# Patient Record
Sex: FEMALE | Race: WHITE | Employment: OTHER | URBAN - METROPOLITAN AREA
[De-identification: names, ages, dates, MRNs, and addresses within clinical notes are randomized per-mention and may not be internally consistent; named-entity substitution may affect disease eponyms.]

---

## 2017-01-31 ENCOUNTER — OFFICE VISIT (OUTPATIENT)
Dept: INTERNAL MEDICINE CLINIC | Age: 67
End: 2017-01-31

## 2017-01-31 VITALS
SYSTOLIC BLOOD PRESSURE: 120 MMHG | BODY MASS INDEX: 41.99 KG/M2 | DIASTOLIC BLOOD PRESSURE: 82 MMHG | HEART RATE: 72 BPM | HEIGHT: 63 IN | WEIGHT: 237 LBS

## 2017-01-31 DIAGNOSIS — M54.42 CHRONIC BILATERAL LOW BACK PAIN WITH BILATERAL SCIATICA: Primary | ICD-10-CM

## 2017-01-31 DIAGNOSIS — F51.04 PSYCHOPHYSIOLOGICAL INSOMNIA: ICD-10-CM

## 2017-01-31 DIAGNOSIS — M54.41 CHRONIC BILATERAL LOW BACK PAIN WITH BILATERAL SCIATICA: Primary | ICD-10-CM

## 2017-01-31 DIAGNOSIS — F32.1 MODERATE SINGLE CURRENT EPISODE OF MAJOR DEPRESSIVE DISORDER (HCC): Chronic | ICD-10-CM

## 2017-01-31 DIAGNOSIS — F41.9 ANXIETY: ICD-10-CM

## 2017-01-31 DIAGNOSIS — G89.29 CHRONIC BILATERAL LOW BACK PAIN WITH BILATERAL SCIATICA: Primary | ICD-10-CM

## 2017-01-31 DIAGNOSIS — K21.9 GASTROESOPHAGEAL REFLUX DISEASE, ESOPHAGITIS PRESENCE NOT SPECIFIED: ICD-10-CM

## 2017-01-31 DIAGNOSIS — M17.12 PRIMARY OSTEOARTHRITIS OF LEFT KNEE: ICD-10-CM

## 2017-01-31 DIAGNOSIS — E66.01 MORBID OBESITY DUE TO EXCESS CALORIES (HCC): ICD-10-CM

## 2017-01-31 PROCEDURE — 99214 OFFICE O/P EST MOD 30 MIN: CPT | Performed by: INTERNAL MEDICINE

## 2017-01-31 RX ORDER — HYDROCODONE BITARTRATE AND ACETAMINOPHEN 5; 300 MG/1; MG/1
TABLET ORAL
Qty: 60 TABLET | Refills: 0 | Status: SHIPPED | OUTPATIENT
Start: 2017-01-31 | End: 2017-03-16 | Stop reason: SDUPTHER

## 2017-01-31 RX ORDER — GABAPENTIN 100 MG/1
100 CAPSULE ORAL 3 TIMES DAILY
Qty: 90 CAPSULE | Refills: 2 | Status: SHIPPED | OUTPATIENT
Start: 2017-01-31 | End: 2017-07-18 | Stop reason: SDUPTHER

## 2017-01-31 RX ORDER — ALPRAZOLAM 1 MG/1
TABLET ORAL
Qty: 60 TABLET | Refills: 2 | Status: SHIPPED | OUTPATIENT
Start: 2017-02-15 | End: 2017-06-22 | Stop reason: SDUPTHER

## 2017-01-31 RX ORDER — ZOLPIDEM TARTRATE 10 MG/1
TABLET ORAL
Qty: 30 TABLET | Refills: 2 | Status: SHIPPED | OUTPATIENT
Start: 2017-01-31 | End: 2017-05-30 | Stop reason: SDUPTHER

## 2017-03-16 RX ORDER — HYDROCODONE BITARTRATE AND ACETAMINOPHEN 5; 300 MG/1; MG/1
TABLET ORAL
Qty: 60 TABLET | Refills: 0 | Status: SHIPPED | OUTPATIENT
Start: 2017-03-16 | End: 2017-04-25 | Stop reason: SDUPTHER

## 2017-04-03 DIAGNOSIS — F51.04 PSYCHOPHYSIOLOGICAL INSOMNIA: ICD-10-CM

## 2017-04-03 RX ORDER — TRAZODONE HYDROCHLORIDE 50 MG/1
50 TABLET ORAL NIGHTLY
Qty: 30 TABLET | Refills: 5 | Status: SHIPPED | OUTPATIENT
Start: 2017-04-03 | End: 2017-11-28 | Stop reason: SDUPTHER

## 2017-04-25 RX ORDER — HYDROCODONE BITARTRATE AND ACETAMINOPHEN 5; 300 MG/1; MG/1
TABLET ORAL
Qty: 60 TABLET | Refills: 0 | Status: SHIPPED | OUTPATIENT
Start: 2017-04-25 | End: 2017-05-25 | Stop reason: SDUPTHER

## 2017-04-28 ENCOUNTER — CLINICAL DOCUMENTATION (OUTPATIENT)
Dept: INTERNAL MEDICINE CLINIC | Age: 67
End: 2017-04-28

## 2017-05-02 ENCOUNTER — OFFICE VISIT (OUTPATIENT)
Dept: INTERNAL MEDICINE CLINIC | Age: 67
End: 2017-05-02

## 2017-05-02 VITALS
OXYGEN SATURATION: 97 % | HEART RATE: 75 BPM | SYSTOLIC BLOOD PRESSURE: 145 MMHG | BODY MASS INDEX: 41.98 KG/M2 | DIASTOLIC BLOOD PRESSURE: 69 MMHG | WEIGHT: 237 LBS

## 2017-05-02 DIAGNOSIS — E78.2 MIXED HYPERLIPIDEMIA: ICD-10-CM

## 2017-05-02 DIAGNOSIS — K59.03 DRUG-INDUCED CONSTIPATION: ICD-10-CM

## 2017-05-02 DIAGNOSIS — Z11.59 NEED FOR HEPATITIS C SCREENING TEST: ICD-10-CM

## 2017-05-02 DIAGNOSIS — K91.2 POST-RESECTION MALABSORPTION: Chronic | ICD-10-CM

## 2017-05-02 DIAGNOSIS — G89.29 CHRONIC BILATERAL LOW BACK PAIN WITH BILATERAL SCIATICA: Primary | ICD-10-CM

## 2017-05-02 DIAGNOSIS — F51.04 PSYCHOPHYSIOLOGICAL INSOMNIA: ICD-10-CM

## 2017-05-02 DIAGNOSIS — K21.9 GASTROESOPHAGEAL REFLUX DISEASE, ESOPHAGITIS PRESENCE NOT SPECIFIED: ICD-10-CM

## 2017-05-02 DIAGNOSIS — M54.42 CHRONIC BILATERAL LOW BACK PAIN WITH BILATERAL SCIATICA: Primary | ICD-10-CM

## 2017-05-02 DIAGNOSIS — E66.01 MORBID OBESITY DUE TO EXCESS CALORIES (HCC): ICD-10-CM

## 2017-05-02 DIAGNOSIS — M54.41 CHRONIC BILATERAL LOW BACK PAIN WITH BILATERAL SCIATICA: Primary | ICD-10-CM

## 2017-05-02 DIAGNOSIS — Z11.4 SCREENING FOR HIV WITHOUT PRESENCE OF RISK FACTORS: ICD-10-CM

## 2017-05-02 DIAGNOSIS — M17.12 PRIMARY OSTEOARTHRITIS OF LEFT KNEE: ICD-10-CM

## 2017-05-02 DIAGNOSIS — F41.9 ANXIETY: ICD-10-CM

## 2017-05-02 DIAGNOSIS — F32.4 MAJOR DEPRESSIVE DISORDER WITH SINGLE EPISODE, IN PARTIAL REMISSION (HCC): Chronic | ICD-10-CM

## 2017-05-02 LAB
BASOPHILS ABSOLUTE: 0 K/UL (ref 0–0.2)
BASOPHILS RELATIVE PERCENT: 0.6 %
EOSINOPHILS ABSOLUTE: 0.1 K/UL (ref 0–0.6)
EOSINOPHILS RELATIVE PERCENT: 2.4 %
HCT VFR BLD CALC: 39.5 % (ref 36–48)
HEMOGLOBIN: 12.5 G/DL (ref 12–16)
LYMPHOCYTES ABSOLUTE: 1.2 K/UL (ref 1–5.1)
LYMPHOCYTES RELATIVE PERCENT: 24.1 %
MCH RBC QN AUTO: 31 PG (ref 26–34)
MCHC RBC AUTO-ENTMCNC: 31.7 G/DL (ref 31–36)
MCV RBC AUTO: 97.7 FL (ref 80–100)
MONOCYTES ABSOLUTE: 0.4 K/UL (ref 0–1.3)
MONOCYTES RELATIVE PERCENT: 7.3 %
NEUTROPHILS ABSOLUTE: 3.2 K/UL (ref 1.7–7.7)
NEUTROPHILS RELATIVE PERCENT: 65.6 %
PDW BLD-RTO: 14.5 % (ref 12.4–15.4)
PLATELET # BLD: 260 K/UL (ref 135–450)
PMV BLD AUTO: 8.7 FL (ref 5–10.5)
RBC # BLD: 4.05 M/UL (ref 4–5.2)
WBC # BLD: 4.8 K/UL (ref 4–11)

## 2017-05-02 PROCEDURE — 99214 OFFICE O/P EST MOD 30 MIN: CPT | Performed by: INTERNAL MEDICINE

## 2017-05-03 LAB
A/G RATIO: 1.6 (ref 1.1–2.2)
ALBUMIN SERPL-MCNC: 4 G/DL (ref 3.4–5)
ALP BLD-CCNC: 89 U/L (ref 40–129)
ALT SERPL-CCNC: 14 U/L (ref 10–40)
ANION GAP SERPL CALCULATED.3IONS-SCNC: 15 MMOL/L (ref 3–16)
AST SERPL-CCNC: 19 U/L (ref 15–37)
BILIRUB SERPL-MCNC: 0.3 MG/DL (ref 0–1)
BUN BLDV-MCNC: 8 MG/DL (ref 7–20)
CALCIUM SERPL-MCNC: 8.6 MG/DL (ref 8.3–10.6)
CHLORIDE BLD-SCNC: 107 MMOL/L (ref 99–110)
CHOLESTEROL, TOTAL: 143 MG/DL (ref 0–199)
CO2: 24 MMOL/L (ref 21–32)
CREAT SERPL-MCNC: 0.7 MG/DL (ref 0.6–1.2)
ESTIMATED AVERAGE GLUCOSE: 99.7 MG/DL
FERRITIN: 19.1 NG/ML (ref 15–150)
GFR AFRICAN AMERICAN: >60
GFR NON-AFRICAN AMERICAN: >60
GLOBULIN: 2.5 G/DL
GLUCOSE BLD-MCNC: 85 MG/DL (ref 70–99)
HBA1C MFR BLD: 5.1 %
HDLC SERPL-MCNC: 48 MG/DL (ref 40–60)
HEPATITIS C ANTIBODY INTERPRETATION: NORMAL
LDL CHOLESTEROL CALCULATED: 61 MG/DL
POTASSIUM SERPL-SCNC: 4.8 MMOL/L (ref 3.5–5.1)
SODIUM BLD-SCNC: 146 MMOL/L (ref 136–145)
TOTAL PROTEIN: 6.5 G/DL (ref 6.4–8.2)
TRIGL SERPL-MCNC: 171 MG/DL (ref 0–150)
TSH SERPL DL<=0.05 MIU/L-ACNC: 1.46 UIU/ML (ref 0.27–4.2)
VITAMIN B-12: 998 PG/ML (ref 211–911)
VITAMIN D 25-HYDROXY: 27.3 NG/ML
VLDLC SERPL CALC-MCNC: 34 MG/DL

## 2017-05-05 LAB — HIV-1 AND HIV-2 ANTIBODIES: NEGATIVE

## 2017-05-18 RX ORDER — HYDROCODONE BITARTRATE AND ACETAMINOPHEN 5; 300 MG/1; MG/1
TABLET ORAL
Qty: 60 TABLET | Refills: 0 | Status: CANCELLED | OUTPATIENT
Start: 2017-05-18

## 2017-05-25 ENCOUNTER — TELEPHONE (OUTPATIENT)
Dept: INTERNAL MEDICINE CLINIC | Age: 67
End: 2017-05-25

## 2017-05-25 RX ORDER — HYDROCODONE BITARTRATE AND ACETAMINOPHEN 5; 300 MG/1; MG/1
TABLET ORAL
Qty: 60 TABLET | Refills: 0 | Status: SHIPPED | OUTPATIENT
Start: 2017-05-25 | End: 2017-06-28 | Stop reason: SDUPTHER

## 2017-05-30 RX ORDER — ZOLPIDEM TARTRATE 10 MG/1
TABLET ORAL
Qty: 30 TABLET | Refills: 0 | Status: SHIPPED | OUTPATIENT
Start: 2017-05-30 | End: 2017-06-30 | Stop reason: SDUPTHER

## 2017-06-29 RX ORDER — HYDROCODONE BITARTRATE AND ACETAMINOPHEN 5; 300 MG/1; MG/1
TABLET ORAL
Qty: 60 TABLET | Refills: 0 | Status: SHIPPED | OUTPATIENT
Start: 2017-06-29 | End: 2017-07-28 | Stop reason: SDUPTHER

## 2017-07-03 RX ORDER — ZOLPIDEM TARTRATE 10 MG/1
TABLET ORAL
Qty: 30 TABLET | Refills: 1 | Status: SHIPPED | OUTPATIENT
Start: 2017-07-03 | End: 2017-08-22 | Stop reason: SDUPTHER

## 2017-07-12 RX ORDER — ESOMEPRAZOLE MAGNESIUM 40 MG/1
CAPSULE, DELAYED RELEASE ORAL
Qty: 30 CAPSULE | Refills: 0 | Status: SHIPPED | OUTPATIENT
Start: 2017-07-12 | End: 2017-08-22 | Stop reason: SDUPTHER

## 2017-07-12 RX ORDER — BUPROPION HYDROCHLORIDE 300 MG/1
TABLET ORAL
Qty: 90 TABLET | Refills: 0 | Status: SHIPPED | OUTPATIENT
Start: 2017-07-12 | End: 2017-10-17 | Stop reason: SDUPTHER

## 2017-07-12 RX ORDER — ESCITALOPRAM OXALATE 10 MG/1
TABLET ORAL
Qty: 90 TABLET | Refills: 0 | Status: SHIPPED | OUTPATIENT
Start: 2017-07-12 | End: 2017-10-17 | Stop reason: SDUPTHER

## 2017-07-12 RX ORDER — LEVOTHYROXINE SODIUM 0.15 MG/1
TABLET ORAL
Qty: 30 TABLET | Refills: 0 | Status: SHIPPED | OUTPATIENT
Start: 2017-07-12 | End: 2017-08-22 | Stop reason: SDUPTHER

## 2017-07-17 ENCOUNTER — CLINICAL DOCUMENTATION (OUTPATIENT)
Dept: INTERNAL MEDICINE CLINIC | Age: 67
End: 2017-07-17

## 2017-07-18 RX ORDER — GABAPENTIN 100 MG/1
CAPSULE ORAL
Qty: 90 CAPSULE | Refills: 2 | Status: SHIPPED | OUTPATIENT
Start: 2017-07-18 | End: 2017-10-23 | Stop reason: SDUPTHER

## 2017-07-28 RX ORDER — HYDROCODONE BITARTRATE AND ACETAMINOPHEN 5; 300 MG/1; MG/1
TABLET ORAL
Qty: 60 TABLET | Refills: 0 | Status: SHIPPED | OUTPATIENT
Start: 2017-07-28 | End: 2017-09-18 | Stop reason: SDUPTHER

## 2017-08-01 ENCOUNTER — TELEPHONE (OUTPATIENT)
Dept: INTERNAL MEDICINE CLINIC | Age: 67
End: 2017-08-01

## 2017-08-22 ENCOUNTER — OFFICE VISIT (OUTPATIENT)
Dept: INTERNAL MEDICINE CLINIC | Age: 67
End: 2017-08-22

## 2017-08-22 VITALS
HEART RATE: 85 BPM | OXYGEN SATURATION: 94 % | WEIGHT: 227 LBS | DIASTOLIC BLOOD PRESSURE: 70 MMHG | BODY MASS INDEX: 40.21 KG/M2 | SYSTOLIC BLOOD PRESSURE: 118 MMHG

## 2017-08-22 DIAGNOSIS — K59.03 DRUG-INDUCED CONSTIPATION: ICD-10-CM

## 2017-08-22 DIAGNOSIS — F32.4 MAJOR DEPRESSIVE DISORDER WITH SINGLE EPISODE, IN PARTIAL REMISSION (HCC): Chronic | ICD-10-CM

## 2017-08-22 DIAGNOSIS — K21.9 GASTROESOPHAGEAL REFLUX DISEASE, ESOPHAGITIS PRESENCE NOT SPECIFIED: ICD-10-CM

## 2017-08-22 DIAGNOSIS — F51.04 PSYCHOPHYSIOLOGICAL INSOMNIA: ICD-10-CM

## 2017-08-22 DIAGNOSIS — M54.42 CHRONIC BILATERAL LOW BACK PAIN WITH BILATERAL SCIATICA: Primary | ICD-10-CM

## 2017-08-22 DIAGNOSIS — M54.41 CHRONIC BILATERAL LOW BACK PAIN WITH BILATERAL SCIATICA: Primary | ICD-10-CM

## 2017-08-22 DIAGNOSIS — M17.12 PRIMARY OSTEOARTHRITIS OF LEFT KNEE: ICD-10-CM

## 2017-08-22 DIAGNOSIS — F41.9 ANXIETY: ICD-10-CM

## 2017-08-22 DIAGNOSIS — G89.29 CHRONIC BILATERAL LOW BACK PAIN WITH BILATERAL SCIATICA: Primary | ICD-10-CM

## 2017-08-22 PROCEDURE — 99214 OFFICE O/P EST MOD 30 MIN: CPT | Performed by: INTERNAL MEDICINE

## 2017-08-22 RX ORDER — ESOMEPRAZOLE MAGNESIUM 40 MG/1
CAPSULE, DELAYED RELEASE ORAL
Qty: 30 CAPSULE | Refills: 5 | Status: SHIPPED | OUTPATIENT
Start: 2017-08-22 | End: 2017-09-15

## 2017-08-22 RX ORDER — ZOLPIDEM TARTRATE 5 MG/1
TABLET ORAL
Qty: 30 TABLET | Refills: 5 | Status: SHIPPED | OUTPATIENT
Start: 2017-08-22 | End: 2018-02-19 | Stop reason: SDUPTHER

## 2017-08-22 RX ORDER — LEVOTHYROXINE SODIUM 0.15 MG/1
TABLET ORAL
Qty: 30 TABLET | Refills: 5 | Status: SHIPPED | OUTPATIENT
Start: 2017-08-22 | End: 2017-09-15

## 2017-08-22 ASSESSMENT — PATIENT HEALTH QUESTIONNAIRE - PHQ9
SUM OF ALL RESPONSES TO PHQ9 QUESTIONS 1 & 2: 1
SUM OF ALL RESPONSES TO PHQ QUESTIONS 1-9: 1
1. LITTLE INTEREST OR PLEASURE IN DOING THINGS: 0
2. FEELING DOWN, DEPRESSED OR HOPELESS: 1

## 2017-09-01 ENCOUNTER — HOSPITAL ENCOUNTER (OUTPATIENT)
Dept: CT IMAGING | Age: 67
Discharge: HOME | End: 2017-09-01
Attending: INTERNAL MEDICINE | Admitting: INTERNAL MEDICINE

## 2017-09-01 DIAGNOSIS — R10.10 PAIN OF UPPER ABDOMEN: ICD-10-CM

## 2017-09-01 LAB
BUN BLDV-MCNC: 13 MG/DL (ref 7–20)
CREAT SERPL-MCNC: 0.8 MG/DL (ref 0.6–1.2)
GFR AFRICAN AMERICAN: >60
GFR NON-AFRICAN AMERICAN: >60

## 2017-09-15 RX ORDER — ESOMEPRAZOLE MAGNESIUM 40 MG/1
CAPSULE, DELAYED RELEASE ORAL
Qty: 30 CAPSULE | Refills: 5 | Status: SHIPPED | OUTPATIENT
Start: 2017-09-15 | End: 2018-05-07 | Stop reason: SDUPTHER

## 2017-09-15 RX ORDER — LEVOTHYROXINE SODIUM 0.15 MG/1
TABLET ORAL
Qty: 30 TABLET | Refills: 5 | Status: SHIPPED | OUTPATIENT
Start: 2017-09-15 | End: 2018-06-05 | Stop reason: SDUPTHER

## 2017-09-18 ENCOUNTER — TELEPHONE (OUTPATIENT)
Dept: INTERNAL MEDICINE CLINIC | Age: 67
End: 2017-09-18

## 2017-09-18 RX ORDER — HYDROCODONE BITARTRATE AND ACETAMINOPHEN 5; 300 MG/1; MG/1
1 TABLET ORAL EVERY 6 HOURS PRN
Qty: 60 TABLET | Refills: 0 | Status: SHIPPED | OUTPATIENT
Start: 2017-09-18 | End: 2017-10-19 | Stop reason: SDUPTHER

## 2017-09-18 RX ORDER — ALPRAZOLAM 1 MG/1
TABLET ORAL
Qty: 60 TABLET | Refills: 1 | Status: SHIPPED | OUTPATIENT
Start: 2017-09-18 | End: 2017-12-18 | Stop reason: SDUPTHER

## 2017-10-03 ENCOUNTER — CLINICAL DOCUMENTATION (OUTPATIENT)
Dept: INTERNAL MEDICINE CLINIC | Age: 67
End: 2017-10-03

## 2017-10-03 ENCOUNTER — OFFICE VISIT (OUTPATIENT)
Dept: INTERNAL MEDICINE CLINIC | Age: 67
End: 2017-10-03

## 2017-10-03 VITALS
SYSTOLIC BLOOD PRESSURE: 129 MMHG | HEIGHT: 63 IN | BODY MASS INDEX: 40.22 KG/M2 | WEIGHT: 227 LBS | DIASTOLIC BLOOD PRESSURE: 76 MMHG | HEART RATE: 68 BPM | RESPIRATION RATE: 14 BRPM

## 2017-10-03 DIAGNOSIS — E66.01 SEVERE OBESITY (BMI >= 40) (HCC): Primary | ICD-10-CM

## 2017-10-03 DIAGNOSIS — H25.9 AGE-RELATED CATARACT OF RIGHT EYE, UNSPECIFIED AGE-RELATED CATARACT TYPE: ICD-10-CM

## 2017-10-03 DIAGNOSIS — H43.391 VITREOUS FLOATERS OF RIGHT EYE: ICD-10-CM

## 2017-10-03 PROCEDURE — 99214 OFFICE O/P EST MOD 30 MIN: CPT | Performed by: INTERNAL MEDICINE

## 2017-10-03 PROCEDURE — 90471 IMMUNIZATION ADMIN: CPT | Performed by: INTERNAL MEDICINE

## 2017-10-03 PROCEDURE — 90686 IIV4 VACC NO PRSV 0.5 ML IM: CPT | Performed by: INTERNAL MEDICINE

## 2017-10-03 ASSESSMENT — ENCOUNTER SYMPTOMS
EYES NEGATIVE: 1
RESPIRATORY NEGATIVE: 1
GASTROINTESTINAL NEGATIVE: 1
BACK PAIN: 1

## 2017-10-03 NOTE — MR AVS SNAPSHOT
After Visit Summary             Jonathan Stout   10/3/2017 9:00 AM   Office Visit    Description:  Female : 1950   Provider:  Evan Birmingham MD   Department:  [address] [place] [address] Primary Care              Your Follow-Up and Future Appointments         Below is a list of your follow-up and future appointments. This may not be a complete list as you may have made appointments directly with providers that we are not aware of or your providers may have made some for you. Please call your providers to confirm appointments. It is important to keep your appointments. Please bring your current insurance card, photo ID, co-pay, and all medication bottles to your appointment. If self-pay, payment is expected at the time of service. Your To-Do List     Future Appointments Provider Department Dept Phone    2017 10:30 AM Evan Birmingham MD [address] [place] [address] Primary Care [phone]    Please arrive 15 minutes prior to appointment, bring photo ID and insurance card. Information from Your Visit        Department     Name Address Phone Fax    [facility] [place] [place]) [facility] [address] [place] [address] [phone]      You Were Seen for:         Comments    Severe obesity (BMI >= 40) ([facility]   [[id number]]         Vital Signs     Blood Pressure Pulse Respirations Height Weight Body Mass Index    129/76 68 14 5' 3\" (1.6 m) 227 lb (103 kg) 40.21 kg/m2    Smoking Status                   Never Smoker           Additional Information about your Body Mass Index (BMI)           Your BMI as listed above is considered obese (30 or more). BMI is an estimate of body fat, calculated from your height and weight. The higher your BMI, the greater your risk of heart disease, high blood pressure, type 2 diabetes, stroke, gallstones, arthritis, sleep apnea, and certain cancers. BMI is not perfect.   It may overestimate body fat in athletes and people who are more muscular. Even a small weight loss (between 5 and 10 percent of your current weight) by decreasing your calorie intake and becoming more physically active will help lower your risk of developing or worsening diseases associated with obesity. Learn more at: [email or web address]          Instructions         Cataracts: Care Instructions  Your Care Instructions    A cataract is a clouding of the lens of the eye. The lens focuses light on the retina at the back of the eye. Cataracts block some of the light and make it harder for you to see clearly. Cataracts often develop when you get older. Most cataracts grow slowly. At first, you may just need stronger glasses to help you see better. Later, if the cataracts grow and begin to seriously impair your vision, you can have surgery to remove them. Follow-up care is a key part of your treatment and safety. Be sure to make and go to all appointments, and call your doctor if you are having problems. Its also a good idea to know your test results and keep a list of the medicines you take. How can you care for yourself at home? · Move room lights and use window shades to avoid glare. · Use more lighting or higher-watt bulbs. · Use a magnifying glass for reading. Look for large-print books and other reading material to make reading more enjoyable. · Have your eyes checked regularly, and update your glasses when needed. · Wear sunglasses to block out harmful sunlight. Buy sunglasses that screen out ultraviolet A and B (UVA and UVB) rays. · Do not smoke. Smoking can make cataracts worse. If you need help quitting, talk to your doctor about stop-smoking programs and medicines. These can increase your chances of quitting for good. When should you call for help? Watch closely for changes in your health, and be sure to contact your doctor if:  · Your vision is getting worse. Our records indicate that you have an active [employer] account. You can view your After Visit Summary by going to [email or web address] and logging in with your [employer] username and password. If you don't have a [employer] username and password but a parent or guardian has access to your record, the parent or guardian should login with their own [employer] username and password and access your record to view the After Visit Summary. Additional Information  If you have questions, please contact the physician practice where you receive care. Remember, [employer] is NOT to be used for urgent needs. For medical emergencies, dial 911. For questions regarding your [employer] account call [phone]. If you have a clinical question, please call your doctor's office.

## 2017-10-03 NOTE — PROGRESS NOTES
Vaccine Information Sheet, \"Influenza - Inactivated\"  given to Otoniel Wild, or parent/legal guardian of  Otoniel Wild and verbalized understanding. Patient responses:    Have you ever had a reaction to a flu vaccine? No  Are you able to eat eggs without adverse effects? Yes  Do you have any current illness? No  Have you ever had Guillian [place] Syndrome? No    Flu vaccine given per order. Please see immunization tab.

## 2017-10-03 NOTE — PROGRESS NOTES
 Cholecalciferol (VITAMIN D) 1000 UNIT CAPS Take 3 capsules by mouth daily. Chief Complaint   Patient presents with   Melanee Fraise     Vitrectomy/[facility] [address] Maxim Brambila & Dr Emy Hernandez @ [facility] 10/23/17 Fax # [phone]     The following comorbid conditions were considered relevant to operative risk, so their status/stability was assessed:  Obesity. No witnessed apneic episodes or loud snoring. No am somnolence or headaches. HPI:    This patient presents to the office today for a preoperative consultation at the request of surgeon, Dr. Maxim Brambila and Dr. Emy Hernandez, who plans on performing vitrectomy/cataract surgery on October 23 at [facility]. The current problem began many years ago, and symptoms have been worsening with time. Cataract is now impairing night vision, and large floaters affect reading, work and leisure activities. Conservative therapy: watchful waiting.     Planned anesthesia: Local   Known anesthesia problems: None   Bleeding risk: No recent or remote history of abnormal bleeding  Personal or FH of DVT/PE: No   Recent steroid use: no  Exercise tolerance: Good     Patient Active Problem List   Diagnosis    Severe obesity (BMI >= 40) (HCC)    Chronic bilateral low back pain with sciatica    Post-resection malabsorption    Depression    Anxiety    Hypothyroidism    Family history of bicuspid aortic valve    Osteopenia    GERD (gastroesophageal reflux disease)    Anal fissure    Insomnia    Colon polyps    Osteoarthritis of left knee    Mixed hyperlipidemia    Leg edema, left    Drug-induced constipation       Past Medical History:   Diagnosis Date    Anal fissure 7/16/2013    Anxiety     Colon polyps     Depression     Fibroid uterus     Hypothyroidism     Morbid obesity ([address]     Osteopenia      Past Surgical History:   Procedure Laterality Date    ABDOMINAL HERNIA REPAIR  2008    APPENDECTOMY  Age 27    GASTRIC BYPASS SURGERY  2000    ANTONI AND BSO  1996    Fibroids Family History   Problem Relation Age of Onset    Diabetes Father      Type II    Diabetes Sister      Type II    Diabetes Brother      Type II    Breast Cancer Sister 58    Lung Cancer Mother      Smoker    Heart Disease Sister      Bicuspid aortic valve x2     Social History     Social History    Marital status:      Spouse name: N/A    Number of children: N/A    Years of education: N/A     Occupational History    Computer work      Social History Main Topics    Smoking status: Never Smoker    Smokeless tobacco: Never Used    Alcohol use No    Drug use: No    Sexual activity: No     Other Topics Concern    Not on file     Social History Narrative       Review of Systems   Constitutional: Negative. HENT: Negative. Eyes: Negative. See above   Respiratory: Negative. Cardiovascular: Negative. Gastrointestinal: Negative. Genitourinary: Negative. Musculoskeletal: Positive for back pain (Chronic). Skin: Negative. Neurological: Negative. Psychiatric/Behavioral: Negative. Physical Exam   Constitutional: She is oriented to person, place, and time. She appears well-developed and well-nourished. No distress. HENT:   Head: Normocephalic and atraumatic. Mouth/Throat: Uvula is midline, oropharynx is clear and moist and mucous membranes are normal.   Eyes: Conjunctivae and EOM are normal. Pupils are equal, round, and reactive to light. Neck: Trachea normal and normal range of motion. Neck supple. No JVD present. Carotid bruit is not present. No thyroid mass and no thyromegaly present. Cardiovascular: Normal rate, regular rhythm, normal heart sounds and intact distal pulses. Exam reveals no gallop and no friction rub. No murmur heard. Pulmonary/Chest: Effort normal and breath sounds normal. No respiratory distress. She has no wheezes. She has no rales. Abdominal: Soft.  Normal aorta and bowel sounds are normal. She exhibits no distension and no

## 2017-10-03 NOTE — PATIENT INSTRUCTIONS
link.  Current as of: March 3, 2017  Content Version: 11.3  © 2401-9049 [employer]. Care instructions adapted under license by [facility] ([facility]). If you have questions about a medical condition or this instruction, always ask your healthcare professional. [address] any warranty or liability for your use of this information.

## 2017-10-17 RX ORDER — ESCITALOPRAM OXALATE 10 MG/1
TABLET ORAL
Qty: 90 TABLET | Refills: 1 | Status: SHIPPED | OUTPATIENT
Start: 2017-10-17 | End: 2017-11-28 | Stop reason: SDUPTHER

## 2017-10-17 RX ORDER — BUPROPION HYDROCHLORIDE 300 MG/1
TABLET ORAL
Qty: 90 TABLET | Refills: 1 | Status: SHIPPED | OUTPATIENT
Start: 2017-10-17 | End: 2018-06-05 | Stop reason: SDUPTHER

## 2017-10-19 RX ORDER — HYDROCODONE BITARTRATE AND ACETAMINOPHEN 5; 300 MG/1; MG/1
1 TABLET ORAL EVERY 6 HOURS PRN
Qty: 15 TABLET | Refills: 0 | Status: SHIPPED | OUTPATIENT
Start: 2017-10-19 | End: 2018-03-06

## 2017-10-19 NOTE — TELEPHONE ENCOUNTER
Spoke to patient explained she was to take one-two a week. She was taking half daily. Explained with other medications she needs to back off on the vicodin. She is having eye surgeon on Monday.  Explained if she needed for the surgery would need to come from surgeon, she voice understanding

## 2017-10-23 RX ORDER — GABAPENTIN 100 MG/1
CAPSULE ORAL
Qty: 90 CAPSULE | Refills: 1 | Status: SHIPPED | OUTPATIENT
Start: 2017-10-23 | End: 2018-02-19 | Stop reason: SDUPTHER

## 2017-11-28 ENCOUNTER — OFFICE VISIT (OUTPATIENT)
Dept: INTERNAL MEDICINE CLINIC | Age: 67
End: 2017-11-28

## 2017-11-28 VITALS
BODY MASS INDEX: 40.92 KG/M2 | OXYGEN SATURATION: 97 % | SYSTOLIC BLOOD PRESSURE: 122 MMHG | DIASTOLIC BLOOD PRESSURE: 74 MMHG | WEIGHT: 231 LBS | HEART RATE: 80 BPM

## 2017-11-28 DIAGNOSIS — G25.81 RESTLESS LEG SYNDROME: ICD-10-CM

## 2017-11-28 DIAGNOSIS — G89.29 CHRONIC BILATERAL LOW BACK PAIN WITH BILATERAL SCIATICA: Primary | ICD-10-CM

## 2017-11-28 DIAGNOSIS — Z12.31 ENCOUNTER FOR SCREENING MAMMOGRAM FOR BREAST CANCER: ICD-10-CM

## 2017-11-28 DIAGNOSIS — F51.04 PSYCHOPHYSIOLOGICAL INSOMNIA: ICD-10-CM

## 2017-11-28 DIAGNOSIS — Z23 NEED FOR PROPHYLACTIC VACCINATION AGAINST STREPTOCOCCUS PNEUMONIAE (PNEUMOCOCCUS): ICD-10-CM

## 2017-11-28 DIAGNOSIS — Z78.0 POST-MENOPAUSAL: ICD-10-CM

## 2017-11-28 DIAGNOSIS — F32.4 MAJOR DEPRESSIVE DISORDER WITH SINGLE EPISODE, IN PARTIAL REMISSION (HCC): Chronic | ICD-10-CM

## 2017-11-28 DIAGNOSIS — M54.42 CHRONIC BILATERAL LOW BACK PAIN WITH BILATERAL SCIATICA: Primary | ICD-10-CM

## 2017-11-28 DIAGNOSIS — F41.9 ANXIETY: ICD-10-CM

## 2017-11-28 DIAGNOSIS — M17.12 PRIMARY OSTEOARTHRITIS OF LEFT KNEE: ICD-10-CM

## 2017-11-28 DIAGNOSIS — M54.41 CHRONIC BILATERAL LOW BACK PAIN WITH BILATERAL SCIATICA: Primary | ICD-10-CM

## 2017-11-28 PROCEDURE — 90670 PCV13 VACCINE IM: CPT | Performed by: INTERNAL MEDICINE

## 2017-11-28 PROCEDURE — 99214 OFFICE O/P EST MOD 30 MIN: CPT | Performed by: INTERNAL MEDICINE

## 2017-11-28 PROCEDURE — 90471 IMMUNIZATION ADMIN: CPT | Performed by: INTERNAL MEDICINE

## 2017-11-28 RX ORDER — LANOLIN ALCOHOL/MO/W.PET/CERES
325 CREAM (GRAM) TOPICAL
Qty: 90 TABLET | Refills: 3 | Status: SHIPPED | OUTPATIENT
Start: 2017-11-28 | End: 2021-01-19

## 2017-11-28 RX ORDER — ESCITALOPRAM OXALATE 10 MG/1
5 TABLET ORAL DAILY
COMMUNITY
Start: 2017-11-28 | End: 2019-02-04 | Stop reason: SDUPTHER

## 2017-11-28 RX ORDER — TRAZODONE HYDROCHLORIDE 50 MG/1
100 TABLET ORAL NIGHTLY
Qty: 60 TABLET | Refills: 5 | Status: SHIPPED | OUTPATIENT
Start: 2017-11-28 | End: 2018-06-03 | Stop reason: SDUPTHER

## 2017-11-28 NOTE — PROGRESS NOTES
gabapentin (NEURONTIN) 100 MG capsule TAKE 1 CAPSULE BY MOUTH THREE TIMES DAILY Yes Cori Wahl MD   escitalopram (LEXAPRO) 10 MG tablet TAKE 1 TABLET BY MOUTH ONCE A DAY Yes Cori Wahl MD   buPROPion (WELLBUTRIN XL) 300 MG extended release tablet TAKE 1 TABLET BY MOUTH EVERY MORNING Yes Cori Wahl MD   ALPRAZolam (XANAX) 1 MG tablet TAKE 1 TO 2 TABLETS BY MOUTH EVERY NIGHT AS NEEDED FOR ANXIETY OR INSOMNIA Yes Cori Wahl MD   levothyroxine (SYNTHROID) 150 MCG tablet TAKE 1 TABLET BY MOUTH ONCE A DAY Yes Cori Wahl MD   esomeprazole ([address]) 40 MG delayed release capsule TAKE 1 CAPSULE BY MOUTH EVERY MORNING BEFORE BREAKFAST Yes Cori Wahl MD   Krill Oil 300 MG CAPS Take 1 capsule by mouth daily Yes Historical Provider, MD   traZODone (DESYREL) 50 MG tablet Take 1 tablet by mouth nightly Yes Cori Wahl MD   Cyanocobalamin (VITAMIN B 12 PO) Take 1,000 mcg by mouth daily. Yes Historical Provider, MD   fish oil-omega-3 fatty acids 1000 MG capsule Take 2 g by mouth daily. Yes Historical Provider, MD   Multiple Minerals-Vitamins (CITRACAL PLUS PO) Take 1 tablet by mouth daily. Yes Historical Provider, MD   Cholecalciferol (VITAMIN D) 1000 UNIT CAPS Take 3 capsules by mouth daily. Yes Historical Provider, MD   HYDROcodone-acetaminophen (VICODIN) 5-300 MG TABS Take 1 tablet by mouth every 6 hours as needed for Pain  Max- 2/24 hr.  Cori Wahl MD   zolpidem (AMBIEN) 5 MG tablet Take 1/2 to 1 tablet by mouth nightly as needed for insomnia. Cori Wahl MD   [facility] Natural Products (OSTEO BI-FLEX ADV TRIPLE ST) TABS Use as directed on bottle  Cori Wahl MD     Vitals:    11/28/17 1031   BP: 122/74   Pulse: 80   SpO2: 97%   Weight: 231 lb (104.8 kg)     Body mass index is 40.92 kg/m².      Wt Readings from Last 3 Encounters:   11/28/17 231 lb (104.8 kg)   10/03/17 227 lb (103 kg)   08/22/17 227 lb (103 kg)     BP Readings from Last 3 Encounters:   11/28/17 122/74 10/03/17 129/76   08/22/17 118/70      CC:  Patient presents for re-evaluation of the following medical concerns      HPI  Chronic Joint Pain:  Patient presents for follow-up of pain in left knee, low back pain. Back pain is better- only significant with prolonged walking. Knee pain is unchanged. On average, pain is perceived as mild- moderate (4-6 pain scale). Restless legs sensation intermittent- last ferritin 19.1. Current treatment: Celebrex 200 mg qd, gabapentin 200 mg tid, no longer taking Vicodin. Medication side effects: none. Recent diagnostic testing: none. Insomnia:  Current treatment: prescription sleep aid-  trazodone  mg qhs, which has been effective. No longer requiring Ambien. No adverse effects. Mood Disorder: Patient presents for follow-up of depression and anxiety disorder. Current complaints include: anhedonia, excessive worry, grief over losing - continues to slowly improve. No further panic attacks. Symptoms/signs of jojo: none. Current treatment includes: Lexapro- 10 mg qd and Wellbutrin- 300 mg qam, takes Xanax about once daily. Medication side effects: tremor. Review of Systems  As documented in HPI    Physical Exam   Constitutional: She is oriented to person, place, and time. She appears well-developed and well-nourished. No distress. HENT:   Mouth/Throat: Oropharynx is clear and moist and mucous membranes are normal.   Eyes: Conjunctivae are normal.   Neck: Carotid bruit is not present. No thyroid mass and no thyromegaly present. Cardiovascular: Normal rate, regular rhythm, normal heart sounds, intact distal pulses and normal pulses. Exam reveals no gallop and no friction rub. No murmur heard. Pulmonary/Chest: Effort normal and breath sounds normal. No respiratory distress. She has no wheezes. She has no rhonchi. She has no rales. Musculoskeletal: She exhibits no edema. There is no tenderness over the lumbar spine and sacral spine.

## 2017-11-28 NOTE — PATIENT INSTRUCTIONS
What You Should Know About Serotonin Syndrome     Serotonin is a chemical found in the body. It helps your nerves and brain work. Some drugs increase serotonin levels in the body. Prescription drugs for depression, headache, and pain are examples of these drugs. Some nonprescription drugs such as Nichos wort and the cough medicine dextromethorphan (Robitussin DM and others) can also increase serotonin levels. These drugs can cause a condition called serotonin syndrome. This is especially true when two or more of these drugs are used at the same time. Serotonin syndrome is extremely rare. However, its good to know the signs to watch for. Serotonin syndrome can cause death if it isnt treated. You can usually get right back to normal if its treated early. Serotonin syndrome is most likely to happen within 24 hours after:  you start taking a drug that increases serotonin levels  the dose of that drug is increased  Some signs that you might have serotonin syndrome are:  Shakiness  Twitching or stiff muscles  Shivering  Agitation or restlessness  Confusion  Hallucinations  Racing heartbeat  Very high or very low blood pressure  Goose bumps  Diarrhea, nausea, or vomiting  Large pupils  Some signs of serotonin syndrome that are very serious are:  High fever  Heavy sweating  Severe muscle spasms or rigid muscles  Seizures  Uneven heartbeat  Passing out  Call your prescriber right away or go to the emergency room if you think you have serotonin syndrome. Do not wait if you feel really bad or if you are getting worse. Be sure to bring all of the medicines you are taking. Or bring a complete list of them. Include vitamins and supplements. This can help your prescriber figure out if you have serotonin syndrome.   [February 2012]

## 2017-12-18 RX ORDER — ALPRAZOLAM 1 MG/1
TABLET ORAL
Qty: 60 TABLET | Refills: 0 | Status: SHIPPED | OUTPATIENT
Start: 2017-12-18 | End: 2018-01-24 | Stop reason: SDUPTHER

## 2018-01-19 ENCOUNTER — TELEPHONE (OUTPATIENT)
Dept: INTERNAL MEDICINE CLINIC | Age: 68
End: 2018-01-19

## 2018-01-19 NOTE — TELEPHONE ENCOUNTER
Patient calling about mammo results. States she needs additional testing that needs to be ordered by PCP. Please call patient back on her cell phone.

## 2018-01-25 RX ORDER — ALPRAZOLAM 1 MG/1
TABLET ORAL
Qty: 60 TABLET | Refills: 0 | Status: SHIPPED | OUTPATIENT
Start: 2018-01-25 | End: 2018-03-06 | Stop reason: SDUPTHER

## 2018-02-13 ENCOUNTER — OFFICE VISIT (OUTPATIENT)
Dept: INTERNAL MEDICINE CLINIC | Age: 68
End: 2018-02-13

## 2018-02-13 VITALS
HEART RATE: 66 BPM | SYSTOLIC BLOOD PRESSURE: 120 MMHG | TEMPERATURE: 98.3 F | WEIGHT: 227 LBS | OXYGEN SATURATION: 97 % | DIASTOLIC BLOOD PRESSURE: 66 MMHG | HEIGHT: 62 IN | BODY MASS INDEX: 41.77 KG/M2

## 2018-02-13 DIAGNOSIS — H25.9 SENILE CATARACT OF LEFT EYE, UNSPECIFIED AGE-RELATED CATARACT TYPE: Primary | ICD-10-CM

## 2018-02-13 DIAGNOSIS — E66.01 SEVERE OBESITY (BMI >= 40) (HCC): ICD-10-CM

## 2018-02-13 DIAGNOSIS — H43.392 VITREOUS FLOATERS OF LEFT EYE: ICD-10-CM

## 2018-02-13 DIAGNOSIS — E78.2 MIXED HYPERLIPIDEMIA: ICD-10-CM

## 2018-02-13 PROCEDURE — 99242 OFF/OP CONSLTJ NEW/EST SF 20: CPT | Performed by: INTERNAL MEDICINE

## 2018-02-13 ASSESSMENT — ENCOUNTER SYMPTOMS
RESPIRATORY NEGATIVE: 1
GASTROINTESTINAL NEGATIVE: 1
BACK PAIN: 1
EYES NEGATIVE: 1

## 2018-02-13 NOTE — PROGRESS NOTES
Preoperative Consultation      Mark Payne  YOB: 1950    Date of Service:  2/13/2018    Vitals:    02/13/18 1024   BP: 120/66   Pulse: 66   Temp: 98.3 °F (36.8 °C)   TempSrc: Temporal   SpO2: 97%   Weight: 227 lb (103 kg)   Height: 5' 2\" (1.575 m)      Wt Readings from Last 2 Encounters:   02/13/18 227 lb (103 kg)   11/28/17 231 lb (104.8 kg)     BP Readings from Last 3 Encounters:   02/13/18 120/66   11/28/17 122/74   10/03/17 129/76        Allergies   Allergen Reactions    Codeine Nausea Only     Outpatient Prescriptions Marked as Taking for the 2/13/18 encounter (Office Visit) with Gary Bee MD   Medication Sig Dispense Refill    ALPRAZolam (XANAX) 1 MG tablet TAKE 1- 2 TABLETS BY MOUTH EVERY NIGHT AT BEDTIME AS NEEDED FOR INSOMNIA 60 tablet 0    traZODone (DESYREL) 50 MG tablet Take 2 tablets by mouth nightly 60 tablet 5    escitalopram (LEXAPRO) 10 MG tablet Take 0.5 tablets by mouth daily      ferrous sulfate (FE TABS) 325 (65 Fe) MG EC tablet Take 1 tablet by mouth daily (with breakfast) 90 tablet 3    gabapentin (NEURONTIN) 100 MG capsule TAKE 1 CAPSULE BY MOUTH THREE TIMES DAILY 90 capsule 1    buPROPion (WELLBUTRIN XL) 300 MG extended release tablet TAKE 1 TABLET BY MOUTH EVERY MORNING 90 tablet 1    levothyroxine (SYNTHROID) 150 MCG tablet TAKE 1 TABLET BY MOUTH ONCE A DAY 30 tablet 5    esomeprazole (NEXIUM) 40 MG delayed release capsule TAKE 1 CAPSULE BY MOUTH EVERY MORNING BEFORE BREAKFAST 30 capsule 5    Krill Oil 300 MG CAPS Take 1 capsule by mouth daily      Cyanocobalamin (VITAMIN B 12 PO) Take 1,000 mcg by mouth daily.  fish oil-omega-3 fatty acids 1000 MG capsule Take 2 g by mouth daily.  Multiple Minerals-Vitamins (CITRACAL PLUS PO) Take 1 tablet by mouth daily.  Cholecalciferol (VITAMIN D) 1000 UNIT CAPS Take 3 capsules by mouth daily.          Chief Complaint   Patient presents with    Cataract     left with vitrectomy, 2/26/18, Dr Emy Baumann  Hypothyroidism     Morbid obesity ([address]     Osteopenia      Past Surgical History:   Procedure Laterality Date    ABDOMINAL HERNIA REPAIR  2008    APPENDECTOMY  Age 27    GASTRIC BYPASS SURGERY  2000    ANTONI AND BSO  1996    Fibroids     Family History   Problem Relation Age of Onset    Diabetes Father      Type II    Diabetes Sister      Type II    Diabetes Brother      Type II    Breast Cancer Sister 58    Lung Cancer Mother      Smoker    Heart Disease Sister      Bicuspid aortic valve x2     Social History     Social History    Marital status:      Spouse name: N/A    Number of children: N/A    Years of education: N/A     Occupational History    Computer work      Social History Main Topics    Smoking status: Never Smoker    Smokeless tobacco: Never Used    Alcohol use No    Drug use: No    Sexual activity: No     Other Topics Concern    Not on file     Social History Narrative    No narrative on file       Review of Systems   Constitutional: Negative. HENT: Negative. Eyes: Negative. Respiratory: Negative. Cardiovascular: Negative. Gastrointestinal: Negative. Genitourinary: Negative. Musculoskeletal: Positive for arthralgias (Bilateral knee pain- OA) and back pain (chronic low back). Skin: Negative. Neurological: Negative. Psychiatric/Behavioral: Negative. Physical Exam   Constitutional: She is oriented to person, place, and time. She appears well-developed and well-nourished. No distress. HENT:   Head: Normocephalic and atraumatic. Mouth/Throat: Uvula is midline, oropharynx is clear and moist and mucous membranes are normal.   Eyes: Conjunctivae and EOM are normal. Pupils are equal, round, and reactive to light. Neck: Trachea normal and normal range of motion. Neck supple. No JVD present. Carotid bruit is not present. No thyroid mass and no thyromegaly present.    Cardiovascular: Normal rate, regular rhythm, normal heart sounds and intact distal pulses. Exam reveals no gallop and no friction rub. No murmur heard. Pulmonary/Chest: Effort normal and breath sounds normal. No respiratory distress. She has no wheezes. She has no rales. Abdominal: Soft. Normal aorta and bowel sounds are normal. She exhibits no distension and no mass. There is no hepatosplenomegaly. There is no tenderness. Musculoskeletal: She exhibits no edema or tenderness. Neurological: She is alert and oriented to person, place, and time. No cranial nerve deficit. Coordination normal.   Skin: Skin is warm and dry. No rash noted. No erythema. Psychiatric: She has a normal mood and affect. Her behavior is normal.         Assessment/Plan:     1. Senile cataract of left eye, unspecified age-related cataract type  1. Preoperative workup as follows: none  2. Change in medication regimen before surgery: Take Nexium, Synthroid, Wellbutrin XL and Lexapro on morning of surgery with sip of water, and hold all other medications until after surgery. Discontinue fish oil 1 week prior to surgery. 3. Prophylaxis for cardiac events with perioperative beta-blockers:Not indicated    4. Deep vein thrombosis prophylaxis: regimen to be chosen by surgical team  5. No contraindications to planned surgery    2. Vitreous floaters of left eye  See above    3. Mixed hyperlipidemia  Importance of lifestyle changes stressed to help prevent need for cholesterol medication in the future. 4. Severe obesity (BMI >= 40) (HCC)  Patient counseled on diet and exercise.

## 2018-02-19 RX ORDER — ZOLPIDEM TARTRATE 5 MG/1
TABLET ORAL
Qty: 30 TABLET | Refills: 2 | Status: SHIPPED | OUTPATIENT
Start: 2018-02-19 | End: 2018-03-06

## 2018-02-19 RX ORDER — GABAPENTIN 100 MG/1
100 CAPSULE ORAL 3 TIMES DAILY
Qty: 90 CAPSULE | Refills: 2 | Status: SHIPPED | OUTPATIENT
Start: 2018-02-19 | End: 2018-06-05 | Stop reason: SDUPTHER

## 2018-03-05 RX ORDER — ALPRAZOLAM 1 MG/1
TABLET ORAL
Qty: 60 TABLET | Refills: 0 | Status: CANCELLED | OUTPATIENT
Start: 2018-03-05 | End: 2018-04-04

## 2018-03-06 ENCOUNTER — OFFICE VISIT (OUTPATIENT)
Dept: INTERNAL MEDICINE CLINIC | Age: 68
End: 2018-03-06

## 2018-03-06 VITALS
SYSTOLIC BLOOD PRESSURE: 118 MMHG | HEART RATE: 74 BPM | OXYGEN SATURATION: 97 % | WEIGHT: 229 LBS | BODY MASS INDEX: 41.88 KG/M2 | DIASTOLIC BLOOD PRESSURE: 62 MMHG

## 2018-03-06 DIAGNOSIS — K91.2 POST-RESECTION MALABSORPTION: Chronic | ICD-10-CM

## 2018-03-06 DIAGNOSIS — F41.9 ANXIETY: ICD-10-CM

## 2018-03-06 DIAGNOSIS — M54.41 CHRONIC BILATERAL LOW BACK PAIN WITH BILATERAL SCIATICA: Primary | ICD-10-CM

## 2018-03-06 DIAGNOSIS — E78.2 MIXED HYPERLIPIDEMIA: ICD-10-CM

## 2018-03-06 DIAGNOSIS — M54.42 CHRONIC BILATERAL LOW BACK PAIN WITH BILATERAL SCIATICA: Primary | ICD-10-CM

## 2018-03-06 DIAGNOSIS — E03.9 ACQUIRED HYPOTHYROIDISM: Chronic | ICD-10-CM

## 2018-03-06 DIAGNOSIS — F32.4 MAJOR DEPRESSIVE DISORDER WITH SINGLE EPISODE, IN PARTIAL REMISSION (HCC): Chronic | ICD-10-CM

## 2018-03-06 DIAGNOSIS — G89.29 CHRONIC BILATERAL LOW BACK PAIN WITH BILATERAL SCIATICA: Primary | ICD-10-CM

## 2018-03-06 DIAGNOSIS — E66.01 SEVERE OBESITY (BMI >= 40) (HCC): ICD-10-CM

## 2018-03-06 DIAGNOSIS — M17.12 PRIMARY OSTEOARTHRITIS OF LEFT KNEE: ICD-10-CM

## 2018-03-06 DIAGNOSIS — F51.04 PSYCHOPHYSIOLOGICAL INSOMNIA: ICD-10-CM

## 2018-03-06 PROBLEM — H43.392 VITREOUS FLOATERS OF LEFT EYE: Status: RESOLVED | Noted: 2018-02-13 | Resolved: 2018-03-06

## 2018-03-06 PROBLEM — H25.9 AGE-RELATED CATARACT OF LEFT EYE: Status: RESOLVED | Noted: 2018-02-13 | Resolved: 2018-03-06

## 2018-03-06 PROBLEM — H43.391 VITREOUS FLOATERS OF RIGHT EYE: Status: RESOLVED | Noted: 2017-10-03 | Resolved: 2018-03-06

## 2018-03-06 PROCEDURE — 99214 OFFICE O/P EST MOD 30 MIN: CPT | Performed by: INTERNAL MEDICINE

## 2018-03-06 RX ORDER — ALPRAZOLAM 1 MG/1
1 TABLET ORAL 2 TIMES DAILY PRN
Qty: 60 TABLET | Refills: 2 | Status: SHIPPED | OUTPATIENT
Start: 2018-03-06 | End: 2018-06-03 | Stop reason: SDUPTHER

## 2018-03-06 NOTE — PROGRESS NOTES
Encounters:   03/06/18 229 lb (103.9 kg)   02/13/18 227 lb (103 kg)   11/28/17 231 lb (104.8 kg)     BP Readings from Last 3 Encounters:   03/06/18 118/62   02/13/18 120/66   11/28/17 122/74      CC:  Patient presents for re-evaluation of the following medical concerns      HPI  Chronic Joint Pain:  Patient presents for follow-up of pain in left knee, low back pain. Back pain is worse- especially on the right, associated with decreased physical after eye surgery. Knee pain is unchanged. On average, pain is perceived as moderate (4-6 pain scale). Restless legs sensation improved. Current treatment: gabapentin 100 mg qam, 200 mg qhs, Tylenol 1000 mg bid. Medication side effects: none. Recent diagnostic testing: none. Insomnia:  Current treatment: prescription sleep aid-  trazodone 100 mg qhs, Xanax 1 mg which has been partially effective. No adverse effects. Mood Disorder: Patient presents for follow-up of depression and anxiety disorder. Current complaints include: anhedonia, excessive worry, grief over losing - unchanged. No further panic attacks. Symptoms/signs of jojo: none. Current treatment includes: Lexapro- 5 mg qd and Wellbutrin- 300 mg qam, takes Xanax about 2x/week during the day for anxiety. Medication side effects: tremor. Post-resection malabsorption:  Taking daily MVI with iron, 2000 mcg vitamin B12/day, vitamin D 1000 IU/day. Hypothyroidism: Recent symptoms: fatigue, hair loss and dry skin. She denies weight gain, weight loss, cold intolerance, heat intolerance, constipation, diarrhea and palpitations. Patient is  taking her medication consistently on an empty stomach. Lab Results   Component Value Date    TSHREFLEX 1.80 11/13/2013    TSHREFLEX 3.93 07/16/2013     Lab Results   Component Value Date    TSH 1.46 05/02/2017    TSH 1.56 07/26/2016    TSH 1.13 10/29/2015      Hyperlipidemia/obesity:  Eating out less, more fruits and vegetables, less simple carbs.   Less

## 2018-03-06 NOTE — PATIENT INSTRUCTIONS
The medication list shown above is our record of what you are currently taking, including any changes that were made at today's visit. If you find any differences when compared to your medications at home, or have any questions that were not answered at your visit, please contact the office.

## 2018-04-04 ENCOUNTER — HOSPITAL ENCOUNTER (OUTPATIENT)
Dept: OTHER | Age: 68
Discharge: HOME | End: 2018-04-04
Attending: INTERNAL MEDICINE | Admitting: INTERNAL MEDICINE

## 2018-04-04 ENCOUNTER — OFFICE VISIT (OUTPATIENT)
Dept: INTERNAL MEDICINE CLINIC | Age: 68
End: 2018-04-04

## 2018-04-04 VITALS
OXYGEN SATURATION: 97 % | BODY MASS INDEX: 41.52 KG/M2 | TEMPERATURE: 100.2 F | HEART RATE: 98 BPM | DIASTOLIC BLOOD PRESSURE: 70 MMHG | RESPIRATION RATE: 16 BRPM | WEIGHT: 227 LBS | SYSTOLIC BLOOD PRESSURE: 136 MMHG

## 2018-04-04 DIAGNOSIS — R68.89 FLU-LIKE SYMPTOMS: Primary | ICD-10-CM

## 2018-04-04 DIAGNOSIS — J20.9 BRONCHITIS WITH BRONCHOSPASM: ICD-10-CM

## 2018-04-04 PROCEDURE — 87804 INFLUENZA ASSAY W/OPTIC: CPT | Performed by: INTERNAL MEDICINE

## 2018-04-04 PROCEDURE — 99213 OFFICE O/P EST LOW 20 MIN: CPT | Performed by: INTERNAL MEDICINE

## 2018-04-04 RX ORDER — AZITHROMYCIN 250 MG/1
TABLET, FILM COATED ORAL
Qty: 1 PACKET | Refills: 0 | Status: SHIPPED | OUTPATIENT
Start: 2018-04-04 | End: 2018-04-14

## 2018-04-04 RX ORDER — ALBUTEROL SULFATE 90 UG/1
2 AEROSOL, METERED RESPIRATORY (INHALATION) EVERY 6 HOURS PRN
Qty: 1 INHALER | Refills: 3 | Status: SHIPPED | OUTPATIENT
Start: 2018-04-04 | End: 2018-10-04

## 2018-04-04 RX ORDER — GUAIFENESIN AND DEXTROMETHORPHAN HYDROBROMIDE 600; 30 MG/1; MG/1
1-2 TABLET, EXTENDED RELEASE ORAL 2 TIMES DAILY
Qty: 28 TABLET | Refills: 0 | COMMUNITY
Start: 2018-04-04 | End: 2018-10-04

## 2018-04-04 RX ORDER — FLUTICASONE FUROATE AND VILANTEROL 200; 25 UG/1; UG/1
1 POWDER RESPIRATORY (INHALATION) DAILY
Qty: 1 EACH | Refills: 0 | COMMUNITY
Start: 2018-04-04 | End: 2018-09-11 | Stop reason: ALTCHOICE

## 2018-05-29 ENCOUNTER — CLINICAL DOCUMENTATION (OUTPATIENT)
Dept: INTERNAL MEDICINE CLINIC | Age: 68
End: 2018-05-29

## 2018-06-03 DIAGNOSIS — F41.9 ANXIETY: Primary | ICD-10-CM

## 2018-06-03 DIAGNOSIS — F51.04 PSYCHOPHYSIOLOGICAL INSOMNIA: ICD-10-CM

## 2018-06-03 RX ORDER — ALPRAZOLAM 1 MG/1
1 TABLET ORAL 2 TIMES DAILY PRN
Qty: 60 TABLET | Refills: 0 | Status: SHIPPED | OUTPATIENT
Start: 2018-06-03 | End: 2018-07-01 | Stop reason: SDUPTHER

## 2018-06-03 RX ORDER — TRAZODONE HYDROCHLORIDE 50 MG/1
TABLET ORAL
Qty: 60 TABLET | Refills: 5 | Status: SHIPPED | OUTPATIENT
Start: 2018-06-03 | End: 2018-12-04 | Stop reason: SDUPTHER

## 2018-06-04 DIAGNOSIS — F41.9 ANXIETY: ICD-10-CM

## 2018-06-04 DIAGNOSIS — F51.04 PSYCHOPHYSIOLOGICAL INSOMNIA: ICD-10-CM

## 2018-06-04 RX ORDER — TRAZODONE HYDROCHLORIDE 50 MG/1
TABLET ORAL
Qty: 60 TABLET | Refills: 5 | Status: CANCELLED | OUTPATIENT
Start: 2018-06-04

## 2018-06-04 RX ORDER — ALPRAZOLAM 1 MG/1
1 TABLET ORAL 2 TIMES DAILY PRN
Qty: 60 TABLET | Refills: 0 | Status: CANCELLED | OUTPATIENT
Start: 2018-06-04 | End: 2018-07-04

## 2018-06-05 ENCOUNTER — OFFICE VISIT (OUTPATIENT)
Dept: INTERNAL MEDICINE CLINIC | Age: 68
End: 2018-06-05

## 2018-06-05 VITALS
DIASTOLIC BLOOD PRESSURE: 68 MMHG | WEIGHT: 226 LBS | BODY MASS INDEX: 41.34 KG/M2 | SYSTOLIC BLOOD PRESSURE: 126 MMHG | HEART RATE: 80 BPM

## 2018-06-05 DIAGNOSIS — G89.29 CHRONIC BILATERAL LOW BACK PAIN WITH BILATERAL SCIATICA: Primary | ICD-10-CM

## 2018-06-05 DIAGNOSIS — M54.42 CHRONIC BILATERAL LOW BACK PAIN WITH BILATERAL SCIATICA: Primary | ICD-10-CM

## 2018-06-05 DIAGNOSIS — M17.12 PRIMARY OSTEOARTHRITIS OF LEFT KNEE: ICD-10-CM

## 2018-06-05 DIAGNOSIS — F51.04 PSYCHOPHYSIOLOGICAL INSOMNIA: ICD-10-CM

## 2018-06-05 DIAGNOSIS — M54.41 CHRONIC BILATERAL LOW BACK PAIN WITH BILATERAL SCIATICA: Primary | ICD-10-CM

## 2018-06-05 DIAGNOSIS — F32.4 MAJOR DEPRESSIVE DISORDER WITH SINGLE EPISODE, IN PARTIAL REMISSION (HCC): ICD-10-CM

## 2018-06-05 DIAGNOSIS — F41.9 ANXIETY: ICD-10-CM

## 2018-06-05 PROCEDURE — 99214 OFFICE O/P EST MOD 30 MIN: CPT | Performed by: INTERNAL MEDICINE

## 2018-06-05 RX ORDER — BUPROPION HYDROCHLORIDE 300 MG/1
TABLET ORAL
Qty: 90 TABLET | Refills: 1 | Status: SHIPPED | OUTPATIENT
Start: 2018-06-05 | End: 2018-06-23 | Stop reason: SDUPTHER

## 2018-06-05 RX ORDER — ASCORBIC ACID 1000 MG
TABLET ORAL
Status: ON HOLD | COMMUNITY
End: 2019-10-21

## 2018-06-05 RX ORDER — LEVOTHYROXINE SODIUM 0.15 MG/1
TABLET ORAL
Qty: 30 TABLET | Refills: 5 | Status: SHIPPED | OUTPATIENT
Start: 2018-06-05 | End: 2019-02-04 | Stop reason: SDUPTHER

## 2018-06-05 RX ORDER — GABAPENTIN 100 MG/1
100 CAPSULE ORAL 3 TIMES DAILY
Qty: 90 CAPSULE | Refills: 2 | Status: SHIPPED | OUTPATIENT
Start: 2018-06-05 | End: 2018-11-05 | Stop reason: SDUPTHER

## 2018-06-24 RX ORDER — BUPROPION HYDROCHLORIDE 300 MG/1
TABLET ORAL
Qty: 90 TABLET | Refills: 1 | Status: SHIPPED | OUTPATIENT
Start: 2018-06-24 | End: 2019-02-04 | Stop reason: SDUPTHER

## 2018-06-28 ENCOUNTER — TELEPHONE (OUTPATIENT)
Dept: ORTHOPEDIC SURGERY | Age: 68
End: 2018-06-28

## 2018-07-31 DIAGNOSIS — F41.9 ANXIETY: ICD-10-CM

## 2018-07-31 DIAGNOSIS — F51.04 PSYCHOPHYSIOLOGICAL INSOMNIA: ICD-10-CM

## 2018-07-31 RX ORDER — ALPRAZOLAM 1 MG/1
TABLET ORAL
Qty: 60 TABLET | Refills: 1 | Status: SHIPPED | OUTPATIENT
Start: 2018-07-31 | End: 2018-09-29 | Stop reason: SDUPTHER

## 2018-09-11 ENCOUNTER — OFFICE VISIT (OUTPATIENT)
Dept: INTERNAL MEDICINE CLINIC | Age: 68
End: 2018-09-11

## 2018-09-11 VITALS
SYSTOLIC BLOOD PRESSURE: 120 MMHG | DIASTOLIC BLOOD PRESSURE: 68 MMHG | BODY MASS INDEX: 41.88 KG/M2 | WEIGHT: 229 LBS | HEART RATE: 76 BPM

## 2018-09-11 DIAGNOSIS — M54.41 CHRONIC BILATERAL LOW BACK PAIN WITH BILATERAL SCIATICA: Primary | ICD-10-CM

## 2018-09-11 DIAGNOSIS — M17.12 PRIMARY OSTEOARTHRITIS OF LEFT KNEE: ICD-10-CM

## 2018-09-11 DIAGNOSIS — R10.84 GENERALIZED ABDOMINAL PAIN: ICD-10-CM

## 2018-09-11 DIAGNOSIS — M67.432 GANGLION CYST OF WRIST, LEFT: ICD-10-CM

## 2018-09-11 DIAGNOSIS — F32.4 MAJOR DEPRESSIVE DISORDER WITH SINGLE EPISODE, IN PARTIAL REMISSION (HCC): ICD-10-CM

## 2018-09-11 DIAGNOSIS — K21.9 GASTROESOPHAGEAL REFLUX DISEASE, ESOPHAGITIS PRESENCE NOT SPECIFIED: ICD-10-CM

## 2018-09-11 DIAGNOSIS — M54.42 CHRONIC BILATERAL LOW BACK PAIN WITH BILATERAL SCIATICA: Primary | ICD-10-CM

## 2018-09-11 DIAGNOSIS — F41.9 ANXIETY: ICD-10-CM

## 2018-09-11 DIAGNOSIS — G89.29 CHRONIC BILATERAL LOW BACK PAIN WITH BILATERAL SCIATICA: Primary | ICD-10-CM

## 2018-09-11 DIAGNOSIS — F51.04 PSYCHOPHYSIOLOGICAL INSOMNIA: ICD-10-CM

## 2018-09-11 PROBLEM — K59.03 DRUG-INDUCED CONSTIPATION: Status: RESOLVED | Noted: 2017-05-02 | Resolved: 2018-09-11

## 2018-09-11 LAB
A/G RATIO: 1.3 (ref 1.1–2.2)
ALBUMIN SERPL-MCNC: 3.8 G/DL (ref 3.4–5)
ALP BLD-CCNC: 82 U/L (ref 40–129)
ALT SERPL-CCNC: 11 U/L (ref 10–40)
AMYLASE: 59 U/L (ref 25–115)
ANION GAP SERPL CALCULATED.3IONS-SCNC: 14 MMOL/L (ref 3–16)
AST SERPL-CCNC: 16 U/L (ref 15–37)
BASOPHILS ABSOLUTE: 0 K/UL (ref 0–0.2)
BASOPHILS RELATIVE PERCENT: 0.4 %
BILIRUB SERPL-MCNC: 0.3 MG/DL (ref 0–1)
BUN BLDV-MCNC: 15 MG/DL (ref 7–20)
CALCIUM SERPL-MCNC: 8.7 MG/DL (ref 8.3–10.6)
CHLORIDE BLD-SCNC: 102 MMOL/L (ref 99–110)
CO2: 25 MMOL/L (ref 21–32)
CREAT SERPL-MCNC: 0.8 MG/DL (ref 0.6–1.2)
EOSINOPHILS ABSOLUTE: 0.1 K/UL (ref 0–0.6)
EOSINOPHILS RELATIVE PERCENT: 1.4 %
GFR AFRICAN AMERICAN: >60
GFR NON-AFRICAN AMERICAN: >60
GLOBULIN: 2.9 G/DL
GLUCOSE BLD-MCNC: 91 MG/DL (ref 70–99)
HCT VFR BLD CALC: 39.8 % (ref 36–48)
HEMOGLOBIN: 13 G/DL (ref 12–16)
LIPASE: 40 U/L (ref 13–60)
LYMPHOCYTES ABSOLUTE: 1.4 K/UL (ref 1–5.1)
LYMPHOCYTES RELATIVE PERCENT: 18.3 %
MCH RBC QN AUTO: 31.1 PG (ref 26–34)
MCHC RBC AUTO-ENTMCNC: 32.6 G/DL (ref 31–36)
MCV RBC AUTO: 95.5 FL (ref 80–100)
MONOCYTES ABSOLUTE: 0.4 K/UL (ref 0–1.3)
MONOCYTES RELATIVE PERCENT: 5.9 %
NEUTROPHILS ABSOLUTE: 5.5 K/UL (ref 1.7–7.7)
NEUTROPHILS RELATIVE PERCENT: 74 %
PDW BLD-RTO: 14.8 % (ref 12.4–15.4)
PLATELET # BLD: 261 K/UL (ref 135–450)
PMV BLD AUTO: 8.3 FL (ref 5–10.5)
POTASSIUM SERPL-SCNC: 4.5 MMOL/L (ref 3.5–5.1)
RBC # BLD: 4.17 M/UL (ref 4–5.2)
SODIUM BLD-SCNC: 141 MMOL/L (ref 136–145)
TOTAL PROTEIN: 6.7 G/DL (ref 6.4–8.2)
WBC # BLD: 7.5 K/UL (ref 4–11)

## 2018-09-11 PROCEDURE — 99214 OFFICE O/P EST MOD 30 MIN: CPT | Performed by: INTERNAL MEDICINE

## 2018-09-11 NOTE — PROGRESS NOTES
Assessment/Plan     1. Chronic bilateral low back pain with bilateral sciatica  Continues to worsen, but patient does not want to pursue until she adequately addresses knee pain. When she is ready, she will see Dr. Irvin Vera to discuss her therapeutic options, including physical therapy, aquatic therapy and injections. Continue current doses of Tylenol and gabapentin. She has not a candidate for NSAIDs due to prior gastric bypass surgery. 2. Primary osteoarthritis of left knee  Await further input from Dr. Tania Worthington. 3. Psychophysiological insomnia  Stable on current doses of trazodone, melatonin and prn Xanax- continue lowest effective doses of medications. Principles of good sleep hygiene discussed. 4. Major depressive disorder with single episode, in partial remission (HCC)  Slowing improving with time. Continue current medications. 5. Anxiety  See plan for depression. Continue lowest effective dose of Xanax for breakthrough symptoms. 6. Gastroesophageal reflux disease, esophagitis presence not specified  Worse despite daily Nexium. Due for colonoscopy, so will see Dr. Tigre Henderson to discuss probable need to EGD, as well. Reflux precautions discussed. Patient will call if symptoms change or worsen. 7. Generalized abdominal pain  Suspect adhesions for gastric bypass surgery, but since becoming progressively worse, warrants imaging. She will also discuss with Dr. Tigre Henderson. Patient will call if symptoms change or worsen. - CT ABDOMEN PELVIS W IV CONTRAST; Future  - Comprehensive Metabolic Panel; Future  - CBC Auto Differential; Future  - Lipase; Future  - Amylase; Future    8. Ganglion cyst of wrist, left  Referred to hand surgery for excision.  - Jese Yan MD (Hand, Wrist, Upper Extremity)        Return in about 3 months (around 12/11/2018).             Pleasant Smaller   YOB: 1950    Date of Visit:  9/11/2018    Allergies   Allergen Reactions    Codeine Nausea Only      Prior to Visit Medications    Medication Sig Taking? Authorizing Provider   buPROPion (WELLBUTRIN XL) 300 MG extended release tablet TAKE 1 TABLET BY MOUTH EVERY MORNING Yes Luisito Davey MD   Ginkgo Biloba (GINKOBA) 40 MG TABS Take by mouth Yes Historical Provider, MD   levothyroxine (SYNTHROID) 150 MCG tablet TAKE 1 TABLET BY MOUTH ONCE A DAY Yes Luisito Davey MD   traZODone (DESYREL) 50 MG tablet TAKE 2 TABLETS BY MOUTH EVERY NIGHT Yes Luisito Davey MD   esomeprazole ([address]) 40 MG delayed release capsule TAKE 1 CAPSULE BY MOUTH EVERY MORNING BEFORE BREAKFAST Yes Luisito Davey MD   albuterol sulfate HFA (PROAIR HFA) 108 (90 Base) MCG/ACT inhaler Inhale 2 puffs into the lungs every 6 hours as needed for Wheezing or Shortness of Breath Yes Valerio Dee DO   Dextromethorphan-Guaifenesin (MUCINEX DM)  MG TB12 Take 1-2 tablets by mouth 2 times daily Yes Valerio Dee DO   escitalopram (LEXAPRO) 10 MG tablet Take 0.5 tablets by mouth daily Yes Luisito Davey MD   ferrous sulfate (FE TABS) 325 (65 Fe) MG EC tablet Take 1 tablet by mouth daily (with breakfast) Yes Luisito Davey MD   Tracy Carrier Oil 300 MG CAPS Take 1 capsule by mouth daily Yes Historical Provider, MD   Cyanocobalamin (VITAMIN B 12 PO) Take 1,000 mcg by mouth daily. Yes Historical Provider, MD   fish oil-omega-3 fatty acids 1000 MG capsule Take 2 g by mouth daily. Yes Historical Provider, MD   Multiple Minerals-Vitamins (CITRACAL PLUS PO) Take 1 tablet by mouth daily. Yes Historical Provider, MD   Cholecalciferol (VITAMIN D) 1000 UNIT CAPS Take 3 capsules by mouth daily. Yes Historical Provider, MD   ALPRAZolam (XANAX) 1 MG tablet TAKE 1 TABLET BY MOUTH TWICE DAILY AS NEEDED FOR ANXIETY OR SLEEP  Luisito Davey MD   gabapentin (NEURONTIN) 100 MG capsule Take 1 capsule by mouth 3 times daily for 90 days. .  Luisito Davey MD       Vitals:    09/11/18 1325   BP: 120/68   Pulse: 76   Weight: 229 lb (103.9 kg)      Body mass index is 41.88 kg/m². Wt Readings from Last 3 Encounters:   09/11/18 229 lb (103.9 kg)   06/18/18 226 lb (102.5 kg)   06/05/18 226 lb (102.5 kg)     BP Readings from Last 3 Encounters:   09/11/18 120/68   06/18/18 120/74   06/05/18 126/68       CC:  Patient presents for re-evaluation of the following medical concerns     HPI  Chronic Joint Pain:  Patient presents for follow-up of pain in left knee, low back pain. Both worse, but knee bothers her more than back currently. On average, pain is perceived as moderate (4-6 pain scale). Current treatment: gabapentin 100 mg qam, 200 mg qhs, Tylenol 1000 mg bid. Medication side effects: none. Recent diagnostic testing: none. Working with Dr. Elaine Tobias on the knee, but is putting off spine consult. Obesity:  Working on lower fat diet, but portions ok. Still drinking soft drinks, but not much other sugar. No significant exercise due to joint pain.     Insomnia:  Current treatment: prescription sleep aid-  trazodone 100 mg qhs, melatonin 5 mg qhs, Xanax 1 mg qod, which has been effective. No adverse effects.      Mood Disorder:  Patient presents for follow-up of depression and anxiety disorder. Current complaints include:  anhedonia, depressed mood, excessive worry, difficulty concentrating, feelings of worthlessness/guilt, impaired memory, change in appetite- all slowly improving. No panic attacks, feelings of hopelessness or suicidal ideation. Symptoms/signs of jojo: none. Current treatment includes: Lexapro- 5 mg qd and Wellbutrin- 300 mg qam, takes Xanax about 2x/week during the day for anxiety. Medication side effects: tremor. GERD:  Currently treated with proton pump inhibitor: Nexium 40 mg, which has been somewhat effective. She has been using this therapy daily.   New unexplained bilateral aching pain in right and left middle quadrants, dry cough, hiccups, post-prandial nausea, bloating, early satiety, but no fevers, dysphagia, cough, no rebound and no guarding. Musculoskeletal: She exhibits no edema. There is no tenderness over the lumbar spine and sacral spine. Paraspinal muscle spasm/tenderness:  yes-right. Knee exam deferred to ortho    1 cm slightly tender nodule over radial aspect of left wrist.   Neurological: She is alert and oriented to person, place, and time. No sensory or motor deficit is noted. Deep tendon reflexes are 2+ and equal bilaterally. SLR positive on the right. Skin: Skin is warm, dry and intact. No rash noted. No erythema. Psychiatric: She has a normal mood and affect.  Her speech is normal and behavior is normal. Judgment and thought content normal. Cognition and memory are normal.

## 2018-09-24 ENCOUNTER — HOSPITAL ENCOUNTER (OUTPATIENT)
Dept: CT IMAGING | Age: 68
Discharge: HOME | End: 2018-09-24
Payer: COMMERCIAL

## 2018-09-24 DIAGNOSIS — R10.84 GENERALIZED ABDOMINAL PAIN: ICD-10-CM

## 2018-09-24 PROCEDURE — 74177 CT ABD & PELVIS W/CONTRAST: CPT

## 2018-09-24 PROCEDURE — 6360000004 HC RX CONTRAST MEDICATION: Performed by: INTERNAL MEDICINE

## 2018-09-24 RX ADMIN — IOHEXOL 50 ML: 240 INJECTION, SOLUTION INTRATHECAL; INTRAVASCULAR; INTRAVENOUS; ORAL at 17:03

## 2018-09-24 RX ADMIN — IOPAMIDOL 75 ML: 755 INJECTION, SOLUTION INTRAVENOUS at 17:02

## 2018-09-29 DIAGNOSIS — F51.04 PSYCHOPHYSIOLOGICAL INSOMNIA: ICD-10-CM

## 2018-09-29 DIAGNOSIS — F41.9 ANXIETY: ICD-10-CM

## 2018-10-01 RX ORDER — ALPRAZOLAM 1 MG/1
TABLET ORAL
Qty: 60 TABLET | Refills: 0 | Status: SHIPPED | OUTPATIENT
Start: 2018-10-01 | End: 2018-10-24 | Stop reason: SDUPTHER

## 2018-10-04 ENCOUNTER — ANESTHESIA EVENT (OUTPATIENT)
Dept: ENDOSCOPY | Age: 68
End: 2018-10-04
Payer: COMMERCIAL

## 2018-10-04 NOTE — PROGRESS NOTES
22. Bring a complete list of all your medications with name and dose include any supplements. 26. Other__________________________________________   *Please call pre admission testing if you any further questions   [facility]   [address]    [id number] [facility]  [phone]   [address] [place] [address]       All above information reviewed with patient in person or by phone. Patient verbalizes understanding. All questions and concerns addressed.                                                                                                  Patient/Rep____________________                                                                                                                                    PRE OP INSTRUCTIONS

## 2018-10-10 ENCOUNTER — HOSPITAL ENCOUNTER (OUTPATIENT)
Age: 68
Setting detail: OUTPATIENT SURGERY
Discharge: HOME | End: 2018-10-10
Attending: INTERNAL MEDICINE | Admitting: INTERNAL MEDICINE
Payer: COMMERCIAL

## 2018-10-10 ENCOUNTER — ANESTHESIA (OUTPATIENT)
Dept: ENDOSCOPY | Age: 68
End: 2018-10-10
Payer: COMMERCIAL

## 2018-10-10 VITALS
WEIGHT: 227 LBS | HEIGHT: 63 IN | RESPIRATION RATE: 16 BRPM | HEART RATE: 78 BPM | SYSTOLIC BLOOD PRESSURE: 111 MMHG | BODY MASS INDEX: 40.22 KG/M2 | DIASTOLIC BLOOD PRESSURE: 89 MMHG | OXYGEN SATURATION: 98 % | TEMPERATURE: 97.2 F

## 2018-10-10 VITALS — SYSTOLIC BLOOD PRESSURE: 118 MMHG | OXYGEN SATURATION: 98 % | DIASTOLIC BLOOD PRESSURE: 65 MMHG

## 2018-10-10 PROCEDURE — 7100000010 HC PHASE II RECOVERY - FIRST 15 MIN: Performed by: INTERNAL MEDICINE

## 2018-10-10 PROCEDURE — 2580000003 HC RX 258: Performed by: NURSE ANESTHETIST, CERTIFIED REGISTERED

## 2018-10-10 PROCEDURE — 7100000011 HC PHASE II RECOVERY - ADDTL 15 MIN: Performed by: INTERNAL MEDICINE

## 2018-10-10 PROCEDURE — 2500000003 HC RX 250 WO HCPCS: Performed by: NURSE ANESTHETIST, CERTIFIED REGISTERED

## 2018-10-10 PROCEDURE — 3700000001 HC ADD 15 MINUTES (ANESTHESIA): Performed by: INTERNAL MEDICINE

## 2018-10-10 PROCEDURE — 2580000003 HC RX 258: Performed by: ANESTHESIOLOGY

## 2018-10-10 PROCEDURE — 2709999900 HC NON-CHARGEABLE SUPPLY: Performed by: INTERNAL MEDICINE

## 2018-10-10 PROCEDURE — 3609017100 HC EGD: Performed by: INTERNAL MEDICINE

## 2018-10-10 PROCEDURE — 3700000000 HC ANESTHESIA ATTENDED CARE: Performed by: INTERNAL MEDICINE

## 2018-10-10 PROCEDURE — 3609010600 HC COLONOSCOPY POLYPECTOMY SNARE/COLD BIOPSY: Performed by: INTERNAL MEDICINE

## 2018-10-10 PROCEDURE — 6360000002 HC RX W HCPCS: Performed by: NURSE ANESTHETIST, CERTIFIED REGISTERED

## 2018-10-10 RX ORDER — SODIUM CHLORIDE 9 MG/ML
INJECTION, SOLUTION INTRAVENOUS CONTINUOUS
Status: CANCELLED | OUTPATIENT
Start: 2018-10-10

## 2018-10-10 RX ORDER — SODIUM CHLORIDE 0.9 % (FLUSH) 0.9 %
10 SYRINGE (ML) INJECTION PRN
Status: CANCELLED | OUTPATIENT
Start: 2018-10-10

## 2018-10-10 RX ORDER — PROPOFOL 10 MG/ML
INJECTION, EMULSION INTRAVENOUS PRN
Status: DISCONTINUED | OUTPATIENT
Start: 2018-10-10 | End: 2018-10-10 | Stop reason: SDUPTHER

## 2018-10-10 RX ORDER — SODIUM CHLORIDE 9 MG/ML
INJECTION, SOLUTION INTRAVENOUS CONTINUOUS PRN
Status: DISCONTINUED | OUTPATIENT
Start: 2018-10-10 | End: 2018-10-10 | Stop reason: SDUPTHER

## 2018-10-10 RX ORDER — ONDANSETRON 2 MG/ML
4 INJECTION INTRAMUSCULAR; INTRAVENOUS PRN
Status: DISCONTINUED | OUTPATIENT
Start: 2018-10-10 | End: 2018-10-10 | Stop reason: HOSPADM

## 2018-10-10 RX ORDER — LIDOCAINE HYDROCHLORIDE 20 MG/ML
INJECTION, SOLUTION EPIDURAL; INFILTRATION; INTRACAUDAL; PERINEURAL PRN
Status: DISCONTINUED | OUTPATIENT
Start: 2018-10-10 | End: 2018-10-10 | Stop reason: SDUPTHER

## 2018-10-10 RX ORDER — SODIUM CHLORIDE 0.9 % (FLUSH) 0.9 %
10 SYRINGE (ML) INJECTION EVERY 12 HOURS SCHEDULED
Status: CANCELLED | OUTPATIENT
Start: 2018-10-10

## 2018-10-10 RX ORDER — PROPOFOL 10 MG/ML
INJECTION, EMULSION INTRAVENOUS CONTINUOUS PRN
Status: DISCONTINUED | OUTPATIENT
Start: 2018-10-10 | End: 2018-10-10 | Stop reason: SDUPTHER

## 2018-10-10 RX ORDER — SODIUM CHLORIDE 9 MG/ML
INJECTION, SOLUTION INTRAVENOUS CONTINUOUS
Status: DISCONTINUED | OUTPATIENT
Start: 2018-10-10 | End: 2018-10-10 | Stop reason: HOSPADM

## 2018-10-10 RX ORDER — GLYCOPYRROLATE 0.2 MG/ML
INJECTION INTRAMUSCULAR; INTRAVENOUS PRN
Status: DISCONTINUED | OUTPATIENT
Start: 2018-10-10 | End: 2018-10-10 | Stop reason: SDUPTHER

## 2018-10-10 RX ORDER — LIDOCAINE HYDROCHLORIDE 10 MG/ML
1 INJECTION, SOLUTION EPIDURAL; INFILTRATION; INTRACAUDAL; PERINEURAL
Status: DISCONTINUED | OUTPATIENT
Start: 2018-10-10 | End: 2018-10-10 | Stop reason: HOSPADM

## 2018-10-10 RX ORDER — SODIUM CHLORIDE 0.9 % (FLUSH) 0.9 %
10 SYRINGE (ML) INJECTION EVERY 12 HOURS SCHEDULED
Status: DISCONTINUED | OUTPATIENT
Start: 2018-10-10 | End: 2018-10-10 | Stop reason: HOSPADM

## 2018-10-10 RX ORDER — SODIUM CHLORIDE 0.9 % (FLUSH) 0.9 %
10 SYRINGE (ML) INJECTION PRN
Status: DISCONTINUED | OUTPATIENT
Start: 2018-10-10 | End: 2018-10-10 | Stop reason: HOSPADM

## 2018-10-10 RX ADMIN — PROPOFOL 120 MCG/KG/MIN: 10 INJECTION, EMULSION INTRAVENOUS at 06:59

## 2018-10-10 RX ADMIN — LIDOCAINE HYDROCHLORIDE 60 MG: 20 INJECTION, SOLUTION EPIDURAL; INFILTRATION; INTRACAUDAL; PERINEURAL at 06:59

## 2018-10-10 RX ADMIN — SODIUM CHLORIDE: 9 INJECTION, SOLUTION INTRAVENOUS at 06:07

## 2018-10-10 RX ADMIN — PROPOFOL 120 MG: 10 INJECTION, EMULSION INTRAVENOUS at 06:59

## 2018-10-10 RX ADMIN — GLYCOPYRROLATE 0.2 MG: 0.2 INJECTION, SOLUTION INTRAMUSCULAR; INTRAVENOUS at 06:58

## 2018-10-10 RX ADMIN — SODIUM CHLORIDE: 9 INJECTION, SOLUTION INTRAVENOUS at 06:56

## 2018-10-10 ASSESSMENT — PAIN SCALES - GENERAL
PAINLEVEL_OUTOF10: 0

## 2018-10-10 ASSESSMENT — PAIN - FUNCTIONAL ASSESSMENT: PAIN_FUNCTIONAL_ASSESSMENT: 0-10

## 2018-10-10 NOTE — ANESTHESIA POSTPROCEDURE EVALUATION
Department of Anesthesiology  Postprocedure Note    Patient: Tesha Kennedy  MRN: [id number]  YOB: 1950  Date of evaluation: 10/10/2018  Time:  8:08 AM     Procedure Summary     Date:  10/10/18 Room / Location:  [facility] ENDO 02 / [facility] ENDOSCOPY    Anesthesia Start:  8856 Anesthesia Stop:  0732    Procedures:       COLONOSCOPY POLYPECTOMY SNARE/COLD BIOPSY (N/A )      EGD (N/A ) Diagnosis:  (HISTORY OF COLON POLYPS Z86.010 / ABDOMINAL PAIN R10.9)    Surgeon:  Naren Koehler MD Responsible Provider:      Anesthesia Type:  MAC ASA Status:  2          Anesthesia Type: MAC    Mook Phase I: Mook Score: 10    Mook Phase II: Mook Score: 9    Last vitals: Reviewed and per EMR flowsheets.        Anesthesia Post Evaluation    Patient location during evaluation: PACU  Patient participation: complete - patient participated  Level of consciousness: awake and alert  Pain score: 0  Airway patency: patent  Nausea & Vomiting: no nausea and no vomiting  Complications: no  Cardiovascular status: blood pressure returned to baseline  Respiratory status: acceptable  Hydration status: euvolemic

## 2018-10-10 NOTE — ANESTHESIA PRE PROCEDURE
Department of Anesthesiology  Preprocedure Note       Name:  Shania Leo   Age:  74 y.o.  :  1950                                          MRN:  [id number]         Date:  10/10/2018      Surgeon: Monet Mckeon):  Antony Stiles MD    Procedure: Procedure(s):  COLONOSCOPY  EGD    Medications prior to admission:   Prior to Admission medications    Medication Sig Start Date End Date Taking? Authorizing Provider   ALPRAZolam Adam Sly) 1 MG tablet TAKE 1 TABLET BY MOUTH TWICE DAILY AS NEEDED FOR ANXIETY OR SLEEP 10/1/18 10/31/18 Yes Viet Benavides MD   buPROPion (WELLBUTRIN XL) 300 MG extended release tablet TAKE 1 TABLET BY MOUTH EVERY MORNING 18  Yes Viet Benavides MD   Ginkgo Biloba [facility]) 40 MG TABS Take by mouth   Yes Historical Provider, MD   levothyroxine (SYNTHROID) 150 MCG tablet TAKE 1 TABLET BY MOUTH ONCE A DAY 18  Yes Viet Benavides MD   traZODone (DESYREL) 50 MG tablet TAKE 2 TABLETS BY MOUTH EVERY NIGHT 6/3/18  Yes Viet Benavides MD   esomeprazole ([address]) 40 MG delayed release capsule TAKE 1 CAPSULE BY MOUTH EVERY MORNING BEFORE BREAKFAST 18  Yes Viet Benavides MD   escitalopram (LEXAPRO) 10 MG tablet Take 0.5 tablets by mouth daily 17  Yes Viet Benaivdes MD   ferrous sulfate (FE TABS) 325 (65 Fe) MG EC tablet Take 1 tablet by mouth daily (with breakfast) 17  Yes Viet Benavides MD   Rennis Candy Oil 300 MG CAPS Take 1 capsule by mouth daily   Yes Historical Provider, MD   Cyanocobalamin (VITAMIN B 12 PO) Take 1,000 mcg by mouth daily. Yes Historical Provider, MD   fish oil-omega-3 fatty acids 1000 MG capsule Take 2 g by mouth daily. Yes Historical Provider, MD   Multiple Minerals-Vitamins (CITRACAL PLUS PO) Take 1 tablet by mouth daily. Yes Historical Provider, MD   Cholecalciferol (VITAMIN D) 1000 UNIT CAPS Take 3 capsules by mouth daily.    Yes Historical Provider, MD   gabapentin (NEURONTIN) 100 MG capsule Take 1 capsule by mouth 3 times daily for 90 days.. 6/5/18 9/3/18  Jeannine Akhtar MD       Current medications:    Current Facility-Administered Medications   Medication Dose Route Frequency Provider Last Rate Last Dose    0.9 % sodium chloride infusion   Intravenous Continuous Claudia Lucks, DO 75 mL/hr at 10/10/18 5709      sodium chloride flush 0.9 % injection 10 mL  10 mL Intravenous 2 times per day Claudia Lucks, DO        sodium chloride flush 0.9 % injection 10 mL  10 mL Intravenous PRN Claudia Lucks, DO        lidocaine PF 1 % injection 1 mL  1 mL Intradermal Once PRN Claudia Lucks, DO        ondansetron [facility] injection 4 mg  4 mg Intravenous PRN Claudia Lucks, DO           Allergies:     Allergies   Allergen Reactions    Codeine Nausea Only       Problem List:    Patient Active Problem List   Diagnosis Code    Severe obesity (BMI >= 40) ([facility]) E66.01    Chronic bilateral low back pain with sciatica M54.40, G89.29    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    Major depressive disorder with single episode F32.9    Anxiety F41.9    Hypothyroidism E03.9    Family history of bicuspid aortic valve Z82.79    Osteopenia M85.80    GERD (gastroesophageal reflux disease) K21.9    Anal fissure K60.2    Psychophysiological insomnia F51.04    Colon polyps K63.5    Generalized abdominal pain R10.84    Osteoarthritis of left knee M17.12    Mixed hyperlipidemia E78.2    Leg edema, left R60.0    Restless leg syndrome G25.81    Ganglion cyst of wrist, left M67.432       Past Medical History:        Diagnosis Date    Anal fissure 7/16/2013    Anxiety     Colon polyps     Depression     Fibroid uterus     Hypothyroidism     Morbid obesity ([address]     Osteopenia        Past Surgical History:        Procedure Laterality Date    ABDOMINAL HERNIA REPAIR  2008    APPENDECTOMY  Age 27    CATARACT REMOVAL WITH IMPLANT Right 10/23/2017    with vitrectomy    CATARACT REMOVAL WITH IMPLANT Left 02/26/2018    with vitrectomy    GASTRIC

## 2018-10-23 ENCOUNTER — CLINICAL DOCUMENTATION (OUTPATIENT)
Dept: INTERNAL MEDICINE CLINIC | Age: 68
End: 2018-10-23

## 2018-10-24 DIAGNOSIS — F51.04 PSYCHOPHYSIOLOGICAL INSOMNIA: ICD-10-CM

## 2018-10-24 DIAGNOSIS — F41.9 ANXIETY: ICD-10-CM

## 2018-10-24 RX ORDER — ALPRAZOLAM 1 MG/1
TABLET ORAL
Qty: 60 TABLET | Refills: 0 | Status: SHIPPED | OUTPATIENT
Start: 2018-10-24 | End: 2018-11-30 | Stop reason: SDUPTHER

## 2018-10-25 RX ORDER — ESOMEPRAZOLE MAGNESIUM 40 MG/1
CAPSULE, DELAYED RELEASE ORAL
Qty: 30 CAPSULE | Refills: 5 | Status: SHIPPED | OUTPATIENT
Start: 2018-10-25 | End: 2018-10-25 | Stop reason: SDUPTHER

## 2018-10-25 RX ORDER — ESOMEPRAZOLE MAGNESIUM 40 MG/1
CAPSULE, DELAYED RELEASE ORAL
Qty: 90 CAPSULE | Refills: 1 | Status: SHIPPED | OUTPATIENT
Start: 2018-10-25 | End: 2019-01-08 | Stop reason: ALTCHOICE

## 2018-11-01 ENCOUNTER — HOSPITAL ENCOUNTER (OUTPATIENT)
Dept: GENERAL RADIOLOGY | Age: 68
Discharge: HOME | End: 2018-11-01
Payer: COMMERCIAL

## 2018-11-01 DIAGNOSIS — K44.9 PARAESOPHAGEAL HERNIA: ICD-10-CM

## 2018-11-01 PROCEDURE — 74240 X-RAY XM UPR GI TRC 1CNTRST: CPT

## 2018-11-05 RX ORDER — GABAPENTIN 100 MG/1
CAPSULE ORAL
Qty: 90 CAPSULE | Refills: 2 | Status: SHIPPED | OUTPATIENT
Start: 2018-11-05 | End: 2019-03-12 | Stop reason: SDUPTHER

## 2018-11-06 ENCOUNTER — OFFICE VISIT (OUTPATIENT)
Dept: SURGERY | Age: 68
End: 2018-11-06
Payer: COMMERCIAL

## 2018-11-06 VITALS
WEIGHT: 228 LBS | HEIGHT: 63 IN | BODY MASS INDEX: 40.4 KG/M2 | DIASTOLIC BLOOD PRESSURE: 70 MMHG | SYSTOLIC BLOOD PRESSURE: 105 MMHG

## 2018-11-06 DIAGNOSIS — R10.10 UPPER ABDOMINAL PAIN: Primary | ICD-10-CM

## 2018-11-06 PROCEDURE — 99243 OFF/OP CNSLTJ NEW/EST LOW 30: CPT | Performed by: SURGERY

## 2018-11-06 ASSESSMENT — ENCOUNTER SYMPTOMS
RESPIRATORY NEGATIVE: 1
BACK PAIN: 1
ALLERGIC/IMMUNOLOGIC NEGATIVE: 1
GASTROINTESTINAL NEGATIVE: 1
EYE ITCHING: 1

## 2018-11-06 NOTE — PROGRESS NOTES
(XANAX) 1 MG tablet TAKE 1 TABLET BY MOUTH TWICE DAILY AS NEEDED FOR ANXIETY OR SLEEP 10/24/18 11/23/18 Yes Burgess Brenda MD   buPROPion (WELLBUTRIN XL) 300 MG extended release tablet TAKE 1 TABLET BY MOUTH EVERY MORNING 6/24/18  Yes Burgess Brenda MD   Ginkgo Biloba [facility]) 40 MG TABS Take by mouth   Yes Historical Provider, MD   levothyroxine (SYNTHROID) 150 MCG tablet TAKE 1 TABLET BY MOUTH ONCE A DAY 6/5/18  Yes Burgess Brenda MD   traZODone (DESYREL) 50 MG tablet TAKE 2 TABLETS BY MOUTH EVERY NIGHT 6/3/18  Yes Burgess Brenda MD   escitalopram (LEXAPRO) 10 MG tablet Take 0.5 tablets by mouth daily 11/28/17  Yes Burgess Brenda MD   ferrous sulfate (FE TABS) 325 (65 Fe) MG EC tablet Take 1 tablet by mouth daily (with breakfast) 11/28/17  Yes Burgess Brenda MD   Gordo Humberto Oil 300 MG CAPS Take 1 capsule by mouth daily   Yes Historical Provider, MD   Cyanocobalamin (VITAMIN B 12 PO) Take 1,000 mcg by mouth daily. Yes Historical Provider, MD   Multiple Minerals-Vitamins (CITRACAL PLUS PO) Take 1 tablet by mouth daily. Yes Historical Provider, MD   Cholecalciferol (VITAMIN D) 1000 UNIT CAPS Take 3 capsules by mouth daily. Yes Historical Provider, MD        Allergies:  Codeine    Social History:    reports that she has never smoked. She has never used smokeless tobacco. She reports that she does not drink alcohol or use drugs. Family History:    Family History   Problem Relation Age of Onset    Diabetes Father         Type II    Diabetes Sister         Type II    Diabetes Brother         Type II    Breast Cancer Sister 58    Lung Cancer Mother         Smoker    Heart Disease Sister         Bicuspid aortic valve x2       REVIEW OF SYSTEMS:  Review of Systems   Constitutional: Positive for fatigue. HENT: Positive for sneezing. Eyes: Positive for itching. Respiratory: Negative. Cardiovascular: Positive for leg swelling. Gastrointestinal: Negative. Endocrine: Negative. Genitourinary: Negative. Musculoskeletal: Positive for back pain and joint swelling. Skin: Negative. Allergic/Immunologic: Negative. Neurological: Positive for tremors and weakness. Hematological: Negative. Psychiatric/Behavioral: Positive for agitation, decreased concentration and sleep disturbance. The patient is nervous/anxious. PHYSICAL EXAM:  VITALS:  /70   Ht 5' 3\" (1.6 m)   Wt 228 lb (103.4 kg)   BMI 40.39 kg/m²   CONSTITUTIONAL:  alert, no apparent distress and morbidly obese  EYES:  sclera clear  ENT:  normocepalic, without obvious abnormality  NECK:  supple, symmetrical, trachea midline  LUNGS:  clear to auscultation  CARDIOVASCULAR:  regular rate and rhythm  ABDOMEN:  scars noted are healed, normal bowel sounds, soft, obese, non-distended, non-tender, voluntary guarding absent, no hepatosplenomegally and hernia absent. Old seroma noted on CT is palpable, nontender  MUSCULOSKELETAL:  0+ pitting edema lower extremities  NEUROLOGIC:  Mental Status Exam:  Level of Alertness:   awake  Orientation:   person, place, time  SKIN:  no bruising or bleeding, normal skin color, texture, turgor, no redness, warmth, or swelling and no jaundice        Radiology Review:   CT scans and UGI, and EGD  EXAMINATION:   CT OF THE ABDOMEN AND PELVIS WITH CONTRAST 9/1/2017 4:03 pm       TECHNIQUE:   CT of the abdomen and pelvis was performed with the administration of   intravenous contrast. Multiplanar reformatted images are provided for review. Dose modulation, iterative reconstruction, and/or weight based adjustment of   the mA/kV was utilized to reduce the radiation dose to as low as reasonably   achievable.        COMPARISON:   01/27/2014       HISTORY:   ORDERING PHYSICIAN PROVIDED HISTORY: Pain of upper abdomen   TECHNOLOGIST PROVIDED HISTORY:   Additional Contrast?->Oral   Technologist Provided Reason for Exam: upper Abd pain   Acuity: Unknown   Type of Encounter: Unknown   Relevant

## 2018-11-28 ENCOUNTER — OFFICE VISIT (OUTPATIENT)
Dept: ORTHOPEDIC SURGERY | Age: 68
End: 2018-11-28
Payer: COMMERCIAL

## 2018-11-28 VITALS
BODY MASS INDEX: 40.39 KG/M2 | HEART RATE: 73 BPM | HEIGHT: 63 IN | WEIGHT: 227.96 LBS | DIASTOLIC BLOOD PRESSURE: 69 MMHG | SYSTOLIC BLOOD PRESSURE: 120 MMHG

## 2018-11-28 DIAGNOSIS — M51.36 DDD (DEGENERATIVE DISC DISEASE), LUMBAR: ICD-10-CM

## 2018-11-28 DIAGNOSIS — M47.816 SPONDYLOSIS OF LUMBAR REGION WITHOUT MYELOPATHY OR RADICULOPATHY: ICD-10-CM

## 2018-11-28 DIAGNOSIS — M54.5 BILATERAL LOW BACK PAIN, UNSPECIFIED CHRONICITY, WITH SCIATICA PRESENCE UNSPECIFIED: ICD-10-CM

## 2018-11-28 DIAGNOSIS — M54.12 CERVICAL RADICULOPATHY: ICD-10-CM

## 2018-11-28 DIAGNOSIS — M48.02 CERVICAL STENOSIS OF SPINE: Primary | ICD-10-CM

## 2018-11-28 DIAGNOSIS — M48.062 LUMBAR STENOSIS WITH NEUROGENIC CLAUDICATION: ICD-10-CM

## 2018-11-28 PROCEDURE — 99244 OFF/OP CNSLTJ NEW/EST MOD 40: CPT | Performed by: PHYSICAL MEDICINE & REHABILITATION

## 2018-11-28 RX ORDER — METHYLPREDNISOLONE 4 MG/1
TABLET ORAL
Qty: 1 KIT | Refills: 0 | Status: SHIPPED | OUTPATIENT
Start: 2018-11-28 | End: 2018-12-11 | Stop reason: ALTCHOICE

## 2018-11-30 ENCOUNTER — TELEPHONE (OUTPATIENT)
Dept: ORTHOPEDIC SURGERY | Age: 68
End: 2018-11-30

## 2018-11-30 DIAGNOSIS — F41.9 ANXIETY: ICD-10-CM

## 2018-11-30 DIAGNOSIS — F51.04 PSYCHOPHYSIOLOGICAL INSOMNIA: ICD-10-CM

## 2018-11-30 RX ORDER — ALPRAZOLAM 1 MG/1
1 TABLET ORAL NIGHTLY PRN
Qty: 30 TABLET | Refills: 0 | Status: SHIPPED | OUTPATIENT
Start: 2018-11-30 | End: 2018-12-21 | Stop reason: SDUPTHER

## 2018-11-30 NOTE — TELEPHONE ENCOUNTER
L/m on patient's v/m that MRI LSP has been approved and authorization is valid until December 28, 2018. Patient will need to call YANIQUE Penaloza to schedule appointment. At this time, MRI CSP is pending denial.  Will notify patient once MRI CSP has been officially approved or denied.

## 2018-12-04 DIAGNOSIS — F51.04 PSYCHOPHYSIOLOGICAL INSOMNIA: ICD-10-CM

## 2018-12-04 RX ORDER — TRAZODONE HYDROCHLORIDE 50 MG/1
TABLET ORAL
Qty: 60 TABLET | Refills: 5 | Status: SHIPPED | OUTPATIENT
Start: 2018-12-04 | End: 2019-03-31 | Stop reason: SDUPTHER

## 2018-12-05 DIAGNOSIS — G95.9 CERVICAL MYELOPATHY (HCC): Primary | ICD-10-CM

## 2018-12-06 ENCOUNTER — TELEPHONE (OUTPATIENT)
Dept: ORTHOPEDIC SURGERY | Age: 68
End: 2018-12-06

## 2018-12-06 DIAGNOSIS — F51.04 PSYCHOPHYSIOLOGICAL INSOMNIA: ICD-10-CM

## 2018-12-06 RX ORDER — TRAZODONE HYDROCHLORIDE 50 MG/1
TABLET ORAL
Qty: 60 TABLET | Refills: 5 | Status: CANCELLED | OUTPATIENT
Start: 2018-12-06

## 2018-12-06 NOTE — TELEPHONE ENCOUNTER
Spoke to patient regarding MRI CSP approval.  Authorization is valid until 01/03/19. Patient has already completed MRI LSP on 12/04/18. Once MRI CSP is scheduled, patient will call the office to schedule MRI LSP and MRI CSP follow up appointment with Dr. Alan Soto.

## 2018-12-11 ENCOUNTER — OFFICE VISIT (OUTPATIENT)
Dept: INTERNAL MEDICINE CLINIC | Age: 68
End: 2018-12-11
Payer: COMMERCIAL

## 2018-12-11 VITALS
WEIGHT: 222 LBS | BODY MASS INDEX: 39.34 KG/M2 | SYSTOLIC BLOOD PRESSURE: 122 MMHG | HEART RATE: 100 BPM | OXYGEN SATURATION: 97 % | DIASTOLIC BLOOD PRESSURE: 70 MMHG

## 2018-12-11 DIAGNOSIS — K21.9 GASTROESOPHAGEAL REFLUX DISEASE, ESOPHAGITIS PRESENCE NOT SPECIFIED: ICD-10-CM

## 2018-12-11 DIAGNOSIS — G89.29 NECK PAIN, CHRONIC: ICD-10-CM

## 2018-12-11 DIAGNOSIS — G89.29 CHRONIC BILATERAL LOW BACK PAIN WITH BILATERAL SCIATICA: Primary | ICD-10-CM

## 2018-12-11 DIAGNOSIS — M54.2 NECK PAIN, CHRONIC: ICD-10-CM

## 2018-12-11 DIAGNOSIS — M17.12 PRIMARY OSTEOARTHRITIS OF LEFT KNEE: ICD-10-CM

## 2018-12-11 DIAGNOSIS — M54.42 CHRONIC BILATERAL LOW BACK PAIN WITH BILATERAL SCIATICA: Primary | ICD-10-CM

## 2018-12-11 DIAGNOSIS — F51.04 PSYCHOPHYSIOLOGICAL INSOMNIA: ICD-10-CM

## 2018-12-11 DIAGNOSIS — E66.01 CLASS 2 SEVERE OBESITY DUE TO EXCESS CALORIES WITH SERIOUS COMORBIDITY AND BODY MASS INDEX (BMI) OF 39.0 TO 39.9 IN ADULT (HCC): ICD-10-CM

## 2018-12-11 DIAGNOSIS — M54.41 CHRONIC BILATERAL LOW BACK PAIN WITH BILATERAL SCIATICA: Primary | ICD-10-CM

## 2018-12-11 DIAGNOSIS — F41.9 ANXIETY: ICD-10-CM

## 2018-12-11 DIAGNOSIS — F32.4 MAJOR DEPRESSIVE DISORDER WITH SINGLE EPISODE, IN PARTIAL REMISSION (HCC): ICD-10-CM

## 2018-12-11 PROCEDURE — 99214 OFFICE O/P EST MOD 30 MIN: CPT | Performed by: INTERNAL MEDICINE

## 2018-12-11 PROCEDURE — 90471 IMMUNIZATION ADMIN: CPT | Performed by: INTERNAL MEDICINE

## 2018-12-11 PROCEDURE — 90732 PPSV23 VACC 2 YRS+ SUBQ/IM: CPT | Performed by: INTERNAL MEDICINE

## 2018-12-11 NOTE — PROGRESS NOTES
Assessment/Plan     1. Chronic bilateral low back pain with bilateral sciatica  Stable. She has appointment with Dr. Nahomy Phan later this week to discuss MRI results. Continue current doses of gabapentin and Tylenol for now. 2. Neck pain, chronic  See plan for low back pain. 3. Primary osteoarthritis of left knee  Awaiting insurance approval for Synvisc per Dr. Valri Lombard. 4. Class 2 severe obesity due to excess calories with serious comorbidity and body mass index (BMI) of 39.0 to 39.9 in adult [facility])  Improved with dietary changes and more walking. She plans to start Weight Watcher's diet after the first of the year. 5. Psychophysiological insomnia  Worse due to holiday stress. Continue current doses of trazodone and melatonin. Principles of good sleep hygiene discussed. 6. Major depressive disorder with single episode, in partial remission ([address]  Continues to slowly improve. Continue current medications. 7. Gastroesophageal reflux disease, esophagitis presence not specified  She was advised to take her PPI before largest meal for maximal benefit. May continue Tums or Pepcid for breakthrough symptoms. Reflux precautions discussed. 8. Anxiety  Worse due to holidays. Continue current doses of Wellbutrin XL and Lexapro, and lowest effective dose of Xanax. Return in about 3 months (around 3/11/2019). Geoff Mcdaniel   YOB: 1950    Date of Visit:  12/11/2018    Allergies   Allergen Reactions    Codeine Nausea Only      Prior to Visit Medications    Medication Sig Taking? Authorizing Provider   traZODone (DESYREL) 50 MG tablet TAKE 2 TABLETS BY MOUTH EVERY NIGHT AT BEDTIME Yes Allie Gonzalez MD   ALPRAZolam Julieth Mcclain) 1 MG tablet Take 1 tablet by mouth nightly as needed for Sleep or Anxiety for up to 30 days. Alaine Denver, MD   gabapentin (NEURONTIN) 100 MG capsule TAKE ONE CAPSULE BY MOUTH THREE TIMES DAILY Yes Allie Gonzalez MD   esomeprazole

## 2018-12-13 ENCOUNTER — OFFICE VISIT (OUTPATIENT)
Dept: ORTHOPEDIC SURGERY | Age: 68
End: 2018-12-13
Payer: COMMERCIAL

## 2018-12-13 VITALS — BODY MASS INDEX: 39.34 KG/M2 | HEIGHT: 63 IN | WEIGHT: 222 LBS

## 2018-12-13 DIAGNOSIS — M50.30 DDD (DEGENERATIVE DISC DISEASE), CERVICAL: ICD-10-CM

## 2018-12-13 DIAGNOSIS — M43.16 SPONDYLOLISTHESIS OF LUMBAR REGION: ICD-10-CM

## 2018-12-13 DIAGNOSIS — M51.26 HNP (HERNIATED NUCLEUS PULPOSUS), LUMBAR: Primary | ICD-10-CM

## 2018-12-13 DIAGNOSIS — M50.20 HNP (HERNIATED NUCLEUS PULPOSUS), CERVICAL: ICD-10-CM

## 2018-12-13 DIAGNOSIS — M54.16 LUMBAR RADICULOPATHY: ICD-10-CM

## 2018-12-13 PROCEDURE — 99214 OFFICE O/P EST MOD 30 MIN: CPT | Performed by: PHYSICAL MEDICINE & REHABILITATION

## 2018-12-13 NOTE — PROGRESS NOTES
negative. Severo's testing is negative bilaterally. FADIR's testing is negative bilaterally. · Skin: There are no rashes, ulcerations or lesions. · Reflexes: Reflexes are symmetrically 2+ at the patellar and ankle tendons. Clonus absent bilaterally at the feet. · Gait & station: antalgic on the right  · Additional Examinations:  · RIGHT LOWER EXTREMITY: Inspection/examination of the right lower extremity does not show any tenderness, deformity or injury. Range of motion is normal and pain-free. There is no gross instability. There are no rashes, ulcerations or lesions. Strength and tone are normal. No atrophy or abnormal movements are noted. · LEFT LOWER EXTREMITY:  Inspection/examination of the left lower extremity does not show any tenderness, deformity or injury. Range of motion is normal and pain-free. There is no gross instability. There are no rashes, ulcerations or lesions. Strength and tone are normal. No atrophy or abnormal movements are noted. Diagnostic Testing:    MR Lumbar spine shows 12/4/18  1. L2-3 shallow disc bulge, subtle annular rent; gently encroaching upon ventral dural sac near    to right L3 nerve root.  Mild progression of disc degeneration and displacement since    09/10/2011 MRI. 2. L3-4 shallow posterior central disc protrusion gently encroaches upon ventral dural sac.     Disc similar to 09/10/2011 MRI. 3. Moderate to large encapsulated cystic structure at anterior abdominal wall subcutis fat,    depicted in part though to better advantage per 09/10/2011 than per currently; though still    present, imaged in small part.         MR Cervical 12/10/18  1. C5-6 disc desiccation.  Spondylosis.  Central and left disc protrusion with effacement of    the left hemicord.  Bilateral foraminal stenosis with C6 root effacement. 2. C6-7 spondylosis.  Broad disc protrusion.  Left foraminal stenosis.  C7 root abutment. 3. Straightening of the cervical lordosis.    4. Spinal cord

## 2018-12-13 NOTE — PROGRESS NOTES
Results for the following test have been finalized and entered into the patients chart.
none  · Medications:   NSAIDS:  yes   Muscle relaxer:  none   Steriods:  yes   Neuropathic medications:  none   Opioids:  none  · Previous surgery:  no  · Previous surgical consult:  no                    Past Medical History:   Past Medical History:   Diagnosis Date    Anal fissure 7/16/2013    Anxiety     Colon polyps     Depression     Fibroid uterus     Hypothyroidism     Morbid obesity ([address]     Osteopenia       Past Surgical History:     Past Surgical History:   Procedure Laterality Date    ABDOMINAL HERNIA REPAIR  2008    APPENDECTOMY  Age 27    CATARACT REMOVAL WITH IMPLANT Right 10/23/2017    with vitrectomy    CATARACT REMOVAL WITH IMPLANT Left 02/26/2018    with vitrectomy    COLONOSCOPY N/A 10/10/2018    COLONOSCOPY POLYPECTOMY SNARE/COLD BIOPSY performed by Michele Wright MD at [address]    AR ESOPHAGOGASTRODUODENOSCOPY TRANSORAL DIAGNOSTIC N/A 10/10/2018    EGD performed by Michele Wright MD at [facility]    Fibroids     Current Medications:     Current Outpatient Prescriptions:     gabapentin (NEURONTIN) 100 MG capsule, TAKE ONE CAPSULE BY MOUTH THREE TIMES DAILY, Disp: 90 capsule, Rfl: 2    esomeprazole (NEXIUM) 40 MG delayed release capsule, TAKE ONE CAPSULE BY MOUTH EVERY MORNING BEFORE BREAKFAST, Disp: 90 capsule, Rfl: 1    ALPRAZolam (XANAX) 1 MG tablet, TAKE 1 TABLET BY MOUTH TWICE DAILY AS NEEDED FOR ANXIETY OR SLEEP, Disp: 60 tablet, Rfl: 0    buPROPion (WELLBUTRIN XL) 300 MG extended release tablet, TAKE 1 TABLET BY MOUTH EVERY MORNING, Disp: 90 tablet, Rfl: 1    Ginkgo Biloba (GINKOBA) 40 MG TABS, Take by mouth, Disp: , Rfl:     levothyroxine (SYNTHROID) 150 MCG tablet, TAKE 1 TABLET BY MOUTH ONCE A DAY, Disp: 30 tablet, Rfl: 5    traZODone (DESYREL) 50 MG tablet, TAKE 2 TABLETS BY MOUTH EVERY NIGHT, Disp: 60 tablet, Rfl: 5    escitalopram (LEXAPRO) 10 MG tablet, Take 0.5 tablets by mouth daily,

## 2018-12-13 NOTE — LETTER
Please schedule the following with:     Date:   2019 @ 10:40    2. 2019 @ 10:00   Account: [de-identified]  Patient: Ranjit Chapman    : 1950  Address:  [address] [place] [address]  [address] [facility],[address] [address]    Phone (H):  [phone] (home)      ----------------------------------------------------------------------------------------------  Diagnosis:     ICD-10-CM    1. HNP (herniated nucleus pulposus), lumbar M51.26    2. Lumbar radiculopathy M54.16    3. Spondylolisthesis of lumbar region M43.16    4. HNP (herniated nucleus pulposus), cervical M50.20    5. DDD (degenerative disc disease), cervical M50.30          Levels: L3, L4  on the right  Transforaminal RAMONA  CPT Codes 56331, 90614    ----------------------------------------------------------------------------------------------  Injection #   [id number] [facility]    Attending Physician       Danny Castañeda.  Mitch Ferrara MD.      ----------------------------------------------------------------------------------------------  Injection Scheduled For:    At:    [address]    Pre-Cert#    2nd Insurance     Pre-Cert#    Comments:    · Infection control  · Tested positive for MRSA in past 12 months:  no  · Tested positive for MSSA \"staph infection\" in past 12 months: no  · Tested positive for VRE (Vancomycin Resistant Enterococci) in past 12 months:   no  · Currently on any antibiotics for an infection: no  · Anticoagulants:  · On a blood thinner:  HERBAL  · Any history of bleeding disorder: no   · Advanced Liver disease: no   · Advanced Renal disease: no   · Glaucoma: no   · Diabetes: no     Sedation:  No  -----------------------------------------------------------------------------------------------  Allergies   Allergen Reactions    Codeine Nausea Only

## 2018-12-28 ENCOUNTER — TELEPHONE (OUTPATIENT)
Dept: ORTHOPEDIC SURGERY | Age: 68
End: 2018-12-28

## 2019-01-03 ENCOUNTER — TELEPHONE (OUTPATIENT)
Dept: ORTHOPEDIC SURGERY | Age: 69
End: 2019-01-03

## 2019-01-07 ENCOUNTER — HOSPITAL ENCOUNTER (OUTPATIENT)
Age: 69
Setting detail: OUTPATIENT SURGERY
Discharge: HOME | End: 2019-01-07
Attending: PHYSICAL MEDICINE & REHABILITATION | Admitting: PHYSICAL MEDICINE & REHABILITATION
Payer: COMMERCIAL

## 2019-01-07 ENCOUNTER — APPOINTMENT (OUTPATIENT)
Dept: GENERAL RADIOLOGY | Age: 69
End: 2019-01-07
Attending: PHYSICAL MEDICINE & REHABILITATION
Payer: COMMERCIAL

## 2019-01-07 ENCOUNTER — TELEPHONE (OUTPATIENT)
Dept: ORTHOPEDIC SURGERY | Age: 69
End: 2019-01-07

## 2019-01-07 VITALS
HEIGHT: 63 IN | RESPIRATION RATE: 18 BRPM | OXYGEN SATURATION: 95 % | BODY MASS INDEX: 39.34 KG/M2 | HEART RATE: 70 BPM | WEIGHT: 222 LBS | TEMPERATURE: 97 F | SYSTOLIC BLOOD PRESSURE: 122 MMHG | DIASTOLIC BLOOD PRESSURE: 72 MMHG

## 2019-01-07 PROCEDURE — 99152 MOD SED SAME PHYS/QHP 5/>YRS: CPT | Performed by: PHYSICAL MEDICINE & REHABILITATION

## 2019-01-07 PROCEDURE — 3610000056 HC PAIN LEVEL 4 BASE (NON-OR): Performed by: PHYSICAL MEDICINE & REHABILITATION

## 2019-01-07 PROCEDURE — 6360000002 HC RX W HCPCS: Performed by: PHYSICAL MEDICINE & REHABILITATION

## 2019-01-07 PROCEDURE — 3209999900 FLUORO FOR SURGICAL PROCEDURES

## 2019-01-07 PROCEDURE — 2709999900 HC NON-CHARGEABLE SUPPLY: Performed by: PHYSICAL MEDICINE & REHABILITATION

## 2019-01-07 PROCEDURE — 2500000003 HC RX 250 WO HCPCS: Performed by: PHYSICAL MEDICINE & REHABILITATION

## 2019-01-07 RX ORDER — LIDOCAINE HYDROCHLORIDE 10 MG/ML
INJECTION, SOLUTION INFILTRATION; PERINEURAL
Status: COMPLETED | OUTPATIENT
Start: 2019-01-07 | End: 2019-01-07

## 2019-01-07 RX ORDER — METHYLPREDNISOLONE ACETATE 80 MG/ML
INJECTION, SUSPENSION INTRA-ARTICULAR; INTRALESIONAL; INTRAMUSCULAR; SOFT TISSUE
Status: COMPLETED | OUTPATIENT
Start: 2019-01-07 | End: 2019-01-07

## 2019-01-07 RX ORDER — MIDAZOLAM HYDROCHLORIDE 1 MG/ML
INJECTION INTRAMUSCULAR; INTRAVENOUS
Status: COMPLETED | OUTPATIENT
Start: 2019-01-07 | End: 2019-01-07

## 2019-01-07 RX ORDER — BUPIVACAINE HYDROCHLORIDE 5 MG/ML
INJECTION, SOLUTION PERINEURAL
Status: COMPLETED | OUTPATIENT
Start: 2019-01-07 | End: 2019-01-07

## 2019-01-07 ASSESSMENT — PAIN DESCRIPTION - DESCRIPTORS: DESCRIPTORS: ACHING

## 2019-01-07 ASSESSMENT — PAIN - FUNCTIONAL ASSESSMENT: PAIN_FUNCTIONAL_ASSESSMENT: 0-10

## 2019-01-07 ASSESSMENT — PAIN SCALES - GENERAL: PAINLEVEL_OUTOF10: 0

## 2019-01-08 RX ORDER — OMEPRAZOLE 40 MG/1
40 CAPSULE, DELAYED RELEASE ORAL
Qty: 30 CAPSULE | Refills: 5 | Status: SHIPPED | OUTPATIENT
Start: 2019-01-08 | End: 2019-01-08 | Stop reason: SDUPTHER

## 2019-01-10 ENCOUNTER — TELEPHONE (OUTPATIENT)
Dept: ORTHOPEDIC SURGERY | Age: 69
End: 2019-01-10

## 2019-01-11 ENCOUNTER — TELEPHONE (OUTPATIENT)
Dept: ORTHOPEDIC SURGERY | Age: 69
End: 2019-01-11

## 2019-01-14 ENCOUNTER — CLINICAL DOCUMENTATION (OUTPATIENT)
Dept: INTERNAL MEDICINE CLINIC | Age: 69
End: 2019-01-14

## 2019-01-14 ENCOUNTER — TELEPHONE (OUTPATIENT)
Dept: ORTHOPEDIC SURGERY | Age: 69
End: 2019-01-14

## 2019-01-18 DIAGNOSIS — F51.04 PSYCHOPHYSIOLOGICAL INSOMNIA: ICD-10-CM

## 2019-01-18 DIAGNOSIS — F41.9 ANXIETY: ICD-10-CM

## 2019-01-18 RX ORDER — ALPRAZOLAM 1 MG/1
1 TABLET ORAL NIGHTLY PRN
Qty: 30 TABLET | Refills: 1 | Status: SHIPPED | OUTPATIENT
Start: 2019-01-18 | End: 2019-03-18 | Stop reason: SDUPTHER

## 2019-02-14 ENCOUNTER — OFFICE VISIT (OUTPATIENT)
Dept: ORTHOPEDIC SURGERY | Age: 69
End: 2019-02-14
Payer: COMMERCIAL

## 2019-02-14 ENCOUNTER — TELEPHONE (OUTPATIENT)
Dept: ORTHOPEDIC SURGERY | Age: 69
End: 2019-02-14

## 2019-02-14 VITALS
BODY MASS INDEX: 39.34 KG/M2 | HEART RATE: 74 BPM | WEIGHT: 222 LBS | SYSTOLIC BLOOD PRESSURE: 120 MMHG | HEIGHT: 63 IN | DIASTOLIC BLOOD PRESSURE: 72 MMHG

## 2019-02-14 DIAGNOSIS — M79.602 PARESTHESIA AND PAIN OF BOTH UPPER EXTREMITIES: ICD-10-CM

## 2019-02-14 DIAGNOSIS — R20.2 PARESTHESIA AND PAIN OF BOTH UPPER EXTREMITIES: ICD-10-CM

## 2019-02-14 DIAGNOSIS — M48.02 CERVICAL STENOSIS OF SPINE: Primary | ICD-10-CM

## 2019-02-14 DIAGNOSIS — M50.30 DDD (DEGENERATIVE DISC DISEASE), CERVICAL: ICD-10-CM

## 2019-02-14 DIAGNOSIS — M79.601 PARESTHESIA AND PAIN OF BOTH UPPER EXTREMITIES: ICD-10-CM

## 2019-02-14 DIAGNOSIS — M48.02 FORAMINAL STENOSIS OF CERVICAL REGION: ICD-10-CM

## 2019-02-14 PROCEDURE — 99214 OFFICE O/P EST MOD 30 MIN: CPT | Performed by: PHYSICAL MEDICINE & REHABILITATION

## 2019-02-18 ENCOUNTER — TELEPHONE (OUTPATIENT)
Dept: ORTHOPEDIC SURGERY | Age: 69
End: 2019-02-18

## 2019-02-26 ENCOUNTER — OFFICE VISIT (OUTPATIENT)
Dept: GYNECOLOGY | Age: 69
End: 2019-02-26
Payer: COMMERCIAL

## 2019-02-26 VITALS
HEIGHT: 63 IN | HEART RATE: 77 BPM | DIASTOLIC BLOOD PRESSURE: 74 MMHG | WEIGHT: 214 LBS | SYSTOLIC BLOOD PRESSURE: 121 MMHG | RESPIRATION RATE: 17 BRPM | BODY MASS INDEX: 37.92 KG/M2

## 2019-02-26 DIAGNOSIS — Z78.0 MENOPAUSE: ICD-10-CM

## 2019-02-26 DIAGNOSIS — N90.89 VULVAR IRRITATION: ICD-10-CM

## 2019-02-26 DIAGNOSIS — Z01.419 WELL WOMAN EXAM WITH ROUTINE GYNECOLOGICAL EXAM: Primary | ICD-10-CM

## 2019-02-26 PROCEDURE — 99387 INIT PM E/M NEW PAT 65+ YRS: CPT | Performed by: OBSTETRICS & GYNECOLOGY

## 2019-02-26 RX ORDER — CLOBETASOL PROPIONATE 0.5 MG/G
OINTMENT TOPICAL NIGHTLY
Qty: 45 G | Refills: 1 | Status: SHIPPED | OUTPATIENT
Start: 2019-02-26 | End: 2021-05-11 | Stop reason: SDUPTHER

## 2019-02-26 ASSESSMENT — ENCOUNTER SYMPTOMS
GASTROINTESTINAL NEGATIVE: 1
RESPIRATORY NEGATIVE: 1
EYES NEGATIVE: 1

## 2019-02-27 ENCOUNTER — TELEPHONE (OUTPATIENT)
Dept: ORTHOPEDIC SURGERY | Age: 69
End: 2019-02-27

## 2019-02-28 LAB
HPV COMMENT: NORMAL
HPV TYPE 16: NOT DETECTED
HPV TYPE 18: NOT DETECTED
HPVOH (OTHER TYPES): NOT DETECTED

## 2019-03-12 ENCOUNTER — OFFICE VISIT (OUTPATIENT)
Dept: INTERNAL MEDICINE CLINIC | Age: 69
End: 2019-03-12
Payer: COMMERCIAL

## 2019-03-12 VITALS
OXYGEN SATURATION: 99 % | SYSTOLIC BLOOD PRESSURE: 126 MMHG | WEIGHT: 210 LBS | DIASTOLIC BLOOD PRESSURE: 64 MMHG | BODY MASS INDEX: 37.2 KG/M2 | HEART RATE: 68 BPM

## 2019-03-12 DIAGNOSIS — F51.04 PSYCHOPHYSIOLOGICAL INSOMNIA: ICD-10-CM

## 2019-03-12 DIAGNOSIS — F41.9 ANXIETY: ICD-10-CM

## 2019-03-12 DIAGNOSIS — M54.41 CHRONIC BILATERAL LOW BACK PAIN WITH BILATERAL SCIATICA: Primary | ICD-10-CM

## 2019-03-12 DIAGNOSIS — M54.42 CHRONIC BILATERAL LOW BACK PAIN WITH BILATERAL SCIATICA: Primary | ICD-10-CM

## 2019-03-12 DIAGNOSIS — K91.2 POST-RESECTION MALABSORPTION: Chronic | ICD-10-CM

## 2019-03-12 DIAGNOSIS — G25.81 RESTLESS LEG SYNDROME: ICD-10-CM

## 2019-03-12 DIAGNOSIS — E66.01 CLASS 2 SEVERE OBESITY DUE TO EXCESS CALORIES WITH SERIOUS COMORBIDITY AND BODY MASS INDEX (BMI) OF 37.0 TO 37.9 IN ADULT (HCC): ICD-10-CM

## 2019-03-12 DIAGNOSIS — G89.29 NECK PAIN, CHRONIC: ICD-10-CM

## 2019-03-12 DIAGNOSIS — G89.29 CHRONIC BILATERAL LOW BACK PAIN WITH BILATERAL SCIATICA: Primary | ICD-10-CM

## 2019-03-12 DIAGNOSIS — M17.12 PRIMARY OSTEOARTHRITIS OF LEFT KNEE: ICD-10-CM

## 2019-03-12 DIAGNOSIS — E03.9 ACQUIRED HYPOTHYROIDISM: Chronic | ICD-10-CM

## 2019-03-12 DIAGNOSIS — M54.2 NECK PAIN, CHRONIC: ICD-10-CM

## 2019-03-12 DIAGNOSIS — F32.4 MAJOR DEPRESSIVE DISORDER WITH SINGLE EPISODE, IN PARTIAL REMISSION (HCC): ICD-10-CM

## 2019-03-12 DIAGNOSIS — Z13.31 POSITIVE DEPRESSION SCREENING: ICD-10-CM

## 2019-03-12 PROCEDURE — 99214 OFFICE O/P EST MOD 30 MIN: CPT | Performed by: INTERNAL MEDICINE

## 2019-03-12 PROCEDURE — G8431 POS CLIN DEPRES SCRN F/U DOC: HCPCS | Performed by: INTERNAL MEDICINE

## 2019-03-12 PROCEDURE — G0444 DEPRESSION SCREEN ANNUAL: HCPCS | Performed by: INTERNAL MEDICINE

## 2019-03-12 RX ORDER — GABAPENTIN 100 MG/1
CAPSULE ORAL
Qty: 90 CAPSULE | Refills: 2 | Status: CANCELLED | OUTPATIENT
Start: 2019-03-12 | End: 2019-06-10

## 2019-03-12 RX ORDER — LEVOTHYROXINE SODIUM 0.15 MG/1
TABLET ORAL
Qty: 30 TABLET | Refills: 5 | Status: SHIPPED | OUTPATIENT
Start: 2019-03-12 | End: 2019-03-12 | Stop reason: SDUPTHER

## 2019-03-12 RX ORDER — GABAPENTIN 100 MG/1
CAPSULE ORAL
Qty: 90 CAPSULE | Refills: 2 | Status: SHIPPED | OUTPATIENT
Start: 2019-03-12 | End: 2019-07-16 | Stop reason: SDUPTHER

## 2019-03-12 ASSESSMENT — PATIENT HEALTH QUESTIONNAIRE - PHQ9
7. TROUBLE CONCENTRATING ON THINGS, SUCH AS READING THE NEWSPAPER OR WATCHING TELEVISION: 3
4. FEELING TIRED OR HAVING LITTLE ENERGY: 3
SUM OF ALL RESPONSES TO PHQ9 QUESTIONS 1 & 2: 5
8. MOVING OR SPEAKING SO SLOWLY THAT OTHER PEOPLE COULD HAVE NOTICED. OR THE OPPOSITE, BEING SO FIGETY OR RESTLESS THAT YOU HAVE BEEN MOVING AROUND A LOT MORE THAN USUAL: 0
5. POOR APPETITE OR OVEREATING: 0
10. IF YOU CHECKED OFF ANY PROBLEMS, HOW DIFFICULT HAVE THESE PROBLEMS MADE IT FOR YOU TO DO YOUR WORK, TAKE CARE OF THINGS AT HOME, OR GET ALONG WITH OTHER PEOPLE: 1
1. LITTLE INTEREST OR PLEASURE IN DOING THINGS: 2
SUM OF ALL RESPONSES TO PHQ QUESTIONS 1-9: 16
6. FEELING BAD ABOUT YOURSELF - OR THAT YOU ARE A FAILURE OR HAVE LET YOURSELF OR YOUR FAMILY DOWN: 2
SUM OF ALL RESPONSES TO PHQ QUESTIONS 1-9: 16
3. TROUBLE FALLING OR STAYING ASLEEP: 3
9. THOUGHTS THAT YOU WOULD BE BETTER OFF DEAD, OR OF HURTING YOURSELF: 0
2. FEELING DOWN, DEPRESSED OR HOPELESS: 3

## 2019-03-18 DIAGNOSIS — F41.9 ANXIETY: ICD-10-CM

## 2019-03-18 DIAGNOSIS — F51.04 PSYCHOPHYSIOLOGICAL INSOMNIA: ICD-10-CM

## 2019-03-18 RX ORDER — ALPRAZOLAM 1 MG/1
TABLET ORAL
Qty: 30 TABLET | Refills: 2 | Status: SHIPPED | OUTPATIENT
Start: 2019-03-18 | End: 2019-06-16 | Stop reason: SDUPTHER

## 2019-04-01 DIAGNOSIS — F51.04 PSYCHOPHYSIOLOGICAL INSOMNIA: ICD-10-CM

## 2019-04-01 RX ORDER — TRAZODONE HYDROCHLORIDE 50 MG/1
150 TABLET ORAL NIGHTLY
Qty: 270 TABLET | Refills: 0 | Status: SHIPPED | OUTPATIENT
Start: 2019-04-01 | End: 2019-06-09 | Stop reason: SDUPTHER

## 2019-04-09 ENCOUNTER — CLINICAL DOCUMENTATION (OUTPATIENT)
Dept: INTERNAL MEDICINE CLINIC | Age: 69
End: 2019-04-09

## 2019-04-19 ENCOUNTER — TELEPHONE (OUTPATIENT)
Dept: ORTHOPEDIC SURGERY | Age: 69
End: 2019-04-19

## 2019-04-22 ENCOUNTER — TELEPHONE (OUTPATIENT)
Dept: ORTHOPEDIC SURGERY | Age: 69
End: 2019-04-22

## 2019-04-22 NOTE — TELEPHONE ENCOUNTER
L/m on v/m:  Due to Dr. Delvalle Richy no show/cancellation policy, patient can not r/s janae. She will need to call back to schedule an office visit to discuss. This is documented several times in her chart.  Please schedule an ov when patient calls back

## 2019-05-01 ENCOUNTER — OFFICE VISIT (OUTPATIENT)
Dept: ORTHOPEDIC SURGERY | Age: 69
End: 2019-05-01
Payer: COMMERCIAL

## 2019-05-01 VITALS
WEIGHT: 210.1 LBS | HEART RATE: 71 BPM | HEIGHT: 63 IN | BODY MASS INDEX: 37.23 KG/M2 | DIASTOLIC BLOOD PRESSURE: 70 MMHG | SYSTOLIC BLOOD PRESSURE: 125 MMHG

## 2019-05-01 DIAGNOSIS — M51.26 HNP (HERNIATED NUCLEUS PULPOSUS), LUMBAR: Primary | ICD-10-CM

## 2019-05-01 DIAGNOSIS — M54.16 LUMBAR RADICULOPATHY: ICD-10-CM

## 2019-05-01 PROCEDURE — 99214 OFFICE O/P EST MOD 30 MIN: CPT | Performed by: PHYSICAL MEDICINE & REHABILITATION

## 2019-05-01 NOTE — PROGRESS NOTES
Follow up: Angela Gasca  1950  [id number]      CHIEF COMPLAINT:    Chief Complaint   Patient presents with    Back Problem     FUA LSP         HISTORY OF PRESENT ILLNESS:  Ms. Joel Turcios is a 79 y.o. female returns for a follow up visit for multiple medical problems. Her current presenting problems are   1. HNP (herniated nucleus pulposus), lumbar    2. Lumbar radiculopathy    . As per information/history obtained from the PADT(patient assessment and documentation tool) - She complains of pain in the lower back with radiation to the lower leg Bilateral She rates the pain 7/10 and describes it as aching, burning. Pain is made worse by: walking, sitting. She denies side effects from the current pain regimen. Patient reports that since the last follow up visit the physical functioning is unchanged, family/social relationships are unchanged, mood is unchanged and sleep patterns are unchanged, and that the overall functioning is unchanged. Patient denies neurological bowel or bladder. Patient returns today for follow up of low back pain. She wishes to have FMLA paperwork filled out at this time. Her pain in the right leg is worse than the left. The pain radiates primarily into the right calf and foot. Sitting for prolonged periods of time exacerbates her pain. She reports trouble getting up from a seated position. Patient has had to cancel epidural injections in the past due to financial issues. She has saved up money for this injection and due to her increased pain she is ready to have the injection at this point. She describes that her lower back and leg pain is worse than her neck pain at this time. She will likely make another appointment to review her neck pain after the injection into the low back. The patient also feels that she is unable to walk in a straight line. The pain has been waking her up at night. She denies any new injuries or triggers since her last visit.  The patient is not ambulating with any assistive devices in the office today.      Associated signs and symptoms:   Neurogenic bowel or bladder symptoms:  no   Perceived weakness:  no   Difficulty walking:  yes              Past Medical History:   Past Medical History:   Diagnosis Date    Anal fissure 7/16/2013    Anxiety     Colon polyps     Depression     Fibroid uterus     Hypothyroidism     Morbid obesity ([address]     Osteopenia       Past Surgical History:     Past Surgical History:   Procedure Laterality Date    ABDOMINAL HERNIA REPAIR  2008    APPENDECTOMY  Age 27    CATARACT REMOVAL WITH IMPLANT Right 10/23/2017    with vitrectomy    CATARACT REMOVAL WITH IMPLANT Left 02/26/2018    with vitrectomy    COLONOSCOPY N/A 10/10/2018    COLONOSCOPY POLYPECTOMY SNARE/COLD BIOPSY performed by Trini Jurado MD at [address] [place] [address], TOTAL ABDOMINAL      ovaries out also    LUMBAR SPINE SURGERY Right 1/7/2019    RIGHT L3 AND L4 LUMBAR TRANSFORAMINAL WITH FLUOROSCOPY performed by Vannessa Whitfield MD at [place] [address] ESOPHAGOGASTRODUODENOSCOPY TRANSORAL DIAGNOSTIC N/A 10/10/2018    EGD performed by Trini Jurado MD at [address] [facility]    Fibroids     Current Medications:     Current Outpatient Medications:     traZODone (DESYREL) 50 MG tablet, TAKE 3 TABLETS BY MOUTH NIGHTLY, Disp: 270 tablet, Rfl: 0    ALPRAZolam (XANAX) 1 MG tablet, TAKE 1 TABLET BY MOUTH NIGHTLY AS NEEDED FOR SLEEP OR ANXIETY, Disp: 30 tablet, Rfl: 2    gabapentin (NEURONTIN) 100 MG capsule, TAKE ONE CAPSULE BY MOUTH THREE TIMES DAILY, Disp: 90 capsule, Rfl: 2    levothyroxine (SYNTHROID) 150 MCG tablet, TAKE 1 TABLET BY MOUTH EVERY DAY, Disp: 90 tablet, Rfl: 1    escitalopram (LEXAPRO) 10 MG tablet, TAKE 1 TABLET BY MOUTH DAILY, Disp: 90 tablet, Rfl: 0    buPROPion (WELLBUTRIN XL) 300 MG extended release tablet, TAKE 1 TABLET BY MOUTH EVERY MORNING, Disp: 90 tablet, Rfl: 1    Ginkgo Biloba (GINKOBA) 40 MG TABS, Take by mouth, Disp: , Rfl:     Krill Oil 300 MG CAPS, Take 1 capsule by mouth daily, Disp: , Rfl:     Cyanocobalamin (VITAMIN B 12 PO), Take 1,000 mcg by mouth daily. , Disp: , Rfl:     Cholecalciferol (VITAMIN D) 1000 UNIT CAPS, Take 3 capsules by mouth daily. , Disp: , Rfl:     omeprazole (PRILOSEC) 40 MG delayed release capsule, TAKE ONE CAPSULE BY MOUTH EVERY MORNING BEFORE BREAKFAST, Disp: 90 capsule, Rfl: 0    ferrous sulfate (FE TABS) 325 (65 Fe) MG EC tablet, Take 1 tablet by mouth daily (with breakfast), Disp: 90 tablet, Rfl: 3    Multiple Minerals-Vitamins (CITRACAL PLUS PO), Take 1 tablet by mouth daily. , Disp: , Rfl:   Allergies:  Codeine  Social History:    reports that she has never smoked. She has never used smokeless tobacco. She reports that she does not drink alcohol or use drugs. Family History:   Family History   Problem Relation Age of Onset    Diabetes Father         Type II    Diabetes Sister         Type II    Diabetes Brother         Type II    Breast Cancer Sister 58    Lung Cancer Mother         Smoker    Heart Disease Sister         Bicuspid aortic valve x2       REVIEW OF SYSTEMS:   CONSTITUTIONAL: Denies unexplained weight loss, fevers, chills or fatigue  NEUROLOGICAL: Denies unsteady gait or progressive weakness  MUSCULOSKELETAL: Denies joint swelling or redness  GI: Denies nausea, vomiting, diarrhea   : Denies bowel or bladder issues       PHYSICAL EXAM:    Vitals: Blood pressure 125/70, pulse 71, height 5' 2.99\" (1.6 m), weight 210 lb 1.6 oz (95.3 kg). GENERAL EXAM:  · General Apparence: Patient is adequately groomed with no evidence of malnutrition. · Psychiatric: Orientation: The patient is oriented to time, place and person. The patient's mood and affect are appropriate   · Vascular: Examination reveals no swelling and palpation reveals no tenderness in upper or lower extremities. Good capillary refill.    · The lymphatic examination of the neck, axillae and groin reveals all areas to be without enlargement or induration  · Sensation is intact without deficit in the upper and lower extremities to light touch and pinprick  · Coordination of the upper and lower extremities are normal.    CERVICAL EXAMINATION:  · Inspection: Local inspection shows no step-off or bruising. Cervical alignment is normal. No instability is noted. · Palpation and Percussion: No evidence of tenderness at the midline. Paraspinal tenderness is not present. There is no paraspinal spasm. Skin:There are no rashes, ulcerations or lesions  · Range of Motion:  limited by 25% in all planes due to pain   · Strength: 5/5 bilateral upper extremities  · Special Tests:   Spurling's and Wolff's are negative bilaterally. Sher and Impingement tests are negative bilaterally. · Skin:There are no rashes, ulcerations or lesions in right & left upper extremities. · Reflexes: Bilaterally triceps, biceps and brachioradialis are 2+. Clonus absent bilaterally at the feet. No pathological reflexes are noted. · Gait & station: normal, patient ambulates without assistance  · Additional Examinations:  · RIGHT UPPER EXTREMITY:  Inspection/examination of the right upper extremity does not show any tenderness, deformity or injury. Range of motion is unremarkable and pain-free. There is no gross instability. There are no rashes, ulcerations or lesions. Strength and tone are normal. No atrophy or abnormal movements are noted. · LEFT UPPER EXTREMITY: Inspection/examination of the left upper extremity does not show any tenderness, deformity or injury. Range of motion is unremarkable and pain-free. There is no gross instability. There are no rashes, ulcerations or lesions. Strength and tone are normal. No atrophy or abnormal movements are noted. LUMBAR/SACRAL EXAMINATION:  · Inspection: Local inspection shows no step-off or bruising.   Lumbar alignment is normal. No instability is noted. · Palpation:   No evidence of tenderness at the midline. Lumbar paraspinal tenderness: Mild L4/5 and L5/S1 tenderness  Bursal tenderness No tenderness bilaterally  There is no paraspinal spasm. · Range of Motion: limited by 50% in all planes due to pain with extension and side bending to the left. · Strength:   Strength testing is 5/5 in all muscle groups tested. · Special Tests:   Straight leg raise and crossed SLR negative. Severo's testing is negative bilaterally. FADIR's testing is negative bilaterally. · Skin: There are no rashes, ulcerations or lesions. · Reflexes: Reflexes are symmetrically 2+ at the patellar and ankle tendons. Clonus absent bilaterally at the feet. · Gait & station: normal, patient ambulates without assistance  · Additional Examinations:  · RIGHT LOWER EXTREMITY: Inspection/examination of the right lower extremity does not show any tenderness, deformity or injury. Range of motion is normal and pain-free. There is no gross instability. There are no rashes, ulcerations or lesions. Strength and tone are normal. No atrophy or abnormal movements are noted. · LEFT LOWER EXTREMITY:  Inspection/examination of the left lower extremity does not show any tenderness, deformity or injury. Range of motion is normal and pain-free. There is no gross instability. There are no rashes, ulcerations or lesions. Strength and tone are normal. No atrophy or abnormal movements are noted. Diagnostic Testing:    MR Lumbar spine shows from 12/4/18. CONCLUSION:   1. L2-3 shallow disc bulge, subtle annular rent; gently encroaching upon ventral dural sac near    to right L3 nerve root.  Mild progression of disc degeneration and displacement since    09/10/2011 MRI. 2. L3-4 shallow posterior central disc protrusion gently encroaches upon ventral dural sac.     Disc similar to 09/10/2011 MRI.    3. Moderate to large encapsulated cystic structure at anterior abdominal wall subcutis fat,    depicted in part though to better advantage per 09/10/2011 than per currently; though still    present, imaged in small part. Results for orders placed or performed in visit on 03/12/19   Comprehensive Metabolic Panel, Fasting   Result Value Ref Range    Sodium 143 136 - 145 mmol/L    Potassium 4.7 3.5 - 5.1 mmol/L    Chloride 106 99 - 110 mmol/L    CO2 25 21 - 32 mmol/L    Anion Gap 12 3 - 16    Glucose, Fasting 91 70 - 99 mg/dL    BUN 13 7 - 20 mg/dL    CREATININE 0.8 0.6 - 1.2 mg/dL    GFR Non-African American >60 >60    GFR African American >60 >60    Calcium 8.8 8.3 - 10.6 mg/dL    Total Protein 6.5 6.4 - 8.2 g/dL    Alb 4.0 3.4 - 5.0 g/dL    Albumin/Globulin Ratio 1.6 1.1 - 2.2    Total Bilirubin <0.2 0.0 - 1.0 mg/dL    Alkaline Phosphatase 83 40 - 129 U/L    ALT 12 10 - 40 U/L    AST 17 15 - 37 U/L    Globulin 2.5 g/dL   TSH with Reflex   Result Value Ref Range    TSH 0.11 (L) 0.27 - 4.20 uIU/mL   Iron and TIBC   Result Value Ref Range    Iron 68 37 - 145 ug/dL    TIBC 349 260 - 445 ug/dL    Iron Saturation 19 15 - 50 %   Ferritin   Result Value Ref Range    Ferritin 20.4 15.0 - 150.0 ng/mL   Vitamin B12   Result Value Ref Range    Vitamin B-12 529 211 - 911 pg/mL   Vitamin D 25 Hydroxy   Result Value Ref Range    Vit D, 25-Hydroxy 30.5 >=30 ng/mL   T4, Free   Result Value Ref Range    T4 Free 1.6 0.9 - 1.8 ng/dL   T3   Result Value Ref Range    T3, Total 0.93 0.80 - 2.00 ng/mL     Impression:       1. HNP (herniated nucleus pulposus), lumbar    2. Lumbar radiculopathy        Plan:  Clinical Course: Above diagnoses are worsening    I discussed the diagnosis and the treatment options with Mignon Villalpando today. In Summary:  The various treatment options were outlined and discussed with Mignon Villalpando including:  Conservative care options: physical therapy, ice, medications, bracing, and activity modification. The indications for therapeutic injections.  The indications for additional imaging/laboratory studies. The indications for (possible future) interventions. After considering the various options discussed, Sami Davalos elected to pursue a course of treatment that includes the followin. Medications:  No further recommendations for new medications. 2. PT:  Encouraged to continue with HEP. 3. Further studies:  No further studies. 4. Interventional:  We discussed pursuing a Right L4 and L5 TF  epidural steroid injection to address the pain. Radiologic imaging and symptoms confirm the pain etiology. Risks, benefits and alternatives of interventional options were discussed. These include and are not limited to bleeding, infection, spinal headache, nerve injury and lack of pain relief. The patient verbalized understanding and would like to proceed. The patient will be scheduled accordingly. 5. Follow up:  2-3 weeks    Patient will be placed on intermittently and can have 2-3 days off per month for the next 3 months for flareups, injection days, and follow-ups. Sami Davalos was instructed to call the office if her symptoms worsen or if new symptoms appear prior to the next scheduled visit. She is specifically instructed to contact the office between now & her scheduled appointment if she has concerns related to her condition or if she needs assistance in scheduling the above tests. She is welcome to call for an appointment sooner if she has any additional concerns or questions. Gretel Franco ATC, am scribing for and in the presence of Dr. Mariam Galarza. 19 2:26 PM Piedad Cordova ATC    I, Dr. Ryan Nuno. Vish, personally performed the services described in this documentation as scribed by CHRISTIANO Lange in my presence and it is both accurate and complete. Sumit Snatos.  Bonny Richardson MD, CARYN, [facility]  Board Certified in [facility]  Certified and Fellowship Trained in [address] [facility]  Partner of [facility] ([facility])             This dictation was performed with a verbal recognition program [facility] ) and it was checked for errors. It is possible that there are still dictated errors within this office note. If so, please bring any errors to my attention for an addendum. All efforts were made to ensure that this office note is accurate.

## 2019-05-01 NOTE — LETTER
Please schedule the following with:     Date:   5/15/2019 @ 10:00    Account: [de-identified]  Patient: Johnny Santos    : 1950  Address:  [address] [place] [address]    Phone (H):  [phone] (home)      ----------------------------------------------------------------------------------------------  Diagnosis:     ICD-10-CM    1. HNP (herniated nucleus pulposus), lumbar M51.26    2. Lumbar radiculopathy M54.16          Levels: L4 & L5  Procedure type TRANSFORAMINAL   Side RIGHT  CPT Codes 13095, 48128    ----------------------------------------------------------------------------------------------  Injection #   [id number] [facility]    Attending Physician       Swapna Orellana.  Dick Couch MD.      ----------------------------------------------------------------------------------------------  Injection Scheduled For:    At:    1st Insurance [employer]    Pre-Cert#    2nd Insurance NONE    Pre-Cert#    Comments or Special instructions:    · Infection control  · Tested positive for MRSA in past 12 months:  no  · Tested positive for MSSA \"staph infection\" in past 12 months: no  · Tested positive for VRE (Vancomycin Resistant Enterococci) in past 12 months:   no  · Currently on any antibiotics for an infection: no  · Anticoagulants:  · On a blood thinner:  no herbals  · Any history of bleeding disorder: no   · Advanced Liver disease: no   · Advanced Renal disease: no   · Glaucoma: no   · Diabetes: no     Sedation:  No  -----------------------------------------------------------------------------------------------  Allergies   Allergen Reactions    Codeine Nausea Only

## 2019-05-02 ENCOUNTER — TELEPHONE (OUTPATIENT)
Dept: ORTHOPEDIC SURGERY | Age: 69
End: 2019-05-02

## 2019-05-09 ENCOUNTER — TELEPHONE (OUTPATIENT)
Dept: ORTHOPEDIC SURGERY | Age: 69
End: 2019-05-09

## 2019-05-09 NOTE — TELEPHONE ENCOUNTER
DOS   05/15/2019  CPT   33587   79799    92863.26  12360  OP SX AUTH  NPR    RIGHT  LEVELS   L4  L5     PROCEDURE   TRANSFORAMINAL RAMONA INJ    [address]:   [place]

## 2019-05-15 ENCOUNTER — APPOINTMENT (OUTPATIENT)
Dept: GENERAL RADIOLOGY | Age: 69
End: 2019-05-15
Attending: PHYSICAL MEDICINE & REHABILITATION
Payer: COMMERCIAL

## 2019-05-15 ENCOUNTER — HOSPITAL ENCOUNTER (OUTPATIENT)
Age: 69
Setting detail: OUTPATIENT SURGERY
Discharge: HOME | End: 2019-05-15
Attending: PHYSICAL MEDICINE & REHABILITATION | Admitting: PHYSICAL MEDICINE & REHABILITATION
Payer: COMMERCIAL

## 2019-05-15 VITALS
SYSTOLIC BLOOD PRESSURE: 106 MMHG | WEIGHT: 205 LBS | HEART RATE: 69 BPM | RESPIRATION RATE: 16 BRPM | BODY MASS INDEX: 36.32 KG/M2 | OXYGEN SATURATION: 100 % | TEMPERATURE: 97.1 F | DIASTOLIC BLOOD PRESSURE: 53 MMHG | HEIGHT: 63 IN

## 2019-05-15 PROCEDURE — 3610000056 HC PAIN LEVEL 4 BASE (NON-OR): Performed by: PHYSICAL MEDICINE & REHABILITATION

## 2019-05-15 PROCEDURE — 2709999900 HC NON-CHARGEABLE SUPPLY: Performed by: PHYSICAL MEDICINE & REHABILITATION

## 2019-05-15 PROCEDURE — 2500000003 HC RX 250 WO HCPCS: Performed by: PHYSICAL MEDICINE & REHABILITATION

## 2019-05-15 PROCEDURE — 6360000002 HC RX W HCPCS: Performed by: PHYSICAL MEDICINE & REHABILITATION

## 2019-05-15 PROCEDURE — 99152 MOD SED SAME PHYS/QHP 5/>YRS: CPT | Performed by: PHYSICAL MEDICINE & REHABILITATION

## 2019-05-15 PROCEDURE — 3209999900 FLUORO FOR SURGICAL PROCEDURES

## 2019-05-15 RX ORDER — BUPIVACAINE HYDROCHLORIDE 5 MG/ML
INJECTION, SOLUTION PERINEURAL
Status: COMPLETED | OUTPATIENT
Start: 2019-05-15 | End: 2019-05-15

## 2019-05-15 RX ORDER — MIDAZOLAM HYDROCHLORIDE 1 MG/ML
INJECTION INTRAMUSCULAR; INTRAVENOUS
Status: COMPLETED | OUTPATIENT
Start: 2019-05-15 | End: 2019-05-15

## 2019-05-15 RX ORDER — LIDOCAINE HYDROCHLORIDE 10 MG/ML
INJECTION, SOLUTION INFILTRATION; PERINEURAL
Status: COMPLETED | OUTPATIENT
Start: 2019-05-15 | End: 2019-05-15

## 2019-05-15 RX ORDER — METHYLPREDNISOLONE ACETATE 80 MG/ML
INJECTION, SUSPENSION INTRA-ARTICULAR; INTRALESIONAL; INTRAMUSCULAR; SOFT TISSUE
Status: COMPLETED | OUTPATIENT
Start: 2019-05-15 | End: 2019-05-15

## 2019-05-15 ASSESSMENT — PAIN SCALES - GENERAL
PAINLEVEL_OUTOF10: 0
PAINLEVEL_OUTOF10: 0

## 2019-05-15 ASSESSMENT — PAIN - FUNCTIONAL ASSESSMENT: PAIN_FUNCTIONAL_ASSESSMENT: 0-10

## 2019-05-15 NOTE — H&P
HISTORY AND PHYSICAL/PRE-SEDATION ASSESSMENT    Patient:  Jj Enloe   :  1950  Medical Record No.:  [id number]   Date:  5/15/2019  Physician:  Lindsey Deras M.D. Facility: [address] [place] [address]    HISTORY OF PRESENT ILLNESS:                 The patient is a 79 y.o. female whom presents with lower back and bilateral leg pain. Review of the imaging and physical exam of the patient confirmed the pre-procedure diagnosis. After a thorough discussion of risks, benefits and alternatives informed consent was obtained. Past Medical History:   Past Medical History:   Diagnosis Date    Anal fissure 2013    Anxiety     Colon polyps     Depression     Fibroid uterus     Hypothyroidism     Morbid obesity ([address]     Osteopenia       Past Surgical History:     Past Surgical History:   Procedure Laterality Date    ABDOMINAL HERNIA REPAIR      APPENDECTOMY  Age 27    CATARACT REMOVAL WITH IMPLANT Right 10/23/2017    with vitrectomy    CATARACT REMOVAL WITH IMPLANT Left 2018    with vitrectomy    COLONOSCOPY N/A 10/10/2018    COLONOSCOPY POLYPECTOMY SNARE/COLD BIOPSY performed by Geovanny Lovell MD at [address] [place] [address], TOTAL ABDOMINAL      ovaries out also    LUMBAR SPINE SURGERY Right 2019    RIGHT L3 AND L4 LUMBAR TRANSFORAMINAL WITH FLUOROSCOPY performed by Lindsey Deras MD at [place] [address] ESOPHAGOGASTRODUODENOSCOPY TRANSORAL DIAGNOSTIC N/A 10/10/2018    EGD performed by Geovanny Lovell MD at [address] [place] [address]    Fibroids     Current Medications:   Prior to Admission medications    Medication Sig Start Date End Date Taking?  Authorizing Provider   traZODone (DESYREL) 50 MG tablet TAKE 3 TABLETS BY MOUTH NIGHTLY 19  Yes Alejandra Owen MD   ALPRAZolam Navarro Paget) 1 MG tablet TAKE 1 TABLET BY MOUTH NIGHTLY AS NEEDED FOR SLEEP OR ANXIETY 3/18/19 6/16/19 Yes Alejandra Owen MD

## 2019-06-09 DIAGNOSIS — F51.04 PSYCHOPHYSIOLOGICAL INSOMNIA: ICD-10-CM

## 2019-06-09 RX ORDER — TRAZODONE HYDROCHLORIDE 50 MG/1
150 TABLET ORAL NIGHTLY
Qty: 270 TABLET | Refills: 1 | Status: SHIPPED | OUTPATIENT
Start: 2019-06-09 | End: 2019-09-10 | Stop reason: DRUGHIGH

## 2019-06-11 ENCOUNTER — OFFICE VISIT (OUTPATIENT)
Dept: INTERNAL MEDICINE CLINIC | Age: 69
End: 2019-06-11
Payer: COMMERCIAL

## 2019-06-11 VITALS
WEIGHT: 199 LBS | BODY MASS INDEX: 35.25 KG/M2 | DIASTOLIC BLOOD PRESSURE: 66 MMHG | SYSTOLIC BLOOD PRESSURE: 128 MMHG | HEART RATE: 67 BPM | OXYGEN SATURATION: 98 %

## 2019-06-11 DIAGNOSIS — E03.9 ACQUIRED HYPOTHYROIDISM: Chronic | ICD-10-CM

## 2019-06-11 DIAGNOSIS — F51.04 PSYCHOPHYSIOLOGICAL INSOMNIA: ICD-10-CM

## 2019-06-11 DIAGNOSIS — F32.4 MAJOR DEPRESSIVE DISORDER WITH SINGLE EPISODE, IN PARTIAL REMISSION (HCC): ICD-10-CM

## 2019-06-11 DIAGNOSIS — F41.9 ANXIETY: ICD-10-CM

## 2019-06-11 DIAGNOSIS — M54.42 CHRONIC BILATERAL LOW BACK PAIN WITH BILATERAL SCIATICA: Primary | ICD-10-CM

## 2019-06-11 DIAGNOSIS — G89.29 CHRONIC BILATERAL LOW BACK PAIN WITH BILATERAL SCIATICA: Primary | ICD-10-CM

## 2019-06-11 DIAGNOSIS — M17.12 PRIMARY OSTEOARTHRITIS OF LEFT KNEE: ICD-10-CM

## 2019-06-11 DIAGNOSIS — M54.41 CHRONIC BILATERAL LOW BACK PAIN WITH BILATERAL SCIATICA: Primary | ICD-10-CM

## 2019-06-11 DIAGNOSIS — E66.01 CLASS 2 SEVERE OBESITY DUE TO EXCESS CALORIES WITH SERIOUS COMORBIDITY AND BODY MASS INDEX (BMI) OF 35.0 TO 35.9 IN ADULT (HCC): ICD-10-CM

## 2019-06-11 DIAGNOSIS — M54.2 NECK PAIN, CHRONIC: ICD-10-CM

## 2019-06-11 DIAGNOSIS — G89.29 NECK PAIN, CHRONIC: ICD-10-CM

## 2019-06-11 PROCEDURE — 99214 OFFICE O/P EST MOD 30 MIN: CPT | Performed by: INTERNAL MEDICINE

## 2019-06-11 NOTE — PROGRESS NOTES
Assessment/Plan     1. Chronic bilateral low back pain with bilateral sciatica  Improved with recent weight loss. She will continue her current medications and follow up with Dr. Gabriele Jiménez. 2. Neck pain, chronic  She will address with Dr. Gabriele Jiménez at her next follow up appointment. 3. Primary osteoarthritis of left knee  Improved with weight loss. She will follow up with ortho is symptoms worsen. 4. Class 2 severe obesity due to excess calories with serious comorbidity and body mass index (BMI) of 35.0 to 35.9 in adult [facility])  Improved. She was encouraged to continue the Weight Watcher's diet and to increase her activity level. 5. Psychophysiological insomnia  Improved on higher dose of trazodone- continue, with low dose melatonin. Principles of good sleep hygiene discussed. 6. Acquired hypothyroidism  Constipation is likely related to iron supplment, but will adjust levothyroxine dose if indicated by lab results. If TSH is normal, she will try daily Miralax.  - TSH without Reflex; Future    7. Major depressive disorder with single episode, in partial remission ([address]  Improved despite discontinuation of Lexapro. Suspect that she is experiencing some mood benefit with the higher dose of trazodone. Continue current therapy. 8. Anxiety  See plan for depression. Has been able to decrease Xanax use to 1 tablet/day- continue lowest effective dose. Return in about 3 months (around 9/11/2019). Anjana Greer   YOB: 1950    Date of Visit:  6/11/2019    Allergies   Allergen Reactions    Codeine Nausea Only      Prior to Visit Medications    Medication Sig Taking?  Authorizing Provider   traZODone (DESYREL) 50 MG tablet TAKE 3 TABLETS BY MOUTH NIGHTLY Yes Melissa Donnelly MD   ALPRAZolam (XANAX) 1 MG tablet TAKE 1 TABLET BY MOUTH NIGHTLY AS NEEDED FOR SLEEP OR ANXIETY Yes Melissa Donnelly MD   gabapentin (NEURONTIN) 100 MG capsule TAKE ONE CAPSULE BY MOUTH THREE TIMES DAILY Yes Juan Cifuentes MD   levothyroxine (SYNTHROID) 150 MCG tablet TAKE 1 TABLET BY MOUTH EVERY DAY Yes Juan Cifuentes MD   buPROPion (WELLBUTRIN XL) 300 MG extended release tablet TAKE 1 TABLET BY MOUTH EVERY MORNING Yes Juan Cifuentes MD   omeprazole (PRILOSEC) 40 MG delayed release capsule TAKE ONE CAPSULE BY MOUTH EVERY MORNING BEFORE BREAKFAST Yes Juan Cifuentes MD   ferrous sulfate (FE TABS) 325 (65 Fe) MG EC tablet Take 1 tablet by mouth daily (with breakfast) Yes Juan Cifuentes MD   Vanesa Pollack Oil 300 MG CAPS Take 1 capsule by mouth daily Yes Historical Provider, MD   Cyanocobalamin (VITAMIN B 12 PO) Take 1,000 mcg by mouth daily. Yes Historical Provider, MD   Multiple Minerals-Vitamins (CITRACAL PLUS PO) Take 1 tablet by mouth daily. Yes Historical Provider, MD   Cholecalciferol (VITAMIN D) 1000 UNIT CAPS Take 3 capsules by mouth daily. Yes Historical Provider, MD   escitalopram (LEXAPRO) 10 MG tablet TAKE 1 TABLET BY MOUTH DAILY  Juan Cifuentes MD   Ginkgo Biloba [facility]) 40 MG TABS Take by mouth  Historical Provider, MD       Vitals:    06/11/19 1050   BP: 128/66   Pulse: 67   SpO2: 98%   Weight: 199 lb (90.3 kg)      Body mass index is 35.25 kg/m². Wt Readings from Last 3 Encounters:   06/11/19 199 lb (90.3 kg)   05/03/19 205 lb (93 kg)   05/01/19 210 lb 1.6 oz (95.3 kg)     BP Readings from Last 3 Encounters:   06/11/19 128/66   05/15/19 (!) 106/53   05/01/19 125/70       CC:  Patient presents for re-evaluation of the following medical concerns     HPI  Chronic Joint Pain:  Patient presents for follow-up of pain in left knee, low back pain, neck pain- some improvement with recent weight loss.  On average, pain is perceived as mild- moderate.   Current treatment: gabapentin 100 mg qam, 200 mg qhs, Tylenol 1000 mg bid, PT exercises per Dr. Candi Peralta. Medication side effects: none.  Recent diagnostic testing: MRI cervical and lumbar spine per Dr. Candi Peralta- received 2 epidurals in L-S spine with temporary relief.        Obesity:  On Weight Watcher's diet for about 6 months- has lost close to 50 pounds. Trying to walk more, but still limited by back and knee pain.     Insomnia:  Current treatment: prescription sleep aid-  trazodone 150 mg qhs, melatonin 5 mg qhs, which has been more effective than 100 mg dose of trazodone.  No adverse effects. Hypothyroidism: Recent symptoms: constipation. She denies cold intolerance, heat intolerance, hair loss, dry skin, diarrhea and palpitations. Patient is  taking her medication consistently on an empty stomach. Mood Disorder:  Patient presents for follow-up of depression and anxiety disorder. Current complaints include:  anhedonia, depressed mood, excessive worry, difficulty concentrating, feelings of worthlessness/guilt, impaired memory- better overall.  No panic attacks, feelings of hopelessness or suicidal ideation.  Symptoms/signs of jojo: none. Current treatment includes: Wellbutrin- 300 mg qam, takes Xanax once in the evening for anxiety. Stopped Lexapro about 3 months ago due to tremor, which has improved.       Lab Results   Component Value Date    LDLCALC 52 03/06/2018    LDLCALC 61 05/02/2017    TRIGLYCFAST 169 (H) 03/06/2018    TRIG 171 (H) 05/02/2017    HDL 47 03/06/2018     Lab Results   Component Value Date    GLUF 91 03/12/2019    GLUCOSE 91 09/11/2018    LABA1C 5.2 03/06/2018    LABA1C 5.1 05/02/2017     Lab Results   Component Value Date     03/12/2019    K 4.7 03/12/2019    BUN 13 03/12/2019    CREATININE 0.8 03/12/2019    LABGLOM >60 03/12/2019    GFRAA >60 03/12/2019    CALCIUM 8.8 03/12/2019    VITD25 30.5 03/12/2019     Lab Results   Component Value Date    ALT 12 03/12/2019    AST 17 03/12/2019     Lab Results   Component Value Date    HGB 13.0 09/11/2018     Lab Results   Component Value Date    TSHREFLEX 0.11 (L) 03/12/2019    TSH 1.46 05/02/2017        Review of Systems  As documented inHPI    Social History     Tobacco Use    Smoking status: Never Smoker    Smokeless tobacco: Never Used   Substance Use Topics    Alcohol use: No        Physical Exam   Constitutional: She is oriented to person, place, and time. She appears well-developed and well-nourished. No distress. HENT:   Mouth/Throat: Oropharynx is clear and moist and mucous membranes are normal.   Eyes: Conjunctivae are normal.   Neck: Carotid bruit is not present. No thyroid mass and no thyromegaly present. Cardiovascular: Normal rate, regular rhythm, normal heart sounds, intact distal pulses and normal pulses. Exam reveals no gallop and no friction rub. No murmur heard. Pulmonary/Chest: Effort normal and breath sounds normal. No respiratory distress. She has no wheezes. She has no rhonchi. She has no rales. Abdominal: She exhibits distension (large periumbilical hernia- slightly tender). There is tenderness (Mild, right-sided). There is no rebound and no guarding. Musculoskeletal: She exhibits no edema. Deferred to ortho/PMR   Neurological: She is alert and oriented to person, place, and time. Skin: Skin is warm, dry and intact. No rash noted. No erythema. Psychiatric: She has a normal mood and affect.  Her speech is normal and behavior is normal. Judgment and thought content normal. Cognition and memory are normal.

## 2019-07-16 RX ORDER — GABAPENTIN 100 MG/1
CAPSULE ORAL
Qty: 90 CAPSULE | Refills: 1 | Status: SHIPPED | OUTPATIENT
Start: 2019-07-16 | End: 2019-09-10 | Stop reason: SDUPTHER

## 2019-08-05 ENCOUNTER — TELEPHONE (OUTPATIENT)
Dept: GYNECOLOGY | Age: 69
End: 2019-08-05

## 2019-08-07 ENCOUNTER — OFFICE VISIT (OUTPATIENT)
Dept: ORTHOPEDIC SURGERY | Age: 69
End: 2019-08-07
Payer: COMMERCIAL

## 2019-08-07 VITALS
DIASTOLIC BLOOD PRESSURE: 62 MMHG | HEART RATE: 65 BPM | BODY MASS INDEX: 34.55 KG/M2 | RESPIRATION RATE: 14 BRPM | HEIGHT: 63 IN | SYSTOLIC BLOOD PRESSURE: 115 MMHG | WEIGHT: 195 LBS

## 2019-08-07 DIAGNOSIS — M43.16 SPONDYLOLISTHESIS OF LUMBAR REGION: ICD-10-CM

## 2019-08-07 DIAGNOSIS — M51.26 HNP (HERNIATED NUCLEUS PULPOSUS), LUMBAR: Primary | ICD-10-CM

## 2019-08-07 DIAGNOSIS — M54.16 LUMBAR RADICULOPATHY: ICD-10-CM

## 2019-08-07 PROCEDURE — 99214 OFFICE O/P EST MOD 30 MIN: CPT | Performed by: PHYSICIAN ASSISTANT

## 2019-08-07 NOTE — PROGRESS NOTES
tenderness, deformity or injury. Range of motion is normal and pain-free. There is no gross instability. There are no rashes, ulcerations or lesions. Strength and tone are normal. No atrophy or abnormal movements are noted. · LEFT LOWER EXTREMITY:  Inspection/examination of the left lower extremity does not show any tenderness, deformity or injury. Range of motion is normal and pain-free. There is no gross instability. There are no rashes, ulcerations or lesions. Strength and tone are normal. No atrophy or abnormal movements are noted. Diagnostic Testing:    MR Lumbar spine shows  12/04/2018  CONCLUSION:   1. L2-3 shallow disc bulge, subtle annular rent; gently encroaching upon ventral dural sac near    to right L3 nerve root.  Mild progression of disc degeneration and displacement since    09/10/2011 MRI. 2. L3-4 shallow posterior central disc protrusion gently encroaches upon ventral dural sac.     Disc similar to 09/10/2011 MRI. 3. Moderate to large encapsulated cystic structure at anterior abdominal wall subcutis fat,    depicted in part though to better advantage per 09/10/2011 than per currently; though still    present, imaged in small part. Results for orders placed or performed in visit on 06/11/19   TSH without Reflex   Result Value Ref Range    TSH 0.52 0.27 - 4.20 uIU/mL     Impression:       1. HNP (herniated nucleus pulposus), lumbar    2. Lumbar radiculopathy    3. Spondylolisthesis of lumbar region        Plan:  Clinical Course: Above diagnoses are worsening    I discussed the diagnosis and the treatment options with Umberto Farah today. In Summary:  The various treatment options were outlined and discussed with Umberto Farah including:  Conservative care options: physical therapy, ice, medications, bracing, and activity modification. The indications for therapeutic injections. The indications for additional imaging/laboratory studies.   The indications for (possible future) interventions. After considering the various options discussed, Jose Reed elected to pursue a course of treatment that includes the followin. Medications:  No further recommendations for new medications. 2. PT:  Encouraged to continue with HEP. 3. Further studies:  No further studies. 4. Interventional:  We discussed pursuing a RIGHT L4 and L5 TF epidural steroid injection to address the pain. Radiologic imaging and symptoms confirm the pain etiology. Risks, benefits and alternatives of interventional options were discussed. These include and are not limited to bleeding, infection, spinal headache, nerve injury and lack of pain relief. The patient verbalized understanding and would like to proceed. The patient will be scheduled accordingly. 5. Follow up:  4-6 weeks      Jose Reed was instructed to call the office if her symptoms worsen or if new symptoms appear prior to the next scheduled visit. She is specifically instructed to contact the office between now & her scheduled appointment if she has concerns related to her condition or if she needs assistance in scheduling the above tests. She is welcome to call for an appointment sooner if she has any additional concerns or questions. Lisbeth Torres CRMA am scribing for and in the presence of Daphney Fagan 19 10:06 AM .    The physical examination was performed between the patient and SHONNA Fagan. All counseling during the appointment was performed between the patient and provider. Arcelia Jesus PA-C, personally performed the services described in this documentation as scribed by Néstor Ramsey CRMA in my presence and it is both accurate and complete.         EDUARDO Amaro PA-C  Board Certified by the [employer] on Certification of Physician Assistants  Jordan Melo 60  Partner of [facility] ([facility])      This dictation was performed with a

## 2019-08-08 RX ORDER — OMEPRAZOLE 40 MG/1
CAPSULE, DELAYED RELEASE ORAL
Qty: 30 CAPSULE | Refills: 5 | Status: SHIPPED | OUTPATIENT
Start: 2019-08-08 | End: 2019-08-08 | Stop reason: SDUPTHER

## 2019-08-08 RX ORDER — OMEPRAZOLE 40 MG/1
CAPSULE, DELAYED RELEASE ORAL
Qty: 90 CAPSULE | Refills: 1 | Status: ON HOLD | OUTPATIENT
Start: 2019-08-08 | End: 2019-10-21

## 2019-08-15 ENCOUNTER — OFFICE VISIT (OUTPATIENT)
Dept: GYNECOLOGY | Age: 69
End: 2019-08-15
Payer: COMMERCIAL

## 2019-08-15 VITALS
RESPIRATION RATE: 17 BRPM | SYSTOLIC BLOOD PRESSURE: 124 MMHG | HEIGHT: 63 IN | WEIGHT: 196.6 LBS | HEART RATE: 73 BPM | BODY MASS INDEX: 34.84 KG/M2 | DIASTOLIC BLOOD PRESSURE: 66 MMHG

## 2019-08-15 DIAGNOSIS — Z78.0 MENOPAUSE: Primary | ICD-10-CM

## 2019-08-15 DIAGNOSIS — N90.89 VULVAR IRRITATION: ICD-10-CM

## 2019-08-15 PROCEDURE — 99213 OFFICE O/P EST LOW 20 MIN: CPT | Performed by: OBSTETRICS & GYNECOLOGY

## 2019-08-18 ASSESSMENT — ENCOUNTER SYMPTOMS
RESPIRATORY NEGATIVE: 1
EYES NEGATIVE: 1
ABDOMINAL PAIN: 0
GASTROINTESTINAL NEGATIVE: 1

## 2019-08-19 NOTE — PROGRESS NOTES
MG EC tablet Take 1 tablet by mouth daily (with breakfast) Yes MD Rukhsana Leon Birch Oil 300 MG CAPS Take 1 capsule by mouth daily Yes Historical Provider, MD   Cyanocobalamin (VITAMIN B 12 PO) Take 1,000 mcg by mouth daily. Yes Historical Provider, MD   Multiple Minerals-Vitamins (CITRACAL PLUS PO) Take 1 tablet by mouth daily. Yes Historical Provider, MD   Cholecalciferol (VITAMIN D) 1000 UNIT CAPS Take 3 capsules by mouth daily. Yes Historical Provider, MD        Allergies   Allergen Reactions    Codeine Nausea Only       Past Medical History:   Diagnosis Date    Anal fissure 7/16/2013    Anxiety     Colon polyps     Depression     Fibroid uterus     Hypothyroidism     Morbid obesity ([address]     Osteopenia        Past Surgical History:   Procedure Laterality Date    ABDOMINAL HERNIA REPAIR  2008    APPENDECTOMY  Age 27    CATARACT REMOVAL WITH IMPLANT Right 10/23/2017    with vitrectomy    CATARACT REMOVAL WITH IMPLANT Left 02/26/2018    with vitrectomy    COLONOSCOPY N/A 10/10/2018    COLONOSCOPY POLYPECTOMY SNARE/COLD BIOPSY performed by Raquel Christine MD at [facility], TOTAL ABDOMINAL      ovaries out also    LUMBAR SPINE SURGERY Right 1/7/2019    RIGHT L3 AND L4 LUMBAR TRANSFORAMINAL WITH FLUOROSCOPY performed by Arsen Lee MD at [address] [place] [address] Right 5/15/2019    RIGHT L4 AND L5 TRANSFORAMINAL EPIDURAL STEROID INJECTION WITH FLUOROSCOPY performed by Arsen Lee MD at [place] [address] ESOPHAGOGASTRODUODENOSCOPY TRANSORAL DIAGNOSTIC N/A 10/10/2018    EGD performed by Raquel Christine MD at [address]    Fibroids       Social History     Socioeconomic History    Marital status:       Spouse name: Not on file    Number of children: Not on file    Years of education: Not on file    Highest education level: Not on file   Occupational History    Occupation: Computer work

## 2019-08-20 ENCOUNTER — TELEPHONE (OUTPATIENT)
Dept: ORTHOPEDIC SURGERY | Age: 69
End: 2019-08-20

## 2019-08-26 ENCOUNTER — HOSPITAL ENCOUNTER (OUTPATIENT)
Age: 69
Setting detail: OUTPATIENT SURGERY
Discharge: HOME | End: 2019-08-26
Attending: PHYSICAL MEDICINE & REHABILITATION | Admitting: PHYSICAL MEDICINE & REHABILITATION
Payer: COMMERCIAL

## 2019-08-26 ENCOUNTER — APPOINTMENT (OUTPATIENT)
Dept: GENERAL RADIOLOGY | Age: 69
End: 2019-08-26
Attending: PHYSICAL MEDICINE & REHABILITATION
Payer: COMMERCIAL

## 2019-08-26 VITALS
TEMPERATURE: 97.5 F | DIASTOLIC BLOOD PRESSURE: 61 MMHG | HEART RATE: 68 BPM | RESPIRATION RATE: 16 BRPM | OXYGEN SATURATION: 99 % | SYSTOLIC BLOOD PRESSURE: 101 MMHG

## 2019-08-26 PROCEDURE — 6360000002 HC RX W HCPCS: Performed by: PHYSICAL MEDICINE & REHABILITATION

## 2019-08-26 PROCEDURE — 2500000003 HC RX 250 WO HCPCS: Performed by: PHYSICAL MEDICINE & REHABILITATION

## 2019-08-26 PROCEDURE — 99152 MOD SED SAME PHYS/QHP 5/>YRS: CPT | Performed by: PHYSICAL MEDICINE & REHABILITATION

## 2019-08-26 PROCEDURE — 3610000056 HC PAIN LEVEL 4 BASE (NON-OR): Performed by: PHYSICAL MEDICINE & REHABILITATION

## 2019-08-26 PROCEDURE — 3209999900 FLUORO FOR SURGICAL PROCEDURES

## 2019-08-26 PROCEDURE — 2709999900 HC NON-CHARGEABLE SUPPLY: Performed by: PHYSICAL MEDICINE & REHABILITATION

## 2019-08-26 RX ORDER — BUPIVACAINE HYDROCHLORIDE 5 MG/ML
INJECTION, SOLUTION EPIDURAL; INTRACAUDAL
Status: COMPLETED | OUTPATIENT
Start: 2019-08-26 | End: 2019-08-26

## 2019-08-26 RX ORDER — METHYLPREDNISOLONE ACETATE 80 MG/ML
INJECTION, SUSPENSION INTRA-ARTICULAR; INTRALESIONAL; INTRAMUSCULAR; SOFT TISSUE
Status: COMPLETED | OUTPATIENT
Start: 2019-08-26 | End: 2019-08-26

## 2019-08-26 RX ORDER — LIDOCAINE HYDROCHLORIDE 10 MG/ML
INJECTION, SOLUTION INFILTRATION; PERINEURAL
Status: COMPLETED | OUTPATIENT
Start: 2019-08-26 | End: 2019-08-26

## 2019-08-26 RX ORDER — MIDAZOLAM HYDROCHLORIDE 1 MG/ML
INJECTION INTRAMUSCULAR; INTRAVENOUS
Status: COMPLETED | OUTPATIENT
Start: 2019-08-26 | End: 2019-08-26

## 2019-08-26 ASSESSMENT — PAIN DESCRIPTION - FREQUENCY: FREQUENCY: CONTINUOUS

## 2019-08-26 ASSESSMENT — PAIN DESCRIPTION - ORIENTATION: ORIENTATION: LOWER

## 2019-08-26 ASSESSMENT — PAIN DESCRIPTION - DESCRIPTORS
DESCRIPTORS: ACHING
DESCRIPTORS: ACHING

## 2019-08-26 ASSESSMENT — PAIN SCALES - GENERAL
PAINLEVEL_OUTOF10: 0
PAINLEVEL_OUTOF10: 2

## 2019-08-26 ASSESSMENT — PAIN - FUNCTIONAL ASSESSMENT: PAIN_FUNCTIONAL_ASSESSMENT: 0-10

## 2019-08-26 ASSESSMENT — PAIN DESCRIPTION - LOCATION: LOCATION: BACK

## 2019-09-10 ENCOUNTER — OFFICE VISIT (OUTPATIENT)
Dept: INTERNAL MEDICINE CLINIC | Age: 69
End: 2019-09-10
Payer: COMMERCIAL

## 2019-09-10 VITALS
WEIGHT: 190 LBS | SYSTOLIC BLOOD PRESSURE: 130 MMHG | HEART RATE: 69 BPM | DIASTOLIC BLOOD PRESSURE: 62 MMHG | OXYGEN SATURATION: 98 % | BODY MASS INDEX: 33.66 KG/M2

## 2019-09-10 DIAGNOSIS — M54.42 CHRONIC BILATERAL LOW BACK PAIN WITH BILATERAL SCIATICA: Primary | ICD-10-CM

## 2019-09-10 DIAGNOSIS — M17.12 PRIMARY OSTEOARTHRITIS OF LEFT KNEE: ICD-10-CM

## 2019-09-10 DIAGNOSIS — G89.29 CHRONIC BILATERAL LOW BACK PAIN WITH BILATERAL SCIATICA: Primary | ICD-10-CM

## 2019-09-10 DIAGNOSIS — G25.81 RESTLESS LEG SYNDROME: ICD-10-CM

## 2019-09-10 DIAGNOSIS — F32.4 MAJOR DEPRESSIVE DISORDER WITH SINGLE EPISODE, IN PARTIAL REMISSION (HCC): ICD-10-CM

## 2019-09-10 DIAGNOSIS — F51.04 PSYCHOPHYSIOLOGICAL INSOMNIA: ICD-10-CM

## 2019-09-10 DIAGNOSIS — F41.9 ANXIETY: ICD-10-CM

## 2019-09-10 DIAGNOSIS — M54.41 CHRONIC BILATERAL LOW BACK PAIN WITH BILATERAL SCIATICA: Primary | ICD-10-CM

## 2019-09-10 DIAGNOSIS — E66.09 CLASS 1 OBESITY DUE TO EXCESS CALORIES WITH SERIOUS COMORBIDITY AND BODY MASS INDEX (BMI) OF 33.0 TO 33.9 IN ADULT: ICD-10-CM

## 2019-09-10 DIAGNOSIS — M54.2 NECK PAIN, CHRONIC: ICD-10-CM

## 2019-09-10 DIAGNOSIS — G89.29 NECK PAIN, CHRONIC: ICD-10-CM

## 2019-09-10 PROCEDURE — 99214 OFFICE O/P EST MOD 30 MIN: CPT | Performed by: INTERNAL MEDICINE

## 2019-09-10 RX ORDER — BUPROPION HYDROCHLORIDE 300 MG/1
TABLET ORAL
Qty: 90 TABLET | Refills: 1 | Status: SHIPPED | OUTPATIENT
Start: 2019-09-10 | End: 2020-04-08

## 2019-09-10 RX ORDER — ALPRAZOLAM 1 MG/1
TABLET ORAL
Qty: 35 TABLET | Refills: 2 | Status: SHIPPED | OUTPATIENT
Start: 2019-09-10 | End: 2019-12-08 | Stop reason: SDUPTHER

## 2019-09-10 RX ORDER — TRAZODONE HYDROCHLORIDE 50 MG/1
100 TABLET ORAL NIGHTLY
Status: SHIPPED | COMMUNITY
Start: 2019-09-10 | End: 2019-11-10

## 2019-09-10 RX ORDER — GABAPENTIN 300 MG/1
300 CAPSULE ORAL 2 TIMES DAILY
Qty: 60 CAPSULE | Refills: 2 | Status: SHIPPED | OUTPATIENT
Start: 2019-09-10 | End: 2019-12-10 | Stop reason: SDUPTHER

## 2019-09-10 NOTE — PROGRESS NOTES
Results   Component Value Date    TSHREFLEX 0.11 (L) 03/12/2019    TSH 0.52 06/11/2019        Review of Systems  As documented inHPI    Social History     Tobacco Use    Smoking status: Never Smoker    Smokeless tobacco: Never Used   Substance Use Topics    Alcohol use: No        Physical Exam   Constitutional: She is oriented to person, place, and time. She appears well-developed and well-nourished. No distress. HENT:   Mouth/Throat: Oropharynx is clear and moist and mucous membranes are normal.   Eyes: Conjunctivae are normal.   Neck: Carotid bruit is not present. No thyroid mass and no thyromegaly present. Cardiovascular: Normal rate, regular rhythm, normal heart sounds, intact distal pulses and normal pulses. Exam reveals no gallop and no friction rub. No murmur heard. Pulmonary/Chest: Effort normal and breath sounds normal. No respiratory distress. She has no wheezes. She has no rhonchi. She has no rales. Abdominal: She exhibits no distension. There is tenderness (Mild, right-sided). There is no rebound and no guarding. Musculoskeletal: She exhibits no edema. Deferred to ortho/PMR   Neurological: She is alert and oriented to person, place, and time. Skin: Skin is warm, dry and intact. No rash noted. No erythema. Psychiatric: She has a normal mood and affect.  Her speech is normal and behavior is normal. Judgment and thought content normal. Cognition and memory are normal.

## 2019-09-10 NOTE — PATIENT INSTRUCTIONS
Patient Education        Patient Education        Iron-Rich Diet: Care Instructions  Your Care Instructions    Your body needs iron to make hemoglobin. Hemoglobin is a substance in red blood cells that carries oxygen from the lungs to cells all through your body. If you do not get enough iron, your body makes fewer and smaller red blood cells. As a result, your body's cells may not get enough oxygen. Adult men need 8 milligrams of iron a day; adult women need 18 milligrams of iron a day. After menopause, women need 8 milligrams of iron a day. A pregnant woman needs 27 milligrams of iron a day. Infants and young children have higher iron needs relative to their size than other age groups. People who have lost blood because of ulcers or heavy menstrual periods may become very low in iron and may develop anemia. Most people can get the iron their bodies need by eating enough of certain iron-rich foods. Your doctor may recommend that you take an iron supplement along with eating an iron-rich diet. Follow-up care is a key part of your treatment and safety. Be sure to make and go to all appointments, and call your doctor if you are having problems. It's also a good idea to know your test results and keep a list of the medicines you take. How can you care for yourself at home? · Make iron-rich foods a part of your daily diet. Iron-rich foods include:  ? All meats, such as chicken, beef, lamb, pork, fish, and shellfish. Liver is especially high in iron. ? Leafy green vegetables. ? Raisins, peas, beans, lentils, barley, and eggs. ? Iron-fortified breakfast cereals. · Eat foods with vitamin C along with iron-rich foods. Vitamin C helps you absorb more iron from food. Drink a glass of orange juice or another citrus juice with your food. · Eat meat and vegetables or grains together. The iron in meat helps your body absorb the iron in other foods. Where can you learn more?   Go to [email or web address] and sign in to your [employer] account. Enter [id number] in the [facility] box to learn more about \"Iron-Rich Diet: Care Instructions. \"     If you do not have an account, please click on the \"Sign Up Now\" link. Current as of: November 7, 2018  Content Version: 12.1  © 7770-0885 [employer], Inc[employer]. Care instructions adapted under license by [facility] ([place] [facility]). If you have questions about a medical condition or this instruction, always ask your healthcare professional. [place] [address] any warranty or liability for your use of this information.

## 2019-09-11 ENCOUNTER — OFFICE VISIT (OUTPATIENT)
Dept: ORTHOPEDIC SURGERY | Age: 69
End: 2019-09-11
Payer: COMMERCIAL

## 2019-09-11 VITALS
HEIGHT: 63 IN | BODY MASS INDEX: 33.67 KG/M2 | SYSTOLIC BLOOD PRESSURE: 108 MMHG | HEART RATE: 67 BPM | WEIGHT: 190.04 LBS | DIASTOLIC BLOOD PRESSURE: 57 MMHG

## 2019-09-11 DIAGNOSIS — M54.16 LUMBAR RADICULOPATHY: ICD-10-CM

## 2019-09-11 DIAGNOSIS — R20.2 PARESTHESIA OF RIGHT LOWER EXTREMITY: ICD-10-CM

## 2019-09-11 DIAGNOSIS — M43.16 SPONDYLOLISTHESIS OF LUMBAR REGION: ICD-10-CM

## 2019-09-11 DIAGNOSIS — M51.26 HNP (HERNIATED NUCLEUS PULPOSUS), LUMBAR: Primary | ICD-10-CM

## 2019-09-11 PROCEDURE — 99213 OFFICE O/P EST LOW 20 MIN: CPT | Performed by: PHYSICIAN ASSISTANT

## 2019-09-11 NOTE — PROGRESS NOTES
Follow up: Shekhar Corona  1950  [id number]      CHIEF COMPLAINT:    Chief Complaint   Patient presents with    Lower Back Pain     F/u Right L4 and Right L5 TF 8/26         HISTORY OF PRESENT ILLNESS:  Ms. Lilly Burton is a 79 y.o. female returns for a follow up visit for multiple medical problems. Her current presenting problems are   1. HNP (herniated nucleus pulposus), lumbar    2. Lumbar radiculopathy    3. Spondylolisthesis of lumbar region    4. Paresthesia of right lower extremity    . As per information/history obtained from the PADT(patient assessment and documentation tool) - She complains of pain in the lower back with radiation to the hips Right, upper leg Right and lower leg Right She rates the pain 6/10 and describes it as dull, burning. Pain is made worse by: lying on right side, sitting, walking. She denies side effects from the current pain regimen. Patient reports that since the last follow up visit the physical functioning is unchanged, family/social relationships are unchanged, mood is unchanged and sleep patterns are unchanged, and that the overall functioning is unchanged. Patient denies neurological bowel or bladder. Patient presents today for follow up of low back and right leg pain. She recently underwent a right L4 and right L5 transforaminal epidural steroid injection on 8/26/19 and notes overall no improvement. She states pain did not worsen, just did not improve. She continues to have constant pain with ADL's and cannot pinpoint any specific activity that worsens her pain directly. She notes no new injuries or triggers since procedure. She continues to have pain with numbness and tingling down the right leg through the buttocks to the ankle. She descirbes that this is a dull ache. She notes no side effects to procedure.        Associated signs and symptoms:   Neurogenic bowel or bladder symptoms:  no   Perceived weakness:  no   Difficulty walking:  yes lower extremities. Good capillary refill. · The lymphatic examination of the neck, axillae and groin reveals all areas to be without enlargement or induration  · Sensation is intact without deficit in the upper and lower extremities to light touch and pinprick  · Coordination of the upper and lower extremities are normal.  · RIGHT UPPER EXTREMITY:  Inspection/examination of the right upper extremity does not show any tenderness, deformity or injury. Range of motion is unremarkable and pain-free. There is no gross instability. There are no rashes, ulcerations or lesions. Strength and tone are normal. No atrophy or abnormal movements are noted. · LEFT UPPER EXTREMITY: Inspection/examination of the left upper extremity does not show any tenderness, deformity or injury. Range of motion is unremarkable and pain-free. There is no gross instability. There are no rashes, ulcerations or lesions. Strength and tone are normal. No atrophy or abnormal movements are noted. LUMBAR/SACRAL EXAMINATION:  · Inspection: Local inspection shows no step-off or bruising. Lumbar alignment is normal. No instability is noted. · Palpation:   No evidence of tenderness at the midline. Lumbar paraspinal tenderness: Mild L4/5 and L5/S1 tenderness  Bursal tenderness No tenderness bilaterally  There is no paraspinal spasm. · Range of Motion: limited by 50% in all planes due to pain  · Strength:   Strength testing is 5/5 in all muscle groups tested. · Special Tests:   Straight leg raise positive right and negative left and crossed SLR negative. Severo's testing is negative bilaterally. FADIR's testing is negative bilaterally. · Skin: There are no rashes, ulcerations or lesions. · Reflexes: Reflexes are symmetrically 2+ at the patellar and ankle tendons. Clonus absent bilaterally at the feet.   · Gait & station: normal, patient ambulates without assistance  · Additional Examinations:  · RIGHT LOWER EXTREMITY: Inspection/examination of errors to my attention for an addendum. All efforts were made to ensure that this office note is accurate.

## 2019-09-12 DIAGNOSIS — F41.9 ANXIETY: ICD-10-CM

## 2019-09-12 DIAGNOSIS — F51.04 PSYCHOPHYSIOLOGICAL INSOMNIA: ICD-10-CM

## 2019-09-12 RX ORDER — ALPRAZOLAM 1 MG/1
TABLET ORAL
Qty: 30 TABLET | Refills: 0 | OUTPATIENT
Start: 2019-09-12

## 2019-10-10 ENCOUNTER — OFFICE VISIT (OUTPATIENT)
Dept: ORTHOPEDIC SURGERY | Age: 69
End: 2019-10-10
Payer: COMMERCIAL

## 2019-10-10 DIAGNOSIS — M79.604 PAIN OF RIGHT LOWER EXTREMITY: Primary | ICD-10-CM

## 2019-10-10 DIAGNOSIS — G57.01 PIRIFORMIS SYNDROME OF RIGHT SIDE: ICD-10-CM

## 2019-10-10 PROCEDURE — 95886 MUSC TEST DONE W/N TEST COMP: CPT | Performed by: PHYSICAL MEDICINE & REHABILITATION

## 2019-10-10 PROCEDURE — 95908 NRV CNDJ TST 3-4 STUDIES: CPT | Performed by: PHYSICAL MEDICINE & REHABILITATION

## 2019-10-15 ENCOUNTER — TELEPHONE (OUTPATIENT)
Dept: ORTHOPEDIC SURGERY | Age: 69
End: 2019-10-15

## 2019-10-21 ENCOUNTER — APPOINTMENT (OUTPATIENT)
Dept: GENERAL RADIOLOGY | Age: 69
End: 2019-10-21
Attending: PHYSICAL MEDICINE & REHABILITATION
Payer: COMMERCIAL

## 2019-10-21 ENCOUNTER — HOSPITAL ENCOUNTER (OUTPATIENT)
Age: 69
Setting detail: OUTPATIENT SURGERY
Discharge: HOME | End: 2019-10-21
Attending: PHYSICAL MEDICINE & REHABILITATION | Admitting: PHYSICAL MEDICINE & REHABILITATION
Payer: COMMERCIAL

## 2019-10-21 VITALS
TEMPERATURE: 96.6 F | SYSTOLIC BLOOD PRESSURE: 124 MMHG | RESPIRATION RATE: 16 BRPM | HEIGHT: 63 IN | BODY MASS INDEX: 32.96 KG/M2 | HEART RATE: 64 BPM | WEIGHT: 186 LBS | DIASTOLIC BLOOD PRESSURE: 60 MMHG | OXYGEN SATURATION: 99 %

## 2019-10-21 PROCEDURE — 3610000054 HC PAIN LEVEL 3 BASE (NON-OR): Performed by: PHYSICAL MEDICINE & REHABILITATION

## 2019-10-21 PROCEDURE — 6360000002 HC RX W HCPCS: Performed by: PHYSICAL MEDICINE & REHABILITATION

## 2019-10-21 PROCEDURE — G0260 INJ FOR SACROILIAC JT ANESTH: HCPCS

## 2019-10-21 PROCEDURE — 99152 MOD SED SAME PHYS/QHP 5/>YRS: CPT | Performed by: PHYSICAL MEDICINE & REHABILITATION

## 2019-10-21 PROCEDURE — 2500000003 HC RX 250 WO HCPCS: Performed by: PHYSICAL MEDICINE & REHABILITATION

## 2019-10-21 PROCEDURE — 2709999900 HC NON-CHARGEABLE SUPPLY: Performed by: PHYSICAL MEDICINE & REHABILITATION

## 2019-10-21 RX ORDER — LIDOCAINE HYDROCHLORIDE 10 MG/ML
INJECTION, SOLUTION EPIDURAL; INFILTRATION; INTRACAUDAL; PERINEURAL
Status: COMPLETED | OUTPATIENT
Start: 2019-10-21 | End: 2019-10-21

## 2019-10-21 RX ORDER — BUPIVACAINE HYDROCHLORIDE 5 MG/ML
INJECTION, SOLUTION EPIDURAL; INTRACAUDAL
Status: COMPLETED | OUTPATIENT
Start: 2019-10-21 | End: 2019-10-21

## 2019-10-21 RX ORDER — METHYLPREDNISOLONE ACETATE 80 MG/ML
INJECTION, SUSPENSION INTRA-ARTICULAR; INTRALESIONAL; INTRAMUSCULAR; SOFT TISSUE
Status: COMPLETED | OUTPATIENT
Start: 2019-10-21 | End: 2019-10-21

## 2019-10-21 RX ORDER — MIDAZOLAM HYDROCHLORIDE 1 MG/ML
INJECTION INTRAMUSCULAR; INTRAVENOUS
Status: COMPLETED | OUTPATIENT
Start: 2019-10-21 | End: 2019-10-21

## 2019-10-21 ASSESSMENT — PAIN - FUNCTIONAL ASSESSMENT: PAIN_FUNCTIONAL_ASSESSMENT: 0-10

## 2019-10-21 ASSESSMENT — PAIN SCALES - GENERAL: PAINLEVEL_OUTOF10: 0

## 2019-11-06 ENCOUNTER — OFFICE VISIT (OUTPATIENT)
Dept: ORTHOPEDIC SURGERY | Age: 69
End: 2019-11-06
Payer: COMMERCIAL

## 2019-11-06 VITALS
SYSTOLIC BLOOD PRESSURE: 125 MMHG | WEIGHT: 186.07 LBS | HEIGHT: 63 IN | DIASTOLIC BLOOD PRESSURE: 70 MMHG | BODY MASS INDEX: 32.97 KG/M2

## 2019-11-06 DIAGNOSIS — M51.26 HNP (HERNIATED NUCLEUS PULPOSUS), LUMBAR: ICD-10-CM

## 2019-11-06 DIAGNOSIS — M54.16 LUMBAR RADICULOPATHY: ICD-10-CM

## 2019-11-06 DIAGNOSIS — G57.01 PIRIFORMIS SYNDROME OF RIGHT SIDE: Primary | ICD-10-CM

## 2019-11-06 PROCEDURE — 99213 OFFICE O/P EST LOW 20 MIN: CPT | Performed by: PHYSICAL MEDICINE & REHABILITATION

## 2019-11-09 DIAGNOSIS — F51.04 PSYCHOPHYSIOLOGICAL INSOMNIA: ICD-10-CM

## 2019-11-10 RX ORDER — TRAZODONE HYDROCHLORIDE 50 MG/1
150 TABLET ORAL NIGHTLY
Qty: 270 TABLET | Refills: 1 | Status: SHIPPED | OUTPATIENT
Start: 2019-11-10 | End: 2020-05-11

## 2019-11-18 ENCOUNTER — E-VISIT (OUTPATIENT)
Dept: INTERNAL MEDICINE CLINIC | Age: 69
End: 2019-11-18
Payer: COMMERCIAL

## 2019-11-18 DIAGNOSIS — J01.90 ACUTE NON-RECURRENT SINUSITIS, UNSPECIFIED LOCATION: Primary | ICD-10-CM

## 2019-11-18 PROCEDURE — 99444 PR PHYSICIAN ONLINE EVALUATION & MANAGEMENT SERVICE: CPT | Performed by: INTERNAL MEDICINE

## 2019-11-18 RX ORDER — AMOXICILLIN AND CLAVULANATE POTASSIUM 875; 125 MG/1; MG/1
1 TABLET, FILM COATED ORAL 2 TIMES DAILY
Qty: 20 TABLET | Refills: 0 | Status: SHIPPED | OUTPATIENT
Start: 2019-11-18 | End: 2019-11-28

## 2019-11-18 ASSESSMENT — LIFESTYLE VARIABLES: SMOKING_STATUS: NO, I'VE NEVER SMOKED

## 2019-12-08 DIAGNOSIS — F41.9 ANXIETY: ICD-10-CM

## 2019-12-08 DIAGNOSIS — F51.04 PSYCHOPHYSIOLOGICAL INSOMNIA: ICD-10-CM

## 2019-12-08 RX ORDER — ALPRAZOLAM 1 MG/1
TABLET ORAL
Qty: 35 TABLET | Refills: 2 | Status: SHIPPED | OUTPATIENT
Start: 2019-12-08 | End: 2020-03-17

## 2019-12-10 ENCOUNTER — OFFICE VISIT (OUTPATIENT)
Dept: INTERNAL MEDICINE CLINIC | Age: 69
End: 2019-12-10
Payer: COMMERCIAL

## 2019-12-10 VITALS
BODY MASS INDEX: 33.84 KG/M2 | DIASTOLIC BLOOD PRESSURE: 60 MMHG | WEIGHT: 191 LBS | OXYGEN SATURATION: 98 % | HEART RATE: 84 BPM | SYSTOLIC BLOOD PRESSURE: 134 MMHG

## 2019-12-10 DIAGNOSIS — M17.12 PRIMARY OSTEOARTHRITIS OF LEFT KNEE: ICD-10-CM

## 2019-12-10 DIAGNOSIS — E66.09 CLASS 1 OBESITY DUE TO EXCESS CALORIES WITH SERIOUS COMORBIDITY AND BODY MASS INDEX (BMI) OF 33.0 TO 33.9 IN ADULT: ICD-10-CM

## 2019-12-10 DIAGNOSIS — G25.81 RESTLESS LEG SYNDROME: ICD-10-CM

## 2019-12-10 DIAGNOSIS — G89.29 CHRONIC BILATERAL LOW BACK PAIN WITH BILATERAL SCIATICA: Primary | ICD-10-CM

## 2019-12-10 DIAGNOSIS — F32.4 MAJOR DEPRESSIVE DISORDER WITH SINGLE EPISODE, IN PARTIAL REMISSION (HCC): ICD-10-CM

## 2019-12-10 DIAGNOSIS — F51.04 PSYCHOPHYSIOLOGICAL INSOMNIA: ICD-10-CM

## 2019-12-10 DIAGNOSIS — M54.2 NECK PAIN, CHRONIC: ICD-10-CM

## 2019-12-10 DIAGNOSIS — G89.29 NECK PAIN, CHRONIC: ICD-10-CM

## 2019-12-10 DIAGNOSIS — F41.9 ANXIETY: ICD-10-CM

## 2019-12-10 DIAGNOSIS — M54.41 CHRONIC BILATERAL LOW BACK PAIN WITH BILATERAL SCIATICA: Primary | ICD-10-CM

## 2019-12-10 DIAGNOSIS — M54.42 CHRONIC BILATERAL LOW BACK PAIN WITH BILATERAL SCIATICA: Primary | ICD-10-CM

## 2019-12-10 PROCEDURE — 99214 OFFICE O/P EST MOD 30 MIN: CPT | Performed by: INTERNAL MEDICINE

## 2019-12-10 RX ORDER — GABAPENTIN 300 MG/1
300 CAPSULE ORAL 2 TIMES DAILY
Qty: 60 CAPSULE | Refills: 5 | Status: SHIPPED | OUTPATIENT
Start: 2019-12-10 | End: 2020-07-06

## 2019-12-10 RX ORDER — CHOLECALCIFEROL (VITAMIN D3) 125 MCG
5 CAPSULE ORAL DAILY
COMMUNITY

## 2020-02-06 RX ORDER — LEVOTHYROXINE SODIUM 0.15 MG/1
TABLET ORAL
Qty: 90 TABLET | Refills: 1 | Status: SHIPPED | OUTPATIENT
Start: 2020-02-06 | End: 2020-11-02

## 2020-03-10 ENCOUNTER — OFFICE VISIT (OUTPATIENT)
Dept: INTERNAL MEDICINE CLINIC | Age: 70
End: 2020-03-10
Payer: COMMERCIAL

## 2020-03-10 VITALS
WEIGHT: 186 LBS | OXYGEN SATURATION: 98 % | DIASTOLIC BLOOD PRESSURE: 60 MMHG | HEART RATE: 70 BPM | BODY MASS INDEX: 32.96 KG/M2 | SYSTOLIC BLOOD PRESSURE: 118 MMHG

## 2020-03-10 DIAGNOSIS — K90.829 POST-RESECTION MALABSORPTION: Chronic | ICD-10-CM

## 2020-03-10 LAB
A/G RATIO: 1.3 (ref 1.1–2.2)
ALBUMIN SERPL-MCNC: 3.6 G/DL (ref 3.4–5)
ALP BLD-CCNC: 67 U/L (ref 40–129)
ALT SERPL-CCNC: 14 U/L (ref 10–40)
ANION GAP SERPL CALCULATED.3IONS-SCNC: 14 MMOL/L (ref 3–16)
AST SERPL-CCNC: 22 U/L (ref 15–37)
BASOPHILS ABSOLUTE: 0 K/UL (ref 0–0.2)
BASOPHILS RELATIVE PERCENT: 0.7 %
BILIRUB SERPL-MCNC: 0.3 MG/DL (ref 0–1)
BUN BLDV-MCNC: 13 MG/DL (ref 7–20)
CALCIUM SERPL-MCNC: 8.7 MG/DL (ref 8.3–10.6)
CHLORIDE BLD-SCNC: 102 MMOL/L (ref 99–110)
CO2: 23 MMOL/L (ref 21–32)
CREAT SERPL-MCNC: 0.8 MG/DL (ref 0.6–1.2)
EOSINOPHILS ABSOLUTE: 0.1 K/UL (ref 0–0.6)
EOSINOPHILS RELATIVE PERCENT: 2.7 %
FERRITIN: 33.1 NG/ML (ref 15–150)
GFR AFRICAN AMERICAN: >60
GFR NON-AFRICAN AMERICAN: >60
GLOBULIN: 2.8 G/DL
GLUCOSE BLD-MCNC: 91 MG/DL (ref 70–99)
HCT VFR BLD CALC: 41.1 % (ref 36–48)
HEMOGLOBIN: 13.3 G/DL (ref 12–16)
IRON SATURATION: 30 % (ref 15–50)
IRON: 103 UG/DL (ref 37–145)
LYMPHOCYTES ABSOLUTE: 1.1 K/UL (ref 1–5.1)
LYMPHOCYTES RELATIVE PERCENT: 25.6 %
MCH RBC QN AUTO: 31.9 PG (ref 26–34)
MCHC RBC AUTO-ENTMCNC: 32.3 G/DL (ref 31–36)
MCV RBC AUTO: 98.7 FL (ref 80–100)
MONOCYTES ABSOLUTE: 0.4 K/UL (ref 0–1.3)
MONOCYTES RELATIVE PERCENT: 8.9 %
NEUTROPHILS ABSOLUTE: 2.7 K/UL (ref 1.7–7.7)
NEUTROPHILS RELATIVE PERCENT: 62.1 %
PDW BLD-RTO: 14.4 % (ref 12.4–15.4)
PLATELET # BLD: 215 K/UL (ref 135–450)
PMV BLD AUTO: 9.3 FL (ref 5–10.5)
POTASSIUM SERPL-SCNC: 4.5 MMOL/L (ref 3.5–5.1)
RBC # BLD: 4.16 M/UL (ref 4–5.2)
SODIUM BLD-SCNC: 139 MMOL/L (ref 136–145)
T4 FREE: 1.7 NG/DL (ref 0.9–1.8)
TOTAL IRON BINDING CAPACITY: 340 UG/DL (ref 260–445)
TOTAL PROTEIN: 6.4 G/DL (ref 6.4–8.2)
TSH REFLEX: 0.13 UIU/ML (ref 0.27–4.2)
VITAMIN B-12: 658 PG/ML (ref 211–911)
VITAMIN D 25-HYDROXY: 17.4 NG/ML
WBC # BLD: 4.4 K/UL (ref 4–11)

## 2020-03-10 PROCEDURE — 99214 OFFICE O/P EST MOD 30 MIN: CPT | Performed by: INTERNAL MEDICINE

## 2020-03-10 NOTE — PROGRESS NOTES
MG tablet TAKE 3 TABLETS BY MOUTH NIGHTLY Yes Carina Calabrese MD   buPROPion (WELLBUTRIN XL) 300 MG extended release tablet TAKE 1 TABLET BY MOUTH EVERY MORNING Yes Carina Calabrese MD   ferrous sulfate (FE TABS) 325 (65 Fe) MG EC tablet Take 1 tablet by mouth daily (with breakfast) Yes Carina Calabrese MD   Perez Hoose Oil 300 MG CAPS Take 1 capsule by mouth daily Yes Historical Provider, MD   Cyanocobalamin (VITAMIN B 12 PO) Take 1,000 mcg by mouth daily. Yes Historical Provider, MD   Cholecalciferol (VITAMIN D) 1000 UNIT CAPS Take 3 capsules by mouth daily. Yes Historical Provider, MD   ALPRAZolam (XANAX) 1 MG tablet TAKE 1 TABLET BY MOUTH TWICE DAILY AS NEEDED FOR ANXIETY  Carina Calabrese MD       Vitals:    03/10/20 1310   BP: 118/60   Pulse: 70   SpO2: 98%   Weight: 186 lb (84.4 kg)      Body mass index is 32.96 kg/m². Wt Readings from Last 3 Encounters:   03/10/20 186 lb (84.4 kg)   12/10/19 191 lb (86.6 kg)   11/06/19 186 lb 1.1 oz (84.4 kg)     BP Readings from Last 3 Encounters:   03/10/20 118/60   12/10/19 134/60   11/06/19 125/70       CC:  Patient presents for re-evaluation of the following medical concerns     HPI  Chronic Joint Pain:  Patient presents for follow-up of pain in left knee, low back pain and neck better- a little better.  On average, pain is perceived as mild-moderate.   Current treatment: gabapentin 600 mg qhs, Tylenol 1000 mg bid, CBD oil, PT exercises per Dr. Everett Bustamante. Medication side effects: none. Recent diagnostic testing: MRI cervical and lumbar spine 12/18 per Dr. Everett Bustamante- Multiple injections have provided only temporary relief. Synvisc was not approved by [email or web address], so no current treatment for knee pain. Obesity:  On Weight Watcher's diet for about 1 year- has lost 62 pounds, 5 since last visit.   Trying to walk more, but still limited by back and knee pain.     Insomnia:  Current treatment: prescription sleep aid-  trazodone 150 mg qhs, melatonin 5 mg qhs, CBD oil, which has been effective. No adverse effects. Restless legs symptoms wax and wane.     Mood Disorder:  Patient presents for follow-up of depression and anxiety disorder. Current complaints include:  anhedonia, depressed mood, excessive worry, difficulty concentrating, feelings of worthlessness/guilt, impaired memory- significantly better.  No panic attacks, feelings of hopelessness or suicidal ideation.  Symptoms/signs of jojo: none. Current treatment includes: Wellbutrin- 300 mg qam, takes Xanax about once in the evening for anxiety and occasionally during the day, CBD oil and counseling every 3 weeks.  Medication side effects: none. Malabsorption:  Currently taking vitamin D 1000 IU/day, B12 1000 mcg qd, iron 325 mg weekly, high iron diet. Women's one-a-day. Lab Results   Component Value Date    LDLCALC 52 03/06/2018    LDLCALC 61 05/02/2017    TRIGLYCFAST 169 (H) 03/06/2018    TRIG 171 (H) 05/02/2017    HDL 47 03/06/2018     Lab Results   Component Value Date    GLUF 91 03/12/2019    GLUCOSE 91 09/11/2018    LABA1C 5.2 03/06/2018    LABA1C 5.1 05/02/2017     Lab Results   Component Value Date     03/12/2019    K 4.7 03/12/2019    BUN 13 03/12/2019    CREATININE 0.8 03/12/2019    LABGLOM >60 03/12/2019    GFRAA >60 03/12/2019    CALCIUM 8.8 03/12/2019    VITD25 30.5 03/12/2019     Lab Results   Component Value Date    ALT 12 03/12/2019    AST 17 03/12/2019     Lab Results   Component Value Date    HGB 13.0 09/11/2018     Lab Results   Component Value Date    TSHREFLEX 0.11 (L) 03/12/2019    TSH 0.52 06/11/2019        Review of Systems  As documented in HPI    Social History     Tobacco Use    Smoking status: Never Smoker    Smokeless tobacco: Never Used   Substance Use Topics    Alcohol use: No        Physical Exam  Constitutional:       General: She is not in acute distress. Appearance: She is well-developed.    Eyes:      Conjunctiva/sclera: Conjunctivae normal.   Neck:      Thyroid: No thyroid mass or thyromegaly. Cardiovascular:      Rate and Rhythm: Normal rate and regular rhythm. Heart sounds: Normal heart sounds. No murmur. No friction rub. No gallop. Pulmonary:      Effort: Pulmonary effort is normal. No respiratory distress. Breath sounds: Normal breath sounds. No wheezing, rhonchi or rales. Musculoskeletal:      Comments: Spine exam deferred to Dr. Konstantin Arnold exam deferred to ortho   Lymphadenopathy:      Cervical: No cervical adenopathy. Skin:     General: Skin is warm and dry. Findings: No erythema or rash. Neurological:      Mental Status: She is alert and oriented to person, place, and time. Psychiatric:         Speech: Speech normal.         Behavior: Behavior normal.         Thought Content:  Thought content normal.         Judgment: Judgment normal.

## 2020-03-11 LAB — T3 TOTAL: 0.92 NG/ML (ref 0.8–2)

## 2020-03-12 RX ORDER — OMEPRAZOLE 40 MG/1
CAPSULE, DELAYED RELEASE ORAL
Qty: 30 CAPSULE | Refills: 5 | Status: SHIPPED | OUTPATIENT
Start: 2020-03-12 | End: 2020-09-01

## 2020-03-16 ENCOUNTER — E-VISIT (OUTPATIENT)
Dept: INTERNAL MEDICINE CLINIC | Age: 70
End: 2020-03-16
Payer: COMMERCIAL

## 2020-03-16 PROCEDURE — 99421 OL DIG E/M SVC 5-10 MIN: CPT | Performed by: INTERNAL MEDICINE

## 2020-03-16 RX ORDER — DOXYCYCLINE HYCLATE 100 MG/1
100 CAPSULE ORAL 2 TIMES DAILY
Qty: 20 CAPSULE | Refills: 0 | Status: SHIPPED | OUTPATIENT
Start: 2020-03-16 | End: 2020-03-26

## 2020-03-16 ASSESSMENT — LIFESTYLE VARIABLES: SMOKING_STATUS: NO, I'VE NEVER SMOKED

## 2020-03-17 RX ORDER — ALPRAZOLAM 1 MG/1
TABLET ORAL
Qty: 35 TABLET | Refills: 2 | Status: SHIPPED | OUTPATIENT
Start: 2020-03-17 | End: 2020-06-17

## 2020-03-30 ENCOUNTER — TELEPHONE (OUTPATIENT)
Dept: INTERNAL MEDICINE CLINIC | Age: 70
End: 2020-03-30

## 2020-04-08 RX ORDER — BUPROPION HYDROCHLORIDE 300 MG/1
TABLET ORAL
Qty: 90 TABLET | Refills: 1 | Status: SHIPPED | OUTPATIENT
Start: 2020-04-08 | End: 2020-07-06

## 2020-05-01 ENCOUNTER — TELEPHONE (OUTPATIENT)
Dept: INTERNAL MEDICINE CLINIC | Age: 70
End: 2020-05-01

## 2020-05-01 NOTE — TELEPHONE ENCOUNTER
We are not currently testing anyone who is not symptomatic, but please ask her to continue to self-quarantine for 14 days after her last exposure, and give her the number for the [facility] to schedule testing if symptoms develop.

## 2020-05-11 RX ORDER — TRAZODONE HYDROCHLORIDE 50 MG/1
TABLET ORAL
Qty: 270 TABLET | Refills: 1 | Status: SHIPPED | OUTPATIENT
Start: 2020-05-11 | End: 2020-09-28

## 2020-06-05 VITALS — BODY MASS INDEX: 32.96 KG/M2 | DIASTOLIC BLOOD PRESSURE: 64 MMHG | SYSTOLIC BLOOD PRESSURE: 90 MMHG | WEIGHT: 186 LBS

## 2020-06-09 ENCOUNTER — TELEMEDICINE (OUTPATIENT)
Dept: INTERNAL MEDICINE CLINIC | Age: 70
End: 2020-06-09
Payer: COMMERCIAL

## 2020-06-09 PROCEDURE — 99214 OFFICE O/P EST MOD 30 MIN: CPT | Performed by: INTERNAL MEDICINE

## 2020-06-09 NOTE — PROGRESS NOTES
Comments: No visible mass  Pulmonary:      Effort: Pulmonary effort is normal. No respiratory distress. Skin:     Comments: No rash, erythema or other discoloration on facial skin and exposed areas of neck and upper extremites    Neurological:      Mental Status: She is alert. Comments: No facial asymmetry (cranial nerve 7 motor function) or gaze palsy   Psychiatric:         Attention and Perception: Attention normal.         Mood and Affect: Mood normal.         Speech: Speech normal.         Behavior: Behavior normal.         Thought Content: Thought content normal.         Cognition and Memory: Cognition normal.          Lab Results   Component Value Date    CHOLFAST 133 03/06/2018    TRIGLYCFAST 169 (H) 03/06/2018    HDL 47 03/06/2018    LDLCALC 52 03/06/2018     Lab Results   Component Value Date    GLUF 91 03/12/2019    LABA1C 5.2 03/06/2018     Lab Results   Component Value Date     03/10/2020    K 4.5 03/10/2020    BUN 13 03/10/2020    CREATININE 0.8 03/10/2020    LABGLOM >60 03/10/2020    GFRAA >60 03/10/2020    CALCIUM 8.7 03/10/2020    VITD25 17.4 (L) 03/10/2020     Lab Results   Component Value Date    ALT 14 03/10/2020    AST 22 03/10/2020     Lab Results   Component Value Date    HGB 13.3 03/10/2020     Lab Results   Component Value Date    TSHREFLEX 0.13 (L) 03/10/2020    TSH 0.52 06/11/2019           ASSESSMENT/PLAN:  1. Chronic bilateral low back pain with bilateral sciatica  Worse with less physical activity associated with recent quarantine. Injections have not provided sustained relief, so will refer to PT. Continue current doses of gabapentin, Tylenol and CBD oil. - [facility] [place]    2. Neck pain, chronic  See plan for low back pain. [address] [place]    3. Primary osteoarthritis of left knee  Unable to obtain Synvisc due to insurance issues, so she will work with PT.  - [facility] [employer]    4.  Acquired hypothyroidism  Fatigue and constipation are likely multifactorial, but will adjust levothyroxine dose if indicated by lab results.   - TSH without Reflex; Future    5. Psychophysiological insomnia  Worse with COVID-19 crisis. Continue current dose of trazodone, melatonin and CBD oil. Principles of good sleep hygiene discussed. 6. Anxiety  Worse with social distancing. SSRIs have been ineffective or caused tremor in the past, so will avoid. Continue current dose of Wellbutrin XL. She will restart CBT and try meditating with Smartphone apps. 7. Major depressive disorder with single episode, in partial remission ([facility] [address]  See plan for anxiety. 8. Class 1 obesity due to excess calories with serious comorbidity and body mass index (BMI) of 32.0 to 32.9 in adult  Continued slow improvement. Continue Weight Watcher's diet and as much physical activity as tolerated. 9. Post-resection malabsorption  Recheck labs on higher dose of vitamin D supplement. - Comprehensive Metabolic Panel, Fasting; Future  - Vitamin D 25 Hydroxy; Future    Return in about 3 months (around 9/9/2020) for 30 MIN F/U Video Visit. An  electronic signature was used to authenticate this note. --Mar Fall MD on 6/9/2020 at 1:09 PM    Pursuant to the emergency declaration under the [address] waiver authority and the [employer] and [employer] Act, this Virtual  Visit was conducted, with patient's consent, to reduce the patient's risk of exposure to COVID-19 and provide continuity of care for an established patient. Services were provided through a video synchronous discussion virtually to substitute for in-person clinic visit.

## 2020-06-15 ENCOUNTER — HOSPITAL ENCOUNTER (OUTPATIENT)
Age: 70
Discharge: HOME | End: 2020-06-15
Payer: COMMERCIAL

## 2020-06-15 LAB
A/G RATIO: 1.4 (ref 1.1–2.2)
ALBUMIN SERPL-MCNC: 3.9 G/DL (ref 3.4–5)
ALP BLD-CCNC: 66 U/L (ref 40–129)
ALT SERPL-CCNC: 13 U/L (ref 10–40)
ANION GAP SERPL CALCULATED.3IONS-SCNC: 12 MMOL/L (ref 3–16)
AST SERPL-CCNC: 20 U/L (ref 15–37)
BILIRUB SERPL-MCNC: 0.3 MG/DL (ref 0–1)
BUN BLDV-MCNC: 17 MG/DL (ref 7–20)
CALCIUM SERPL-MCNC: 9 MG/DL (ref 8.3–10.6)
CHLORIDE BLD-SCNC: 103 MMOL/L (ref 99–110)
CO2: 24 MMOL/L (ref 21–32)
CREAT SERPL-MCNC: 1 MG/DL (ref 0.6–1.2)
GFR AFRICAN AMERICAN: >60
GFR NON-AFRICAN AMERICAN: 55
GLOBULIN: 2.8 G/DL
GLUCOSE FASTING: 72 MG/DL (ref 70–99)
POTASSIUM SERPL-SCNC: 4.7 MMOL/L (ref 3.5–5.1)
SODIUM BLD-SCNC: 139 MMOL/L (ref 136–145)
TOTAL PROTEIN: 6.7 G/DL (ref 6.4–8.2)
TSH SERPL DL<=0.05 MIU/L-ACNC: 1.4 UIU/ML (ref 0.27–4.2)
VITAMIN D 25-HYDROXY: 44.6 NG/ML

## 2020-06-15 PROCEDURE — 84443 ASSAY THYROID STIM HORMONE: CPT

## 2020-06-15 PROCEDURE — 36415 COLL VENOUS BLD VENIPUNCTURE: CPT

## 2020-06-15 PROCEDURE — 80053 COMPREHEN METABOLIC PANEL: CPT

## 2020-06-15 PROCEDURE — 82306 VITAMIN D 25 HYDROXY: CPT

## 2020-06-17 RX ORDER — ALPRAZOLAM 1 MG/1
TABLET ORAL
Qty: 35 TABLET | Refills: 2 | Status: SHIPPED | OUTPATIENT
Start: 2020-06-17 | End: 2020-09-17

## 2020-07-06 RX ORDER — GABAPENTIN 300 MG/1
CAPSULE ORAL
Qty: 60 CAPSULE | Refills: 5 | Status: SHIPPED | OUTPATIENT
Start: 2020-07-06 | End: 2020-12-29

## 2020-07-06 RX ORDER — GABAPENTIN 100 MG/1
CAPSULE ORAL
Qty: 90 CAPSULE | Refills: 1 | OUTPATIENT
Start: 2020-07-06 | End: 2021-01-06

## 2020-07-06 RX ORDER — BUPROPION HYDROCHLORIDE 300 MG/1
TABLET ORAL
Qty: 90 TABLET | Refills: 1 | Status: SHIPPED | OUTPATIENT
Start: 2020-07-06 | End: 2021-04-23

## 2020-07-20 ENCOUNTER — OFFICE VISIT (OUTPATIENT)
Dept: ORTHOPEDIC SURGERY | Age: 70
End: 2020-07-20
Payer: COMMERCIAL

## 2020-07-20 VITALS — WEIGHT: 186 LBS | BODY MASS INDEX: 32.96 KG/M2 | HEIGHT: 63 IN | TEMPERATURE: 98.6 F

## 2020-07-20 PROCEDURE — 99214 OFFICE O/P EST MOD 30 MIN: CPT | Performed by: ORTHOPAEDIC SURGERY

## 2020-07-20 PROCEDURE — 20610 DRAIN/INJ JOINT/BURSA W/O US: CPT | Performed by: ORTHOPAEDIC SURGERY

## 2020-07-20 RX ORDER — METHYLPREDNISOLONE ACETATE 40 MG/ML
40 INJECTION, SUSPENSION INTRA-ARTICULAR; INTRALESIONAL; INTRAMUSCULAR; SOFT TISSUE ONCE
Status: COMPLETED | OUTPATIENT
Start: 2020-07-20 | End: 2020-07-20

## 2020-07-20 RX ORDER — ROPIVACAINE HYDROCHLORIDE 5 MG/ML
30 INJECTION, SOLUTION EPIDURAL; INFILTRATION; PERINEURAL ONCE
Status: COMPLETED | OUTPATIENT
Start: 2020-07-20 | End: 2020-07-20

## 2020-07-20 RX ORDER — DICLOFENAC SODIUM 75 MG/1
75 TABLET, DELAYED RELEASE ORAL 2 TIMES DAILY
Qty: 60 TABLET | Refills: 0 | Status: SHIPPED | OUTPATIENT
Start: 2020-07-20 | End: 2020-09-09

## 2020-07-20 RX ADMIN — ROPIVACAINE HYDROCHLORIDE 30 ML: 5 INJECTION, SOLUTION EPIDURAL; INFILTRATION; PERINEURAL at 15:11

## 2020-07-20 RX ADMIN — METHYLPREDNISOLONE ACETATE 40 MG: 40 INJECTION, SUSPENSION INTRA-ARTICULAR; INTRALESIONAL; INTRAMUSCULAR; SOFT TISSUE at 15:11

## 2020-07-20 NOTE — PROGRESS NOTES
Chief Complaint  Knee Pain (op/sp left knee pain and swelling-- no injury -- ongoing for years  past worse the last 2 weeks)      History of Present Illness:  Pradip Urena is a pleasant 81 y.o. female following up up for her left knee osteoarthritis. She was last seen 2 years ago and has been managing her osteoarthritis with as needed NSAIDs and tylenol. 2 weeks ago she states her knee started to flare. She has been taking care of her daughter after surgery. She denies any falls or injury. Medical History:  Patient's medications, allergies, past medical, surgical, social and family histories were reviewed and updated as appropriate. Pain Assessment  Location of Pain: Knee  Location Modifiers: Left  Severity of Pain: 8  Quality of Pain: Grinding, Popping, Aching, Sharp  Duration of Pain: Persistent  Frequency of Pain: Intermittent  Date Pain First Started: 07/06/20  Aggravating Factors: Walking  Limiting Behavior: Some  Relieving Factors: Rest, Ice(tylenol)  Result of Injury: No  Work-Related Injury: No  Are there other pain locations you wish to document?: No    Pertinent items are noted in HPI  Review of systems reviewed from Patient History Form completed today and available in the patient's chart under the Media tab. Vital Signs:  Temp 98.6 °F (37 °C)   Ht 5' 2.99\" (1.6 m)   Wt 186 lb (84.4 kg)   BMI 32.96 kg/m²         Neuro: Alert & oriented x 3,  normal,  no focal deficits noted. Normal affect. Eyes: sclera clear  Ears: Normal external ear  Mouth:  No perioral lesions  Pulm: Respirations unlabored and regular  Pulse: Extremities well perfused. 2+ peripheral pulses. Skin: Warm. No ulcerations. Constitutional: The physical examination finds the patient to be well-developed and well-nourished. The patient is alert and oriented x3 and was cooperative throughout the visit. Left knee exam    Gait: No use of assistive devices. antalgic gait.     Alignment: Varus alignment. Inspection/skin: Skin is intact without erythema or ecchymosis. No gross deformity. Palpation: Moderate crepitus. Medial joint line tenderness present. Tender to palpation popliteal fossa    Range of Motion: 3 to 120 degrees    Strength: Normal quadriceps development. Effusion: Trace effusion, no swelling present. Ligamentous stability: No cruciate or collateral ligament instability. 3 mm pseudolaxity    Neurologic and vascular: Skin is warm and well-perfused. Sensation is intact to light-touch. Right knee exam    Gait: No use of assistive devices. No antalgic gait. Alignment: normal alignment. Inspection/skin: Skin is intact without erythema or ecchymosis. No gross deformity. Palpation: mild crepitus. Mild medial joint line tenderness present. Range of Motion: There is full range of motion. Strength: Normal quadriceps development. Effusion: No effusion or swelling present. Ligamentous stability: No cruciate or collateral ligament instability. Neurologic and vascular: Skin is warm and well-perfused. Sensation is intact to light-touch. Radiology:       Pertinent imaging reviewed, images only - no report available. Radiographs were obtained and reviewed in the office; 3 views: bilateral PA, bilateral Merchants AND left lateral    Impression: Left knee tricompartmental osteoarthritis with bone-on-bone articulation of the medial compartment, modest interval worsening of medial femoral condyle erosion       Assessment : Left knee tricompartmental osteoarthritis    Impression:  Encounter Diagnosis   Name Primary?     Left knee pain, unspecified chronicity Yes       Office Procedures:  Orders Placed This Encounter   Procedures    XR KNEE LEFT (3 VIEWS)     Standing Status:   Future     Number of Occurrences:   1     Standing Expiration Date:   7/20/2021     Order Specific Question:   Reason for exam:     Answer:   Pain         Plan: Pertinent imaging was reviewed. The etiology, natural history, and treatment options for the disorder were discussed. The roles of activity medication, antiinflammatories, injections, bracing, physical therapy, and surgical interventions were all described to the patient and questions were answered. We believe patient is a candidate for left knee corticosteroid injection today and initiation of as needed oral nonsteroidal anti-inflammatories. We are prescribing diclofenac 75 mg twice daily with food. She is to take it for 1 to 2 weeks and then decrease usage as she tolerates. The patient was advised that NSAID-type medications have several potential side effects that include: gastrointestinal irritation including hemorrhage, renal injury, as well as an increased risk for heart attack and stroke. The patient was asked to take the medication with food and to stop if there is any symptoms of GI upset, including heartburn, nausea, increased gas or diarrhea. I asked the patient to contact their medical provider for vomiting, abdominal pain or black/bloody stools. The patient should have renal function testing per his medical provider periodically if the medication is taken on a regular basis. The patient should be alert for any swelling in the lower extremities and should stop taking the medication immediately and contact their medical provider should this occur. In addition, the patient should stop taking the medication immediately and contact their medical provider should there be any shortness of breath, fatigue and be evaluated in an emergency facility for any chest pain. The patient expresses understanding of these issues and questions were answered. Procedure: Left knee corticosteroid injection  After informed consent was provided, the skin was cleansed and prepped. Using a 25 gauge needle an injection with 3mL of 40 mg/ml Depo-Medrol and 4 mL of 0.5% ropivicaine was injected without difficulty.   The needle was withdrawn and the puncture site sealed with a Band-Aid. The patient tolerated the procedure well. We will see Dai Erazo back on an as-needed basis. Janett Monsalve is in agreement with this plan. All questions were answered to patient's satisfaction and was encouraged to call with any further questions. Shaila Pino MD  Board Certified Orthopaedic Surgeon, [address] [place] [address]  7/20/2020         The encounter with Janett Monsalve was supervised by Dr Venkatesh Harrison who personally examined the patient and reviewed the plan. This dictation was performed with a verbal recognition program (DRAGON) and it was checked for errors. It is possible that there are still dictated errors within this office note. If so, please bring any errors to my attention for an addendum. All efforts were made to ensure that this office note is accurate. Attending Attestation:    I, Dr. Venkatesh Harrison, supervised my sports medicine fellow in the evaluation and development of a treatment plan  for this patient. I personally interviewed the patient and performed a physical examination. In addition, I discussed the patient's condition and treatment options with them. All of their questions were answered. Attestation:  I was physically present and performed my own examination of this patient and have discussed the case, including pertinent history and exam findings with the fellow. I agree with the documented assessment and plan. Ren Romo.  Venkatesh Harrison MD

## 2020-09-01 RX ORDER — OMEPRAZOLE 40 MG/1
CAPSULE, DELAYED RELEASE ORAL
Qty: 90 CAPSULE | Refills: 1 | Status: SHIPPED | OUTPATIENT
Start: 2020-09-01 | End: 2021-09-07

## 2020-09-01 RX ORDER — OMEPRAZOLE 40 MG/1
CAPSULE, DELAYED RELEASE ORAL
Qty: 30 CAPSULE | Refills: 5 | Status: SHIPPED | OUTPATIENT
Start: 2020-09-01 | End: 2020-09-01

## 2020-09-09 NOTE — PROGRESS NOTES
9/10/2020    TELEHEALTH EVALUATION -- Audio/Visual (During WWTVA-76 public health emergency)    HPI:  Morgan Claude (:  1950) has requested an audio/video evaluation for the following concern(s):    Chronic Joint Pain:  Patient presents for follow-up of pain in back and neck- improved. Left knee worse- saw Dr. Jessica Arguello in July, who administered cortisone injection and recommended knee replacement.  On average, pain is perceived as mild-moderate.   Current treatment: gabapentin 600 mg qhs, Tylenol 1000 mg bid. Medication side effects: none. Recent diagnostic testing: MRI cervical and lumbar spine  per Dr. Russell Knapp- Multiple injections  provided only temporary relief. Did not pursue PT for her back since symptoms improved. Obesity:  Eating a healthy, well-balanced diet- has lost 70 pounds, 5 since last visit.  Trying to walk more, but still limited by back and knee pain.     Hypothyroidism: Recent symptoms: constipation, fatigue. She denies cold intolerance, heat intolerance, hair loss, dry skin, edema and palpitations. Patient is taking her medication consistently on an empty stomach. Insomnia:  Current treatment: prescription sleep aid-  trazodone 150 mg qhs, melatonin 5 mg qhsl, which has been effective. No adverse effects. Restless legs symptoms wax and wane.     Mood Disorder:  Patient presents for follow-up of depression and anxiety disorder. Current complaints include:  anhedonia, depressed mood, tearfulness, excessive worry, difficulty concentrating, feelings of worthlessness/guilt, impaired memory- much improved.  No panic attacks, feelings of hopelessness, irritability or suicidal ideation.  Symptoms/signs of jojo: none. Current treatment includes: Wellbutrin- 300 mg qam, takes Xanax about once in the evening for anxiety and occasionally during the day.  Medication side effects: none. Prior to Visit Medications    Medication Sig Taking?  Authorizing Provider   omeprazole (PRILOSEC) 40 MG delayed release capsule TAKE 1 CAPSULE BY MOUTH EVERY MORNING BEFORE BREAKFAST Yes Sonia Darden MD   gabapentin (NEURONTIN) 300 MG capsule TAKE ONE CAPSULE BY MOUTH TWICE DAILY Yes Sonia Darden MD   buPROPion (WELLBUTRIN XL) 300 MG extended release tablet TAKE 1 TABLET BY MOUTH EVERY MORNING Yes Sonia Darden MD   ALPRAZolam (XANAX) 1 MG tablet TAKE 1 TABLET BY MOUTH TWICE DAILY AS NEEDED FOR ANXIETY Yes Sonia Darden MD   traZODone (DESYREL) 50 MG tablet TAKE 3 TABLETS BY MOUTH EVERY NIGHT AT BEDTIME Yes Sonia Darden MD   levothyroxine (SYNTHROID) 150 MCG tablet TAKE 1 TABLET BY MOUTH EVERY DAY Yes Sonia Darden MD   melatonin 5 MG TABS tablet Take 5 mg by mouth daily Yes Historical Provider, MD   ferrous sulfate (FE TABS) 325 (65 Fe) MG EC tablet Take 1 tablet by mouth daily (with breakfast) Yes Sonia Darden MD   Chuy [place] Oil 300 MG CAPS Take 1 capsule by mouth daily Yes Historical Provider, MD   Cyanocobalamin (VITAMIN B 12 PO) Take 1,000 mcg by mouth daily. Yes Historical Provider, MD   Cholecalciferol (VITAMIN D) 1000 UNIT CAPS Take 3 capsules by mouth daily. Yes Historical Provider, MD       Wt Readings from Last 3 Encounters:   07/20/20 186 lb (84.4 kg)   06/05/20 186 lb (84.4 kg)   03/10/20 186 lb (84.4 kg)     BP Readings from Last 3 Encounters:   06/05/20 90/64   03/10/20 118/60   12/10/19 134/60       Patient-Reported Vitals 9/10/2020   Patient-Reported Weight 181   Patient-Reported Height 53   Patient-Reported Systolic 983   Patient-Reported Diastolic 64   Patient-Reported Pulse 83   Patient-Reported Temperature -   BMI 32.1      Review of Systems:   As documented in HPI     Physical Exam  Constitutional:       General: She is not in acute distress. Appearance: She is well-developed. HENT:      Head: Normocephalic and atraumatic. Mouth/Throat:      Lips: No lesions.    Neck:      Comments: No visible mass  Pulmonary:      Effort: Pulmonary effort is normal. No respiratory distress. Skin:     Comments: No rash, erythema or other discoloration on facial skin and exposed areas of neck and upper extremites    Neurological:      Mental Status: She is alert. Comments: No facial asymmetry (cranial nerve 7 motor function) or gaze palsy   Psychiatric:         Attention and Perception: Attention normal.         Mood and Affect: Mood normal.         Speech: Speech normal.         Behavior: Behavior normal.         Thought Content: Thought content normal.         Cognition and Memory: Cognition normal.          Lab Results   Component Value Date    CHOLFAST 133 03/06/2018    TRIGLYCFAST 169 (H) 03/06/2018    HDL 47 03/06/2018    LDLCALC 52 03/06/2018     Lab Results   Component Value Date    GLUF 72 06/15/2020    LABA1C 5.2 03/06/2018     Lab Results   Component Value Date     06/15/2020    K 4.7 06/15/2020    BUN 17 06/15/2020    CREATININE 1.0 06/15/2020    LABGLOM 55 (A) 06/15/2020    GFRAA >60 06/15/2020    CALCIUM 9.0 06/15/2020    VITD25 44.6 06/15/2020     Lab Results   Component Value Date    ALT 13 06/15/2020    AST 20 06/15/2020     Lab Results   Component Value Date    HGB 13.3 03/10/2020     Lab Results   Component Value Date    TSHREFLEX 0.13 (L) 03/10/2020    TSH 1.40 06/15/2020           ASSESSMENT/PLAN:    1. Chronic bilateral low back pain with bilateral sciatica  Improved. Continue current doses of gabapentin and Tylenol. She will schedule PT if symptoms worsen. 2. Neck pain, chronic  See above. 3. Primary osteoarthritis of left knee  Some improvement with recent injection, but will likely need TKR within the next year. Likely benefit of continued weight loss stressed. 4. Psychophysiological insomnia  Doing well on combination of trazodone and melatonin- continue. 5. Anxiety  Improved. Continue current dose of Wellbutrin XL and lowest effective dose of Xanax.     6. Major depressive disorder with single episode, [facility] ([facility]  Improved- see plan for anxiety. 7. Class 1 obesity due to excess calories with serious comorbidity and body mass index (BMI) of 32.0 to 32.9 in adult  Patient was asked about her current diet and exercise habits, and personalized advice was provided regarding recommended lifestyle changes. Patient's comorbid health conditions associated with elevated BMI were discussed, as well as the likely benefits of weight loss. Based upon patient's motivation to change her behavior, the following plan was agreed upon to work toward a weight loss goal of 30-40 pounds: well-balanced diet, following Weight Watcher's principles, she will increase her activity level as much as tolerated. Educational materials for  weight loss were provided. Patient will follow-up in 3 month(s) with PCP. Provider spent 15 minutes counseling patient. Return in about 3 months (around 12/10/2020) for AWV- Video. An  electronic signature was used to authenticate this note. --Chalino Herrera MD on 9/10/2020 at 10:43 AM    Pursuant to the emergency declaration under the [facility] waiver authority and the [employer] Act, this Virtual  Visit was conducted, with patient's consent, to reduce the patient's risk of exposure to COVID-19 and provide continuity of care for an established patient. Services were provided through a video synchronous discussion virtually to substitute for in-person clinic visit.

## 2020-09-10 ENCOUNTER — TELEMEDICINE (OUTPATIENT)
Dept: INTERNAL MEDICINE CLINIC | Age: 70
End: 2020-09-10
Payer: COMMERCIAL

## 2020-09-10 PROCEDURE — 99214 OFFICE O/P EST MOD 30 MIN: CPT | Performed by: INTERNAL MEDICINE

## 2020-09-10 PROCEDURE — G0447 BEHAVIOR COUNSEL OBESITY 15M: HCPCS | Performed by: INTERNAL MEDICINE

## 2020-09-17 RX ORDER — ALPRAZOLAM 1 MG/1
TABLET ORAL
Qty: 35 TABLET | Refills: 2 | Status: SHIPPED | OUTPATIENT
Start: 2020-09-17 | End: 2020-12-28 | Stop reason: SDUPTHER

## 2020-09-17 NOTE — TELEPHONE ENCOUNTER
Last appointment: 9/10/2020  Next appointment: Visit date not found.  Due in Dec  Last refill: 6/17/2020  #[phone] x2

## 2020-09-28 RX ORDER — TRAZODONE HYDROCHLORIDE 50 MG/1
TABLET ORAL
Qty: 270 TABLET | Refills: 1 | Status: SHIPPED | OUTPATIENT
Start: 2020-09-28 | End: 2021-02-26

## 2020-10-19 VITALS — BODY MASS INDEX: 31.89 KG/M2 | WEIGHT: 180 LBS | HEIGHT: 63 IN

## 2020-10-19 ASSESSMENT — PATIENT HEALTH QUESTIONNAIRE - PHQ9
SUM OF ALL RESPONSES TO PHQ QUESTIONS 1-9: 1
SUM OF ALL RESPONSES TO PHQ9 QUESTIONS 1 & 2: 1
1. LITTLE INTEREST OR PLEASURE IN DOING THINGS: 0
1. LITTLE INTEREST OR PLEASURE IN DOING THINGS: 0
SUM OF ALL RESPONSES TO PHQ QUESTIONS 1-9: 1
2. FEELING DOWN, DEPRESSED OR HOPELESS: 0
SUM OF ALL RESPONSES TO PHQ QUESTIONS 1-9: 0
SUM OF ALL RESPONSES TO PHQ QUESTIONS 1-9: 1
2. FEELING DOWN, DEPRESSED OR HOPELESS: 1
SUM OF ALL RESPONSES TO PHQ QUESTIONS 1-9: 0
SUM OF ALL RESPONSES TO PHQ QUESTIONS 1-9: 0
SUM OF ALL RESPONSES TO PHQ9 QUESTIONS 1 & 2: 0

## 2020-10-19 ASSESSMENT — LIFESTYLE VARIABLES
AUDIT-C TOTAL SCORE: INCOMPLETE
HOW OFTEN DO YOU HAVE A DRINK CONTAINING ALCOHOL: NEVER
AUDIT TOTAL SCORE: INCOMPLETE
HOW OFTEN DO YOU HAVE A DRINK CONTAINING ALCOHOL: 0

## 2020-10-20 ENCOUNTER — TELEMEDICINE (OUTPATIENT)
Dept: INTERNAL MEDICINE CLINIC | Age: 70
End: 2020-10-20
Payer: COMMERCIAL

## 2020-10-20 PROBLEM — E78.1 HYPERTRIGLYCERIDEMIA: Status: ACTIVE | Noted: 2020-10-20

## 2020-10-20 PROCEDURE — G0438 PPPS, INITIAL VISIT: HCPCS | Performed by: INTERNAL MEDICINE

## 2020-10-20 PROCEDURE — G0447 BEHAVIOR COUNSEL OBESITY 15M: HCPCS | Performed by: INTERNAL MEDICINE

## 2020-10-20 NOTE — PROGRESS NOTES
Medicare Annual Wellness Visit  Name: Emili Olmos Date: 10/20/2020   MRN: [id number] Sex: Female   Age: 81 y.o. Ethnicity: Non-/Non    : 1950 Race: Nikkie Mendez is here for Medicare AWV    Screenings for behavioral, psychosocial and functional/safety risks, and cognitive dysfunction are all negative except as indicated below. These results, as well as other patient data from the [address] [facility] form, are documented in Flowsheets linked to this Encounter. Allergies   Allergen Reactions    Codeine Nausea Only         Prior to Visit Medications    Medication Sig Taking? Authorizing Provider   traZODone (DESYREL) 50 MG tablet TAKE 3 TABLETS BY MOUTH EVERY NIGHT AT BEDTIME  Lana Bird MD   ALPRAZolam (XANAX) 1 MG tablet TAKE 1 TABLET BY MOUTH TWICE DAILY AS NEEDED FOR ANXIETY  Lana Bird MD   omeprazole (PRILOSEC) 40 MG delayed release capsule TAKE 1 CAPSULE BY MOUTH EVERY MORNING BEFORE BREAKFAST  Lana Bird MD   gabapentin (NEURONTIN) 300 MG capsule TAKE ONE CAPSULE BY MOUTH TWICE DAILY  Lana Bird MD   buPROPion (WELLBUTRIN XL) 300 MG extended release tablet TAKE 1 TABLET BY MOUTH EVERY MORNING  Lana Bird MD   levothyroxine (SYNTHROID) 150 MCG tablet TAKE 1 TABLET BY MOUTH EVERY DAY  Lana Bird MD   melatonin 5 MG TABS tablet Take 5 mg by mouth daily  Historical Provider, MD   ferrous sulfate (FE TABS) 325 (65 Fe) MG EC tablet Take 1 tablet by mouth daily (with breakfast)  Lana Bird MD    Sink Oil 300 MG CAPS Take 1 capsule by mouth daily  Historical Provider, MD   Cyanocobalamin (VITAMIN B 12 PO) Take 1,000 mcg by mouth daily. Historical Provider, MD   Cholecalciferol (VITAMIN D) 1000 UNIT CAPS Take 3 capsules by mouth daily.   Historical Provider, MD         Past Medical History:   Diagnosis Date    Anal fissure 2013    Anxiety     Colon polyps     Depression     Fibroid uterus     Hypothyroidism     Morbid obesity ([facility] [address]     Osteopenia        Past Surgical History:   Procedure Laterality Date    ABDOMINAL HERNIA REPAIR  2008    APPENDECTOMY  Age 27    CATARACT REMOVAL WITH IMPLANT Right 10/23/2017    with vitrectomy    CATARACT REMOVAL WITH IMPLANT Left 02/26/2018    with vitrectomy    COLONOSCOPY N/A 10/10/2018    COLONOSCOPY POLYPECTOMY SNARE/COLD BIOPSY performed by Gifty Morgan MD at [address] Right 8/26/2019    RIGHT L4 AND L5 TRANSFORAMINAL EPIDURAL STEROID INJECTION WITH FLUOROSCOPY performed by Courtney Salinas MD at [address], [facility]. INJECT OTHER PERPHRL NERV Right 10/21/2019    RIGHT PIRIFORMIS INJECTION WITH FLUOROSCOPY (18405) performed by Courtney Salinas MD at [facility], TOTAL ABDOMINAL      ovaries out also   [address] Right 1/7/2019    RIGHT L3 AND L4 LUMBAR TRANSFORAMINAL WITH FLUOROSCOPY performed by Courtney Salinas MD at [facility] Right 5/15/2019    RIGHT L4 AND L5 TRANSFORAMINAL EPIDURAL STEROID INJECTION WITH FLUOROSCOPY performed by Courtney Salinas MD at [facility] ESOPHAGOGASTRODUODENOSCOPY TRANSORAL DIAGNOSTIC N/A 10/10/2018    EGD performed by Gifty Morgan MD at [address]    Fibroids         Family History   Problem Relation Age of Onset    Diabetes Father         Type II    Diabetes Sister         Type II    Diabetes Brother         Type II    Breast Cancer Sister 58    Lung Cancer Mother         Smoker    Heart Disease Sister         Bicuspid aortic valve x2       CareTeam (Including outside providers/suppliers regularly involved in providing care):   Patient Care Team:  Caitlin Mae MD as PCP - General (Internal Medicine)  Caitlin Mae MD as PCP - [address] [place] [address] Provider  Geovany Garcia DPM as Consulting Physician (Podiatry)  Miguel Angel Villavicencio MD as Surgeon (General Surgery)  Gifty Morgan MD as Consulting Physician (Gastroenterology)  Liberty Lion MD as Obstetrician (Obstetrics & Gynecology)    [facility] Readings from Last 3 Encounters:   10/19/20 180 lb (81.6 kg)   07/20/20 186 lb (84.4 kg)   06/05/20 186 lb (84.4 kg)     Patient-Reported Vitals 10/20/2020   Patient-Reported Weight 180   Patient-Reported Height -   Patient-Reported Systolic 052   Patient-Reported Diastolic 61   Patient-Reported Pulse 86   Patient-Reported Temperature -      Body mass index is 32.4 kg/m². Based upon direct observation of the patient, evaluation of cognition reveals recent and remote memory intact. Physical Exam  Constitutional:       General: She is not in acute distress. Appearance: She is well-developed. HENT:      Head: Normocephalic and atraumatic. Mouth/Throat:      Lips: No lesions. Neck:      Comments: No visible mass  Pulmonary:      Effort: Pulmonary effort is normal. No respiratory distress. Skin:     Comments: No rash, erythema or other discoloration on facial skin and exposed areas of neck and upper extremites    Neurological:      Mental Status: She is alert. Comments: No facial asymmetry (cranial nerve 7 motor function) or gaze palsy   Psychiatric:         Attention and Perception: Attention normal.         Mood and Affect: Mood normal.         Speech: Speech normal.         Behavior: Behavior normal.         Thought Content: Thought content normal.         Cognition and Memory: Cognition normal.       Patient's complete Health Risk Assessment and screening values have been reviewed and are found in Flowsheets. The following problems were reviewed today and where indicated follow up appointments were made and/or referrals ordered. Positive Risk Factor Screenings with Interventions:       General Health and ACP:  General  In general, how would you say your health is?: Very Good  In the past 7 days, have you experienced any of the following?  New or Increased Pain, New or Increased Fatigue, Loneliness, Social Isolation, Stress or Anger?: (!) Loneliness, Stress  Do you get the social and emotional support that you need?: Yes  Do you have a Living Will?: (!) No  Advance Directives     Power of  Living Will ACP-Advance Directive ACP-Power of     Not on File Not on [address] Interventions:  · Loneliness: Has adequate support from friends and family  · Stress: See above  · No Living Will: Patient referred to [place] Habits/Nutrition:  Health Habits/Nutrition  Do you exercise for at least 20 minutes 2-3 times per week?: Yes  Have you lost any weight without trying in the past 3 months?: (!) Yes  Do you eat fewer than 2 meals per day?: No  Have you seen a dentist within the past year?: Yes  Body mass index: (!) 32.39  Health Habits/Nutrition Interventions:  · Nutritional issues:  She will continue lower carb, portion-controlled diet with as much exercise as tolerated    Personalized Preventive Plan   Current Health Maintenance Status  Immunization History   Administered Date(s) Administered    Influenza Virus Vaccine 10/29/2015, 10/23/2018, 12/10/2019    Influenza, Quadv, IM, PF (6 mo and older Fluzone, Flulaval, Fluarix, and 3 yrs and older Afluria) 10/25/2016, 10/03/2017    Pneumococcal Conjugate 13-valent (Lcocfet25) 11/28/2017    Pneumococcal Polysaccharide ([id number]) 12/11/2018    Tdap (Boostrix, Adacel) 01/17/2013    Zoster Live (Zostavax) 11/12/2013        Health Maintenance   Topic Date Due    Shingles Vaccine (2 of 3) 01/07/2014    Annual Wellness Visit (AWV)  07/20/2020    Flu vaccine (1) 09/01/2020    Breast cancer screen  06/03/2021    TSH testing  06/15/2021    DTaP/Tdap/Td vaccine (2 - Td) 01/17/2023    Lipid screen  03/06/2023    Colon cancer screen colonoscopy  10/10/2023    DEXA (modify frequency per FRAX score)  Completed    Pneumococcal 65+ years Vaccine  Completed    Hepatitis C screen identification was verified at the start of the visit: Yes    Services were provided through a video synchronous discussion virtually to substitute for in-person clinic visit. Patient and provider were located at their individual homes. --Gerard Perez MD on 10/20/2020 at 1:04 PM    An electronic signature was used to authenticate this note.

## 2020-10-20 NOTE — PATIENT INSTRUCTIONS
Personalized Preventive Plan for Kimball Schirmer - 10/20/2020  Medicare offers a range of preventive health benefits. Some of the tests and screenings are paid in full while other may be subject to a deductible, co-insurance, and/or copay. Some of these benefits include a comprehensive review of your medical history including lifestyle, illnesses that may run in your family, and various assessments and screenings as appropriate. After reviewing your medical record and screening and assessments performed today your provider may have ordered immunizations, labs, imaging, and/or referrals for you. A list of these orders (if applicable) as well as your Preventive Care list are included within your After Visit Summary for your review. Other Preventive Recommendations:    · A preventive eye exam performed by an eye specialist is recommended every 1-2 years to screen for glaucoma; cataracts, macular degeneration, and other eye disorders. · A preventive dental visit is recommended every 6 months. · Try to get at least 150 minutes of exercise per week or 10,000 steps per day on a pedometer . · Order or download the FREE \"Exercise & Physical Activity: Your Everyday Guide\" from [employer]. Call 7-805.516.2015 or search [employer] online. · You need 3842-7462 mg of calcium and 4143-3132 IU of vitamin D per day. It is possible to meet your calcium requirement with diet alone, but a vitamin D supplement is usually necessary to meet this goal.  · When exposed to the sun, use a sunscreen that protects against both UVA and UVB radiation with an SPF of 30 or greater. Reapply every 2 to 3 hours or after sweating, drying off with a towel, or swimming. · Always wear a seat belt when traveling in a car. Always wear a helmet when riding a bicycle or motorcycle.

## 2020-11-02 ENCOUNTER — TELEPHONE (OUTPATIENT)
Dept: ORTHOPEDIC SURGERY | Age: 70
End: 2020-11-02

## 2020-11-02 ENCOUNTER — OFFICE VISIT (OUTPATIENT)
Dept: ORTHOPEDIC SURGERY | Age: 70
End: 2020-11-02
Payer: COMMERCIAL

## 2020-11-02 VITALS — WEIGHT: 180 LBS | HEIGHT: 63 IN | BODY MASS INDEX: 31.89 KG/M2 | TEMPERATURE: 98.4 F

## 2020-11-02 PROCEDURE — 99212 OFFICE O/P EST SF 10 MIN: CPT | Performed by: ORTHOPAEDIC SURGERY

## 2020-11-02 RX ORDER — LEVOTHYROXINE SODIUM 0.15 MG/1
TABLET ORAL
Qty: 90 TABLET | Refills: 1 | Status: SHIPPED | OUTPATIENT
Start: 2020-11-02 | End: 2021-06-25

## 2020-11-02 NOTE — PROGRESS NOTES
Returns today for follow-up of left knee varus osteoarthritis. She had a steroid injections which lasted about 6 weeks but is now painful again. She wants to know her options. ROS: Pertinent items are noted in HPI. No notes on file    Past Medical History:  7/16/2013: Anal fissure  No date: Anxiety  No date: Colon polyps  No date: Depression  No date: Fibroid uterus  No date: Hypothyroidism  No date: Morbid obesity ([address]  No date: Osteopenia     Past Surgical History:  2008: ABDOMINAL HERNIA REPAIR  Age 27: APPENDECTOMY  10/23/2017: CATARACT REMOVAL WITH IMPLANT; Right      Comment:  with vitrectomy  02/26/2018: CATARACT REMOVAL WITH IMPLANT; Left      Comment:  with vitrectomy  10/10/2018: COLONOSCOPY; N/A      Comment:  COLONOSCOPY POLYPECTOMY SNARE/COLD BIOPSY performed by                Nereyda Waters MD at [address]  8/26/2019: EPIDURAL STEROID INJECTION; Right      Comment:  RIGHT L4 AND L5 TRANSFORAMINAL EPIDURAL STEROID                INJECTION WITH FLUOROSCOPY performed by Irene Roberts MD at [address] [facility] [facility]: GASTRIC BYPASS SURGERY  10/21/2019: HC INJECT OTHER PERPHRL NERV; Right      Comment:  RIGHT PIRIFORMIS INJECTION WITH FLUOROSCOPY (06688)                performed by Irene Roberts MD at [address]  No date: HYSTERECTOMY, TOTAL ABDOMINAL      Comment:  ovaries out also  1/7/2019: [address]; Right      Comment:  RIGHT L3 AND L4 LUMBAR TRANSFORAMINAL WITH FLUOROSCOPY                performed by Irene Roberts MD at [address]  5/15/2019: [address];  Right      Comment:  RIGHT L4 AND L5 TRANSFORAMINAL EPIDURAL STEROID                INJECTION WITH FLUOROSCOPY performed by Irene Roberts MD at [address]  10/10/2018: [place] ESOPHAGOGASTRODUODENOSCOPY TRANSORAL DIAGNOSTIC; N/A      Comment:  EGD performed by Nereyda Waters MD at [address] [facility] [address]: [facility] AND [facility]      Comment:  Fibroids    Review of patient's family history indicates:  Problem: Diabetes      Relation: Father          Age of Onset: (Not Specified)          Comment: Type II  Problem: Diabetes      Relation: Sister          Age of Onset: (Not Specified)          Comment: Type II  Problem: Diabetes      Relation: Brother          Age of Onset: (Not Specified)          Comment: Type II  Problem: Breast Cancer      Relation: Sister          Age of Onset: 58  Problem: Lung Cancer      Relation: Mother          Age of Onset: (Not Specified)          Comment: Smoker  Problem: Heart Disease      Relation: Sister          Age of Onset: (Not Specified)          Comment: Bicuspid aortic valve x2      Social History    Socioeconomic History      Marital status:        Spouse name: None      Number of children: None      Years of education: None      Highest education level: None    Occupational History      Occupation: Computer work    Social Needs      Financial resource strain: None      Food insecurity        Worry: None        Inability: None      Transportation needs        Medical: None        Non-medical: None    Tobacco Use      Smoking status: Never Smoker      Smokeless tobacco: Never Used    Substance and Sexual Activity      Alcohol use: No      Drug use: No      Sexual activity: Never    Lifestyle      Physical activity        Days per week: None        Minutes per session: None      Stress: None    Relationships      Social connections        Talks on phone: None        Gets together: None        Attends Qu[employer] service: None        Active member of club or organization: None        Attends meetings of clubs or organizations: None        Relationship status: None      Intimate partner violence        Fear of current or ex partner: None        Emotionally abused: None        Physically abused: None        Forced sexual activity: None    Other Topics      Concerns:        None    Social History Narrative      None      Current Outpatient Medications:  levothyroxine (SYNTHROID) 150 MCG tablet, TAKE 1 TABLET BY MOUTH EVERY DAY, Disp: 90 tablet, Rfl: 1  traZODone (DESYREL) 50 MG tablet, TAKE 3 TABLETS BY MOUTH EVERY NIGHT AT BEDTIME, Disp: 270 tablet, Rfl: 1  ALPRAZolam (XANAX) 1 MG tablet, TAKE 1 TABLET BY MOUTH TWICE DAILY AS NEEDED FOR ANXIETY, Disp: 35 tablet, Rfl: 2  omeprazole (PRILOSEC) 40 MG delayed release capsule, TAKE 1 CAPSULE BY MOUTH EVERY MORNING BEFORE BREAKFAST, Disp: 90 capsule, Rfl: 1  gabapentin (NEURONTIN) 300 MG capsule, TAKE ONE CAPSULE BY MOUTH TWICE DAILY, Disp: 60 capsule, Rfl: 5  buPROPion (WELLBUTRIN XL) 300 MG extended release tablet, TAKE 1 TABLET BY MOUTH EVERY MORNING, Disp: 90 tablet, Rfl: 1  melatonin 5 MG TABS tablet, Take 5 mg by mouth daily, Disp: , Rfl:   ferrous sulfate (FE TABS) 325 (65 Fe) MG EC tablet, Take 1 tablet by mouth daily (with breakfast), Disp: 90 tablet, Rfl: 3  Krill Oil 300 MG CAPS, Take 1 capsule by mouth daily, Disp: , Rfl:   Cyanocobalamin (VITAMIN B 12 PO), Take 1,000 mcg by mouth daily. , Disp: , Rfl:   Cholecalciferol (VITAMIN D) 1000 UNIT CAPS, Take 3 capsules by mouth daily. , Disp: , Rfl:     No current facility-administered medications for this visit. -- Codeine -- Nausea Only    VITAL SIGNS:  Temp 98.4 °F (36.9 °C)   Ht 5' 2.5\" (1.588 m)   Wt 180 lb (81.6 kg)   BMI 32.40 kg/m²   On examination today she is tender mainly in the medial joint line. There is no warmth erythema or effusion. She does have 2 or 3 mm of pseudolaxity with valgus stress. She has negative Lachman's and pivot shift. She has no lateral joint line or retinacular tenderness. Previous x-rays show she is bone-on-bone in the medial compartment of her knee. Impression varus osteoarthritis left knee. Plan: We discussed viscosupplementation and total knee arthroplasty. Patient would like to try viscosupplementation. We will try to get this ordered and see her back once it is approved.

## 2020-11-02 NOTE — TELEPHONE ENCOUNTER
11/2/2020. FREDDY () series of 5. NO PA REQUIRED. LEFT KNEE.  VALID & BILLABLE ON CLAIM YES. BUY AND BILL. Per [employer] GUIDELINES.

## 2020-11-03 NOTE — TELEPHONE ENCOUNTER
Called patient and left message approval for Leonora Prescott left # for patient to call back to schedule

## 2020-11-04 ENCOUNTER — TELEPHONE (OUTPATIENT)
Dept: SPIRITUAL SERVICES | Age: 70
End: 2020-11-04

## 2020-11-06 ENCOUNTER — OFFICE VISIT (OUTPATIENT)
Dept: ORTHOPEDIC SURGERY | Age: 70
End: 2020-11-06
Payer: COMMERCIAL

## 2020-11-06 VITALS — HEIGHT: 63 IN | BODY MASS INDEX: 31.71 KG/M2 | WEIGHT: 179 LBS

## 2020-11-06 PROCEDURE — 99202 OFFICE O/P NEW SF 15 MIN: CPT | Performed by: ORTHOPAEDIC SURGERY

## 2020-11-06 RX ORDER — TRIAMCINOLONE ACETONIDE 40 MG/ML
80 INJECTION, SUSPENSION INTRA-ARTICULAR; INTRAMUSCULAR ONCE
Status: COMPLETED | OUTPATIENT
Start: 2020-11-06 | End: 2020-11-06

## 2020-11-06 RX ORDER — BUPIVACAINE HYDROCHLORIDE 2.5 MG/ML
2 INJECTION, SOLUTION INFILTRATION; PERINEURAL ONCE
Status: COMPLETED | OUTPATIENT
Start: 2020-11-06 | End: 2020-11-06

## 2020-11-06 RX ADMIN — TRIAMCINOLONE ACETONIDE 80 MG: 40 INJECTION, SUSPENSION INTRA-ARTICULAR; INTRAMUSCULAR at 13:32

## 2020-11-06 RX ADMIN — BUPIVACAINE HYDROCHLORIDE 5 MG: 2.5 INJECTION, SOLUTION INFILTRATION; PERINEURAL at 13:31

## 2020-11-06 NOTE — PROGRESS NOTES
SHOULDER CONSULTATION    Referring Provider: Dr. Liz Parrish    Primary Care Provider: Same    Chief Complaint    Pain (RIGHT SHOULDER)      History of Present Illness:  [place] is a 81 y.o. female who presents today for new patient evaluation and right shoulder consultation. Very pleasant female with almost 6 months of dull aching pain in the shoulder. Worse with activities such as abduction and extension. No trauma to report. Is beginning to be more of an affecting the quality of her day-to-day life. Mild night pain only. No neurologic symptoms. Her health been stable. No significant interventions to date other than anti-inflammatories. Pain Assessment  Location of Pain: Shoulder  Location Modifiers: Right  Severity of Pain: 9  Quality of Pain: Dull, Aching  Duration of Pain: Persistent  Frequency of Pain: Intermittent  Date Pain First Started: (6 MONTH)  Aggravating Factors: Bending, Stretching, Straightening(REACHIN CROSS BODY)  Limiting Behavior: Yes  Relieving Factors: Rest  Result of Injury: No  Work-Related Injury: No  Are there other pain locations you wish to document?: No    Medical History:  Patient's medications, allergies, past medical, surgical, social and family histories were reviewed and updated as appropriate. Review of Systems:  Pertinent items are noted in HPI  Review of systems reviewed from Patient History Form dated on November 6 and available in the patient's chart under the Media tab. Vitals:    11/06/20 1305   Weight: 179 lb (81.2 kg)   Height: 5' 3\" (1.6 m)           General Exam:   Constitutional: Patient is adequately groomed with no evidence of malnutrition  Mental Status: The patient is oriented to time, place and person. The patient's mood and affect are appropriate. Vascular: Examination reveals no swelling or calf tenderness. Peripheral pulses are palpable and 2+. Neurological: The patient has good coordination.   There is no weakness or sensory deficit. Shoulder Examination:    Inspection: On inspection she demonstrates a mild thoracic kyphosis, perhaps some subtle atrophy in the supraspinatus fossa    Palpation: Exquisitely tender apex the bicipital groove and anterolateral tuberosity    Range of Motion: She has a reasonable arc of movement although her scapulohumeral rhythm above 90 is poor and she has some endrange pain in abduction and extension    Strength: She is reasonable isometric strength dextrorotation with mild pain. She has a half grade diminishment in strength and elevation and abduction    Special Tests: Significant pain with Neer and Sher. Significant pain with Jobes and O'Briens. Moderate pain with long head biceps provocative testing as well. Nontender acromioclavicular joint    Skin: There are no rashes, ulcerations or lesions. Gait: No assist device    Spine good cervical rotation    Additional Comments:         Radiology:     X-rays obtained reviewed the office today include 4 views of the right shoulder. She is very mild glenohumeral arthrosis. She has some sclerotic change to the tuberosity. She has a type II acromion. Mild acromioclavicular arthritis      Assessment : My clinical impression is degenerative rotator cuff pathology      Office Procedures:  Orders Placed This Encounter   Procedures    XR SHOULDER RIGHT (MIN 2 VIEWS)   [address] [facility]     Referral Priority:   Routine     Referral Type:   Eval and Treat     Referral Reason:   Specialty Services Required     Requested Specialty:   Physical Therapy     Number of Visits Requested:   1    DE ARTHROCENTESIS ASPIR&/INJ MAJOR JT/BURSA W/O US       Treatment Plan: We had a nice discussion about the anatomy of the shoulder with function of the rotator cuff. Given her reasonable movement and she is a great candidate for first-line conservative treatment. She like to pursue this before MRI imaging at this time.     A detailed discussion of the nature of rotator cuff pathology was had with the patient. We outlined the fact that her cuff tears are very common in ageing population. We discussed some of the literature which has delineated the incidence of rotator cuff tears over time. The full spectrum of rotator cuff tears beginning with tendinopathy and extending to partial-thickness tears and full-thickness tears were notable. We also discussed available research which has shown good success rates with conservative treatment of rotator cuff pathology. Conservative supportive care with heat, ice, topicals, the judicious use of anti-inflammatories and physical therapy was outlined. The process of patient compensation through muscular adaptations and physical therapy was notable. Finally, the indications for rotator cuff repair were discussed; failure of conservative treatment, significant functional weakness and symptoms affecting the quality of daily life. Hopefully, she explain things clearly. She is comfortable with plan of care. She will be in physical therapy at the [facility] office. We appreciate the opportunity to care for this patient. The trust that is implicit in this referral does not go unnoticed. We will do our very best to provide high-quality care that goes above and beyond standards. Please feel free contact us with any questions or concerns about this patient. [address] [place] [address] Partner [facility]     This dictation was performed with a verbal recognition program (DRAGON) and it was checked for errors. It is possible that there are still dictated errors within this office note. If so, please bring any errors to my attention for an addendum. All efforts were made to ensure that this office note is accurate.

## 2020-12-21 ENCOUNTER — OFFICE VISIT (OUTPATIENT)
Dept: ORTHOPEDIC SURGERY | Age: 70
End: 2020-12-21
Payer: COMMERCIAL

## 2020-12-21 VITALS — TEMPERATURE: 97.2 F | HEIGHT: 63 IN | BODY MASS INDEX: 31.71 KG/M2 | WEIGHT: 179 LBS

## 2020-12-21 PROCEDURE — 99213 OFFICE O/P EST LOW 20 MIN: CPT | Performed by: ORTHOPAEDIC SURGERY

## 2020-12-21 PROCEDURE — 20610 DRAIN/INJ JOINT/BURSA W/O US: CPT | Performed by: ORTHOPAEDIC SURGERY

## 2020-12-21 NOTE — PROGRESS NOTES
Patient returns today for her first series of viscosupplementation left knee. She has osteoarthritis and steroid injection that worked well for her as a had in the past.    ROS: Pertinent items are noted in HPI. No notes on file    Past Medical History:  7/16/2013: Anal fissure  No date: Anxiety  No date: Colon polyps  No date: Depression  No date: Fibroid uterus  No date: Hypothyroidism  No date: Morbid obesity ([address]  No date: Osteopenia     Past Surgical History:  2008: ABDOMINAL HERNIA REPAIR  Age 27: APPENDECTOMY  10/23/2017: CATARACT REMOVAL WITH IMPLANT; Right      Comment:  with vitrectomy  02/26/2018: CATARACT REMOVAL WITH IMPLANT; Left      Comment:  with vitrectomy  10/10/2018: COLONOSCOPY; N/A      Comment:  COLONOSCOPY POLYPECTOMY SNARE/COLD BIOPSY performed by                Momo Palomares MD at [address] [place] [address]  8/26/2019: EPIDURAL STEROID INJECTION; Right      Comment:  RIGHT L4 AND L5 TRANSFORAMINAL EPIDURAL STEROID                INJECTION WITH FLUOROSCOPY performed by Cosme Severe, MD at [address] [place] [address],[address]: GASTRIC BYPASS SURGERY  10/21/2019: HC INJECT OTHER PERPHRL NERV; Right      Comment:  RIGHT PIRIFORMIS INJECTION WITH FLUOROSCOPY (50214)                performed by Cosme Severe, MD at [facility]  No date: HYSTERECTOMY, TOTAL ABDOMINAL      Comment:  ovaries out also  1/7/2019: [address] ; Right      Comment:  RIGHT L3 AND L4 LUMBAR TRANSFORAMINAL WITH FLUOROSCOPY                performed by Cosme Severe, MD at [address] [facility]  5/15/2019: [address];  Right      Comment:  RIGHT L4 AND L5 TRANSFORAMINAL EPIDURAL STEROID                INJECTION WITH FLUOROSCOPY performed by Cosme Severe, MD at [address] [facility]  10/10/2018: ID ESOPHAGOGASTRODUODENOSCOPY TRANSORAL DIAGNOSTIC; N/A      Comment:  EGD performed by Momo Palomares MD at [address] [place] [address] Road: [facility]      Comment:  Fibroids    Review of patient's family history indicates:  Problem: Diabetes      Relation: Father          Age of Onset: (Not Specified)          Comment: Type II  Problem: Diabetes      Relation: Sister          Age of Onset: (Not Specified)          Comment: Type II  Problem: Diabetes      Relation: Brother          Age of Onset: (Not Specified)          Comment: Type II  Problem: Breast Cancer      Relation: Sister          Age of Onset: 58  Problem: Lung Cancer      Relation: Mother          Age of Onset: (Not Specified)          Comment: Smoker  Problem: Heart Disease      Relation: Sister          Age of Onset: (Not Specified)          Comment: Bicuspid aortic valve x2      Social History    Socioeconomic History      Marital status:        Spouse name: None      Number of children: None      Years of education: None      Highest education level: None    Occupational History      Occupation: Computer work    Social Needs      Financial resource strain: None      Food insecurity        Worry: None        Inability: None      Transportation needs        Medical: None        Non-medical: None    Tobacco Use      Smoking status: Never Smoker      Smokeless tobacco: Never Used    Substance and Sexual Activity      Alcohol use: No      Drug use: No      Sexual activity: Never    Lifestyle      Physical activity        Days per week: None        Minutes per session: None      Stress: None    Relationships      Social connections        Talks on phone: None        Gets together: None        Attends Adventism service: None        Active member of club or organization: None        Attends meetings of clubs or organizations: None        Relationship status: None      Intimate partner violence        Fear of current or ex partner: None        Emotionally abused: None        Physically abused: None        Forced sexual activity: None    Other Topics      Concerns:        None    Social History Narrative      None      Current Outpatient Medications:   levothyroxine (SYNTHROID) 150 MCG tablet, TAKE 1 TABLET BY MOUTH EVERY DAY, Disp: 90 tablet, Rfl: 1    traZODone (DESYREL) 50 MG tablet, TAKE 3 TABLETS BY MOUTH EVERY NIGHT AT BEDTIME, Disp: 270 tablet, Rfl: 1    omeprazole (PRILOSEC) 40 MG delayed release capsule, TAKE 1 CAPSULE BY MOUTH EVERY MORNING BEFORE BREAKFAST, Disp: 90 capsule, Rfl: 1    gabapentin (NEURONTIN) 300 MG capsule, TAKE ONE CAPSULE BY MOUTH TWICE DAILY, Disp: 60 capsule, Rfl: 5    buPROPion (WELLBUTRIN XL) 300 MG extended release tablet, TAKE 1 TABLET BY MOUTH EVERY MORNING, Disp: 90 tablet, Rfl: 1    melatonin 5 MG TABS tablet, Take 5 mg by mouth daily, Disp: , Rfl:     ferrous sulfate (FE TABS) 325 (65 Fe) MG EC tablet, Take 1 tablet by mouth daily (with breakfast), Disp: 90 tablet, Rfl: 3    Krill Oil 300 MG CAPS, Take 1 capsule by mouth daily, Disp: , Rfl:     Cyanocobalamin (VITAMIN B 12 PO), Take 1,000 mcg by mouth daily. , Disp: , Rfl:     Cholecalciferol (VITAMIN D) 1000 UNIT CAPS, Take 3 capsules by mouth daily. , Disp: , Rfl:     ALPRAZolam (XANAX) 1 MG tablet, TAKE 1 TABLET BY MOUTH TWICE DAILY AS NEEDED FOR ANXIETY, Disp: 35 tablet, Rfl: 2    Current Facility-Administered Medications:    sodium hyaluronate (SUPARTZ) injection 25 mg, 25 mg, Intra-articular, Once, Billie Bruno MD         -- Codeine -- Nausea Only    VITAL SIGNS:  Temp 97.2 °F (36.2 °C)   Ht 5' 3\" (1.6 m)   Wt 179 lb (81.2 kg)   BMI 31.71 kg/m²   Examination left knee today shows no warmth, erythema, or effusion. Her range of motion is still pretty good she lacks 3 or 4 degrees of full extension but still has 110 degrees of flexion. She is tender mainly in the medial joint line. Hans's testing does not produce a clunk or shift. She has intact ACL with a normal Lachman's and pivot shift. Impression left knee osteoarthritis. Plan: After sterile prep injected left knee with 2 and half mL of Supartz for osteoarthritis.   The patient tolerated this procedure well.

## 2020-12-28 ENCOUNTER — OFFICE VISIT (OUTPATIENT)
Dept: ORTHOPEDIC SURGERY | Age: 70
End: 2020-12-28
Payer: COMMERCIAL

## 2020-12-28 VITALS — WEIGHT: 179 LBS | HEIGHT: 63 IN | BODY MASS INDEX: 31.71 KG/M2 | TEMPERATURE: 97 F

## 2020-12-28 PROCEDURE — 20610 DRAIN/INJ JOINT/BURSA W/O US: CPT | Performed by: ORTHOPAEDIC SURGERY

## 2020-12-28 RX ORDER — ALPRAZOLAM 1 MG/1
TABLET ORAL
Qty: 35 TABLET | Refills: 2 | Status: SHIPPED | OUTPATIENT
Start: 2020-12-28 | End: 2021-03-29

## 2020-12-28 NOTE — PROGRESS NOTES
Patient is here for second Supartz injection. She did not really notice any relief yet. ROS: Pertinent items are noted in HPI. No notes on file    Past Medical History:  7/16/2013: Anal fissure  No date: Anxiety  No date: Colon polyps  No date: Depression  No date: Fibroid uterus  No date: Hypothyroidism  No date: Morbid obesity ([address]  No date: Osteopenia     Past Surgical History:  2008: ABDOMINAL HERNIA REPAIR  Age 27: APPENDECTOMY  10/23/2017: CATARACT REMOVAL WITH IMPLANT; Right      Comment:  with vitrectomy  02/26/2018: CATARACT REMOVAL WITH IMPLANT; Left      Comment:  with vitrectomy  10/10/2018: COLONOSCOPY; N/A      Comment:  COLONOSCOPY POLYPECTOMY SNARE/COLD BIOPSY performed by                Kingston Scheuermann, MD at [address]  8/26/2019: EPIDURAL STEROID INJECTION; Right      Comment:  RIGHT L4 AND L5 TRANSFORAMINAL EPIDURAL STEROID                INJECTION WITH FLUOROSCOPY performed by Vinh Elena MD at [facility] Floor: GASTRIC BYPASS SURGERY  10/21/2019: HC INJECT OTHER PERPHRL NERV; Right      Comment:  RIGHT PIRIFORMIS INJECTION WITH FLUOROSCOPY ([id number])                performed by Vinh Elena MD at [facility]\""  No date: HYSTERECTOMY, TOTAL ABDOMINAL      Comment:  ovaries out also  1/7/2019: [address]; Right      Comment:  RIGHT L3 AND L4 LUMBAR TRANSFORAMINAL WITH FLUOROSCOPY                performed by Vinh Elena MD at [address]  5/15/2019: [address];  Right      Comment:  RIGHT L4 AND L5 TRANSFORAMINAL EPIDURAL STEROID                INJECTION WITH FLUOROSCOPY performed by Vinh Elena MD at [address] \[facility]\""  10/10/2018: [place] ESOPHAGOGASTRODUODENOSCOPY TRANSORAL DIAGNOSTIC; N/A      Comment:  EGD performed by Kingston Scheuermann, MD at [address] [facility] Road: [facility]      Comment:  Fibroids    Review of patient's family history indicates:  Problem: Diabetes      Relation: Father          Age of Onset: (Not Specified)          Comment: Type II  Problem: Diabetes      Relation: Sister          Age of Onset: (Not Specified)          Comment: Type II  Problem: Diabetes      Relation: Brother          Age of Onset: (Not Specified)          Comment: Type II  Problem: Breast Cancer      Relation: Sister          Age of Onset: 58  Problem: Lung Cancer      Relation: Mother          Age of Onset: (Not Specified)          Comment: Smoker  Problem: Heart Disease      Relation: Sister          Age of Onset: (Not Specified)          Comment: Bicuspid aortic valve x2      Social History    Socioeconomic History      Marital status:        Spouse name: None      Number of children: None      Years of education: None      Highest education level: None    Occupational History      Occupation: Computer work    Social Needs      Financial resource strain: None      Food insecurity        Worry: None        Inability: None      Transportation needs        Medical: None        Non-medical: None    Tobacco Use      Smoking status: Never Smoker      Smokeless tobacco: Never Used    Substance and Sexual Activity      Alcohol use: No      Drug use: No      Sexual activity: Never    Lifestyle      Physical activity        Days per week: None        Minutes per session: None      Stress: None    Relationships      Social connections        Talks on phone: None        Gets together: None        Attends Anabaptist service: None        Active member of club or organization: None        Attends meetings of clubs or organizations: None        Relationship status: None      Intimate partner violence        Fear of current or ex partner: None        Emotionally abused: None        Physically abused: None        Forced sexual activity: None    Other Topics      Concerns:        None    Social History Narrative      None      Current Outpatient Medications:    levothyroxine (SYNTHROID) 150 MCG tablet, TAKE 1 TABLET BY MOUTH EVERY DAY, Disp: 90 tablet, Rfl: 1    traZODone (DESYREL) 50 MG tablet, TAKE 3 TABLETS BY MOUTH EVERY NIGHT AT BEDTIME, Disp: 270 tablet, Rfl: 1    omeprazole (PRILOSEC) 40 MG delayed release capsule, TAKE 1 CAPSULE BY MOUTH EVERY MORNING BEFORE BREAKFAST, Disp: 90 capsule, Rfl: 1    gabapentin (NEURONTIN) 300 MG capsule, TAKE ONE CAPSULE BY MOUTH TWICE DAILY, Disp: 60 capsule, Rfl: 5    buPROPion (WELLBUTRIN XL) 300 MG extended release tablet, TAKE 1 TABLET BY MOUTH EVERY MORNING, Disp: 90 tablet, Rfl: 1    melatonin 5 MG TABS tablet, Take 5 mg by mouth daily, Disp: , Rfl:     ferrous sulfate (FE TABS) 325 (65 Fe) MG EC tablet, Take 1 tablet by mouth daily (with breakfast), Disp: 90 tablet, Rfl: 3    Krill Oil 300 MG CAPS, Take 1 capsule by mouth daily, Disp: , Rfl:     Cyanocobalamin (VITAMIN B 12 PO), Take 1,000 mcg by mouth daily. , Disp: , Rfl:     Cholecalciferol (VITAMIN D) 1000 UNIT CAPS, Take 3 capsules by mouth daily. , Disp: , Rfl:     ALPRAZolam (XANAX) 1 MG tablet, TAKE 1 TABLET BY MOUTH TWICE DAILY AS NEEDED FOR ANXIETY, Disp: 35 tablet, Rfl: 2    No current facility-administered medications for this visit. -- Codeine -- Nausea Only    VITAL SIGNS:  Temp 97 °F (36.1 °C)   Ht 5' 3\" (1.6 m)   Wt 179 lb (81.2 kg)   BMI 31.71 kg/m²   Examination of the left knee today shows no warmth, erythema, or effusion. After sterile prep we injected the left knee with 2 and half mL of Supartz for osteoarthritis. The patient tolerated this procedure well.

## 2020-12-28 NOTE — TELEPHONE ENCOUNTER
Last appointment: 10/20/2020  Next appointment: 1/19/2021  Last refill: 09/17/2020 # 35 with two refills

## 2020-12-29 RX ORDER — GABAPENTIN 300 MG/1
CAPSULE ORAL
Qty: 60 CAPSULE | Refills: 5 | Status: SHIPPED | OUTPATIENT
Start: 2020-12-29 | End: 2021-06-24

## 2021-01-04 ENCOUNTER — HOSPITAL ENCOUNTER (OUTPATIENT)
Age: 71
Discharge: HOME | End: 2021-01-04
Payer: COMMERCIAL

## 2021-01-04 ENCOUNTER — OFFICE VISIT (OUTPATIENT)
Dept: ORTHOPEDIC SURGERY | Age: 71
End: 2021-01-04
Payer: COMMERCIAL

## 2021-01-04 VITALS — WEIGHT: 179 LBS | HEIGHT: 63 IN | BODY MASS INDEX: 31.71 KG/M2 | TEMPERATURE: 97 F

## 2021-01-04 DIAGNOSIS — E03.9 ACQUIRED HYPOTHYROIDISM: Chronic | ICD-10-CM

## 2021-01-04 DIAGNOSIS — M17.12 PRIMARY OSTEOARTHRITIS OF LEFT KNEE: Primary | ICD-10-CM

## 2021-01-04 DIAGNOSIS — K91.2 POST-RESECTION MALABSORPTION: Chronic | ICD-10-CM

## 2021-01-04 DIAGNOSIS — E78.1 HYPERTRIGLYCERIDEMIA: ICD-10-CM

## 2021-01-04 LAB
A/G RATIO: 1.4 (ref 1.1–2.2)
ALBUMIN SERPL-MCNC: 3.8 G/DL (ref 3.4–5)
ALP BLD-CCNC: 76 U/L (ref 40–129)
ALT SERPL-CCNC: 10 U/L (ref 10–40)
ANION GAP SERPL CALCULATED.3IONS-SCNC: 13 MMOL/L (ref 3–16)
AST SERPL-CCNC: 19 U/L (ref 15–37)
BASOPHILS ABSOLUTE: 0 K/UL (ref 0–0.2)
BASOPHILS RELATIVE PERCENT: 0.7 %
BILIRUB SERPL-MCNC: 0.3 MG/DL (ref 0–1)
BUN BLDV-MCNC: 21 MG/DL (ref 7–20)
CALCIUM SERPL-MCNC: 8.8 MG/DL (ref 8.3–10.6)
CHLORIDE BLD-SCNC: 104 MMOL/L (ref 99–110)
CHOLESTEROL, FASTING: 145 MG/DL (ref 0–199)
CO2: 24 MMOL/L (ref 21–32)
CREAT SERPL-MCNC: 0.8 MG/DL (ref 0.6–1.2)
EOSINOPHILS ABSOLUTE: 0.1 K/UL (ref 0–0.6)
EOSINOPHILS RELATIVE PERCENT: 2 %
FERRITIN: 36.7 NG/ML (ref 15–150)
GFR AFRICAN AMERICAN: >60
GFR NON-AFRICAN AMERICAN: >60
GLOBULIN: 2.7 G/DL
GLUCOSE FASTING: 92 MG/DL (ref 70–99)
HCT VFR BLD CALC: 36.9 % (ref 36–48)
HDLC SERPL-MCNC: 62 MG/DL (ref 40–60)
HEMOGLOBIN: 12.1 G/DL (ref 12–16)
IRON SATURATION: 24 % (ref 15–50)
IRON: 65 UG/DL (ref 37–145)
LDL CHOLESTEROL CALCULATED: 62 MG/DL
LYMPHOCYTES ABSOLUTE: 1.1 K/UL (ref 1–5.1)
LYMPHOCYTES RELATIVE PERCENT: 16 %
MCH RBC QN AUTO: 32.3 PG (ref 26–34)
MCHC RBC AUTO-ENTMCNC: 32.9 G/DL (ref 31–36)
MCV RBC AUTO: 98 FL (ref 80–100)
MONOCYTES ABSOLUTE: 0.4 K/UL (ref 0–1.3)
MONOCYTES RELATIVE PERCENT: 6.1 %
NEUTROPHILS ABSOLUTE: 5.1 K/UL (ref 1.7–7.7)
NEUTROPHILS RELATIVE PERCENT: 75.2 %
PDW BLD-RTO: 14 % (ref 12.4–15.4)
PLATELET # BLD: 252 K/UL (ref 135–450)
PMV BLD AUTO: 9.2 FL (ref 5–10.5)
POTASSIUM SERPL-SCNC: 4.5 MMOL/L (ref 3.5–5.1)
RBC # BLD: 3.76 M/UL (ref 4–5.2)
SODIUM BLD-SCNC: 141 MMOL/L (ref 136–145)
TOTAL IRON BINDING CAPACITY: 276 UG/DL (ref 260–445)
TOTAL PROTEIN: 6.5 G/DL (ref 6.4–8.2)
TRIGLYCERIDE, FASTING: 104 MG/DL (ref 0–150)
TSH SERPL DL<=0.05 MIU/L-ACNC: 0.46 UIU/ML (ref 0.27–4.2)
VITAMIN B-12: >2000 PG/ML (ref 211–911)
VITAMIN D 25-HYDROXY: 31.7 NG/ML
VLDLC SERPL CALC-MCNC: 21 MG/DL
WBC # BLD: 6.8 K/UL (ref 4–11)

## 2021-01-04 PROCEDURE — 36415 COLL VENOUS BLD VENIPUNCTURE: CPT

## 2021-01-04 PROCEDURE — 20610 DRAIN/INJ JOINT/BURSA W/O US: CPT | Performed by: ORTHOPAEDIC SURGERY

## 2021-01-04 PROCEDURE — 84443 ASSAY THYROID STIM HORMONE: CPT

## 2021-01-04 PROCEDURE — 83540 ASSAY OF IRON: CPT

## 2021-01-04 PROCEDURE — 82607 VITAMIN B-12: CPT

## 2021-01-04 PROCEDURE — 80053 COMPREHEN METABOLIC PANEL: CPT

## 2021-01-04 PROCEDURE — 82306 VITAMIN D 25 HYDROXY: CPT

## 2021-01-04 PROCEDURE — 83550 IRON BINDING TEST: CPT

## 2021-01-04 PROCEDURE — 80061 LIPID PANEL: CPT

## 2021-01-04 PROCEDURE — 82728 ASSAY OF FERRITIN: CPT

## 2021-01-04 PROCEDURE — 85025 COMPLETE CBC W/AUTO DIFF WBC: CPT

## 2021-01-04 NOTE — PROGRESS NOTES
Turns today for third Supartz injection left knee for osteoarthritis. She says it still hurts when she sits for 15 minutes and gets up but after a few strides the knee does feel better. ROS: Pertinent items are noted in HPI. No notes on file    Past Medical History:  7/16/2013: Anal fissure  No date: Anxiety  No date: Colon polyps  No date: Depression  No date: Fibroid uterus  No date: Hypothyroidism  No date: Morbid obesity ([address]  No date: Osteopenia     Past Surgical History:  2008: ABDOMINAL HERNIA REPAIR  Age 27: APPENDECTOMY  10/23/2017: CATARACT REMOVAL WITH IMPLANT; Right      Comment:  with vitrectomy  02/26/2018: CATARACT REMOVAL WITH IMPLANT; Left      Comment:  with vitrectomy  10/10/2018: COLONOSCOPY; N/A      Comment:  COLONOSCOPY POLYPECTOMY SNARE/COLD BIOPSY performed by                Mima Crain MD at [address] [place] [address]  8/26/2019: EPIDURAL STEROID INJECTION; Right      Comment:  RIGHT L4 AND L5 TRANSFORAMINAL EPIDURAL STEROID                INJECTION WITH FLUOROSCOPY performed by Micheline Brooks MD at [address] [place] [address],[address] Floor: GASTRIC BYPASS SURGERY  10/21/2019: HC INJECT OTHER PERPHRL NERV; Right      Comment:  RIGHT PIRIFORMIS INJECTION WITH FLUOROSCOPY (80527)                performed by Micheline Brooks MD at [facility]  No date: HYSTERECTOMY, TOTAL ABDOMINAL      Comment:  ovaries out also  1/7/2019: [facility]; Right      Comment:  RIGHT L3 AND L4 LUMBAR TRANSFORAMINAL WITH FLUOROSCOPY                performed by Micheline Brooks MD at [address]  5/15/2019: [address] ;  Right      Comment:  RIGHT L4 AND L5 TRANSFORAMINAL EPIDURAL STEROID                INJECTION WITH FLUOROSCOPY performed by Micheline Brooks MD at [address] [facility]  10/10/2018: MD ESOPHAGOGASTRODUODENOSCOPY TRANSORAL DIAGNOSTIC; N/A      Comment:  EGD performed by Mima Crain MD at [address] [facility] [address]: [facility]      Comment:  Fibroids    Review of patient's family history indicates:  Problem: Diabetes      Relation: Father          Age of Onset: (Not Specified)          Comment: Type II  Problem: Diabetes      Relation: Sister          Age of Onset: (Not Specified)          Comment: Type II  Problem: Diabetes      Relation: Brother          Age of Onset: (Not Specified)          Comment: Type II  Problem: Breast Cancer      Relation: Sister          Age of Onset: 58  Problem: Lung Cancer      Relation: Mother          Age of Onset: (Not Specified)          Comment: Smoker  Problem: Heart Disease      Relation: Sister          Age of Onset: (Not Specified)          Comment: Bicuspid aortic valve x2      Social History    Socioeconomic History      Marital status:        Spouse name: None      Number of children: None      Years of education: None      Highest education level: None    Occupational History      Occupation: Computer work    Social Needs      Financial resource strain: None      Food insecurity        Worry: None        Inability: None      Transportation needs        Medical: None        Non-medical: None    Tobacco Use      Smoking status: Never Smoker      Smokeless tobacco: Never Used    Substance and Sexual Activity      Alcohol use: No      Drug use: No      Sexual activity: Never    Lifestyle      Physical activity        Days per week: None        Minutes per session: None      Stress: None    Relationships      Social connections        Talks on phone: None        Gets together: None        Attends Anglican service: None        Active member of club or organization: None        Attends meetings of clubs or organizations: None        Relationship status: None      Intimate partner violence        Fear of current or ex partner: None        Emotionally abused: None        Physically abused: None        Forced sexual activity: None    Other Topics      Concerns:        None    Social History Narrative      None      Current Outpatient Medications:   gabapentin (NEURONTIN) 300 MG capsule, TAKE ONE CAPSULE BY MOUTH TWICE DAILY, Disp: 60 capsule, Rfl: 5    ALPRAZolam (XANAX) 1 MG tablet, Take one tablet by mouth twice daily as needed for anxiety, Disp: 35 tablet, Rfl: 2    levothyroxine (SYNTHROID) 150 MCG tablet, TAKE 1 TABLET BY MOUTH EVERY DAY, Disp: 90 tablet, Rfl: 1    traZODone (DESYREL) 50 MG tablet, TAKE 3 TABLETS BY MOUTH EVERY NIGHT AT BEDTIME, Disp: 270 tablet, Rfl: 1    omeprazole (PRILOSEC) 40 MG delayed release capsule, TAKE 1 CAPSULE BY MOUTH EVERY MORNING BEFORE BREAKFAST, Disp: 90 capsule, Rfl: 1    buPROPion (WELLBUTRIN XL) 300 MG extended release tablet, TAKE 1 TABLET BY MOUTH EVERY MORNING, Disp: 90 tablet, Rfl: 1    melatonin 5 MG TABS tablet, Take 5 mg by mouth daily, Disp: , Rfl:     Cyanocobalamin (VITAMIN B 12 PO), Take 1,000 mcg by mouth daily. , Disp: , Rfl:     Cholecalciferol (VITAMIN D) 1000 UNIT CAPS, Take 3 capsules by mouth daily. , Disp: , Rfl:     ferrous sulfate (FE TABS) 325 (65 Fe) MG EC tablet, Take 1 tablet by mouth daily (with breakfast), Disp: 90 tablet, Rfl: 3    Krill Oil 300 MG CAPS, Take 1 capsule by mouth daily, Disp: , Rfl:     No current facility-administered medications for this visit. -- Codeine -- Nausea Only    VITAL SIGNS:  Temp 97 °F (36.1 °C)   Ht 5' 3\" (1.6 m)   Wt 179 lb (81.2 kg)   BMI 31.71 kg/m²   On examination today there is no warmth, erythema, or effusion. After sterile prep injected the left knee with 2 and half mL of Supartz for osteoarthritis. The patient tolerated this procedure well.

## 2021-01-11 ENCOUNTER — OFFICE VISIT (OUTPATIENT)
Dept: ORTHOPEDIC SURGERY | Age: 71
End: 2021-01-11
Payer: COMMERCIAL

## 2021-01-11 VITALS — BODY MASS INDEX: 31.71 KG/M2 | TEMPERATURE: 97.3 F | HEIGHT: 63 IN | WEIGHT: 179 LBS

## 2021-01-11 DIAGNOSIS — M17.12 PRIMARY OSTEOARTHRITIS OF LEFT KNEE: Primary | ICD-10-CM

## 2021-01-11 PROCEDURE — 20610 DRAIN/INJ JOINT/BURSA W/O US: CPT | Performed by: ORTHOPAEDIC SURGERY

## 2021-01-11 NOTE — PROGRESS NOTES
Patient is here for fourth Supartz injection left knee for osteoarthritis. She is feeling better. ROS: Pertinent items are noted in HPI. No notes on file    Past Medical History:  7/16/2013: Anal fissure  No date: Anxiety  No date: Colon polyps  No date: Depression  No date: Fibroid uterus  No date: Hypothyroidism  No date: Morbid obesity ([address]  No date: Osteopenia     Past Surgical History:  2008: ABDOMINAL HERNIA REPAIR  Age 27: APPENDECTOMY  10/23/2017: CATARACT REMOVAL WITH IMPLANT; Right      Comment:  with vitrectomy  02/26/2018: CATARACT REMOVAL WITH IMPLANT; Left      Comment:  with vitrectomy  10/10/2018: COLONOSCOPY; N/A      Comment:  COLONOSCOPY POLYPECTOMY SNARE/COLD BIOPSY performed by                Fredy Norwood MD at [address] [place] [address]  8/26/2019: EPIDURAL STEROID INJECTION; Right      Comment:  RIGHT L4 AND L5 TRANSFORAMINAL EPIDURAL STEROID                INJECTION WITH FLUOROSCOPY performed by Robert Eid MD at [address] [place] [address],[facility] Floor: GASTRIC BYPASS SURGERY  10/21/2019: HC INJECT OTHER PERPHRL NERV; Right      Comment:  RIGHT PIRIFORMIS INJECTION WITH FLUOROSCOPY (79703)                performed by Robert Eid MD at [address] [facility]  No date: HYSTERECTOMY, TOTAL ABDOMINAL      Comment:  ovaries out also  1/7/2019: [facility] [facility]; Right      Comment:  RIGHT L3 AND L4 LUMBAR TRANSFORAMINAL WITH FLUOROSCOPY                performed by Robert Eid MD at [address]  5/15/2019: [facility];  Right      Comment:  RIGHT L4 AND L5 TRANSFORAMINAL EPIDURAL STEROID                INJECTION WITH FLUOROSCOPY performed by Robert Eid MD at [address]  10/10/2018: [place] ESOPHAGOGASTRODUODENOSCOPY TRANSORAL DIAGNOSTIC; N/A      Comment:  EGD performed by Fredy Norwood MD at [address] [place] [address] Road: [facility]      Comment:  Fibroids    Review of patient's family history indicates:  Problem: Diabetes      Relation: Father          Age of Onset: (Not Specified)          Comment: Type II  Problem: Diabetes      Relation: Sister          Age of Onset: (Not Specified)          Comment: Type II  Problem: Diabetes      Relation: Brother          Age of Onset: (Not Specified)          Comment: Type II  Problem: Breast Cancer      Relation: Sister          Age of Onset: 58  Problem: Lung Cancer      Relation: Mother          Age of Onset: (Not Specified)          Comment: Smoker  Problem: Heart Disease      Relation: Sister          Age of Onset: (Not Specified)          Comment: Bicuspid aortic valve x2      Social History    Socioeconomic History      Marital status:        Spouse name: None      Number of children: None      Years of education: None      Highest education level: None    Occupational History      Occupation: Computer work    Social Needs      Financial resource strain: None      Food insecurity        Worry: None        Inability: None      Transportation needs        Medical: None        Non-medical: None    Tobacco Use      Smoking status: Never Smoker      Smokeless tobacco: Never Used    Substance and Sexual Activity      Alcohol use: No      Drug use: No      Sexual activity: Never    Lifestyle      Physical activity        Days per week: None        Minutes per session: None      Stress: None    Relationships      Social connections        Talks on phone: None        Gets together: None        Attends Latter-day service: None        Active member of club or organization: None        Attends meetings of clubs or organizations: None        Relationship status: None      Intimate partner violence        Fear of current or ex partner: None        Emotionally abused: None        Physically abused: None        Forced sexual activity: None    Other Topics      Concerns:        None    Social History Narrative      None      Current Outpatient Medications:    gabapentin (NEURONTIN) 300 MG capsule, TAKE ONE CAPSULE BY MOUTH TWICE DAILY, Disp: 60 capsule, Rfl: 5    ALPRAZolam (XANAX) 1 MG tablet, Take one tablet by mouth twice daily as needed for anxiety, Disp: 35 tablet, Rfl: 2    levothyroxine (SYNTHROID) 150 MCG tablet, TAKE 1 TABLET BY MOUTH EVERY DAY, Disp: 90 tablet, Rfl: 1    traZODone (DESYREL) 50 MG tablet, TAKE 3 TABLETS BY MOUTH EVERY NIGHT AT BEDTIME, Disp: 270 tablet, Rfl: 1    omeprazole (PRILOSEC) 40 MG delayed release capsule, TAKE 1 CAPSULE BY MOUTH EVERY MORNING BEFORE BREAKFAST, Disp: 90 capsule, Rfl: 1    buPROPion (WELLBUTRIN XL) 300 MG extended release tablet, TAKE 1 TABLET BY MOUTH EVERY MORNING, Disp: 90 tablet, Rfl: 1    melatonin 5 MG TABS tablet, Take 5 mg by mouth daily, Disp: , Rfl:     Cyanocobalamin (VITAMIN B 12 PO), Take 1,000 mcg by mouth daily. , Disp: , Rfl:     Cholecalciferol (VITAMIN D) 1000 UNIT CAPS, Take 3 capsules by mouth daily. , Disp: , Rfl:     ferrous sulfate (FE TABS) 325 (65 Fe) MG EC tablet, Take 1 tablet by mouth daily (with breakfast), Disp: 90 tablet, Rfl: 3    Krill Oil 300 MG CAPS, Take 1 capsule by mouth daily, Disp: , Rfl:     No current facility-administered medications for this visit. -- Codeine -- Nausea Only    VITAL SIGNS:  Temp 97.3 °F (36.3 °C)   Ht 5' 3\" (1.6 m)   Wt 179 lb (81.2 kg)   BMI 31.71 kg/m²   Examination left knee today shows no warmth, erythema, or effusion. After sterile prep injected left knee with 2 half mL of Supartz for osteoarthritis. The patient tolerated this procedure well.

## 2021-01-17 NOTE — PROGRESS NOTES
Date of Visit:  2021    CC: Toan Sanchez (: 1950) is a 78 y.o. female, established patient, here for evaluation/re-evaluation of the following medical concerns:    ASSESSMENT/PLAN:  1. Chronic bilateral low back pain with bilateral sciatica  Assessment & Plan:  Stable on current dose of gabapentin- continue. If symptoms worsen, will refer to [facility]. 2. Neck pain, chronic  Assessment & Plan:  Worse, but unclear whether this is due to cervical spine issue or rotator cuff tear. She will follow up with her orthopedist for further evaluation. 3. Primary osteoarthritis of left knee  Assessment & Plan:  Much improved with recent Supartz injections, which can be repeated in 6 months. However, if she does not obtain relief that lasts for several months, she will follow-up with ortho to discuss TKR. 4. Psychophysiological insomnia  Assessment & Plan:  Doing well on current doses of trazodone and melatonin- continue. 5. Restless leg syndrome  Assessment & Plan:  Well-controlled on current dose of gabapentin-continue. 6. Anxiety  Assessment & Plan:  See plan for depression. She will continue lowest effective dose of Xanax and attempt to wean after pandemic. 7. Major depressive disorder with single episode, in partial remission [facility])  Assessment & Plan:  Stable on current dose of Wellbutrin XL, but may be causing/contributing to hand tremor. Once pandemic is under control, will try cutting back to 150 mg qd. 8. Class 1 obesity due to excess calories with serious comorbidity and body mass index (BMI) of 31.0 to 31.9 in adult  Assessment & Plan:  Patient was asked about her current diet and exercise habits, and personalized advice was provided regarding recommended lifestyle changes. Patient's comorbid health conditions associated with elevated BMI were discussed, as well as the likely benefits of weight loss.   Based upon patient's motivation to change her behavior, the following plan was agreed upon to work toward a weight loss goal of 30-40 pounds: well-balanced diet, following Weight Watcher's principles, she will increase her activity level as much as tolerated. Educational materials for  weight loss were provided. Patient will follow-up in 3 month(s) with PCP. Provider spent 15 minutes counseling patient. 9. Body mass index 31.0-31.9, adult  -     Obesity Counseling, 15 Minutes     Return in about 3 months (around 4/19/2021) for 30 MIN F/U. Patient-Reported Vitals 1/19/2021   Patient-Reported Weight 182 lb   Patient-Reported Height -   Patient-Reported Systolic 125   Patient-Reported Diastolic 52   Patient-Reported Pulse 65   Patient-Reported Temperature -        Wt Readings from Last 3 Encounters:   01/18/21 179 lb (81.2 kg)   01/11/21 179 lb (81.2 kg)   01/04/21 179 lb (81.2 kg)     BP Readings from Last 3 Encounters:   06/05/20 90/64   03/10/20 118/60   12/10/19 134/60     Estimated body mass index is 31.71 kg/m² as calculated from the following:    Height as of 1/18/21: 5' 3\" (1.6 m). Weight as of 1/18/21: 179 lb (81.2 kg). HPI  Chronic Joint Pain:  Patient presents for follow-up of pain in back- stable, neck and shoulder worse. Left knee improved with 5 Supartz injections, last yesterday.  On average, pain is perceived as mild-moderate.   Current treatment: gabapentin 600 mg qhs.  Medication side effects: none. Recent diagnostic testing: MRI cervical and lumbar spine 12/18 per Dr. iLane Leblanc- Multiple injections  provided only temporary relief.   Did not pursue PT for her back since symptoms improved.     Obesity:  Eating a healthy, well-balanced diet- has lost 70 pounds total, but gained back a few pounds during the holidays.  Walking 3-4x/week.     Insomnia:  Current treatment: prescription sleep aid-  trazodone 150 mg qhs, melatonin 5 mg qhs, which has been effective.  No adverse effects.  Restless legs symptoms well-controlled.     Mood Disorder:  Patient presents for follow-up of depression and anxiety disorder. Current complaints include:  anhedonia, depressed mood, tearfulness, excessive worry, difficulty concentrating, feelings of worthlessness/guilt, impaired memory- continues to improve.  No panic attacks, feelings of hopelessness, irritability or suicidal ideation.  Symptoms/signs of jojo: none. Current treatment includes: Wellbutrin- 300 mg qam, takes Xanax about once in the evening for anxiety and occasionally during the day.  Medication side effects: tremor of hands. ROS  As documented above    Physical Exam  Constitutional:       General: She is not in acute distress. Appearance: She is well-developed. HENT:      Head: Normocephalic and atraumatic. Mouth/Throat:      Lips: No lesions. Neck:      Comments: No visible mass  Pulmonary:      Effort: Pulmonary effort is normal. No respiratory distress. Skin:     Comments: No rash, erythema or other discoloration on facial skin and exposed areas of neck and upper extremites    Neurological:      Mental Status: She is alert. Comments: No facial asymmetry (cranial nerve 7 motor function) or gaze palsy   Psychiatric:         Attention and Perception: Attention normal.         Mood and Affect: Mood normal.         Speech: Speech normal.         Behavior: Behavior normal.         Thought Content: Thought content normal.         Cognition and Memory: Cognition normal.           Mahsa Carver is a 78 y.o. female being evaluated by a Virtual Visit (video visit) encounter to address concerns as mentioned above. A caregiver was present when appropriate. Due to this being a TeleHealth encounter (During AYYFG-49 public health emergency), evaluation of the following organ systems was limited: Vitals/Constitutional/EENT/Resp/CV/GI//MS/Neuro/Skin/Heme-Lymph-Imm.   Pursuant to the emergency declaration under the [address] and Response Supplemental Appropriations Act, this Virtual Visit was conducted with patient's (and/or legal guardian's) consent, to reduce the patient's risk of exposure to COVID-19 and provide necessary medical care. The patient (and/or legal guardian) has also been advised to contact this office for worsening conditions or problems, and seek emergency medical treatment and/or call 911 if deemed necessary. Patient identification was verified at the start of the visit: Yes    Services were provided through a video synchronous discussion virtually to substitute for in-person clinic visit. Patient and provider were located at their individual homes. --Manuela Pastrana MD on 1/19/2021 at 3:28 PM    An electronic signature was used to authenticate this note.

## 2021-01-18 ENCOUNTER — OFFICE VISIT (OUTPATIENT)
Dept: ORTHOPEDIC SURGERY | Age: 71
End: 2021-01-18
Payer: COMMERCIAL

## 2021-01-18 VITALS — HEIGHT: 63 IN | BODY MASS INDEX: 31.71 KG/M2 | TEMPERATURE: 96.8 F | WEIGHT: 179 LBS

## 2021-01-18 DIAGNOSIS — M17.12 PRIMARY OSTEOARTHRITIS OF LEFT KNEE: Primary | ICD-10-CM

## 2021-01-18 PROCEDURE — 20610 DRAIN/INJ JOINT/BURSA W/O US: CPT | Performed by: ORTHOPAEDIC SURGERY

## 2021-01-18 NOTE — PROGRESS NOTES
Patient returns today for her last Supartz injection left knee for osteoarthritis. She says she is feeling near normal.    ROS: Pertinent items are noted in HPI. No notes on file    Past Medical History:  7/16/2013: Anal fissure  No date: Anxiety  No date: Colon polyps  No date: Depression  No date: Fibroid uterus  No date: Hypothyroidism  No date: Morbid obesity ([address]  No date: Osteopenia     Past Surgical History:  2008: ABDOMINAL HERNIA REPAIR  Age 27: APPENDECTOMY  10/23/2017: CATARACT REMOVAL WITH IMPLANT; Right      Comment:  with vitrectomy  02/26/2018: CATARACT REMOVAL WITH IMPLANT; Left      Comment:  with vitrectomy  10/10/2018: COLONOSCOPY; N/A      Comment:  COLONOSCOPY POLYPECTOMY SNARE/COLD BIOPSY performed by                Andrew Miller MD at [address] [place] [address]  8/26/2019: EPIDURAL STEROID INJECTION; Right      Comment:  RIGHT L4 AND L5 TRANSFORAMINAL EPIDURAL STEROID                INJECTION WITH FLUOROSCOPY performed by Nimisha Torres MD at [address] [place] [address],[facility] Floor: GASTRIC BYPASS SURGERY  10/21/2019: HC INJECT OTHER PERPHRL NERV; Right      Comment:  RIGHT PIRIFORMIS INJECTION WITH FLUOROSCOPY (40909)                performed by Nimisha Torres MD at [facility] hospitals  No date: HYSTERECTOMY, TOTAL ABDOMINAL      Comment:  ovaries out also  1/7/2019: [facility]; Right      Comment:  RIGHT L3 AND L4 LUMBAR TRANSFORAMINAL WITH FLUOROSCOPY                performed by Nimisha Torres MD at [facility] hospitals  5/15/2019: [facility];  Right      Comment:  RIGHT L4 AND L5 TRANSFORAMINAL EPIDURAL STEROID                INJECTION WITH FLUOROSCOPY performed by Nimisha Torres MD at [facility]  10/10/2018: [place] ESOPHAGOGASTRODUODENOSCOPY TRANSORAL DIAGNOSTIC; N/A      Comment:  EGD performed by Andrew Miller MD at [address] [facility] [address]: [facility] AND [facility]      Comment:  Fibroids    Review of patient's family history indicates:  Problem: Diabetes      Relation: Father Age of Onset: (Not Specified)          Comment: Type II  Problem: Diabetes      Relation: Sister          Age of Onset: (Not Specified)          Comment: Type II  Problem: Diabetes      Relation: Brother          Age of Onset: (Not Specified)          Comment: Type II  Problem: Breast Cancer      Relation: Sister          Age of Onset: 58  Problem: Lung Cancer      Relation: Mother          Age of Onset: (Not Specified)          Comment: Smoker  Problem: Heart Disease      Relation: Sister          Age of Onset: (Not Specified)          Comment: Bicuspid aortic valve x2      Social History    Socioeconomic History      Marital status:        Spouse name: None      Number of children: None      Years of education: None      Highest education level: None    Occupational History      Occupation: Computer work    Social Needs      Financial resource strain: None      Food insecurity        Worry: None        Inability: None      Transportation needs        Medical: None        Non-medical: None    Tobacco Use      Smoking status: Never Smoker      Smokeless tobacco: Never Used    Substance and Sexual Activity      Alcohol use: No      Drug use: No      Sexual activity: Never    Lifestyle      Physical activity        Days per week: None        Minutes per session: None      Stress: None    Relationships      Social connections        Talks on phone: None        Gets together: None        Attends Synagogue service: None        Active member of club or organization: None        Attends meetings of clubs or organizations: None        Relationship status: None      Intimate partner violence        Fear of current or ex partner: None        Emotionally abused: None        Physically abused: None        Forced sexual activity: None    Other Topics      Concerns:        None    Social History Narrative      None      Current Outpatient Medications:    gabapentin (NEURONTIN) 300 MG capsule, TAKE ONE CAPSULE BY MOUTH

## 2021-01-19 ENCOUNTER — VIRTUAL VISIT (OUTPATIENT)
Dept: INTERNAL MEDICINE CLINIC | Age: 71
End: 2021-01-19
Payer: COMMERCIAL

## 2021-01-19 DIAGNOSIS — G89.29 NECK PAIN, CHRONIC: ICD-10-CM

## 2021-01-19 DIAGNOSIS — F32.4 MAJOR DEPRESSIVE DISORDER WITH SINGLE EPISODE, IN PARTIAL REMISSION (HCC): ICD-10-CM

## 2021-01-19 DIAGNOSIS — M54.2 NECK PAIN, CHRONIC: ICD-10-CM

## 2021-01-19 DIAGNOSIS — E66.09 CLASS 1 OBESITY DUE TO EXCESS CALORIES WITH SERIOUS COMORBIDITY AND BODY MASS INDEX (BMI) OF 31.0 TO 31.9 IN ADULT: ICD-10-CM

## 2021-01-19 DIAGNOSIS — M54.41 CHRONIC BILATERAL LOW BACK PAIN WITH BILATERAL SCIATICA: Primary | ICD-10-CM

## 2021-01-19 DIAGNOSIS — G89.29 CHRONIC BILATERAL LOW BACK PAIN WITH BILATERAL SCIATICA: Primary | ICD-10-CM

## 2021-01-19 DIAGNOSIS — F41.9 ANXIETY: ICD-10-CM

## 2021-01-19 DIAGNOSIS — F51.04 PSYCHOPHYSIOLOGICAL INSOMNIA: ICD-10-CM

## 2021-01-19 DIAGNOSIS — G25.81 RESTLESS LEG SYNDROME: ICD-10-CM

## 2021-01-19 DIAGNOSIS — M17.12 PRIMARY OSTEOARTHRITIS OF LEFT KNEE: ICD-10-CM

## 2021-01-19 DIAGNOSIS — M54.42 CHRONIC BILATERAL LOW BACK PAIN WITH BILATERAL SCIATICA: Primary | ICD-10-CM

## 2021-01-19 PROCEDURE — G0447 BEHAVIOR COUNSEL OBESITY 15M: HCPCS | Performed by: INTERNAL MEDICINE

## 2021-01-19 PROCEDURE — 99214 OFFICE O/P EST MOD 30 MIN: CPT | Performed by: INTERNAL MEDICINE

## 2021-01-19 NOTE — ASSESSMENT & PLAN NOTE
Worse, but unclear whether this is due to cervical spine issue or rotator cuff tear. She will follow up with her orthopedist for further evaluation.

## 2021-01-19 NOTE — ASSESSMENT & PLAN NOTE
Patient was asked about her current diet and exercise habits, and personalized advice was provided regarding recommended lifestyle changes. Patient's comorbid health conditions associated with elevated BMI were discussed, as well as the likely benefits of weight loss. Based upon patient's motivation to change her behavior, the following plan was agreed upon to work toward a weight loss goal of 30-40 pounds: well-balanced diet, following Weight Watcher's principles, she will increase her activity level as much as tolerated. Educational materials for  weight loss were provided. Patient will follow-up in 3 month(s) with PCP. Provider spent 15 minutes counseling patient.

## 2021-01-19 NOTE — ASSESSMENT & PLAN NOTE
Much improved with recent Supartz injections, which can be repeated in 6 months. However, if she does not obtain relief that lasts for several months, she will follow-up with ortho to discuss TKR.

## 2021-01-19 NOTE — ASSESSMENT & PLAN NOTE
Stable on current dose of gabapentin- continue. If symptoms worsen, will refer to [facility].

## 2021-01-19 NOTE — ASSESSMENT & PLAN NOTE
See plan for depression. She will continue lowest effective dose of Xanax and attempt to wean after pandemic.

## 2021-01-19 NOTE — ASSESSMENT & PLAN NOTE
Stable on current dose of Wellbutrin XL, but may be causing/contributing to hand tremor. Once pandemic is under control, will try cutting back to 150 mg qd.

## 2021-02-15 ENCOUNTER — E-VISIT (OUTPATIENT)
Dept: INTERNAL MEDICINE CLINIC | Age: 71
End: 2021-02-15
Payer: COMMERCIAL

## 2021-02-15 DIAGNOSIS — R30.0 DYSURIA: Primary | ICD-10-CM

## 2021-02-15 PROCEDURE — 99422 OL DIG E/M SVC 11-20 MIN: CPT | Performed by: INTERNAL MEDICINE

## 2021-02-16 RX ORDER — NITROFURANTOIN 25; 75 MG/1; MG/1
100 CAPSULE ORAL 2 TIMES DAILY
Qty: 14 CAPSULE | Refills: 0 | Status: SHIPPED | OUTPATIENT
Start: 2021-02-16 | End: 2021-02-18 | Stop reason: SINTOL

## 2021-02-16 NOTE — PROGRESS NOTES
HPI: as per patient provided history  Exam: N/A (electronic visit)  ASSESSMENT/PLAN:  1. Dysuria  Unclear whether due to bacterial cystitis or vaginal yeast infection. Due to inclement weather, UA not possible today, so will treat empirically with Macrobid. If no improvement within 2-3 days, will need to submit UA C & S. Patient will call if symptoms change or worsen. - nitrofurantoin, macrocrystal-monohydrate, (MACROBID) 100 MG capsule; Take 1 capsule by mouth 2 times daily for 7 days  Dispense: 14 capsule; Refill: 0    Patient instructed to call the office if worsens, or fails to improve as anticipated. 11-20 minutes were spent on the digital evaluation and management of this patient.

## 2021-02-18 ENCOUNTER — TELEPHONE (OUTPATIENT)
Dept: INTERNAL MEDICINE CLINIC | Age: 71
End: 2021-02-18

## 2021-02-18 RX ORDER — SULFAMETHOXAZOLE AND TRIMETHOPRIM 800; 160 MG/1; MG/1
1 TABLET ORAL 2 TIMES DAILY
Qty: 14 TABLET | Refills: 0 | Status: SHIPPED | OUTPATIENT
Start: 2021-02-18 | End: 2021-02-25

## 2021-02-18 NOTE — TELEPHONE ENCOUNTER
Patient was prescribed Macrobid. Took 2 doses and then started having muscle ache, fever of 101, headache & diarrhea. She didn't take her dose last night & woke up feeling fine this morning.  Would like a different antibiotic sent in for her UTI

## 2021-02-26 DIAGNOSIS — F51.04 PSYCHOPHYSIOLOGICAL INSOMNIA: ICD-10-CM

## 2021-02-26 RX ORDER — TRAZODONE HYDROCHLORIDE 50 MG/1
TABLET ORAL
Qty: 270 TABLET | Refills: 1 | Status: SHIPPED | OUTPATIENT
Start: 2021-02-26 | End: 2021-09-09

## 2021-03-24 ENCOUNTER — TELEPHONE (OUTPATIENT)
Dept: ORTHOPEDIC SURGERY | Age: 71
End: 2021-03-24

## 2021-03-24 NOTE — TELEPHONE ENCOUNTER
Surgery and/or Procedure Scheduling     Contact Name: Jeri Eddy Request: LEFT KNEE  Patient Contact Number: [phone]

## 2021-03-28 DIAGNOSIS — F51.04 PSYCHOPHYSIOLOGICAL INSOMNIA: ICD-10-CM

## 2021-03-28 DIAGNOSIS — F41.9 ANXIETY: ICD-10-CM

## 2021-03-29 RX ORDER — ALPRAZOLAM 1 MG/1
TABLET ORAL
Qty: 35 TABLET | Refills: 2 | Status: SHIPPED | OUTPATIENT
Start: 2021-03-29 | End: 2021-06-25

## 2021-04-12 ENCOUNTER — OFFICE VISIT (OUTPATIENT)
Dept: ORTHOPEDIC SURGERY | Age: 71
End: 2021-04-12
Payer: COMMERCIAL

## 2021-04-12 VITALS — BODY MASS INDEX: 31.71 KG/M2 | WEIGHT: 179 LBS | TEMPERATURE: 96.7 F | HEIGHT: 63 IN

## 2021-04-12 DIAGNOSIS — M17.12 PRIMARY OSTEOARTHRITIS OF LEFT KNEE: Primary | ICD-10-CM

## 2021-04-12 DIAGNOSIS — Z01.818 PRE-OP TESTING: ICD-10-CM

## 2021-04-12 PROCEDURE — 99213 OFFICE O/P EST LOW 20 MIN: CPT | Performed by: ORTHOPAEDIC SURGERY

## 2021-04-12 NOTE — PROGRESS NOTES
Patient returns today for follow-up of her left knee osteoarthritis. She completed viscosupplementation in January and said this only lasted about a month. She says she is now having a hard time with almost all walking and especially getting up out of a chair. She is already had steroid injections. She is bone-on-bone in the medial compartment on standing x-ray. ROS: Pertinent items are noted in HPI. No notes on file    Past Medical History:  7/16/2013: Anal fissure  No date: Anxiety  No date: Colon polyps  No date: Depression  No date: Fibroid uterus  No date: Hypothyroidism  No date: Morbid obesity ([facility] [address]  No date: Osteopenia     Past Surgical History:  2008: ABDOMINAL HERNIA REPAIR  Age 27: APPENDECTOMY  10/23/2017: CATARACT REMOVAL WITH IMPLANT; Right      Comment:  with vitrectomy  02/26/2018: CATARACT REMOVAL WITH IMPLANT; Left      Comment:  with vitrectomy  10/10/2018: COLONOSCOPY; N/A      Comment:  COLONOSCOPY POLYPECTOMY SNARE/COLD BIOPSY performed by                Rufus Saint, MD at [address]  8/26/2019: EPIDURAL STEROID INJECTION; Right      Comment:  RIGHT L4 AND L5 TRANSFORAMINAL EPIDURAL STEROID                INJECTION WITH FLUOROSCOPY performed by Emily Andrade MD at [address] [place] [address],[address]: GASTRIC BYPASS SURGERY  10/21/2019: HC INJECT OTHER PERPHRL NERV; Right      Comment:  RIGHT PIRIFORMIS INJECTION WITH FLUOROSCOPY (34646)                performed by Emily Andrade MD at [facility]  No date: HYSTERECTOMY, TOTAL ABDOMINAL      Comment:  ovaries out also  1/7/2019: [facility]; Right      Comment:  RIGHT L3 AND L4 LUMBAR TRANSFORAMINAL WITH FLUOROSCOPY                performed by Emily Andrade MD at [address]  5/15/2019: [address];  Right      Comment:  RIGHT L4 AND L5 TRANSFORAMINAL EPIDURAL STEROID                INJECTION WITH FLUOROSCOPY performed by Emily Andrade MD at [address]  10/10/2018: [place] ESOPHAGOGASTRODUODENOSCOPY TRANSORAL DIAGNOSTIC; N/A      Comment:  EGD performed by Rika Aguiar MD at [address] [facility] [address] [facility] [address]: [facility] AND [facility]      Comment:  Fibroids    Review of patient's family history indicates:  Problem: Diabetes      Relation: Father          Age of Onset: (Not Specified)          Comment: Type II  Problem: Diabetes      Relation: Sister          Age of Onset: (Not Specified)          Comment: Type II  Problem: Diabetes      Relation: Brother          Age of Onset: (Not Specified)          Comment: Type II  Problem: Breast Cancer      Relation: Sister          Age of Onset: 58  Problem: Lung Cancer      Relation: Mother          Age of Onset: (Not Specified)          Comment: Smoker  Problem: Heart Disease      Relation: Sister          Age of Onset: (Not Specified)          Comment: Bicuspid aortic valve x2      Social History    Socioeconomic History      Marital status:        Spouse name: None      Number of children: None      Years of education: None      Highest education level: None    Occupational History      Occupation: Computer work    Social Needs      Financial resource strain: None      Food insecurity        Worry: None        Inability: None      Transportation needs        Medical: None        Non-medical: None    Tobacco Use      Smoking status: Never Smoker      Smokeless tobacco: Never Used    Substance and Sexual Activity      Alcohol use: No      Drug use: No      Sexual activity: Never    Lifestyle      Physical activity        Days per week: None        Minutes per session: None      Stress: None    Relationships      Social connections        Talks on phone: None        Gets together: None        Attends Bahai service: None        Active member of club or organization: None        Attends meetings of clubs or organizations: None        Relationship status: None      Intimate partner violence        Fear of current or ex partner: None        Emotionally abused: None Physically abused: None        Forced sexual activity: None    Other Topics      Concerns:        None    Social History Narrative      None      Current Outpatient Medications:  ALPRAZolam (XANAX) 1 MG tablet, TAKE 1 TABLET BY MOUTH TWICE DAILY AS NEEDED FOR ANXIETY, Disp: 35 tablet, Rfl: 2  traZODone (DESYREL) 50 MG tablet, TAKE 3 TABLETS BY MOUTH EVERY NIGHT AT BEDTIME, Disp: 270 tablet, Rfl: 1  Multiple Vitamin (MULTIVITAMIN PO), Take by mouth, Disp: , Rfl:   gabapentin (NEURONTIN) 300 MG capsule, TAKE ONE CAPSULE BY MOUTH TWICE DAILY, Disp: 60 capsule, Rfl: 5  levothyroxine (SYNTHROID) 150 MCG tablet, TAKE 1 TABLET BY MOUTH EVERY DAY, Disp: 90 tablet, Rfl: 1  omeprazole (PRILOSEC) 40 MG delayed release capsule, TAKE 1 CAPSULE BY MOUTH EVERY MORNING BEFORE BREAKFAST, Disp: 90 capsule, Rfl: 1  buPROPion (WELLBUTRIN XL) 300 MG extended release tablet, TAKE 1 TABLET BY MOUTH EVERY MORNING, Disp: 90 tablet, Rfl: 1  melatonin 5 MG TABS tablet, Take 5 mg by mouth daily, Disp: , Rfl:   Cyanocobalamin (VITAMIN B 12 PO), Take 500 mcg by mouth daily , Disp: , Rfl:   Cholecalciferol (VITAMIN D) 1000 UNIT CAPS, Take 2 capsules by mouth daily , Disp: , Rfl:     No current facility-administered medications for this visit. -- Codeine -- Nausea Only   -- Macrobid [Nitrofurantoin] -- Other (See Comments)    --  Fever, myalgias    VITAL SIGNS:  Temp 96.7 °F (35.9 °C)   Ht 5' 3\" (1.6 m)   Wt 179 lb (81.2 kg)   BMI 31.71 kg/m²   On examination today she has a very small effusion but no warmth or erythema. She still gets nearly full extension and flexes to about 115 degrees. She is tender medial joint line. She does about 2 or 3 mm of pseudolaxity medially. She is quite tender to medial joint line. Hans's testing does not produce a clunk or shift. Impression varus osteoarthritis left knee. Plan: Patient would like to proceed with total knee arthroplasty.   Went over the book discussed the operation postop course risks and benefits. All questions were answered. Scheduled for left total knee arthroplasty and see her back preoperatively.

## 2021-04-19 ENCOUNTER — TELEPHONE (OUTPATIENT)
Dept: INTERNAL MEDICINE CLINIC | Age: 71
End: 2021-04-19

## 2021-04-22 NOTE — PROGRESS NOTES
Date of Visit:  2021    CC: Renetta Parks (: 1950) is a 78 y.o. female, established patient, here for evaluation/re-evaluation of the following medical concerns:    ASSESSMENT/PLAN:  Chronic bilateral low back pain with bilateral sciatica  Assessment & Plan:  Improved with regular chiropractic treatment- continue, with current dose of gabapentin. Neck pain, chronic  Assessment & Plan:  Improved with chiropractic treatment- continue. Primary osteoarthritis of left knee  Assessment & Plan:  Worse. TKR planned in early . Pre-op scheduled. Class 1 obesity due to excess calories with serious comorbidity and body mass index (BMI) of 34.0 to 34.9 in adult  Assessment & Plan:  Worse due to decreased activity level related to knee pain. Patient was asked about her current diet and exercise habits, and personalized advice was provided regarding recommended lifestyle changes. Patient's comorbid health conditions associated with elevated BMI were discussed, as well as the likely benefits of weight loss. Based upon patient's motivation to change her behavior, the following plan was agreed upon to work toward a weight loss goal of 30-40 pounds: well-balanced diet, following Weight Watcher's principles, she will increase her activity level after TKR. Educational materials for  weight loss were provided. Patient will follow-up in 3 month(s) with PCP. Provider spent 15 minutes counseling patient. Major depressive disorder with single episode, in partial remission (HCC)  Assessment & Plan:  Stable on current medications, but suspect that Wellbutrin is contributing to her intention tremor, so will try decreasing dose to 150 mg qam.  She will call if depression symptoms worsen on lower dose. Anxiety  Assessment & Plan:  See plan for depression. Continue lowest effective dose of Xanax.    Psychophysiological insomnia  Assessment & Plan:  Doing well on lower dose of trazodone, with prn melatonin- continue. Periumbilical mass  Assessment & Plan:  Suspect same fluid collection in anterior abdominal wall seen on prior CT from 2013, but will re-scan since larger and more tender on exam.   Orders:  -     CT ABDOMEN PELVIS W IV CONTRAST Additional Contrast? Radiologist Recommendation; Future  Body mass index 34.0-34.9, adult  -     Obesity Counseling, 15 Minutes     Follow up 5/20/21 for pre-op, as scheduled    Vitals:    04/23/21 1328   BP: 112/68   Pulse: 63   Weight: 186 lb (84.4 kg)   Height: 5' 2\" (1.575 m)      Estimated body mass index is 34.02 kg/m² as calculated from the following:    Height as of this encounter: 5' 2\" (1.575 m). Weight as of this encounter: 186 lb (84.4 kg). Wt Readings from Last 3 Encounters:   04/23/21 186 lb (84.4 kg)   04/12/21 179 lb (81.2 kg)   01/18/21 179 lb (81.2 kg)     BP Readings from Last 3 Encounters:   04/23/21 112/68   06/05/20 90/64   03/10/20 118/60     HPI  Chronic Joint Pain:  Patient presents for follow-up of pain in back and neck- stable. Left knee pain worsened after Supartz injections wore off- planning TKR 6/1/21.  On average, pain is perceived as mild-moderate.  Current treatment: gabapentin 600 mg qhs.  Medication side effects: none. Recent diagnostic testing: MRI cervical and lumbar spine 12/18 per Dr. Enoc Shah- Multiple injections  provided only temporary relief. Seeing chiropractor for neck and back pain, which has been helpful.     Obesity:  Eating a healthy, well-balanced diet- has gained back about 7 pounds over the past 3 months. Unable to exercise due to knee pain.     Insomnia:  Current treatment: prescription sleep aid-  trazodone 100 mg qhs, melatonin 5 mg qhs prn, which has been effective.  No adverse effects.  Restless legs symptoms well-controlled.     Mood Disorder:  Patient presents for follow-up of depression and anxiety disorder. Current complaints include: excessive worry, difficulty concentrating, impaired memory- waxes and wanes.  No tearfulness, depressed mood, feelings of worthlessness/guilt, anhedonia, panic attacks, feelings of hopelessness, irritability or suicidal ideation.  Symptoms/signs of jojo: none. Current treatment includes: Wellbutrin- 300 mg qam, takes Xanax about once in the evening for anxiety and occasionally during the day.  Medication side effects: tremor of hands. ROS  As documented above    Physical Exam  Constitutional:       General: She is not in acute distress. Appearance: She is well-developed. Eyes:      Conjunctiva/sclera: Conjunctivae normal.   Neck:      Thyroid: No thyroid mass or thyromegaly. Cardiovascular:      Rate and Rhythm: Normal rate and regular rhythm. Heart sounds: Murmur present. Systolic (LLSB to apex) murmur present with a grade of 2/6. No friction rub. No gallop. Pulmonary:      Effort: Pulmonary effort is normal. No respiratory distress. Breath sounds: Normal breath sounds. No wheezing, rhonchi or rales. Abdominal:      Comments: Large, mildly tender, firm mass above umbilicus. More prominent with valsalva. Musculoskeletal:      Right lower leg: No edema. Left lower leg: No edema. Comments: Ortho exam deferred to specialists   Lymphadenopathy:      Cervical: No cervical adenopathy. Skin:     General: Skin is warm and dry. Findings: No erythema or rash. Neurological:      Mental Status: She is alert and oriented to person, place, and time. Psychiatric:         Speech: Speech normal.         Behavior: Behavior normal.         Thought Content: Thought content normal.         Judgment: Judgment normal.           --Keiko Zimmerman MD on 4/23/2021 at 2:07 PM    An electronic signature was used to authenticate this note.

## 2021-04-23 ENCOUNTER — OFFICE VISIT (OUTPATIENT)
Dept: INTERNAL MEDICINE CLINIC | Age: 71
End: 2021-04-23
Payer: COMMERCIAL

## 2021-04-23 VITALS
DIASTOLIC BLOOD PRESSURE: 68 MMHG | HEIGHT: 62 IN | SYSTOLIC BLOOD PRESSURE: 112 MMHG | BODY MASS INDEX: 34.23 KG/M2 | WEIGHT: 186 LBS | HEART RATE: 63 BPM

## 2021-04-23 DIAGNOSIS — M17.12 PRIMARY OSTEOARTHRITIS OF LEFT KNEE: ICD-10-CM

## 2021-04-23 DIAGNOSIS — M54.42 CHRONIC BILATERAL LOW BACK PAIN WITH BILATERAL SCIATICA: Primary | ICD-10-CM

## 2021-04-23 DIAGNOSIS — G89.29 NECK PAIN, CHRONIC: ICD-10-CM

## 2021-04-23 DIAGNOSIS — F41.9 ANXIETY: ICD-10-CM

## 2021-04-23 DIAGNOSIS — E66.09 CLASS 1 OBESITY DUE TO EXCESS CALORIES WITH SERIOUS COMORBIDITY AND BODY MASS INDEX (BMI) OF 34.0 TO 34.9 IN ADULT: ICD-10-CM

## 2021-04-23 DIAGNOSIS — G89.29 CHRONIC BILATERAL LOW BACK PAIN WITH BILATERAL SCIATICA: Primary | ICD-10-CM

## 2021-04-23 DIAGNOSIS — F51.04 PSYCHOPHYSIOLOGICAL INSOMNIA: ICD-10-CM

## 2021-04-23 DIAGNOSIS — M54.2 NECK PAIN, CHRONIC: ICD-10-CM

## 2021-04-23 DIAGNOSIS — R19.05 PERIUMBILICAL MASS: ICD-10-CM

## 2021-04-23 DIAGNOSIS — M54.41 CHRONIC BILATERAL LOW BACK PAIN WITH BILATERAL SCIATICA: Primary | ICD-10-CM

## 2021-04-23 DIAGNOSIS — F32.4 MAJOR DEPRESSIVE DISORDER WITH SINGLE EPISODE, IN PARTIAL REMISSION (HCC): ICD-10-CM

## 2021-04-23 PROCEDURE — 99214 OFFICE O/P EST MOD 30 MIN: CPT | Performed by: INTERNAL MEDICINE

## 2021-04-23 PROCEDURE — G0447 BEHAVIOR COUNSEL OBESITY 15M: HCPCS | Performed by: INTERNAL MEDICINE

## 2021-04-23 RX ORDER — BUPROPION HYDROCHLORIDE 150 MG/1
150 TABLET ORAL EVERY MORNING
Qty: 90 TABLET | Refills: 1 | Status: SHIPPED | OUTPATIENT
Start: 2021-04-23 | End: 2021-12-08

## 2021-04-23 NOTE — ASSESSMENT & PLAN NOTE
Stable on current medications, but suspect that Wellbutrin is contributing to her intention tremor, so will try decreasing dose to 150 mg qam.  She will call if depression symptoms worsen on lower dose.

## 2021-04-23 NOTE — ASSESSMENT & PLAN NOTE
Worse due to decreased activity level related to knee pain. Patient was asked about her current diet and exercise habits, and personalized advice was provided regarding recommended lifestyle changes. Patient's comorbid health conditions associated with elevated BMI were discussed, as well as the likely benefits of weight loss. Based upon patient's motivation to change her behavior, the following plan was agreed upon to work toward a weight loss goal of 30-40 pounds: well-balanced diet, following Weight Watcher's principles, she will increase her activity level after TKR. Educational materials for  weight loss were provided. Patient will follow-up in 3 month(s) with PCP. Provider spent 15 minutes counseling patient.

## 2021-04-23 NOTE — ASSESSMENT & PLAN NOTE
Suspect same fluid collection in anterior abdominal wall seen on prior CT from 2013, but will re-scan since larger and more tender on exam.

## 2021-04-26 ENCOUNTER — TELEPHONE (OUTPATIENT)
Dept: INTERNAL MEDICINE CLINIC | Age: 71
End: 2021-04-26

## 2021-04-26 DIAGNOSIS — Z78.0 MENOPAUSE: ICD-10-CM

## 2021-04-26 DIAGNOSIS — Z01.812 PRE-PROCEDURE LAB EXAM: Primary | ICD-10-CM

## 2021-04-26 DIAGNOSIS — Z12.31 SCREENING MAMMOGRAM, ENCOUNTER FOR: Primary | ICD-10-CM

## 2021-04-26 NOTE — TELEPHONE ENCOUNTER
Sindy With central scheduling Called to have an order for Bun creatinine placed before the patient's appointment on may 6th.

## 2021-05-06 ENCOUNTER — HOSPITAL ENCOUNTER (OUTPATIENT)
Dept: CT IMAGING | Age: 71
Discharge: HOME | End: 2021-05-06
Payer: COMMERCIAL

## 2021-05-06 ENCOUNTER — HOSPITAL ENCOUNTER (OUTPATIENT)
Age: 71
Discharge: HOME | End: 2021-05-06
Payer: COMMERCIAL

## 2021-05-06 DIAGNOSIS — R19.05 PERIUMBILICAL MASS: ICD-10-CM

## 2021-05-06 LAB
BUN BLDV-MCNC: 17 MG/DL (ref 7–20)
CREAT SERPL-MCNC: 0.9 MG/DL (ref 0.6–1.2)
GFR AFRICAN AMERICAN: >60
GFR NON-AFRICAN AMERICAN: >60

## 2021-05-06 PROCEDURE — 82565 ASSAY OF CREATININE: CPT

## 2021-05-06 PROCEDURE — 84520 ASSAY OF UREA NITROGEN: CPT

## 2021-05-06 PROCEDURE — 6360000004 HC RX CONTRAST MEDICATION: Performed by: INTERNAL MEDICINE

## 2021-05-06 PROCEDURE — 74177 CT ABD & PELVIS W/CONTRAST: CPT

## 2021-05-06 PROCEDURE — 36415 COLL VENOUS BLD VENIPUNCTURE: CPT

## 2021-05-06 RX ADMIN — IOPAMIDOL 75 ML: 755 INJECTION, SOLUTION INTRAVENOUS at 16:15

## 2021-05-06 RX ADMIN — IOHEXOL 50 ML: 240 INJECTION, SOLUTION INTRATHECAL; INTRAVASCULAR; INTRAVENOUS; ORAL at 16:15

## 2021-05-11 ENCOUNTER — TELEPHONE (OUTPATIENT)
Dept: ORTHOPEDIC SURGERY | Age: 71
End: 2021-05-11

## 2021-05-11 RX ORDER — CLOBETASOL PROPIONATE 0.5 MG/G
OINTMENT TOPICAL NIGHTLY
Qty: 45 G | Refills: 1 | Status: SHIPPED | OUTPATIENT
Start: 2021-05-11 | End: 2021-06-10

## 2021-05-11 NOTE — TELEPHONE ENCOUNTER
PA requsted via [facility] by fax @ [phone] w/clinicals.   Reference # [id number]

## 2021-05-12 PROBLEM — I25.84 CORONARY ARTERY CALCIFICATION: Status: ACTIVE | Noted: 2021-05-12

## 2021-05-12 PROBLEM — I25.10 CORONARY ARTERY CALCIFICATION: Status: ACTIVE | Noted: 2021-05-12

## 2021-05-13 ENCOUNTER — TELEPHONE (OUTPATIENT)
Dept: ORTHOPEDIC SURGERY | Age: 71
End: 2021-05-13

## 2021-05-13 NOTE — TELEPHONE ENCOUNTER
COVID: 4/14/21- Has received both COVID vaccine    Orthopedic Nurse Navigator Summary  -Education: has watched video. Patient Name: Nicolas Arreola  Anticipated Date of Surgery:6/1/21  Using [employer]? Yes, Are they Registered: Yes  Attended Pre-Op Education Class: No  If No, why not? PCP:  Phone #:  Date of PCP Visit for H&P: 05/20/2021  Any Noted Concerns from PCP prior to surgery: No  If Yes, what concerns?:  Is the Patient in a Pain Management Program?: No  Review of Past Medical History Reveals History of:  -  Critical Lab Values:  - Hemoglobin (g/dL): Date Value  - Hematocrit (%): Date Value  - HgbA1C : Date Value  - Albumin : Date Value  - BUN (mg/dL) : Date Value  - CREATININE (mg/dL) : Date Value  - BMI (kg/m2) : Date Value  -  Coronary Artery Disease/HTN/CHF History: No  Cardiologist:  Cardiac Clearance Necessary: No  Date of Cardiac Clearance Appt: On Plavix? No  If YES, when will they stop taking? Final Cardiac Recommendations: On any anticoagulation: No  -  Diabetes History: No  Most Recent HgbA1C:  PCP or Endocrine Recommendations:  Nutritionist/Dietician Consult Scheduled:  Final Plan For Diabetic Control:  Pulmonary: COPD/Emphysema/ Use of home oxygen:  Alcohol use: Non-Drinker  -  DVT Risk Stratification: Low Risk  Vascular Consult Ordered:  Date of Vascular Appt:  Hematology/Oncology Consult Ordered:  Date of Hematology/Oncology Appt:  Final Recommendation For DVT Prophylaxis:  Smoking history: Non-Smoker  Use of Estrogen:  -  BMI Greater than 40 at time of scheduling?: No  Has Surgeon been notified of BMI concern? No  Weight Loss Clinic Consult Ordered No  Date of Wt Loss Clinic Appt:  BMI at time of surgery (if went through [facility]):  -  Additional Medical Concerns:   Additional Recommendations for above concerns:  Attended Pre-Hab Program: No  Anticipated Discharge Disposition: D/C home  Who will be with patient at home following discharge? daughter and grandsons  Equipment patient already has: none  Bedroom on first or second floor: first  Bathroom on first or second floor: first  Weight bearing status: full  Pre-op ambulatory status:  Number of entry steps: FIVE  Caregiver assistance: full time  Naun Kincaid [facility]  05/24/2021

## 2021-05-13 NOTE — TELEPHONE ENCOUNTER
Gio Coats [id number]    Date: 6/01/2021 thru 7/01/2021  Type of SX:  Inpatient  Location: [facility]  CPT: 12284   DX Code: M17.12  SX area: Lt knee  Insurance: [place] [id number]

## 2021-05-19 NOTE — PROGRESS NOTES
Preoperative Consultation      Tico Mei  YOB: 1950    Date of Service:  2021    Chief Complaint   Patient presents with    Pre-op Exam     Left knee replacement 2021 Dr Agata Ley     HPI:    Tico Mei (: 1950) is a 78 y.o. female, established patient, here for a preoperative consultation at the request of surgeon, Dr. Agata Ley, who plans on performing left TKA on  at [place] [address].  The current problem began 10 years ago, and symptoms have been worsening with time. Conservative therapy: Yes: injections, PT, which has been not very effective. .    Planned anesthesia: General   Known anesthesia problems: None   Bleeding risk: No recent or remote history of abnormal bleeding  Personal or FH of DVT/PE: No   Recent steroid use: no  Exercise tolerance before surgery at least 4 METS (Can walk up a flight of steps or a hill or walk on level ground at 3 to 4 mph): Yes   Patient objection to receiving blood products: No    Patient Active Problem List   Diagnosis    Obesity due to excess calories with serious comorbidity    Chronic bilateral low back pain with sciatica    Post-resection malabsorption    Major depressive disorder with single episode    Anxiety    Hypothyroidism    Family history of bicuspid aortic valve    Osteopenia    GERD (gastroesophageal reflux disease)    Anal fissure    Psychophysiological insomnia    Colon polyps    Osteoarthritis of left knee    Restless leg syndrome    Ganglion cyst of wrist, left    Neck pain, chronic    Hypertriglyceridemia    Periumbilical mass    Coronary artery calcification       Review of Systems   Constitutional: Negative. HENT: Negative. Eyes: Negative. Respiratory: Negative. Cardiovascular: Negative. Gastrointestinal: Negative. Genitourinary: Negative. Musculoskeletal:        See above   Skin: Negative. Neurological: Negative. COLONOSCOPY POLYPECTOMY SNARE/COLD BIOPSY performed by Paulette Parson MD at [address] Right 8/26/2019    RIGHT L4 AND L5 TRANSFORAMINAL EPIDURAL STEROID INJECTION WITH FLUOROSCOPY performed by Arturo Smith MD at [facility]    [facility] OTHER [facility]RL NERV Right 10/21/2019    RIGHT PIRIFORMIS INJECTION WITH FLUOROSCOPY ([address]38[address]) performed by Arturo Smith MD at [facility] 399, TOTAL ABDOMINAL      ovaries out also   [address] Right 1/7/2019    RIGHT L3 AND L4 LUMBAR TRANSFORAMINAL WITH FLUOROSCOPY performed by Arturo Smith MD at [facility] Right 5/15/2019    RIGHT L4 AND L5 TRANSFORAMINAL EPIDURAL STEROID INJECTION WITH FLUOROSCOPY performed by Arturo Smith MD at [facility] 177 ESOPHAGOGASTRODUODENOSCOPY TRANSORAL DIAGNOSTIC N/A 10/10/2018    EGD performed by Paulette Parson MD at [address]    Fibroids     Family History   Problem Relation Age of Onset    Diabetes Father         Type II    Diabetes Sister         Type II    Diabetes Brother         Type II    Breast Cancer Sister 58    Lung Cancer Mother         Smoker    Heart Disease Sister         Bicuspid aortic valve x2     Social History     Socioeconomic History    Marital status:       Spouse name: Not on file    Number of children: Not on file    Years of education: Not on file    Highest education level: Not on file   Occupational History    Occupation: Computer work   Tobacco Use    Smoking status: Never Smoker    Smokeless tobacco: Never Used   Vaping Use    Vaping Use: Never used   Substance and Sexual Activity    Alcohol use: No    Drug use: No    Sexual activity: Never   Other Topics Concern    Not on file   Social History Narrative    Not on file     Social Determinants of Health     Financial Resource Strain: Low Risk     Difficulty of Paying Living Expenses: Not hard at all   Food Insecurity: No Food Insecurity    Worried About [address] [place] [address] in the Last Year: Never true    Sy of Food in the Last Year: Never true   Transportation Needs: No Transportation Needs    Lack of Transportation (Medical): No    Lack of Transportation (Non-Medical): No   Physical Activity:     Days of Exercise per Week:     Minutes of Exercise per Session:    Stress:     Feeling of Stress :    Social Connections:     Frequency of Communication with Friends and Family:     Frequency of Social Gatherings with Friends and Family:     Attends Quaker Services:     Active Member of Clubs or Organizations:     Attends Club or Organization Meetings:     Marital Status:    Intimate Partner Violence: Not At Risk    Fear of Current or Ex-Partner: No    Emotionally Abused: No    Physically Abused: No    Sexually Abused: No       Vitals:    05/20/21 1055   BP: 112/64   Pulse: 70   Resp: 16   SpO2: 98%   Weight: 186 lb 3.2 oz (84.5 kg)   Height: 5' 2\" (1.575 m)      Estimated body mass index is 34.06 kg/m² as calculated from the following:    Height as of this encounter: 5' 2\" (1.575 m). Weight as of this encounter: 186 lb 3.2 oz (84.5 kg). Wt Readings from Last 2 Encounters:   05/20/21 186 lb 3.2 oz (84.5 kg)   04/23/21 186 lb (84.4 kg)     BP Readings from Last 3 Encounters:   05/20/21 112/64   04/23/21 112/68   06/05/20 90/64      Physical Exam  Constitutional:       General: She is not in acute distress. Appearance: She is well-developed. Eyes:      Conjunctiva/sclera: Conjunctivae normal.   Neck:      Thyroid: No thyroid mass or thyromegaly. Cardiovascular:      Rate and Rhythm: Normal rate and regular rhythm. Heart sounds: Murmur heard. Systolic (LLSB to apex) murmur is present with a grade of 2/6. No friction rub. No gallop. Pulmonary:      Effort: Pulmonary effort is normal. No respiratory distress. Breath sounds: Normal breath sounds.  No wheezing, rhonchi or rales. Abdominal:      Comments: Large, mildly tender, firm mass above umbilicus. More prominent with valsalva. Musculoskeletal:      Right lower leg: No edema. Left lower leg: No edema. Comments: Ortho exam deferred to specialist   Lymphadenopathy:      Cervical: No cervical adenopathy. Skin:     General: Skin is warm and dry. Findings: No erythema or rash. Neurological:      Mental Status: She is alert and oriented to person, place, and time. Psychiatric:         Speech: Speech normal.         Behavior: Behavior normal.         Thought Content: Thought content normal.         Judgment: Judgment normal.          Lab Results   Component Value Date    CHOLFAST 145 01/04/2021    TRIGLYCFAST 104 01/04/2021    HDL 62 (H) 01/04/2021    LDLCALC 62 01/04/2021     Lab Results   Component Value Date    GLUF 92 01/04/2021    LABA1C 5.2 03/06/2018     Lab Results   Component Value Date     01/04/2021    K 4.5 01/04/2021    BUN 17 05/06/2021    CREATININE 0.9 05/06/2021    LABGLOM >60 05/06/2021    GFRAA >60 05/06/2021    CALCIUM 8.8 01/04/2021    VITD25 31.7 01/04/2021     Lab Results   Component Value Date    ALT 10 01/04/2021    AST 19 01/04/2021     Lab Results   Component Value Date    HGB 12.1 01/04/2021     Lab Results   Component Value Date    TSHREFLEX 0.13 (L) 03/10/2020    TSH 0.46 01/04/2021             Assessment/Plan:     Primary osteoarthritis of left knee  Emergent procedure No  Active Cardiac Condition (decompensated HF, Arrhythmia, MI <3 weeks, severe valve disease):  No   Risk Level of Procedure Intermediate Risk (intraperitoneal, intrathoracic, HENT, orthopedic, or carotid endarterectomy, etc.)  Revised Cardiac Risk Index Risk factors: None    According to the 2014 ACC/AHA pre-operative risk assessment guidelines Tony Burris is a low risk for major cardiac complications during a intermediate risk procedure and may continue as planned.     Preoperative workup as follows: Labs and EKG per surgical facility  Change in medication regimen before surgery: Take Synthroid, omeprazole on morning of surgery with sip of water, and hold all other medications until after surgery  Prophylaxis for cardiac events with perioperative beta-blockers:Not indicated    Deep vein thrombosis prophylaxis: regimen to be chosen by surgical team    Further recommendations requested from consultants: No  Coronary artery calcification  Assessment & Plan:  Asymptomatic, but will obtain coronary calcium scan prior to surgery. Orders:  -     CT CARDIAC CALCIUM SCORING; Future  Periumbilical mass  Assessment & Plan:  Minimally symptomatic- due to post-op seroma per recent CT. Class 1 obesity due to excess calories with serious comorbidity and body mass index (BMI) of 34.0 to 34.9 in adult  Assessment & Plan:  No symptoms of NNEKA, but recommend close perioperative monitoring of O2 sats since body habitus conveys risk for this disorder. Return in about 3 months (around 8/20/2021). --Billy Merlin, MD on 5/20/2021 at 11:25 AM    An electronic signature was used to authenticate this note.

## 2021-05-20 ENCOUNTER — OFFICE VISIT (OUTPATIENT)
Dept: INTERNAL MEDICINE CLINIC | Age: 71
End: 2021-05-20
Payer: COMMERCIAL

## 2021-05-20 VITALS
HEIGHT: 62 IN | BODY MASS INDEX: 34.27 KG/M2 | HEART RATE: 70 BPM | SYSTOLIC BLOOD PRESSURE: 112 MMHG | RESPIRATION RATE: 16 BRPM | WEIGHT: 186.2 LBS | OXYGEN SATURATION: 98 % | DIASTOLIC BLOOD PRESSURE: 64 MMHG

## 2021-05-20 DIAGNOSIS — I25.84 CORONARY ARTERY CALCIFICATION: ICD-10-CM

## 2021-05-20 DIAGNOSIS — R19.05 PERIUMBILICAL MASS: ICD-10-CM

## 2021-05-20 DIAGNOSIS — M17.12 PRIMARY OSTEOARTHRITIS OF LEFT KNEE: Primary | ICD-10-CM

## 2021-05-20 DIAGNOSIS — E66.09 CLASS 1 OBESITY DUE TO EXCESS CALORIES WITH SERIOUS COMORBIDITY AND BODY MASS INDEX (BMI) OF 34.0 TO 34.9 IN ADULT: ICD-10-CM

## 2021-05-20 DIAGNOSIS — I25.10 CORONARY ARTERY CALCIFICATION: ICD-10-CM

## 2021-05-20 PROCEDURE — 99214 OFFICE O/P EST MOD 30 MIN: CPT | Performed by: INTERNAL MEDICINE

## 2021-05-20 SDOH — ECONOMIC STABILITY: FOOD INSECURITY: WITHIN THE PAST 12 MONTHS, YOU WORRIED THAT YOUR FOOD WOULD RUN OUT BEFORE YOU GOT MONEY TO BUY MORE.: NEVER TRUE

## 2021-05-20 SDOH — ECONOMIC STABILITY: TRANSPORTATION INSECURITY
IN THE PAST 12 MONTHS, HAS LACK OF TRANSPORTATION KEPT YOU FROM MEETINGS, WORK, OR FROM GETTING THINGS NEEDED FOR DAILY LIVING?: NO

## 2021-05-20 ASSESSMENT — SOCIAL DETERMINANTS OF HEALTH (SDOH)
WITHIN THE LAST YEAR, HAVE TO BEEN RAPED OR FORCED TO HAVE ANY KIND OF SEXUAL ACTIVITY BY YOUR PARTNER OR EX-PARTNER?: NO
WITHIN THE LAST YEAR, HAVE YOU BEEN HUMILIATED OR EMOTIONALLY ABUSED IN OTHER WAYS BY YOUR PARTNER OR EX-PARTNER?: NO
HOW HARD IS IT FOR YOU TO PAY FOR THE VERY BASICS LIKE FOOD, HOUSING, MEDICAL CARE, AND HEATING?: NOT HARD AT ALL
WITHIN THE LAST YEAR, HAVE YOU BEEN AFRAID OF YOUR PARTNER OR EX-PARTNER?: NO

## 2021-05-20 ASSESSMENT — ENCOUNTER SYMPTOMS
GASTROINTESTINAL NEGATIVE: 1
EYES NEGATIVE: 1
RESPIRATORY NEGATIVE: 1

## 2021-05-24 ENCOUNTER — OFFICE VISIT (OUTPATIENT)
Dept: ORTHOPEDIC SURGERY | Age: 71
End: 2021-05-24

## 2021-05-24 VITALS — TEMPERATURE: 98.6 F | BODY MASS INDEX: 34.23 KG/M2 | HEIGHT: 62 IN | WEIGHT: 186 LBS

## 2021-05-24 DIAGNOSIS — M17.12 PRIMARY OSTEOARTHRITIS OF LEFT KNEE: Primary | ICD-10-CM

## 2021-05-24 PROCEDURE — 99024 POSTOP FOLLOW-UP VISIT: CPT | Performed by: ORTHOPAEDIC SURGERY

## 2021-05-24 NOTE — PROGRESS NOTES
The patient is here for preop visit for left total knee arthroplasty be done in approximately week. ROS: Pertinent items are noted in HPI. No notes on file    Past Medical History:  7/16/2013: Anal fissure  No date: Anxiety  No date: Colon polyps  No date: Depression  No date: Fibroid uterus  No date: Hypothyroidism  No date: Morbid obesity ([address]  No date: Osteopenia     Past Surgical History:  2008: ABDOMINAL HERNIA REPAIR  Age 27: APPENDECTOMY  10/23/2017: CATARACT REMOVAL WITH IMPLANT; Right      Comment:  with vitrectomy  02/26/2018: CATARACT REMOVAL WITH IMPLANT; Left      Comment:  with vitrectomy  10/10/2018: COLONOSCOPY; N/A      Comment:  COLONOSCOPY POLYPECTOMY SNARE/COLD BIOPSY performed by                Adán Dash MD at [address] [place] [address]  8/26/2019: EPIDURAL STEROID INJECTION; Right      Comment:  RIGHT L4 AND L5 TRANSFORAMINAL EPIDURAL STEROID                INJECTION WITH FLUOROSCOPY performed by Mauricio Thompson MD at [address] [place] [address],[facility] Floor: GASTRIC BYPASS SURGERY  10/21/2019: HC INJECT OTHER PERPHRL NERV; Right      Comment:  RIGHT PIRIFORMIS INJECTION WITH FLUOROSCOPY ([address])                performed by Mauricio Thompson MD at [facility], [facility]  No date: HYSTERECTOMY, TOTAL ABDOMINAL      Comment:  ovaries out also  1/7/2019: [address]; Right      Comment:  RIGHT L3 AND L4 LUMBAR TRANSFORAMINAL WITH FLUOROSCOPY                performed by Mauricio Thompson MD at [address] [facility]  5/15/2019: [address];  Right      Comment:  RIGHT L4 AND L5 TRANSFORAMINAL EPIDURAL STEROID                INJECTION WITH FLUOROSCOPY performed by Mauricio Thompson MD at [address] [facility]  10/10/2018: VT ESOPHAGOGASTRODUODENOSCOPY TRANSORAL DIAGNOSTIC; N/A      Comment:  EGD performed by Adán Dash MD at [address] [place] [address] Road: [facility] AND O      Comment:  Fibroids    Review of patient's family history indicates:  Problem: Diabetes      Relation: Father          Age of Onset: (Not Specified)          Comment: Type II  Problem: Diabetes      Relation: Sister          Age of Onset: (Not Specified)          Comment: Type II  Problem: Diabetes      Relation: Brother          Age of Onset: (Not Specified)          Comment: Type II  Problem: Breast Cancer      Relation: Sister          Age of Onset: 58  Problem: Lung Cancer      Relation: Mother          Age of Onset: (Not Specified)          Comment: Smoker  Problem: Heart Disease      Relation: Sister          Age of Onset: (Not Specified)          Comment: Bicuspid aortic valve x2      Social History    Socioeconomic History      Marital status:       Spouse name: None      Number of children: None      Years of education: None      Highest education level: None    Occupational History      Occupation: Computer work    Tobacco Use      Smoking status: Never Smoker      Smokeless tobacco: Never Used    Vaping Use      Vaping Use: Never used    Substance and Sexual Activity      Alcohol use: No      Drug use: No      Sexual activity: Never    Other Topics      Concerns:        None    Social History Narrative      None    Social Determinants of Health  Financial Resource Strain: Low Risk       Difficulty of Paying Living Expenses: Not hard at all  Food Insecurity: No Food Insecurity      Worried About [address] [facility] in the Last Year: Never true      Ran Out of Food in the Last Year: Never true  Transportation Needs: No Transportation Needs      Lack of Transportation (Medical): No      Lack of Transportation (Non-Medical):  No  Physical Activity:       Days of Exercise per Week:       Minutes of Exercise per Session:   Stress:       Feeling of Stress :   Social Connections:       Frequency of Communication with Friends and Family:       Frequency of Social Gatherings with Friends and Family:       Attends Church Services:       Active Member of Clubs or Organizations:       Attends Club or Organization Meetings:       Marital Status: Intimate Partner Violence: Not At Risk      Fear of Current or Ex-Partner: No      Emotionally Abused: No      Physically Abused: No      Sexually Abused: No    Current Outpatient Medications:  clobetasol (TEMOVATE) 0.05 % ointment, Apply topically nightly, Disp: 45 g, Rfl: 1  buPROPion (WELLBUTRIN XL) 150 MG extended release tablet, Take 1 tablet by mouth every morning, Disp: 90 tablet, Rfl: 1  ALPRAZolam (XANAX) 1 MG tablet, TAKE 1 TABLET BY MOUTH TWICE DAILY AS NEEDED FOR ANXIETY, Disp: 35 tablet, Rfl: 2  traZODone (DESYREL) 50 MG tablet, TAKE 3 TABLETS BY MOUTH EVERY NIGHT AT BEDTIME, Disp: 270 tablet, Rfl: 1  Multiple Vitamin (MULTIVITAMIN PO), Take by mouth, Disp: , Rfl:   gabapentin (NEURONTIN) 300 MG capsule, TAKE ONE CAPSULE BY MOUTH TWICE DAILY, Disp: 60 capsule, Rfl: 5  levothyroxine (SYNTHROID) 150 MCG tablet, TAKE 1 TABLET BY MOUTH EVERY DAY, Disp: 90 tablet, Rfl: 1  omeprazole (PRILOSEC) 40 MG delayed release capsule, TAKE 1 CAPSULE BY MOUTH EVERY MORNING BEFORE BREAKFAST, Disp: 90 capsule, Rfl: 1  melatonin 5 MG TABS tablet, Take 5 mg by mouth daily, Disp: , Rfl:   Cyanocobalamin (VITAMIN B 12 PO), Take 500 mcg by mouth daily , Disp: , Rfl:   Cholecalciferol (VITAMIN D) 1000 UNIT CAPS, Take 2 capsules by mouth daily , Disp: , Rfl:     No current facility-administered medications for this visit. -- Codeine -- Nausea Only   -- Macrobid [Nitrofurantoin] -- Other (See Comments)    --  Fever, myalgias    VITAL SIGNS:  Temp 98.6 °F (37 °C)   Ht 5' 2\" (1.575 m)   Wt 186 lb (84.4 kg)   BMI 34.02 kg/m²   We discussed the risks, benefits, and alternatives of surgery, including not limited to infection, stiffness, DVT, fractures, and others. The patient seems to understand and accept these risks. All questions were answered. We will see her Tuesday for surgery and then about 2 weeks postop to start outpatient physical therapy.

## 2021-05-25 ENCOUNTER — HOSPITAL ENCOUNTER (OUTPATIENT)
Dept: CT IMAGING | Age: 71
Discharge: HOME | End: 2021-05-25
Payer: COMMERCIAL

## 2021-05-25 DIAGNOSIS — I25.84 CORONARY ARTERY CALCIFICATION: ICD-10-CM

## 2021-05-25 DIAGNOSIS — I25.10 CORONARY ARTERY CALCIFICATION: ICD-10-CM

## 2021-05-25 PROCEDURE — 75571 CT HRT W/O DYE W/CA TEST: CPT

## 2021-05-26 ENCOUNTER — HOSPITAL ENCOUNTER (OUTPATIENT)
Age: 71
Discharge: HOME | End: 2021-05-26
Payer: COMMERCIAL

## 2021-05-26 DIAGNOSIS — M17.12 PRIMARY OSTEOARTHRITIS OF LEFT KNEE: ICD-10-CM

## 2021-05-26 DIAGNOSIS — Z01.818 PRE-OP TESTING: ICD-10-CM

## 2021-05-26 LAB
A/G RATIO: 1.7 (ref 1.1–2.2)
ABO/RH: NORMAL
ALBUMIN SERPL-MCNC: 4 G/DL (ref 3.4–5)
ALP BLD-CCNC: 77 U/L (ref 40–129)
ALT SERPL-CCNC: 9 U/L (ref 10–40)
ANION GAP SERPL CALCULATED.3IONS-SCNC: 12 MMOL/L (ref 3–16)
ANTIBODY SCREEN: NORMAL
APTT: 30.3 SEC (ref 24.2–36.2)
AST SERPL-CCNC: 16 U/L (ref 15–37)
BASOPHILS ABSOLUTE: 0 K/UL (ref 0–0.2)
BASOPHILS RELATIVE PERCENT: 0.8 %
BILIRUB SERPL-MCNC: 0.3 MG/DL (ref 0–1)
BILIRUBIN URINE: NEGATIVE
BLOOD, URINE: NEGATIVE
BUN BLDV-MCNC: 16 MG/DL (ref 7–20)
CALCIUM SERPL-MCNC: 8.9 MG/DL (ref 8.3–10.6)
CHLORIDE BLD-SCNC: 106 MMOL/L (ref 99–110)
CLARITY: ABNORMAL
CO2: 27 MMOL/L (ref 21–32)
COLOR: YELLOW
CREAT SERPL-MCNC: 0.8 MG/DL (ref 0.6–1.2)
EKG ATRIAL RATE: 72 BPM
EKG DIAGNOSIS: NORMAL
EKG P AXIS: 54 DEGREES
EKG P-R INTERVAL: 192 MS
EKG Q-T INTERVAL: 434 MS
EKG QRS DURATION: 88 MS
EKG QTC CALCULATION (BAZETT): 475 MS
EKG R AXIS: -20 DEGREES
EKG T AXIS: 24 DEGREES
EKG VENTRICULAR RATE: 72 BPM
EOSINOPHILS ABSOLUTE: 0.2 K/UL (ref 0–0.6)
EOSINOPHILS RELATIVE PERCENT: 3.8 %
EPITHELIAL CELLS, UA: 1 /HPF (ref 0–5)
GFR AFRICAN AMERICAN: >60
GFR NON-AFRICAN AMERICAN: >60
GLOBULIN: 2.3 G/DL
GLUCOSE BLD-MCNC: 81 MG/DL (ref 70–99)
GLUCOSE URINE: NEGATIVE MG/DL
HCT VFR BLD CALC: 37.4 % (ref 36–48)
HEMOGLOBIN: 12.3 G/DL (ref 12–16)
HYALINE CASTS: 0 /LPF (ref 0–8)
INR BLD: 0.88 (ref 0.86–1.14)
KETONES, URINE: NEGATIVE MG/DL
LEUKOCYTE ESTERASE, URINE: NEGATIVE
LYMPHOCYTES ABSOLUTE: 0.9 K/UL (ref 1–5.1)
LYMPHOCYTES RELATIVE PERCENT: 18.4 %
MCH RBC QN AUTO: 31.9 PG (ref 26–34)
MCHC RBC AUTO-ENTMCNC: 32.8 G/DL (ref 31–36)
MCV RBC AUTO: 97.1 FL (ref 80–100)
MICROSCOPIC EXAMINATION: YES
MONOCYTES ABSOLUTE: 0.4 K/UL (ref 0–1.3)
MONOCYTES RELATIVE PERCENT: 7.3 %
NEUTROPHILS ABSOLUTE: 3.6 K/UL (ref 1.7–7.7)
NEUTROPHILS RELATIVE PERCENT: 69.7 %
NITRITE, URINE: NEGATIVE
PDW BLD-RTO: 14 % (ref 12.4–15.4)
PH UA: 6 (ref 5–8)
PLATELET # BLD: 218 K/UL (ref 135–450)
PMV BLD AUTO: 9.4 FL (ref 5–10.5)
POTASSIUM SERPL-SCNC: 4.5 MMOL/L (ref 3.5–5.1)
PROTEIN UA: NEGATIVE MG/DL
PROTHROMBIN TIME: 10.2 SEC (ref 10–13.2)
RBC # BLD: 3.85 M/UL (ref 4–5.2)
RBC UA: 2 /HPF (ref 0–4)
SODIUM BLD-SCNC: 145 MMOL/L (ref 136–145)
SPECIFIC GRAVITY UA: 1.02 (ref 1–1.03)
TOTAL PROTEIN: 6.3 G/DL (ref 6.4–8.2)
URINE REFLEX TO CULTURE: ABNORMAL
URINE TYPE: ABNORMAL
UROBILINOGEN, URINE: 0.2 E.U./DL
VITAMIN D 25-HYDROXY: 34.1 NG/ML
WBC # BLD: 5.1 K/UL (ref 4–11)
WBC UA: 1 /HPF (ref 0–5)

## 2021-05-26 PROCEDURE — 93010 ELECTROCARDIOGRAM REPORT: CPT | Performed by: INTERNAL MEDICINE

## 2021-05-26 PROCEDURE — 93005 ELECTROCARDIOGRAM TRACING: CPT | Performed by: ORTHOPAEDIC SURGERY

## 2021-05-26 NOTE — PROGRESS NOTES
[facility] patients having surgery or anesthesia are required to be Covid tested OR to have been vaccinated at least 14 days prior to your procedure. It is very important to return our call to [phone] to notify the staff of your last vaccination date or you will be required to complete Covid testing within the 5-6 days prior to surgery & quarantine. We recommend coming to the [facility]. flu tent to complete. It is open Monday-Friday 8am-3pm currently. If you go outside [facility] you need to ensure you have a PCR Covid test and fax results to PreAdmission Testing at [phone]. PRIOR TO PROCEDURE DATE:        1. PLEASE FOLLOW ANY  GUIDELINES/ INSTRUCTIONS PRIOR TO YOUR PROCEDURE AS ADVISED BY YOUR SURGEON. 2. Arrange for someone to drive you home and be with you for the first 24 hours after discharge for your safety after your procedure for which you received sedation. Ensure it is someone we can share information with regarding your discharge. 3. You must contact your surgeon for instructions IF:   You are taking any blood thinners, aspirin, anti-inflammatory or vitamin E.   There is a change in your physical condition such as a cold, fever, rash, cuts, sores or any other infection, especially near your surgical site. 4. Do not drink alcohol the day before or day of your procedure. 5. A Pre-op History and Physical for surgery MUST be completed by your Physician or Urgent Care within 30 days of your procedure date. Please bring a copy with you on the day of your procedure and along with any other testing performed. THE DAY OF YOUR PROCEDURE:  1. Follow instructions for ARRIVAL TIME as DIRECTED BY YOUR SURGEON. 2. Enter the MAIN entrance from [address] [place] [address] and follow the signs to the free [employer] or [employer] parking (offered free of charge 6am-5pm).             3. Enter the Main Entrance of the hospital (do not enter from the lower level of the parking garage). Upon entrance, check in with the  at the main desk on your left. If no one is available at the desk, proceed into the [facility] Waiting Room and go through the door directly into the [facility]. There is a Check-in desk ACROSS from Room 5 (marked with a sign hanging from the ceiling). The phone number for the surgery center is [phone]. 4. Please call [phone] option #2 option #2 if you have not been preregistered yet. On the day of your procedure bring your insurance card and photo ID. You will be registered at your bedside once brought back to your room. 5. DO NOT EAT ANYTHING eight hours prior to your arrival for surgery. May have 8 ounces of water 4 hours prior to your arrival for surgery. NOTE: ALL Gastric, Bariatric and Bowel surgery patients MUST follow their surgeon's instructions. 6. MEDICATIONS    Take the following medications with a SMALL sip of water: levothyroine, omeprazole   Use your usual dose of inhalers the morning of surgery. BRING your rescue inhaler with you to hospital.    Anesthesia does NOT want you to take insulin the morning of surgery. They will control your blood sugar while you are at the hospital. Please contact your ordering physician for instructions regarding your insulin the night before your procedure. If you have an insulin pump, please keep it set on basal rate. 7. Do not swallow water when brushing teeth. No gum, candy, mints or ice chips. Refrain from smoking or at least decrease the amount. 8. Dress in loose, comfortable clothing appropriate for redressing after your procedure. Do not wear jewelry (including body piercings), make-up (especially NO eye make-up), fingernail polish (NO toenail polish if foot/leg surgery), lotion, powders or metal hairclips. 9. Dentures, glasses, or contacts will need to be removed before your procedure.  Bring cases for your glasses, contacts, dentures, or hearing aids to protect them while you are in surgery. 10. If you use a CPAP, please bring it with you on the day of your procedure. 11. We recommend that valuable personal  belongings such as cash, cell phones, e-tablets or jewelry, be left at home during your stay. The hospital will not be responsible for valuables that are not secured in the hospital safe. However, if your insurance requires a co-pay, you may want to bring a method of payment, i.e. Check or credit card, if you wish to pay your co-pay the day of surgery. 12. If you are to stay overnight, you may bring a bag with personal items. Please have any large items you may need brought in by your family after your arrival to your hospital room. 15. If you have a Living Will or Durable Power of , please bring a copy on the day of your procedure. 15. With your permission, one family member may accompany you while you are being prepared for surgery. Once you are ready, additional family members may join you. HOW WE KEEP YOU SAFE and WORK TO PREVENT SURGICAL SITE INFECTIONS:  1. Health care workers should always check your ID bracelet to verify your name and birth date. You will be asked many times to state your name, date of birth, and allergies. 2. Health care workers should always clean their hands with soap or alcohol gel before providing care to you. It is okay to ask anyone if they cleaned their hands before they touch you. 3. You will be actively involved in verifying the type of procedure you are having and ensuring the correct surgical site. This will be confirmed multiple times prior to your procedure. Do NOT imelda your surgery site UNLESS instructed to by your surgeon. 4. Do not shave or wax for 72 hours prior to procedure near your operative site. Shaving with a razor can irritate your skin and make it easier to develop an infection.  On the day of your procedure, any hair that needs to be removed near the surgical site will be clipped by a healthcare worker using a special clippers designed to avoid skin irritation. 5. When you are in the operating room, your surgical site will be cleansed with a special soap, and in most cases, you will be given an antibiotic before the surgery begins. What to expect AFTER YOUR PROCEDURE:  1. Immediately following your procedure, your will be taken to the PACU for the first phase of your recovery. Your nurse will help you recover from any potential side effects of anesthesia, such as extreme drowsiness, changes in your vital signs or breathing patterns. Nausea, headache, muscle aches, or sore throat may also occur after anesthesia. Your nurse will help you manage these potential side effects. 2. For comfort and safety, arrange to have someone at home with you for the first 24 hours after discharge. 3. You and your family will be given written instructions about your diet, activity, dressing care, medications, and return visits. 4. Once at home, should issues with nausea, pain, or bleeding occur, or should you notice any signs of infection, you should call your surgeon. 5. Always clean your hands before and after caring for your wound. Do not let your family touch your surgery site without cleaning their hands. 6. Narcotic pain medications can cause significant constipation. You may want to add a stool softener to your postoperative medication schedule or speak to your surgeon on how best to manage this SIDE EFFECT. SPECIAL INSTRUCTIONS     Thank you for allowing us to care for you. We strive to exceed your expectations in the delivery of care and service provided to you and your family. If you need to contact the [place] [address] staff for any reason, please call us at [phone]    Instructions reviewed with patient during preadmission testing phone interview.   Herman Reddy RN.5/26/2021 .1:37 PM      ADDITIONAL EDUCATIONAL INFORMATION REVIEWED PER PHONE WITH YOU AND/OR YOUR FAMILY:  Yes Hibiclens® Bathing Instructions   No Antibacterial Soap

## 2021-05-27 ENCOUNTER — TELEPHONE (OUTPATIENT)
Dept: INTERNAL MEDICINE CLINIC | Age: 71
End: 2021-05-27

## 2021-05-27 ENCOUNTER — ANESTHESIA EVENT (OUTPATIENT)
Dept: OPERATING ROOM | Age: 71
End: 2021-05-27
Payer: COMMERCIAL

## 2021-05-27 LAB
ESTIMATED AVERAGE GLUCOSE: 88.2 MG/DL
HBA1C MFR BLD: 4.7 %

## 2021-05-28 NOTE — PROGRESS NOTES
[facility] [employer] [employer] / [address] [facility], [address]    Acknowledgment of Informed Consent for Surgical or Medical Procedure and Sedation  I agree to allow doctor(s) Demarcus Mcfadden, and his/her associates or assistants, including residents and/or other qualified medical practitioner to perform the following medical treatment or procedure and to administer or direct the administration of sedation as necessary:  Procedure(s): LEFT TOTAL KNEE ARTHROPLASTY    My doctor has explained the following regarding the proposed procedure:   the explanation of the procedure   the benefits of the procedure   the potential problems that might occur during recuperation   the risks and side effects of the procedure which could include but are not limited to severe blood loss, infection, stroke or death   the benefits, risks and side effect of alternative procedures including the consequences of declining this procedure or any alternative procedures   the likelihood of achieving satisfactory results. I acknowledge no guarantee or assurance has been made to me regarding the results. I understand that during the course of this treatment/procedure, unforeseen conditions can occur which require an additional or different procedure. I agree to allow my physician or assistants to perform such extension of the original procedure as they may find necessary. I understand that sedation will often result in temporary impairment of memory and fine motor skills and that sedation can occasionally progress to a state of deep sedation or general anesthesia. I understand the risks of anesthesia for surgery include, but are not limited to, sore throat, hoarseness, injury to face, mouth, or teeth; nausea; headache; injury to blood vessels or nerves; death, brain damage, or paralysis.     I understand that if I have a Limitation of Treatment order in effect during my hospitalization, the order may or may not be in effect during this procedure. I give my doctor permission to give me blood or blood products. I understand that there are risks with receiving blood such as hepatitis, AIDS, fever, or allergic reaction. I acknowledge that the risks, benefits, and alternatives of this treatment have been explained to me and that no express or implied warranty has been given by the hospital, any blood bank, or any person or entity as to the blood or blood components transfused. At the discretion of my doctor, I agree to allow observers, equipment/product representatives and allow photographing, and/or televising of the procedure, provided my name or identity is maintained confidentially. I agree the hospital may dispose of or use for scientific or educational purposes any tissue, fluid, or body parts which may be removed.     ________________________________Date________Time______ am/pm  ([place] [facility])  Patient or Signature of Closest Relative or Legal Guardian    ________________________________Date________Time______am/pm      Page 1 of  1  Witness

## 2021-06-01 ENCOUNTER — HOSPITAL ENCOUNTER (OUTPATIENT)
Age: 71
Setting detail: OBSERVATION
Discharge: HOME | End: 2021-06-02
Attending: ORTHOPAEDIC SURGERY | Admitting: ORTHOPAEDIC SURGERY
Payer: COMMERCIAL

## 2021-06-01 ENCOUNTER — ANESTHESIA (OUTPATIENT)
Dept: OPERATING ROOM | Age: 71
End: 2021-06-01
Payer: COMMERCIAL

## 2021-06-01 ENCOUNTER — APPOINTMENT (OUTPATIENT)
Dept: GENERAL RADIOLOGY | Age: 71
End: 2021-06-01
Attending: ORTHOPAEDIC SURGERY
Payer: COMMERCIAL

## 2021-06-01 VITALS — DIASTOLIC BLOOD PRESSURE: 84 MMHG | TEMPERATURE: 98.1 F | SYSTOLIC BLOOD PRESSURE: 180 MMHG | OXYGEN SATURATION: 96 %

## 2021-06-01 DIAGNOSIS — Z96.652 TOTAL KNEE REPLACEMENT STATUS, LEFT: Primary | ICD-10-CM

## 2021-06-01 LAB
ABO/RH: NORMAL
ANTIBODY SCREEN: NORMAL
MRSA SCREEN RT-PCR: NORMAL

## 2021-06-01 PROCEDURE — 86850 RBC ANTIBODY SCREEN: CPT

## 2021-06-01 PROCEDURE — 6360000002 HC RX W HCPCS: Performed by: ORTHOPAEDIC SURGERY

## 2021-06-01 PROCEDURE — 7100000000 HC PACU RECOVERY - FIRST 15 MIN: Performed by: ORTHOPAEDIC SURGERY

## 2021-06-01 PROCEDURE — 6360000002 HC RX W HCPCS: Performed by: STUDENT IN AN ORGANIZED HEALTH CARE EDUCATION/TRAINING PROGRAM

## 2021-06-01 PROCEDURE — G0378 HOSPITAL OBSERVATION PER HR: HCPCS

## 2021-06-01 PROCEDURE — 3700000001 HC ADD 15 MINUTES (ANESTHESIA): Performed by: ORTHOPAEDIC SURGERY

## 2021-06-01 PROCEDURE — 73560 X-RAY EXAM OF KNEE 1 OR 2: CPT

## 2021-06-01 PROCEDURE — 6360000002 HC RX W HCPCS: Performed by: ANESTHESIOLOGY

## 2021-06-01 PROCEDURE — 94761 N-INVAS EAR/PLS OXIMETRY MLT: CPT

## 2021-06-01 PROCEDURE — 94150 VITAL CAPACITY TEST: CPT

## 2021-06-01 PROCEDURE — 3600000004 HC SURGERY LEVEL 4 BASE: Performed by: ORTHOPAEDIC SURGERY

## 2021-06-01 PROCEDURE — 2500000003 HC RX 250 WO HCPCS: Performed by: ANESTHESIOLOGY

## 2021-06-01 PROCEDURE — 6370000000 HC RX 637 (ALT 250 FOR IP): Performed by: ANESTHESIOLOGY

## 2021-06-01 PROCEDURE — 2580000003 HC RX 258: Performed by: STUDENT IN AN ORGANIZED HEALTH CARE EDUCATION/TRAINING PROGRAM

## 2021-06-01 PROCEDURE — 2700000000 HC OXYGEN THERAPY PER DAY

## 2021-06-01 PROCEDURE — 87641 MR-STAPH DNA AMP PROBE: CPT

## 2021-06-01 PROCEDURE — 2709999900 HC NON-CHARGEABLE SUPPLY: Performed by: ORTHOPAEDIC SURGERY

## 2021-06-01 PROCEDURE — 6370000000 HC RX 637 (ALT 250 FOR IP): Performed by: STUDENT IN AN ORGANIZED HEALTH CARE EDUCATION/TRAINING PROGRAM

## 2021-06-01 PROCEDURE — 3700000000 HC ANESTHESIA ATTENDED CARE: Performed by: ORTHOPAEDIC SURGERY

## 2021-06-01 PROCEDURE — 3600000014 HC SURGERY LEVEL 4 ADDTL 15MIN: Performed by: ORTHOPAEDIC SURGERY

## 2021-06-01 PROCEDURE — 7100000001 HC PACU RECOVERY - ADDTL 15 MIN: Performed by: ORTHOPAEDIC SURGERY

## 2021-06-01 PROCEDURE — C1776 JOINT DEVICE (IMPLANTABLE): HCPCS | Performed by: ORTHOPAEDIC SURGERY

## 2021-06-01 PROCEDURE — 86900 BLOOD TYPING SEROLOGIC ABO: CPT

## 2021-06-01 PROCEDURE — 2580000003 HC RX 258: Performed by: ORTHOPAEDIC SURGERY

## 2021-06-01 PROCEDURE — 86901 BLOOD TYPING SEROLOGIC RH(D): CPT

## 2021-06-01 PROCEDURE — 76942 ECHO GUIDE FOR BIOPSY: CPT | Performed by: ANESTHESIOLOGY

## 2021-06-01 PROCEDURE — 2500000003 HC RX 250 WO HCPCS: Performed by: ORTHOPAEDIC SURGERY

## 2021-06-01 PROCEDURE — C1713 ANCHOR/SCREW BN/BN,TIS/BN: HCPCS | Performed by: ORTHOPAEDIC SURGERY

## 2021-06-01 PROCEDURE — 2500000003 HC RX 250 WO HCPCS: Performed by: NURSE ANESTHETIST, CERTIFIED REGISTERED

## 2021-06-01 PROCEDURE — 6360000002 HC RX W HCPCS: Performed by: NURSE ANESTHETIST, CERTIFIED REGISTERED

## 2021-06-01 PROCEDURE — 6370000000 HC RX 637 (ALT 250 FOR IP): Performed by: ORTHOPAEDIC SURGERY

## 2021-06-01 DEVICE — JOURNEY II BCS FEMORAL OXINIUM                                    LEFT SIZE 3
Type: IMPLANTABLE DEVICE | Site: KNEE | Status: FUNCTIONAL
Brand: JOURNEY

## 2021-06-01 DEVICE — JOURNEY II BCS XLPE ARTICULAR                                    INSERT SIZE 1-2 LEFT 15MM
Type: IMPLANTABLE DEVICE | Site: KNEE | Status: FUNCTIONAL
Brand: JOURNEY

## 2021-06-01 DEVICE — RALLY HV AB BONE CEMENT 40 GRAMS
Type: IMPLANTABLE DEVICE | Site: KNEE | Status: FUNCTIONAL
Brand: RALLY

## 2021-06-01 DEVICE — JOURNEY BCS PATELLA BICONVEX 23 MM STANDARD
Type: IMPLANTABLE DEVICE | Site: KNEE | Status: FUNCTIONAL
Brand: JOURNEY

## 2021-06-01 DEVICE — JOURNEY TIBIAL BASEPLATE NONPOROUS                                    LT SZ 2
Type: IMPLANTABLE DEVICE | Site: KNEE | Status: FUNCTIONAL
Brand: JOURNEY

## 2021-06-01 DEVICE — KNEE K1 TOT HEMI STD CEM IMPL CAPPED K1 SN: Type: IMPLANTABLE DEVICE | Site: KNEE | Status: FUNCTIONAL

## 2021-06-01 RX ORDER — DEXAMETHASONE SODIUM PHOSPHATE 4 MG/ML
6 INJECTION, SOLUTION INTRA-ARTICULAR; INTRALESIONAL; INTRAMUSCULAR; INTRAVENOUS; SOFT TISSUE ONCE
Status: COMPLETED | OUTPATIENT
Start: 2021-06-02 | End: 2021-06-02

## 2021-06-01 RX ORDER — MIDAZOLAM HYDROCHLORIDE 1 MG/ML
INJECTION INTRAMUSCULAR; INTRAVENOUS PRN
Status: DISCONTINUED | OUTPATIENT
Start: 2021-06-01 | End: 2021-06-01 | Stop reason: SDUPTHER

## 2021-06-01 RX ORDER — ONDANSETRON 4 MG/1
4 TABLET, ORALLY DISINTEGRATING ORAL EVERY 8 HOURS PRN
Status: DISCONTINUED | OUTPATIENT
Start: 2021-06-01 | End: 2021-06-02 | Stop reason: HOSPADM

## 2021-06-01 RX ORDER — MAGNESIUM HYDROXIDE 1200 MG/15ML
LIQUID ORAL CONTINUOUS PRN
Status: COMPLETED | OUTPATIENT
Start: 2021-06-01 | End: 2021-06-01

## 2021-06-01 RX ORDER — GABAPENTIN 300 MG/1
300 CAPSULE ORAL 2 TIMES DAILY
Status: DISCONTINUED | OUTPATIENT
Start: 2021-06-01 | End: 2021-06-02 | Stop reason: HOSPADM

## 2021-06-01 RX ORDER — ACETAMINOPHEN 325 MG/1
650 TABLET ORAL EVERY 6 HOURS
Status: DISCONTINUED | OUTPATIENT
Start: 2021-06-01 | End: 2021-06-02 | Stop reason: HOSPADM

## 2021-06-01 RX ORDER — PROPOFOL 10 MG/ML
INJECTION, EMULSION INTRAVENOUS PRN
Status: DISCONTINUED | OUTPATIENT
Start: 2021-06-01 | End: 2021-06-01 | Stop reason: SDUPTHER

## 2021-06-01 RX ORDER — OXYCODONE HCL 10 MG/1
10 TABLET, FILM COATED, EXTENDED RELEASE ORAL ONCE
Status: COMPLETED | OUTPATIENT
Start: 2021-06-01 | End: 2021-06-01

## 2021-06-01 RX ORDER — LIDOCAINE HCL/PF 100 MG/5ML
SYRINGE (ML) INJECTION PRN
Status: DISCONTINUED | OUTPATIENT
Start: 2021-06-01 | End: 2021-06-01 | Stop reason: SDUPTHER

## 2021-06-01 RX ORDER — OXYCODONE HYDROCHLORIDE 5 MG/1
5 TABLET ORAL EVERY 4 HOURS PRN
Status: DISCONTINUED | OUTPATIENT
Start: 2021-06-01 | End: 2021-06-02 | Stop reason: HOSPADM

## 2021-06-01 RX ORDER — DEXAMETHASONE SODIUM PHOSPHATE 4 MG/ML
INJECTION, SOLUTION INTRA-ARTICULAR; INTRALESIONAL; INTRAMUSCULAR; INTRAVENOUS; SOFT TISSUE
Status: COMPLETED
Start: 2021-06-01 | End: 2021-06-02

## 2021-06-01 RX ORDER — FENTANYL CITRATE 50 UG/ML
INJECTION, SOLUTION INTRAMUSCULAR; INTRAVENOUS
Status: COMPLETED
Start: 2021-06-01 | End: 2021-06-01

## 2021-06-01 RX ORDER — SODIUM CHLORIDE 9 MG/ML
25 INJECTION, SOLUTION INTRAVENOUS PRN
Status: DISCONTINUED | OUTPATIENT
Start: 2021-06-01 | End: 2021-06-02 | Stop reason: HOSPADM

## 2021-06-01 RX ORDER — POLYETHYLENE GLYCOL 3350 17 G/17G
17 POWDER, FOR SOLUTION ORAL DAILY PRN
Status: DISCONTINUED | OUTPATIENT
Start: 2021-06-01 | End: 2021-06-02 | Stop reason: HOSPADM

## 2021-06-01 RX ORDER — SENNA AND DOCUSATE SODIUM 50; 8.6 MG/1; MG/1
1 TABLET, FILM COATED ORAL 2 TIMES DAILY
Status: DISCONTINUED | OUTPATIENT
Start: 2021-06-01 | End: 2021-06-02 | Stop reason: HOSPADM

## 2021-06-01 RX ORDER — LABETALOL HYDROCHLORIDE 5 MG/ML
5 INJECTION, SOLUTION INTRAVENOUS EVERY 10 MIN PRN
Status: DISCONTINUED | OUTPATIENT
Start: 2021-06-01 | End: 2021-06-01 | Stop reason: HOSPADM

## 2021-06-01 RX ORDER — BUPROPION HYDROCHLORIDE 150 MG/1
150 TABLET ORAL EVERY MORNING
Status: DISCONTINUED | OUTPATIENT
Start: 2021-06-02 | End: 2021-06-02 | Stop reason: HOSPADM

## 2021-06-01 RX ORDER — DEXAMETHASONE SODIUM PHOSPHATE 10 MG/ML
10 INJECTION, SOLUTION INTRAMUSCULAR; INTRAVENOUS ONCE
Status: DISCONTINUED | OUTPATIENT
Start: 2021-06-01 | End: 2021-06-01 | Stop reason: HOSPADM

## 2021-06-01 RX ORDER — ASPIRIN 325 MG
325 TABLET, DELAYED RELEASE (ENTERIC COATED) ORAL DAILY
Qty: 45 TABLET | Refills: 0 | Status: SHIPPED | OUTPATIENT
Start: 2021-06-01 | End: 2021-08-24 | Stop reason: SDUPTHER

## 2021-06-01 RX ORDER — SODIUM CHLORIDE, SODIUM LACTATE, POTASSIUM CHLORIDE, AND CALCIUM CHLORIDE .6; .31; .03; .02 G/100ML; G/100ML; G/100ML; G/100ML
IRRIGANT IRRIGATION PRN
Status: DISCONTINUED | OUTPATIENT
Start: 2021-06-01 | End: 2021-06-01 | Stop reason: ALTCHOICE

## 2021-06-01 RX ORDER — ROCURONIUM BROMIDE 10 MG/ML
INJECTION, SOLUTION INTRAVENOUS PRN
Status: DISCONTINUED | OUTPATIENT
Start: 2021-06-01 | End: 2021-06-01 | Stop reason: SDUPTHER

## 2021-06-01 RX ORDER — ONDANSETRON 2 MG/ML
4 INJECTION INTRAMUSCULAR; INTRAVENOUS EVERY 6 HOURS PRN
Status: DISCONTINUED | OUTPATIENT
Start: 2021-06-01 | End: 2021-06-02 | Stop reason: HOSPADM

## 2021-06-01 RX ORDER — DIPHENHYDRAMINE HYDROCHLORIDE 50 MG/ML
12.5 INJECTION INTRAMUSCULAR; INTRAVENOUS
Status: DISCONTINUED | OUTPATIENT
Start: 2021-06-01 | End: 2021-06-01 | Stop reason: HOSPADM

## 2021-06-01 RX ORDER — OMEPRAZOLE 20 MG/1
40 CAPSULE, DELAYED RELEASE ORAL EVERY MORNING
Status: DISCONTINUED | OUTPATIENT
Start: 2021-06-02 | End: 2021-06-02 | Stop reason: HOSPADM

## 2021-06-01 RX ORDER — OXYCODONE HYDROCHLORIDE 5 MG/1
10 TABLET ORAL EVERY 4 HOURS PRN
Status: DISCONTINUED | OUTPATIENT
Start: 2021-06-01 | End: 2021-06-02 | Stop reason: HOSPADM

## 2021-06-01 RX ORDER — CEFAZOLIN SODIUM 2 G/50ML
2000 SOLUTION INTRAVENOUS ONCE
Status: COMPLETED | OUTPATIENT
Start: 2021-06-01 | End: 2021-06-01

## 2021-06-01 RX ORDER — SUCCINYLCHOLINE/SOD CL,ISO/PF 200MG/10ML
SYRINGE (ML) INTRAVENOUS PRN
Status: DISCONTINUED | OUTPATIENT
Start: 2021-06-01 | End: 2021-06-01 | Stop reason: SDUPTHER

## 2021-06-01 RX ORDER — SCOLOPAMINE TRANSDERMAL SYSTEM 1 MG/1
1 PATCH, EXTENDED RELEASE TRANSDERMAL ONCE
Status: COMPLETED | OUTPATIENT
Start: 2021-06-01 | End: 2021-06-01

## 2021-06-01 RX ORDER — SODIUM CHLORIDE 0.9 % (FLUSH) 0.9 %
5-40 SYRINGE (ML) INJECTION EVERY 12 HOURS SCHEDULED
Status: DISCONTINUED | OUTPATIENT
Start: 2021-06-01 | End: 2021-06-02 | Stop reason: HOSPADM

## 2021-06-01 RX ORDER — SODIUM CHLORIDE, SODIUM LACTATE, POTASSIUM CHLORIDE, CALCIUM CHLORIDE 600; 310; 30; 20 MG/100ML; MG/100ML; MG/100ML; MG/100ML
INJECTION, SOLUTION INTRAVENOUS CONTINUOUS
Status: DISCONTINUED | OUTPATIENT
Start: 2021-06-01 | End: 2021-06-01

## 2021-06-01 RX ORDER — PHENYLEPHRINE HYDROCHLORIDE 10 MG/ML
INJECTION INTRAVENOUS PRN
Status: DISCONTINUED | OUTPATIENT
Start: 2021-06-01 | End: 2021-06-01 | Stop reason: SDUPTHER

## 2021-06-01 RX ORDER — LEVOTHYROXINE SODIUM 0.15 MG/1
150 TABLET ORAL
Status: DISCONTINUED | OUTPATIENT
Start: 2021-06-02 | End: 2021-06-02 | Stop reason: HOSPADM

## 2021-06-01 RX ORDER — OXYCODONE HYDROCHLORIDE AND ACETAMINOPHEN 5; 325 MG/1; MG/1
1 TABLET ORAL EVERY 6 HOURS PRN
Qty: 32 TABLET | Refills: 0 | Status: SHIPPED | OUTPATIENT
Start: 2021-06-01 | End: 2021-06-08

## 2021-06-01 RX ORDER — FENTANYL CITRATE 50 UG/ML
25 INJECTION, SOLUTION INTRAMUSCULAR; INTRAVENOUS EVERY 5 MIN PRN
Status: DISCONTINUED | OUTPATIENT
Start: 2021-06-01 | End: 2021-06-01 | Stop reason: HOSPADM

## 2021-06-01 RX ORDER — ONDANSETRON 2 MG/ML
INJECTION INTRAMUSCULAR; INTRAVENOUS PRN
Status: DISCONTINUED | OUTPATIENT
Start: 2021-06-01 | End: 2021-06-01 | Stop reason: SDUPTHER

## 2021-06-01 RX ORDER — TRAZODONE HYDROCHLORIDE 50 MG/1
50 TABLET ORAL NIGHTLY
Status: DISCONTINUED | OUTPATIENT
Start: 2021-06-01 | End: 2021-06-02 | Stop reason: HOSPADM

## 2021-06-01 RX ORDER — PROMETHAZINE HYDROCHLORIDE 25 MG/ML
6.25 INJECTION, SOLUTION INTRAMUSCULAR; INTRAVENOUS
Status: DISCONTINUED | OUTPATIENT
Start: 2021-06-01 | End: 2021-06-01 | Stop reason: HOSPADM

## 2021-06-01 RX ORDER — FENTANYL CITRATE 50 UG/ML
INJECTION, SOLUTION INTRAMUSCULAR; INTRAVENOUS PRN
Status: DISCONTINUED | OUTPATIENT
Start: 2021-06-01 | End: 2021-06-01 | Stop reason: SDUPTHER

## 2021-06-01 RX ORDER — MORPHINE SULFATE 2 MG/ML
2 INJECTION, SOLUTION INTRAMUSCULAR; INTRAVENOUS
Status: DISCONTINUED | OUTPATIENT
Start: 2021-06-01 | End: 2021-06-02 | Stop reason: HOSPADM

## 2021-06-01 RX ORDER — POLYETHYLENE GLYCOL 3350 17 G/17G
17 POWDER, FOR SOLUTION ORAL DAILY
Qty: 510 G | Refills: 0 | Status: ON HOLD | OUTPATIENT
Start: 2021-06-01 | End: 2021-06-10 | Stop reason: SDUPTHER

## 2021-06-01 RX ORDER — MIDAZOLAM HYDROCHLORIDE 1 MG/ML
INJECTION INTRAMUSCULAR; INTRAVENOUS
Status: COMPLETED
Start: 2021-06-01 | End: 2021-06-01

## 2021-06-01 RX ORDER — SODIUM CHLORIDE 0.9 % (FLUSH) 0.9 %
5-40 SYRINGE (ML) INJECTION PRN
Status: DISCONTINUED | OUTPATIENT
Start: 2021-06-01 | End: 2021-06-02 | Stop reason: HOSPADM

## 2021-06-01 RX ORDER — ONDANSETRON 4 MG/1
4 TABLET, FILM COATED ORAL 3 TIMES DAILY PRN
Qty: 30 TABLET | Refills: 0 | Status: ON HOLD | OUTPATIENT
Start: 2021-06-01 | End: 2021-06-10 | Stop reason: HOSPADM

## 2021-06-01 RX ORDER — BUPIVACAINE HYDROCHLORIDE 5 MG/ML
INJECTION, SOLUTION EPIDURAL; INTRACAUDAL
Status: COMPLETED | OUTPATIENT
Start: 2021-06-01 | End: 2021-06-01

## 2021-06-01 RX ORDER — ONDANSETRON 2 MG/ML
4 INJECTION INTRAMUSCULAR; INTRAVENOUS
Status: DISCONTINUED | OUTPATIENT
Start: 2021-06-01 | End: 2021-06-01 | Stop reason: HOSPADM

## 2021-06-01 RX ORDER — DEXAMETHASONE SODIUM PHOSPHATE 4 MG/ML
INJECTION, SOLUTION INTRA-ARTICULAR; INTRALESIONAL; INTRAMUSCULAR; INTRAVENOUS; SOFT TISSUE PRN
Status: DISCONTINUED | OUTPATIENT
Start: 2021-06-01 | End: 2021-06-01 | Stop reason: SDUPTHER

## 2021-06-01 RX ORDER — CEFAZOLIN SODIUM 2 G/50ML
2000 SOLUTION INTRAVENOUS EVERY 8 HOURS
Status: COMPLETED | OUTPATIENT
Start: 2021-06-01 | End: 2021-06-02

## 2021-06-01 RX ORDER — ALPRAZOLAM 0.5 MG/1
1 TABLET ORAL 2 TIMES DAILY PRN
Status: DISCONTINUED | OUTPATIENT
Start: 2021-06-01 | End: 2021-06-02 | Stop reason: HOSPADM

## 2021-06-01 RX ORDER — HYDRALAZINE HYDROCHLORIDE 20 MG/ML
5 INJECTION INTRAMUSCULAR; INTRAVENOUS EVERY 10 MIN PRN
Status: DISCONTINUED | OUTPATIENT
Start: 2021-06-01 | End: 2021-06-01 | Stop reason: HOSPADM

## 2021-06-01 RX ORDER — MORPHINE SULFATE 2 MG/ML
4 INJECTION, SOLUTION INTRAMUSCULAR; INTRAVENOUS
Status: DISCONTINUED | OUTPATIENT
Start: 2021-06-01 | End: 2021-06-02 | Stop reason: HOSPADM

## 2021-06-01 RX ORDER — BUPIVACAINE HYDROCHLORIDE 5 MG/ML
INJECTION, SOLUTION EPIDURAL; INTRACAUDAL
Status: COMPLETED
Start: 2021-06-01 | End: 2021-06-01

## 2021-06-01 RX ORDER — GLYCOPYRROLATE 1 MG/5 ML
SYRINGE (ML) INTRAVENOUS PRN
Status: DISCONTINUED | OUTPATIENT
Start: 2021-06-01 | End: 2021-06-01 | Stop reason: SDUPTHER

## 2021-06-01 RX ADMIN — ASPIRIN 325 MG: 325 TABLET, COATED ORAL at 16:30

## 2021-06-01 RX ADMIN — ACETAMINOPHEN 650 MG: 325 TABLET ORAL at 16:31

## 2021-06-01 RX ADMIN — TRANEXAMIC ACID 1000 MG: 1 INJECTION, SOLUTION INTRAVENOUS at 10:47

## 2021-06-01 RX ADMIN — PROPOFOL 100 MG: 10 INJECTION, EMULSION INTRAVENOUS at 10:42

## 2021-06-01 RX ADMIN — PHENYLEPHRINE HYDROCHLORIDE 80 MCG: 10 INJECTION INTRAVENOUS at 10:51

## 2021-06-01 RX ADMIN — PROPOFOL 50 MG: 10 INJECTION, EMULSION INTRAVENOUS at 11:12

## 2021-06-01 RX ADMIN — DOCUSATE SODIUM 50 MG AND SENNOSIDES 8.6 MG 1 TABLET: 8.6; 5 TABLET, FILM COATED ORAL at 22:30

## 2021-06-01 RX ADMIN — DEXAMETHASONE SODIUM PHOSPHATE 12 MG: 4 INJECTION, SOLUTION INTRAMUSCULAR; INTRAVENOUS at 16:21

## 2021-06-01 RX ADMIN — FENTANYL CITRATE 50 MCG: 50 INJECTION, SOLUTION INTRAMUSCULAR; INTRAVENOUS at 10:41

## 2021-06-01 RX ADMIN — ACETAMINOPHEN 650 MG: 325 TABLET ORAL at 22:30

## 2021-06-01 RX ADMIN — Medication 0.1 MG: at 10:51

## 2021-06-01 RX ADMIN — OXYCODONE 10 MG: 5 TABLET ORAL at 22:33

## 2021-06-01 RX ADMIN — SODIUM CHLORIDE, POTASSIUM CHLORIDE, SODIUM LACTATE AND CALCIUM CHLORIDE: 600; 310; 30; 20 INJECTION, SOLUTION INTRAVENOUS at 12:17

## 2021-06-01 RX ADMIN — TRAZODONE HYDROCHLORIDE 50 MG: 50 TABLET ORAL at 22:31

## 2021-06-01 RX ADMIN — OXYCODONE HYDROCHLORIDE 10 MG: 10 TABLET, FILM COATED, EXTENDED RELEASE ORAL at 10:33

## 2021-06-01 RX ADMIN — CEFAZOLIN SODIUM 2000 MG: 2 SOLUTION INTRAVENOUS at 18:05

## 2021-06-01 RX ADMIN — GABAPENTIN 300 MG: 300 CAPSULE ORAL at 22:30

## 2021-06-01 RX ADMIN — OXYCODONE 10 MG: 5 TABLET ORAL at 16:30

## 2021-06-01 RX ADMIN — PROPOFOL 50 MG: 10 INJECTION, EMULSION INTRAVENOUS at 11:05

## 2021-06-01 RX ADMIN — FENTANYL CITRATE 25 MCG: 50 INJECTION, SOLUTION INTRAMUSCULAR; INTRAVENOUS at 11:05

## 2021-06-01 RX ADMIN — FENTANYL CITRATE 100 MCG: 50 INJECTION, SOLUTION INTRAMUSCULAR; INTRAVENOUS at 10:20

## 2021-06-01 RX ADMIN — Medication 50 MG: at 10:42

## 2021-06-01 RX ADMIN — FENTANYL CITRATE 25 MCG: 50 INJECTION, SOLUTION INTRAMUSCULAR; INTRAVENOUS at 11:12

## 2021-06-01 RX ADMIN — Medication 100 MG: at 10:42

## 2021-06-01 RX ADMIN — ROCURONIUM BROMIDE 5 MG: 10 INJECTION INTRAVENOUS at 10:42

## 2021-06-01 RX ADMIN — ONDANSETRON 4 MG: 2 INJECTION INTRAMUSCULAR; INTRAVENOUS at 10:47

## 2021-06-01 RX ADMIN — MIDAZOLAM HYDROCHLORIDE 2 MG: 2 INJECTION, SOLUTION INTRAMUSCULAR; INTRAVENOUS at 10:20

## 2021-06-01 RX ADMIN — CEFAZOLIN SODIUM 2000 MG: 2 SOLUTION INTRAVENOUS at 10:38

## 2021-06-01 RX ADMIN — DEXAMETHASONE SODIUM PHOSPHATE 4 MG: 4 INJECTION, SOLUTION INTRAMUSCULAR; INTRAVENOUS at 10:25

## 2021-06-01 RX ADMIN — SCOPOLAMINE 1 PATCH: 1 PATCH, EXTENDED RELEASE TRANSDERMAL at 10:33

## 2021-06-01 RX ADMIN — BUPIVACAINE HYDROCHLORIDE 30 ML: 5 INJECTION, SOLUTION EPIDURAL; INTRACAUDAL; PERINEURAL at 10:25

## 2021-06-01 RX ADMIN — Medication 10 ML: at 22:31

## 2021-06-01 RX ADMIN — SODIUM CHLORIDE, POTASSIUM CHLORIDE, SODIUM LACTATE AND CALCIUM CHLORIDE: 600; 310; 30; 20 INJECTION, SOLUTION INTRAVENOUS at 10:36

## 2021-06-01 ASSESSMENT — PULMONARY FUNCTION TESTS
PIF_VALUE: 21
PIF_VALUE: 21
PIF_VALUE: 18
PIF_VALUE: 17
PIF_VALUE: 21
PIF_VALUE: 17
PIF_VALUE: 20
PIF_VALUE: 21
PIF_VALUE: 21
PIF_VALUE: 20
PIF_VALUE: 20
PIF_VALUE: 3
PIF_VALUE: 21
PIF_VALUE: 20
PIF_VALUE: 21
PIF_VALUE: 0
PIF_VALUE: 0
PIF_VALUE: 20
PIF_VALUE: 21
PIF_VALUE: 20
PIF_VALUE: 21
PIF_VALUE: 20
PIF_VALUE: 21
PIF_VALUE: 21
PIF_VALUE: 20
PIF_VALUE: 3
PIF_VALUE: 21
PIF_VALUE: 22
PIF_VALUE: 21
PIF_VALUE: 16
PIF_VALUE: 1
PIF_VALUE: 0
PIF_VALUE: 21
PIF_VALUE: 1
PIF_VALUE: 21
PIF_VALUE: 20
PIF_VALUE: 21
PIF_VALUE: 4
PIF_VALUE: 21
PIF_VALUE: 18
PIF_VALUE: 21
PIF_VALUE: 21
PIF_VALUE: 0
PIF_VALUE: 20
PIF_VALUE: 21
PIF_VALUE: 20
PIF_VALUE: 18
PIF_VALUE: 17
PIF_VALUE: 21
PIF_VALUE: 22
PIF_VALUE: 8
PIF_VALUE: 21
PIF_VALUE: 20
PIF_VALUE: 21
PIF_VALUE: 20
PIF_VALUE: 21
PIF_VALUE: 21
PIF_VALUE: 18
PIF_VALUE: 20
PIF_VALUE: 21
PIF_VALUE: 18
PIF_VALUE: 21
PIF_VALUE: 19
PIF_VALUE: 19
PIF_VALUE: 21
PIF_VALUE: 21
PIF_VALUE: 0
PIF_VALUE: 20
PIF_VALUE: 21
PIF_VALUE: 3
PIF_VALUE: 21
PIF_VALUE: 22
PIF_VALUE: 20
PIF_VALUE: 21
PIF_VALUE: 1
PIF_VALUE: 21
PIF_VALUE: 18
PIF_VALUE: 1
PIF_VALUE: 21
PIF_VALUE: 3
PIF_VALUE: 20
PIF_VALUE: 21
PIF_VALUE: 18
PIF_VALUE: 20
PIF_VALUE: 21
PIF_VALUE: 22
PIF_VALUE: 21
PIF_VALUE: 21
PIF_VALUE: 18
PIF_VALUE: 1
PIF_VALUE: 3
PIF_VALUE: 21
PIF_VALUE: 21
PIF_VALUE: 3
PIF_VALUE: 19
PIF_VALUE: 1
PIF_VALUE: 20
PIF_VALUE: 21
PIF_VALUE: 21
PIF_VALUE: 20
PIF_VALUE: 21
PIF_VALUE: 18
PIF_VALUE: 1
PIF_VALUE: 21
PIF_VALUE: 19
PIF_VALUE: 20
PIF_VALUE: 21
PIF_VALUE: 18
PIF_VALUE: 17
PIF_VALUE: 20
PIF_VALUE: 21
PIF_VALUE: 21
PIF_VALUE: 3
PIF_VALUE: 21
PIF_VALUE: 3
PIF_VALUE: 20
PIF_VALUE: 21
PIF_VALUE: 17
PIF_VALUE: 1
PIF_VALUE: 21
PIF_VALUE: 21
PIF_VALUE: 3

## 2021-06-01 ASSESSMENT — PAIN DESCRIPTION - ORIENTATION
ORIENTATION: LEFT
ORIENTATION: LEFT

## 2021-06-01 ASSESSMENT — PAIN DESCRIPTION - ONSET: ONSET: ON-GOING

## 2021-06-01 ASSESSMENT — PAIN DESCRIPTION - DESCRIPTORS: DESCRIPTORS: ACHING

## 2021-06-01 ASSESSMENT — PAIN SCALES - GENERAL
PAINLEVEL_OUTOF10: 0
PAINLEVEL_OUTOF10: 0
PAINLEVEL_OUTOF10: 8
PAINLEVEL_OUTOF10: 3
PAINLEVEL_OUTOF10: 7
PAINLEVEL_OUTOF10: 7
PAINLEVEL_OUTOF10: 4
PAINLEVEL_OUTOF10: 0
PAINLEVEL_OUTOF10: 7

## 2021-06-01 ASSESSMENT — PAIN DESCRIPTION - DIRECTION: RADIATING_TOWARDS: TO THIGH

## 2021-06-01 ASSESSMENT — PAIN DESCRIPTION - FREQUENCY: FREQUENCY: CONTINUOUS

## 2021-06-01 ASSESSMENT — PAIN DESCRIPTION - PAIN TYPE
TYPE: ACUTE PAIN;CHRONIC PAIN;SURGICAL PAIN
TYPE: ACUTE PAIN;SURGICAL PAIN

## 2021-06-01 ASSESSMENT — PAIN DESCRIPTION - PROGRESSION: CLINICAL_PROGRESSION: GRADUALLY WORSENING

## 2021-06-01 ASSESSMENT — PAIN DESCRIPTION - LOCATION
LOCATION: KNEE
LOCATION: KNEE

## 2021-06-01 ASSESSMENT — PAIN - FUNCTIONAL ASSESSMENT
PAIN_FUNCTIONAL_ASSESSMENT: PREVENTS OR INTERFERES SOME ACTIVE ACTIVITIES AND ADLS
PAIN_FUNCTIONAL_ASSESSMENT: 0-10

## 2021-06-01 NOTE — ANESTHESIA POSTPROCEDURE EVALUATION
Department of Anesthesiology  Postprocedure Note    Patient: Ernst Lundberg  MRN: [id number]  YOB: 1950  Date of evaluation: 6/1/2021  Time:  1:24 PM     Procedure Summary     Date: 06/01/21 Room / Location: [facility] [address]    Anesthesia Start: 4767 Anesthesia Stop: 1354    Procedure: LEFT TOTAL KNEE ARTHROPLASTY (Left Knee) Diagnosis:       Osteoarthritis of left knee, unspecified osteoarthritis type      (Osteoarthritis of left knee, unspecified osteoarthritis type [M17.12])    Surgeons: Sugey Veronica MD Responsible Provider: Frankey Blush, DO    Anesthesia Type: general, regional ASA Status: 2          Anesthesia Type: general, regional    Mook Phase I: Mook Score: 10    Mook Phase II:      Last vitals: Reviewed and per EMR flowsheets.        Anesthesia Post Evaluation    Patient location during evaluation: bedside  Patient participation: complete - patient participated  Level of consciousness: awake and alert  Airway patency: patent  Nausea & Vomiting: no nausea and no vomiting  Complications: no  Respiratory status: acceptable  Hydration status: stable

## 2021-06-01 NOTE — ANESTHESIA PRE PROCEDURE
Once Filemon Kang MD        ortho mix (with morphine) injection   Injection On Call Filemon Kang MD        ceFAZolin (ANCEF) 2000 mg in dextrose 3 % 50 mL IVPB (duplex)  2,000 mg Intravenous Once Filemon Kang MD        oxyCODONE (OXYCONTIN) extended release tablet 10 mg  10 mg Oral Once Filemon Kang MD        tranexamic acid (CYKLOKAPRON) 1,000 mg in sodium chloride 0.9 % 50 mL IVPB  1,000 mg Intravenous Once Filemon Kang MD        tranexamic acid (CYKLOKAPRON) 1,000 mg in sodium chloride 0.9 % 50 mL IVPB  1,000 mg Intravenous Once Filemon Kang MD        lactated ringers infusion   Intravenous Continuous Reda SasegunsDO        bupivacaine (PF) (MARCAINE) 0.5 % injection             dexamethasone (DECADRON) 4 MG/ML injection             midazolam (VERSED) 2 MG/2ML injection             fentaNYL (SUBLIMAZE) 100 MCG/2ML injection                Allergies:     Allergies   Allergen Reactions    Codeine Nausea Only    Macrobid [Nitrofurantoin] Other (See Comments)     Fever, myalgias       Problem List:    Patient Active Problem List   Diagnosis Code    Obesity due to excess calories with serious comorbidity E66.09    Chronic bilateral low back pain with sciatica M54.40, G89.29    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    Major depressive disorder with single episode F32.9    Anxiety F41.9    Hypothyroidism E03.9    Family history of bicuspid aortic valve Z82.79    Osteopenia M85.80    GERD (gastroesophageal reflux disease) K21.9    Anal fissure K60.2    Psychophysiological insomnia F51.04    Colon polyps K63.5    Osteoarthritis of left knee M17.12    Restless leg syndrome G25.81    Ganglion cyst of wrist, left M67.432    Neck pain, chronic M54.2, G89.29    Hypertriglyceridemia Y80.2    Periumbilical mass V05.35    Coronary artery calcification I25.10, I25.84       Past Medical History:        Diagnosis Date    Exercise tolerance: good (>4 METS),   (+) hyperlipidemia      NYHA Classification: I  ECG reviewed  Rhythm: regular  Rate: normal  Echocardiogram reviewed         Beta Blocker:  Not on Beta Blocker         Neuro/Psych:   (+) depression/anxiety             GI/Hepatic/Renal:   (+) GERD: well controlled,           Endo/Other:    (+) hypothyroidism::., .                 Abdominal:           Vascular:                                        Anesthesia Plan      general and regional     ASA 2       Induction: intravenous. MIPS: Postoperative opioids intended. Anesthetic plan and risks discussed with patient. Use of blood products discussed with patient whom consented to blood products. Plan discussed with CRNA.     Attending anesthesiologist reviewed and agrees with Pre Eval content              Tonia Macias DO   6/1/2021

## 2021-06-01 NOTE — PROGRESS NOTES
4 Eyes Admission Assessment     I agree as the admission nurse that 2 RN's have performed a thorough Head to Toe Skin Assessment on the patient. ALL assessment sites listed below have been assessed on admission. Areas assessed by both nurses:   [x]   Head, Face, and Ears   [x]   Shoulders, Back, and Chest  [x]   Arms, Elbows, and Hands   [x]   Coccyx, Sacrum, and Ischium  [x]   Legs, Feet, and Heels        Does the Patient have Skin Breakdown?   Scattered bruising,   Beny Prevention initiated:  no  Wound Care Orders initiated: no      [facility] nurse consulted for Pressure Injury (Stage 3,4, Unstageable, DTI, NWPT, and Complex wounds) or Beny score 18 or lower:  no    Nurse 1 eSignature: Electronically signed by Lisa Corbin RN on 6/1/21 at 2:56 PM EDT    **SHARE this note so that the co-signing nurse is able to place an eSignature**    Nurse 2 eSignature: Electronically signed by Jose Trent RN on 6/1/21 at 3:13 PM EDT

## 2021-06-01 NOTE — PROGRESS NOTES
PACU Transfer to Floor Note    Current Allergies: Codeine and Macrobid [nitrofurantoin]    Pt meets criteria as per Moses Score and ASPAN Standards to transfer to next phase of care. No results for input(s): POCGLU in the last 72 hours. Vitals:    06/01/21 1430   BP: (!) 114/54   Pulse: 87   Resp: 13   Temp: 98.5 °F (36.9 °C)   SpO2: 99%     Vitals within 20% of pt's admission vitals as per MOSES SCORE    SpO2: 99 %    O2 Flow Rate (L/min): 2 L/min      Intake/Output Summary (Last 24 hours) at 6/1/2021 1440  Last data filed at 6/1/2021 1430  Gross per 24 hour   Intake 1259 ml   Output --   Net 1259 ml       Pain assessment:  none    Pain Level: 0- received block    Patient was assessed for alterations to skin integrity. There were not alterations observed. Is patient incontinent: no    Handoff report given at bedside.    Daughter updated and directed to pt room      6/1/2021 2:40 PM

## 2021-06-01 NOTE — H&P
BMI 34.02 kg/m²      Airway:  Airway patent with no audible stridor    Heart:  Regular rate and rhythm, No murmur noted    Lungs:  No increased work of breathing, good air exchange, clear to auscultation bilaterally, no crackles or wheezing    Abdomen:  Soft, non-distended, non-tender, normal active bowel sounds, no masses palpated    ASSESSMENT AND PLAN    Patient is a 78 y.o. female with above specified procedure planned. 1.  The patients history and physical was obtained and signed off by the pre-admission testing department. Patient seen and focused exam done today- no new changes since last physical exam on 5-    2. Access to ancillary services are available per request of the provider.     Rishi Cordovama     6/1/2021

## 2021-06-01 NOTE — CONSULTS
Hospital Medicine  Consult History & Physical        Chief Complaint:  Elective post op left TKA     Date of Service: Pt seen/examined in consultation on 06/01/21    History Of Present Illness:      78 y.o. female who we are asked to see/evaluate by Ke Ortiz* for medical management of post operative management. She underwent Left TKA for left Knee OA, Estimated Blood Loss (mL): less than 100. She was seen post op and doing well, Vital are stable, and she is on 2L NC but denies any shortness of breath. Says she ate fruit, cottage cheest and chicken noodle soup and able to keep it down. Walked from bed to the restroom, says pain is tolerable. Denies any specific problems.       Past Medical History:        Diagnosis Date    Anal fissure 7/16/2013    Anxiety     Colon polyps     Depression     Fibroid uterus     Hypothyroidism     Morbid obesity ([address]     Osteopenia        Past Surgical History:        Procedure Laterality Date    ABDOMINAL HERNIA REPAIR  2008    APPENDECTOMY  Age 27    CATARACT REMOVAL WITH IMPLANT Right 10/23/2017    with vitrectomy    CATARACT REMOVAL WITH IMPLANT Left 02/26/2018    with vitrectomy    COLONOSCOPY N/A 10/10/2018    COLONOSCOPY POLYPECTOMY SNARE/COLD BIOPSY performed by Robyn Jordan MD at [address]  Right 8/26/2019    RIGHT L4 AND L5 TRANSFORAMINAL EPIDURAL STEROID INJECTION WITH FLUOROSCOPY performed by Sj Morales MD at [address], [facility]. INJECT OTHER PERPHRL NERV Right 10/21/2019    RIGHT PIRIFORMIS INJECTION WITH FLUOROSCOPY (87809) performed by Sj Morales MD at [facility], TOTAL ABDOMINAL      ovaries out also   [address] Right 1/7/2019    RIGHT L3 AND L4 LUMBAR TRANSFORAMINAL WITH FLUOROSCOPY performed by Sj Morales MD at [address] [place] [address] Right 5/15/2019    RIGHT L4 AND L5 TRANSFORAMINAL EPIDURAL STEROID INJECTION WITH FLUOROSCOPY performed by Khloe Churchill MD at [facility] [address] ESOPHAGOGASTRODUODENOSCOPY TRANSORAL DIAGNOSTIC N/A 10/10/2018    EGD performed by Valentin Hidalgo MD at [address] [facility]    Fibroids    TOTAL KNEE ARTHROPLASTY Left 6/1/2021    LEFT TOTAL KNEE ARTHROPLASTY performed by Rodolfo Rossi MD at [address]       Medications Prior to Admission:    Prior to Admission medications    Medication Sig Start Date End Date Taking? Authorizing Provider   oxyCODONE-acetaminophen (PERCOCET) 5-325 MG per tablet Take 1 tablet by mouth every 6 hours as needed for Pain for up to 7 days. Intended supply: 7 days.  Take lowest dose possible to manage pain 6/1/21 6/8/21 Yes Daren Dawn DO   polyethylene glycol [facility]) 17 GM/SCOOP powder Take 17 g by mouth daily 6/1/21 7/1/21 Yes Daren Dawn DO   ondansetron [facility]) 4 MG tablet Take 1 tablet by mouth 3 times daily as needed for Nausea or Vomiting 6/1/21  Yes Daren Dawn DO   aspirin 325 MG EC tablet Take 1 tablet by mouth daily 6/1/21 7/16/21 Yes Daren Dawn DO   clobetasol (TEMOVATE) 0.05 % ointment Apply topically nightly 5/11/21 6/10/21 Yes Yuliana Navarro MD   buPROPion (WELLBUTRIN XL) 150 MG extended release tablet Take 1 tablet by mouth every morning 4/23/21  Yes Montana Arreugin MD   ALPRAZolam Ebony Elise) 1 MG tablet TAKE 1 TABLET BY MOUTH TWICE DAILY AS NEEDED FOR ANXIETY 3/29/21 6/29/21 Yes Montana Arreguin MD   traZODone (DESYREL) 50 MG tablet TAKE 3 TABLETS BY MOUTH EVERY NIGHT AT BEDTIME 2/26/21  Yes Montana Arreguin MD   Multiple Vitamin (MULTIVITAMIN PO) Take by mouth   Yes Historical Provider, MD   gabapentin (NEURONTIN) 300 MG capsule TAKE ONE CAPSULE BY MOUTH TWICE DAILY 12/29/20 7/1/21 Yes Montana Arreguin MD   levothyroxine (SYNTHROID) 150 MCG tablet TAKE 1 TABLET BY MOUTH EVERY DAY 11/2/20  Yes Montana Arreguin MD   omeprazole (PRILOSEC) 40 MG delayed release capsule TAKE 1 CAPSULE BY MOUTH EVERY MORNING BEFORE BREAKFAST 9/1/20  Yes Chavo Silva MD   melatonin 5 MG TABS tablet Take 5 mg by mouth daily   Yes Historical Provider, MD   Cyanocobalamin (VITAMIN B 12 PO) Take 500 mcg by mouth daily    Yes Historical Provider, MD   Cholecalciferol (VITAMIN D) 1000 UNIT CAPS Take 2 capsules by mouth daily    Yes Historical Provider, MD       Allergies:  Codeine and Macrobid [nitrofurantoin]    Social History:      The patient currently lives at home    TOBACCO:   reports that she has never smoked. She has never used smokeless tobacco.  ETOH:   reports no history of alcohol use. Family History:     Reviewed      Problem Relation Age of Onset    Diabetes Father         Type II    Diabetes Sister         Type II    Diabetes Brother         Type II    Breast Cancer Sister 58    Lung Cancer Mother         Smoker    Heart Disease Sister         Bicuspid aortic valve x2       REVIEW OF SYSTEMS:   Pertinent positives as noted in the HPI. All other systems reviewed and negative. PHYSICAL EXAM PERFORMED:  /66   Pulse 84   Temp 98.2 °F (36.8 °C) (Oral)   Resp 18   Ht 5' 2\" (1.575 m)   Wt 186 lb (84.4 kg)   SpO2 94%   BMI 34.02 kg/m²   General appearance: No apparent distress, appears stated age and cooperative. HEENT: Normal cephalic, atraumatic without obvious deformity. Pupils equal, round, and reactive to light. Extra ocular muscles intact. Conjunctivae/corneas clear. Neck: Supple, with full range of motion. No jugular venous distention. Trachea midline. Respiratory:  Normal respiratory effort. Clear to auscultation, bilaterally without Rales/Wheezes/Rhonchi. Cardiovascular: Regular rate and rhythm with normal S1/S2 without murmurs, rubs or gallops. Abdomen: Soft, non-tender, non-distended with normal bowel sounds. Musculoskeletal:left leg wrapped  Skin: Skin color, texture, turgor normal.  No rashes or lesions. Neurologic:  Neurovascularly intact without any focal sensory/motor deficits. orthopedic surgery    Thank you for the consultation, will follow up as needed    Pascual Samayoa MD  Hospitalist

## 2021-06-01 NOTE — BRIEF OP NOTE
Brief Postoperative Note      Patient: Roberto Carlos Fuentes  YOB: 1950  MRN: [id number]    Date of Procedure: 6/1/2021    Pre-Op Diagnosis: Osteoarthritis of left knee, unspecified osteoarthritis type [M17.12]    Post-Op Diagnosis: Same       Procedure(s):  LEFT TOTAL KNEE ARTHROPLASTY    Surgeon(s):  MD Hayder Dwyer DO    Assistant:  Surgical Assistant: Naun Mckeon    Anesthesia: General    Estimated Blood Loss (mL): less than 567     Complications: None    Specimens:   * No specimens in log *    Implants:  * No implants in log *      Drains: * No LDAs found *    Findings: See op report    Electronically signed by Hayder Pedroza DO on 6/1/2021 at 12:49 PM

## 2021-06-01 NOTE — PROGRESS NOTES
Patient admitted to [address] [place] [address] from PACU. Patient is alert and oriented. VSS.

## 2021-06-01 NOTE — PROGRESS NOTES
Patient arrived from OR to PACU # 7 s/p LEFT TOTAL KNEE ARTHROPLASTY (Left Knee) per . Attached to PACU monitoring device, report received from CRNA who stated patient received pre-op block.  VSS, patient drowsy on arrival.

## 2021-06-01 NOTE — PLAN OF CARE
Problem: Falls - Risk of:  Goal: Will remain free from falls  Description: Will remain free from falls  Outcome: Ongoing  Note: Alert oriented time 4 aware of block and need to call foe assist when get out of bed , 2 person assist with walker and gait belt , pt aware

## 2021-06-02 VITALS
WEIGHT: 186 LBS | DIASTOLIC BLOOD PRESSURE: 65 MMHG | HEIGHT: 62 IN | SYSTOLIC BLOOD PRESSURE: 119 MMHG | BODY MASS INDEX: 34.23 KG/M2 | TEMPERATURE: 97.6 F | OXYGEN SATURATION: 96 % | RESPIRATION RATE: 16 BRPM | HEART RATE: 66 BPM

## 2021-06-02 PROCEDURE — 97530 THERAPEUTIC ACTIVITIES: CPT

## 2021-06-02 PROCEDURE — 97116 GAIT TRAINING THERAPY: CPT

## 2021-06-02 PROCEDURE — 2580000003 HC RX 258: Performed by: STUDENT IN AN ORGANIZED HEALTH CARE EDUCATION/TRAINING PROGRAM

## 2021-06-02 PROCEDURE — 97161 PT EVAL LOW COMPLEX 20 MIN: CPT

## 2021-06-02 PROCEDURE — 97535 SELF CARE MNGMENT TRAINING: CPT

## 2021-06-02 PROCEDURE — 96374 THER/PROPH/DIAG INJ IV PUSH: CPT

## 2021-06-02 PROCEDURE — 6370000000 HC RX 637 (ALT 250 FOR IP): Performed by: STUDENT IN AN ORGANIZED HEALTH CARE EDUCATION/TRAINING PROGRAM

## 2021-06-02 PROCEDURE — 94760 N-INVAS EAR/PLS OXIMETRY 1: CPT

## 2021-06-02 PROCEDURE — G0378 HOSPITAL OBSERVATION PER HR: HCPCS

## 2021-06-02 PROCEDURE — 6360000002 HC RX W HCPCS: Performed by: STUDENT IN AN ORGANIZED HEALTH CARE EDUCATION/TRAINING PROGRAM

## 2021-06-02 PROCEDURE — 6360000002 HC RX W HCPCS

## 2021-06-02 PROCEDURE — 97166 OT EVAL MOD COMPLEX 45 MIN: CPT

## 2021-06-02 RX ADMIN — OMEPRAZOLE 40 MG: 20 CAPSULE, DELAYED RELEASE ORAL at 08:32

## 2021-06-02 RX ADMIN — ACETAMINOPHEN 650 MG: 325 TABLET ORAL at 03:35

## 2021-06-02 RX ADMIN — Medication 10 ML: at 10:38

## 2021-06-02 RX ADMIN — DEXAMETHASONE SODIUM PHOSPHATE 6 MG: 4 INJECTION, SOLUTION INTRAMUSCULAR; INTRAVENOUS at 03:33

## 2021-06-02 RX ADMIN — LEVOTHYROXINE SODIUM 150 MCG: 150 TABLET ORAL at 08:31

## 2021-06-02 RX ADMIN — OXYCODONE HYDROCHLORIDE 5 MG: 5 TABLET ORAL at 08:32

## 2021-06-02 RX ADMIN — DOCUSATE SODIUM 50 MG AND SENNOSIDES 8.6 MG 1 TABLET: 8.6; 5 TABLET, FILM COATED ORAL at 10:38

## 2021-06-02 RX ADMIN — OXYCODONE 10 MG: 5 TABLET ORAL at 03:32

## 2021-06-02 RX ADMIN — BUPROPION HYDROCHLORIDE 150 MG: 150 TABLET, FILM COATED, EXTENDED RELEASE ORAL at 08:31

## 2021-06-02 RX ADMIN — OXYCODONE 10 MG: 5 TABLET ORAL at 14:00

## 2021-06-02 RX ADMIN — DEXAMETHASONE SODIUM PHOSPHATE 6 MG: 4 INJECTION, SOLUTION INTRA-ARTICULAR; INTRALESIONAL; INTRAMUSCULAR; INTRAVENOUS; SOFT TISSUE at 03:33

## 2021-06-02 RX ADMIN — CEFAZOLIN SODIUM 2000 MG: 2 SOLUTION INTRAVENOUS at 03:35

## 2021-06-02 RX ADMIN — GABAPENTIN 300 MG: 300 CAPSULE ORAL at 08:31

## 2021-06-02 ASSESSMENT — PAIN DESCRIPTION - PROGRESSION
CLINICAL_PROGRESSION: NOT CHANGED
CLINICAL_PROGRESSION: NOT CHANGED

## 2021-06-02 ASSESSMENT — PAIN SCALES - GENERAL
PAINLEVEL_OUTOF10: 7
PAINLEVEL_OUTOF10: 3
PAINLEVEL_OUTOF10: 6
PAINLEVEL_OUTOF10: 7
PAINLEVEL_OUTOF10: 7

## 2021-06-02 ASSESSMENT — PAIN DESCRIPTION - ORIENTATION
ORIENTATION: LEFT
ORIENTATION: LEFT

## 2021-06-02 ASSESSMENT — PAIN DESCRIPTION - PAIN TYPE
TYPE: SURGICAL PAIN
TYPE: SURGICAL PAIN

## 2021-06-02 ASSESSMENT — PAIN DESCRIPTION - DESCRIPTORS
DESCRIPTORS: ACHING
DESCRIPTORS: ACHING

## 2021-06-02 ASSESSMENT — PAIN DESCRIPTION - FREQUENCY
FREQUENCY: CONTINUOUS
FREQUENCY: CONTINUOUS

## 2021-06-02 ASSESSMENT — PAIN DESCRIPTION - ONSET
ONSET: ON-GOING
ONSET: ON-GOING

## 2021-06-02 ASSESSMENT — PAIN DESCRIPTION - LOCATION
LOCATION: KNEE
LOCATION: KNEE

## 2021-06-02 NOTE — PROGRESS NOTES
Physical Therapy    Facility/Department: [facility] 5T ORTHO/NEURO  Initial Assessment and treatment    NAME: Kurt Resendez  : 1950  MRN: [id number]    Date of Service: 2021    Discharge Recommendations:    Kurt Resendez scored a 18/24 on the AM-PAC short mobility form. Current research shows that an AM-PAC score of 18 or greater is typically associated with a discharge to the patient's home setting. Based on the patient's AM-PAC score and their current functional mobility deficits, it is recommended that the patient have 2-3 sessions per week of Physical Therapy at d/c to increase the patient's independence. At this time, this patient demonstrates the endurance and safety to discharge home with home PT and a follow up treatment frequency of 2-3x/wk. Please see assessment section for further patient specific details. HOME HEALTH CARE: LEVEL 1 STANDARD    - Initial home health evaluation to occur within 24-48 hours, in patient home   - Therapy to evaluate with goal of regaining prior level of functioning   - Therapy to evaluate if patient has [address] needs for personal care      PT Equipment Recommendations  Equipment Needed: Yes  Mobility Devices: Christophe Boraas: Rolling    Assessment   Body structures, Functions, Activity limitations: Decreased functional mobility ; Decreased safe awareness;Decreased balance;Decreased endurance  Assessment: Patient tolerated session well, transferring and ambulating in room and hallways as above with CGA and use of FWW. Patient with effortful gait and decreased stance time on left due to pain. She was able to ascend/descend 1 step with minimal assistance though movement was very effortful. Patient reporting plan to d/c home this afternoon, states two grandsons will be there to assist her with two steps to get into the home and she will have home PT x 2 weeks. Recommend continued skilled PT upon discharge.   Will follow while in acute care setting to maximize independence with mobility. Treatment Diagnosis: impaired mobility associated with TKA  Prognosis: Good  Decision Making: Low Complexity  PT Education: Goals;Transfer Training;PT Role;Functional Mobility Training;Plan of Care;General Safety;Gait Training;Weight-bearing Education;Home Exercise Program  Patient Education: patient verbalized understanding. REQUIRES PT FOLLOW UP: Yes  Activity Tolerance  Activity Tolerance: Patient Tolerated treatment well       Patient Diagnosis(es): The encounter diagnosis was Total knee replacement status, left.     has a past medical history of Anal fissure, Anxiety, Colon polyps, Depression, Fibroid uterus, Hypothyroidism, Morbid obesity ([address] and Osteopenia. has a past surgical history that includes Gastric bypass surgery (2000); Abdominal hernia repair (2008); jasmyne and bso (cervix removed) (1996); Appendectomy (Age 27); Cataract removal with implant (Right, 10/23/2017); Cataract removal with implant (Left, 02/26/2018); pr esophagogastroduodenoscopy transoral diagnostic (N/A, 10/10/2018); Colonoscopy (N/A, 10/10/2018); Lumbar spine surgery (Right, 1/7/2019); Hysterectomy, total abdominal; Lumbar spine surgery (Right, 5/15/2019); epidural steroid injection (Right, 8/26/2019); hc inject other perphrl nerv (Right, 10/21/2019); and Total knee arthroplasty (Left, 6/1/2021). Restrictions  Position Activity Restriction  Other position/activity restrictions: WBAT LLE  Vision/Hearing  Vision: Impaired  Vision Exceptions: Wears glasses for reading  Hearing: Within functional limits     Subjective  General  Chart Reviewed: Yes  Patient assessed for rehabilitation services?: Yes  Additional Pertinent Hx: Patient is a 79 y/o female admitted 6/2 for left TKA. PMH significant for morbid obesity, gastric bypass, abdominal hernia repair, lumbar spine surgery.   Response To Previous Treatment: Not applicable  Family / Caregiver Present: No  Referring Practitioner: Katt See DO  Referral Date : 06/01/21  Diagnosis: osteoarthritis of left knee  Follows Commands: Within Functional Limits  General Comment  Comments: Patients supine in bed upon arrival.  Subjective  Subjective: Patient agreeable to PT evaluation, reports hopeful for d/c home this afternoon.   Pain Screening  Patient Currently in Pain: Yes (5/10 in left knee, RN aware)  Vital Signs  Patient Currently in Pain: Yes (5/10 in left knee, RN aware)       Orientation  Orientation  Overall Orientation Status: Within Normal Limits  Social/Functional History  Social/Functional History  Lives With: Daughter (can provide 24 hour supervision)  Type of Home: House  Home Layout: Two level, Able to Live on Main level with bedroom/bathroom  Home Access: Stairs to enter without rails  Entrance Stairs - Number of Steps: 2  Bathroom Shower/Tub: Tub/Shower unit  Bathroom Toilet: Standard (sink nearby for leverage)  Bathroom Equipment: Grab bars in shower, Tub transfer bench  Home Equipment: Cane, Reacher (cane was her husbands, she does not use it at home)  ADL Assistance: Independent  Homemaking Assistance: Independent  Homemaking Responsibilities: Yes  Ambulation Assistance: Independent  Transfer Assistance: Independent  Active : Yes  Occupation: Retired  Type of occupation:  for [employer] [address]: reading, shopping (limited currently due to knee pain)  Additional Comments: patient denies history of falls at home; daughter able to help out at home as needed  [employer]  Overall Cognitive Status: WFL    Objective          AROM RLE (degrees)  RLE AROM: WFL  AROM LLE (degrees)  LLE AROM : Exceptions  LLE General AROM: ankle and hip WFL, left knee 10-65 degrees  Strength RLE  Strength RLE: WFL  Strength LLE  Strength LLE: Exception  Comment: 3-/5 in left knee, ankle and hip WFL        Bed mobility  Supine to Sit: Supervision (head of bed flat, use of bedrail)  Scooting: Supervision (to EOB)  Comment: patient goals: discharge  Short term goal 1: patient will perform bed mobility with modified independence  Short term goal 2: patient will perform transfers sit<>stand with supervision  Short term goal 3: patient will ambulate 200' with FWW and supervision  Short term goal 4: patient will ascend/descend 2 steps with unilateral handrail and SBA  Patient Goals   Patient goals : to go home       Therapy Time   Individual Concurrent Group Co-treatment   Time In 0845         Time Out 0928         Minutes 43         Timed Code Treatment Minutes: 28 Minutes    Timed Code Treatment Minutes:  28 Minutes    Total Treatment Minutes:    28 minutes treatment + 15 minutes evaluation = 43 total treatment minutes  If patient discharges prior to next session this note will serve as a discharge summary. Please see below for the latest assessment towards goals.    [address]

## 2021-06-02 NOTE — PROGRESS NOTES
Patient discharged with belongings, new medications, and follow up instructions. IV removed without complications. Patient transported to main entrance via wheelchair, daughter awaiting at [facility].

## 2021-06-02 NOTE — PROGRESS NOTES
Patient A&Ox4, VSS. Neuro checks within normal limits. Surgical dressing dry and intact with scant drainage. Pain managed with oral medications. Patient ambulating with walker, tolerating well. Patient tolerating diet, denies nausea. Patient anticipating discharge home this afternoon. Will continue to monitor.

## 2021-06-02 NOTE — PROGRESS NOTES
Hospitalist Progress Note      PCP: Billy Merlin, MD    Date of Admission: 6/1/2021    Chief Complaint:     No chief complaint on file. knee pain    Subjective:  Patient seen and examined at the bedside. No complaints at this time. No acute events overnight    PFHS: reviewed as documented 6/1/2021, no changes    Medications:  Reviewed    Infusion Medications    sodium chloride       Scheduled Medications    gabapentin  300 mg Oral BID    buPROPion  150 mg Oral QAM    traZODone  50 mg Oral Nightly    levothyroxine  150 mcg Oral QAM AC    omeprazole  40 mg Oral QAM    sodium chloride flush  5-40 mL Intravenous 2 times per day    acetaminophen  650 mg Oral Q6H    sennosides-docusate sodium  1 tablet Oral BID     PRN Meds: sodium chloride flush, sodium chloride, oxyCODONE **OR** oxyCODONE, morphine **OR** morphine, polyethylene glycol, ondansetron **OR** ondansetron, ALPRAZolam      Intake/Output Summary (Last 24 hours) at 6/2/2021 1136  Last data filed at 6/1/2021 1430  Gross per 24 hour   Intake 1209 ml   Output --   Net 1209 ml       Physical Exam    /65   Pulse 66   Temp 97.6 °F (36.4 °C) (Oral)   Resp 16   Ht 5' 2\" (1.575 m)   Wt 186 lb (84.4 kg)   SpO2 96%   BMI 34.02 kg/m²     General appearance:  No acute distress, appears stated age  Eyes: Pupils equal, round, reactive to light, conjunctiva/corneas clear  Ears/Nose/Mouth/Throat: No external lesions or scars, hearing intact to voice  Neck: Trachea midline, no masses noted, no thyromegaly  Respiratory:  Non-labored breathing, clear to auscultation bilaterally  Cardiovascular: Regular rate and rhythm, no murmurs, gallops, or rubs  Abdomen: soft, non-tender, non-distended  Musculoskeletal: Warm, well perfused, no cyanosis or edema  Skin: normal color, no wounds noted  Psychiatric: A&Ox4, good insight and judgment    Labs:   No results for input(s): WBC, HGB, HCT, PLT in the last 72 hours.   No results for input(s): NA, K, CL, CO2, BUN, CREATININE, CALCIUM, PHOS in the last 72 hours. Invalid input(s): MAGNES  No results for input(s): AST, ALT, BILIDIR, BILITOT, ALKPHOS in the last 72 hours. No results for input(s): INR in the last 72 hours. No results for input(s): Paralee Fontan in the last 72 hours. Urinalysis:      Lab Results   Component Value Date    NITRU Negative 05/26/2021    WBCUA 1 05/26/2021    RBCUA 2 05/26/2021    BLOODU Negative 05/26/2021    SPECGRAV 1.019 05/26/2021    GLUCOSEU Negative 05/26/2021       Radiology:  XR KNEE LEFT (1-2 VIEWS)   Final Result   Impression:      Status post left knee arthroplasty with no immediate complication evident.            Assessment/Plan:    Active Hospital Problems    Diagnosis Date Noted    Total knee replacement status, left [Z96.652] 06/01/2021       Plan:    # L knee s/p left TKA  -management per primary    # post-op hypoxia   -resolved    # Anxiety  -continue home medications    # Hypothyroidism  -stable, continue home synthroid    DVT Prophylaxis: Per primary  Diet: DIET GENERAL;  Code Status: Full Code    PT/OT Eval Status: per primary    Dispo: per primary    Micheline Guevara MD

## 2021-06-02 NOTE — DISCHARGE SUMMARY
Physician Discharge Summary     Patient ID:  Veto Lara  [id number]  71 y.o.  1950    Admit date: 6/1/2021    Discharge date and time: No discharge date for patient encounter. Admitting Physician: Isis Riddle MD     Discharge Physician: Dr Mauro Montano MD    Admission Diagnoses: Osteoarthritis of left knee, unspecified osteoarthritis type [M17.12]  Total knee replacement status, left [Z96.652]    Discharge Diagnoses: Left total knee arthroplasty    Admission Condition: good    Discharged Condition: good    Indication for Admission: Postoperative pain control    Hospital Course: Jose Alegre was admitted for pain control following routine left total knee arthroplasty. Her postoperative course was uncomplicated and she was able to work well with physical therapy. Her pain was controlled and she was tolerating a regular diet. At this time she is been deemed as appropriate to return back home with plans for home health care physical therapy and Occupational Therapy for the first 2 weeks. She was provided with a discharge packet containing the office contact information with questions or concerns    Consults: none    Significant Diagnostic Studies: Postoperative x-rays    Treatments: surgery: Left total knee arthroplasty    Discharge Exam:  LLE:  Dressing is clean dry intact  Calf is soft and supple without signs of DVT  Motor and sensation are intact  Capillary refill less than 2 seconds    Disposition: home    In process/preliminary results:  Outstanding Order Results     No orders found for last 30 day(s). Patient Instructions:   Current Discharge Medication List      START taking these medications    Details   oxyCODONE-acetaminophen (PERCOCET) 5-325 MG per tablet Take 1 tablet by mouth every 6 hours as needed for Pain for up to 7 days. Intended supply: 7 days.  Take lowest dose possible to manage pain  Qty: 32 tablet, Refills: 0    Comments: Reduce doses taken as pain becomes manageable  Associated Diagnoses: Total knee replacement status, left      polyethylene glycol (GLYCOLAX) 17 GM/SCOOP powder Take 17 g by mouth daily  Qty: 510 g, Refills: 0      ondansetron (ZOFRAN) 4 MG tablet Take 1 tablet by mouth 3 times daily as needed for Nausea or Vomiting  Qty: 30 tablet, Refills: 0      aspirin 325 MG EC tablet Take 1 tablet by mouth daily  Qty: 45 tablet, Refills: 0         CONTINUE these medications which have NOT CHANGED    Details   clobetasol (TEMOVATE) 0.05 % ointment Apply topically nightly  Qty: 45 g, Refills: 1      buPROPion (WELLBUTRIN XL) 150 MG extended release tablet Take 1 tablet by mouth every morning  Qty: 90 tablet, Refills: 1      ALPRAZolam (XANAX) 1 MG tablet TAKE 1 TABLET BY MOUTH TWICE DAILY AS NEEDED FOR ANXIETY  Qty: 35 tablet, Refills: 2    Associated Diagnoses: Psychophysiological insomnia; Anxiety      traZODone (DESYREL) 50 MG tablet TAKE 3 TABLETS BY MOUTH EVERY NIGHT AT BEDTIME  Qty: 270 tablet, Refills: 1    Associated Diagnoses: Psychophysiological insomnia      Multiple Vitamin (MULTIVITAMIN PO) Take by mouth      gabapentin (NEURONTIN) 300 MG capsule TAKE ONE CAPSULE BY MOUTH TWICE DAILY  Qty: 60 capsule, Refills: 5      levothyroxine (SYNTHROID) 150 MCG tablet TAKE 1 TABLET BY MOUTH EVERY DAY  Qty: 90 tablet, Refills: 1      omeprazole (PRILOSEC) 40 MG delayed release capsule TAKE 1 CAPSULE BY MOUTH EVERY MORNING BEFORE BREAKFAST  Qty: 90 capsule, Refills: 1    Comments: **Patient requests 90 days supply**      melatonin 5 MG TABS tablet Take 5 mg by mouth daily      Cyanocobalamin (VITAMIN B 12 PO) Take 500 mcg by mouth daily       Cholecalciferol (VITAMIN D) 1000 UNIT CAPS Take 2 capsules by mouth daily            Activity: activity as tolerated and no driving for today  Diet: regular diet  Wound Care: keep wound clean and dry    Follow-up with Dr Gibson Marley in 2 weeks.     Signed:  Roger Harrison DO  Clinical Fellow  [facility] and [facility]     06/02/21  7:05 AM

## 2021-06-02 NOTE — OP NOTE
[address], [address]                                OPERATIVE REPORT    PATIENT NAME: Jennifer Vidales                     :        1950  MED REC NO:   [id number]                          ROOM:       5519  ACCOUNT NO:   [de-identified]                           ADMIT DATE: 2021  PROVIDER:     Henry Ramirez MD    DATE OF PROCEDURE:  2021    PREOPERATIVE DIAGNOSIS:  Left knee varus osteoarthritis. POSTOPERATIVE DIAGNOSIS:  Left knee varus osteoarthritis. PROCEDURE PERFORMED:  Left total knee arthroplasty using Smith and  Nephew Journey II, size 3 femur, size 2 tibia, 22 mm biconvex button and  15 mm tibial insert; all cemented. SURGEON:  Henry Ramirez MD    ASSISTANT:  Mona Moore DO    INDICATIONS:  This is a 44-year-old female with varus osteoarthritis. She has bone-on-bone and some erosion in the medial compartment. She  had been through oral anti-inflammatories, steroid injections and  viscosupplementation and at the current time, none of these were  providing relief. She is having significant pain with just activities  of daily living, so after discussion of risks, benefits, and  alternatives, the patient wished to proceed with surgery. OPERATIVE NOTE:  The patient was transported to the operating room after  adductor canal block was placed. General anesthesia was induced. The  padded tourniquet was placed on the thigh and the leg was prepped and  draped in a sterile fashion including covering all skin with sterile  incise drape. After exsanguination with an Esmarch bandage, the  tourniquet was inflated to 275 mmHg. A straight midline incision was  made, carried down through the skin and subcutaneous tissue. Subcutaneous flaps were raised. A median parapatellar incision was  made. Medial capsule was released slightly. Distal femur was opened  with a drill.   An intramedullary clive was used to place a 6-degree valgus  cutting block on primary length and distal cuts were made. The external guide was used and then checked with an external guidewire  once the cutting block was then placed. This was felt to be in good  position. 3 mm were taken off the low side, which was medially. Extension gaps were checked. This allowed full extension starting with  about a 15-degree extension like preoperatively. Next, posterior  referencing guide was used. This was sized with a size 3. This was  placed in 3 degrees of external rotation and femoral cuts were made. There were some osteophytes in both posterior condyles and these were  removed and the posterior capsule was injected with anesthetic cocktail. The tibia was trialed as a 3, but this was too broad to  anterior-posterior and would either overhang anteriorly or posteriorly,  so a size 2 was chosen. This was placed in appropriate rotation and  pinned. Femoral trial was placed. Different tibial trials were placed  until we worked way up to 13, even no minimal bone had been taken, as  the patient had fairly significant deformity and this brought good  stability from 0 to 90 degrees medially and laterally and still allowed  full extension under gravity. Central box cut was then made. Patella  was addressed. Patellar osteophytes had already been removed, this was  very small patella measuring 19.5 mm in depth before reaming with the  button in place, this measured 21.5 mm. Patella tracked nicely down the  midline with no retinacular closure. All trial parts were removed. Everything was lavaged and Irrisept soak was undertaken and then lavaged  again. Bone plugs were made both in femoral and tibial canals. First,  the tibia was cemented, followed by the femur and followed by the  patellar button. This was having full extension with a 15 mm insert. Any excess cement was removed.   Again, the knee was checked from 0 to 90  degrees through range of motion and was felt to be quite stable with the  15 mm insert, so a true 15 was chosen. One final time, everything was  thoroughly irrigated including subcutaneous tissues. The true insert  was placed and the knee reduced. The knee was closed with it in some  flexion over a bolster. The central third with #1 Ethibond in  figure-of-eight fashion. The proximal and distal parts of the  retinaculum were closed with #1 Vicryl, subcutaneous tissues with 2-0  Vicryl and skin with 4-0 Monocryl and Steri-Strips. Aquacel dressing  and a sterile compressive wrap was applied. The patient tolerated this  procedure well. There were no known complications. Estimated blood  loss was only 50 mL. She was returned to recovery area in stable  condition.         Delroy Gunter MD    D: 06/01/2021 12:44:42       T: 06/01/2021 13:31:33     [email or web address]  Job#: [id number]     Doc#: [id number]    CC:

## 2021-06-02 NOTE — PROGRESS NOTES
Occupational Therapy   Occupational Therapy Initial Assessment/Treatment  Date: 2021   Patient Name: Kurt Fernández  MRN: [id number]     : 1950    Date of Service: 2021    Discharge Recommendations:    Kurt Fernández scored a 20/24 on the AM-PAC ADL Inpatient form. Current research shows that an AM-PAC score of 18 or greater is typically associated with a discharge to the patient's home setting. Based on the patient's AM-PAC score, and their current ADL deficits, it is recommended that the patient have 2-3 sessions per week of Occupational Therapy at d/c to increase the patient's independence. At this time, this patient demonstrates the endurance and safety to discharge home with 24 hr/assist (home vs OP services) and a follow up treatment frequency of 2-3x/wk. Please see assessment section for further patient specific details. If patient discharges prior to next session this note will serve as a discharge summary. Please see below for the latest assessment towards goals. OT Equipment Recommendations  Equipment Needed: No    Assessment   Performance deficits / Impairments: Decreased functional mobility ; Decreased endurance  Assessment: Pt functioning mildly below baseline level. Pt with a slight decrease in mobility due to pain; however, Pt able to tolerate therapy well. Pt currently requires CGA to ambulate and SBA for most ADLs. Feel Pt would benefit from acute OT services to increase endurance and independence with functional mobility. Pt from home with family who can help out as needed throughout the day. Anticipate d/c home w/24 hr assist when appropriate. Treatment Diagnosis: Decreased endurance and impaired functional mobility  Prognosis: Good  Decision Making: Medium Complexity  OT Education: OT Role;Plan of Care;Transfer Training  Patient Education: Pt verbalized understanding.   REQUIRES OT FOLLOW UP: Yes  Activity Tolerance  Activity Tolerance: Patient Tolerated treatment well  Safety Devices  Safety Devices in place: Yes  Type of devices: Left in bed;Bed alarm in place;Call light within reach;Nurse notified           Treatment Diagnosis: Decreased endurance and impaired functional mobility      Restrictions  Position Activity Restriction  Other position/activity restrictions: WBAT LLE    Subjective   General  Chart Reviewed: Yes  Patient assessed for rehabilitation services?: Yes  Additional Pertinent Hx: Pt admitted on 6/1/21 for LEFT TOTAL KNEE ARTHROPLASTY,   PMH: anxiety, depression, osteopenia, RIGHT HC INJECT OTHER PERPHRL NERV (10/21/2019), RIGHT LUMBAR SPINE SURGERY (1/7/2019; 5/15/2019), gastric bypass surgery (2000), and appendectomy. Family / Caregiver Present: No  Referring Practitioner: Dr. Claudia Lopez  Diagnosis: Osteoarthritis of L knee  Subjective  Subjective: Pt in chair upon entry. Pt pleasant and agreeable to work with OT. \"I've been looking for you. I was told I would work with you this morning. \"  Patient Currently in Pain: Yes (5/10 in left knee, RN aware)  Vital Signs  Temp: 97.6 °F (36.4 °C)  Temp Source: Oral  Pulse: 66  Heart Rate Source: Monitor  BP: 119/65  BP Location: Right upper arm  Patient Position: Semi fowlers  Level of Consciousness: Alert (0)  Patient Currently in Pain: Yes (5/10 in left knee, RN aware)  Oxygen Therapy  SpO2: 96 %  O2 Device: None (Room air)  Social/Functional History  Social/Functional History  Lives With: Daughter (can provide 24 hour supervision)  Type of Home: House  Home Layout: Two level, Able to Live on Main level with bedroom/bathroom  Home Access: Stairs to enter without rails  Entrance Stairs - Number of Steps: 2  Bathroom Shower/Tub: Tub/Shower unit  Bathroom Toilet: Standard (sink nearby for leverage)  Bathroom Equipment: Grab bars in shower, Tub transfer bench  Home Equipment: Gibran Garcia (cane was her husbands, she does not use it at home)  ADL Assistance: Independent  Homemaking Assistance: Independent  Homemaking Responsibilities: Yes  Ambulation Assistance: Independent  Transfer Assistance: Independent  Active : Yes  Occupation: Retired  Type of occupation:  for [employer] & Hobbies: reading, shopping (limited currently due to knee pain)  Additional Comments: patient denies history of falls at home; daughter able to help out at home as needed       Objective   Vision: Impaired  Vision Exceptions: Wears glasses for reading  Hearing: Within functional limits    Orientation  Overall Orientation Status: Within Normal Limits     Balance  Sitting Balance: Supervision  Standing Balance: Contact guard assistance  Standing Balance  Time: ~2 min.   Activity: bathroom mobility/activity  Functional Mobility  Functional - Mobility Device: Rolling Walker  Activity: To/from bathroom  Assist Level: Contact guard assistance  Toilet Transfers  Toilet - Technique: Ambulating  Equipment Used: Standard toilet (grab bars)  Toilet Transfer: Minimal assistance (cues for hand placement)  ADL  Grooming: Stand by assistance (wash hands while standing at the sink)  UE Dressing: Independent (don bra and dress while sitting in chair)  LE Dressing: Stand by assistance (don socks while sitting in chair)  Toileting: Stand by assistance  Tone RUE  RUE Tone: Normotonic  Tone LUE  LUE Tone: Normotonic  Coordination  Movements Are Fluid And Coordinated: Yes     Bed mobility  Sit to Supine: Supervision (HOB flat; use of handrails)  Transfers  Stand Step Transfers: Minimal assistance (to CGA with RW)  Sit to stand: Minimal assistance (from chair; cues for hand placement)  Stand to sit: Contact guard assistance (cues for hand placement)     Cognition  Overall Cognitive Status: WFL                 LUE AROM (degrees)  LUE AROM : WFL  RUE AROM (degrees)  RUE AROM : WFL  LUE Strength  Gross LUE Strength: WFL  RUE Strength  Gross RUE Strength: WFL     Hand Dominance  Hand Dominance: Right         Pt seen for evaluation and treatment including ADL/transfer training. Plan   Plan  Times per week: 7  Current Treatment Recommendations: Functional Mobility Training, Endurance Training      AM-PAC Score        AM-PAC Inpatient Daily Activity Raw Score: 20 (06/02/21 1231)  AM-PAC Inpatient ADL T-Scale Score : 42.03 (06/02/21 1231)  ADL Inpatient CMS 0-100% Score: 38.32 (06/02/21 1231)  ADL Inpatient CMS G-Code Modifier : CJ (06/02/21 1231)    Goals                 No goals met. Short term goals  Time Frame for Short term goals: Discharge  Short term goal 1: Transfer to/from toilet with CGA  Short term goal 2: Stance with RW with SBA x 5 min to participate in ADLs/functional mobility  Patient Goals   Patient goals : Get up and get around well enough at home       Therapy Time   Individual Concurrent Group Co-treatment   Time In 0954         Time Out 1032         Minutes 38         Timed Code Treatment Minutes: 28 Minutes      Total Tx Min: 38 minutes    Therapist was present, directed the patient's care, made skilled judgment, and was responsible for assessment and treatment of the patient. If patient is discharged prior to next treatment, this not will serve as the discharge summary.     Vandana Rosales S/OT Negative

## 2021-06-02 NOTE — CARE COORDINATION
[facility]    Patient aware and agreeable to services. Faxed orders to [facility].     Swapnil Salguero LPN  Care Transition Nurse  [address]  [phone]

## 2021-06-02 NOTE — PLAN OF CARE
Problem: Falls - Risk of:  Goal: Will remain free from falls  Description: Will remain free from falls. 6/2/2021 0103 by Denton Garnett RN  Outcome: Ongoing    Problem: Falls - Risk of:  Goal: Absence of physical injury  Description: Absence of physical injury.   Outcome: Ongoing

## 2021-06-02 NOTE — CARE COORDINATION
Fax: [phone]      Potential assistance Purchasing Medications: Potential Assistance Purchasing Medications: No  Does Patient want to participate in local refill/ meds to beds program?: Yes    Meds To Beds General Rules:  1. Can ONLY be done Monday- Friday between 8:30am-5pm  2. Prescription(s) must be in pharmacy by 3pm to be filled same day  3. Copy of patient's insurance/ prescription drug card and patient face sheet must be sent along with the prescription(s)  4. Cost of Rx cannot be added to hospital bill. If financial assistance is needed, please contact unit  or ;  or  CANNOT provide pharmacy voucher for patients co-pays  5. Patients can then  the prescription on their way out of the hospital at discharge, or pharmacy can deliver to the bedside if staff is available. (payment due at time of pick-up or delivery - cash, check, or card accepted)     Able to afford home medications/ co-pay costs: Yes    ADLS  Support Systems: Children    PT AM-PAC: 18 /24  OT AM-PAC: 20 /24    [place]: Home  Steps:     Plans to RETURN to current housing: Yes  Barriers to RETURNING to current housing: None    Home Care Information  Currently ACTIVE with [address]: No  Home Care Agency: Not Applicable    Currently ACTIVE with [place] [facility]: No  Passport/ Waiver: No  Passport/ Waiver Services: Not Applicable    : Leon Ye RN  Phone: [phone] [facility]  DME Provider: [facility]  Equipment: walker    Home Oxygen and Respiratory Equipment  Has HOME OXYGEN prior to admission: [address]: Not Applicable  Other Respiratory Equipment:       DISCHARGE PLAN:  Disposition: Home with [address]: [facility]     Transportation PLAN for discharge: family     Factors facilitating achievement of predicted outcomes:     Barriers to discharge:      Additional Case Management Notes: Met with pt at bedside to discuss discharge lans. Pt will return to her home where she lives with her adult daughter. Daughter can provide ADL support and transportation. Pt would like home PT/OT and has no preference of agency. Agreeable to [facility] and referral made by this worker. Pt does not have a functional walker, referral made to [facility] and walker delivered to room prior to discharge. No further discharge needs identified. Pt aware of and agreeable to discharge plan. The Plan for Transition of Care is related to the following treatment goals of Osteoarthritis of left knee, unspecified osteoarthritis type [M17.12]  Total knee replacement status, left [Z96.652]    The Patient and/or patient representative Christian and her family were provided with a choice of provider and agrees with the discharge plan Yes    Freedom of choice list was provided with basic dialogue that supports the patient's individualized plan of care/goals and shares the quality data associated with the providers.  Yes    Care Transition patient: No    Lotus Clarke RN  [facility] [employer]  Case- Management Department  Ph: [phone]

## 2021-06-03 ENCOUNTER — TELEPHONE (OUTPATIENT)
Dept: INTERNAL MEDICINE CLINIC | Age: 71
End: 2021-06-03

## 2021-06-03 ENCOUNTER — TELEPHONE (OUTPATIENT)
Dept: ORTHOPEDIC SURGERY | Age: 71
End: 2021-06-03

## 2021-06-03 NOTE — TELEPHONE ENCOUNTER
Attempted to contact pt, left voicemail with purpose and call back number.     Nathaly Ureña  Orthopedic Nurse Navigator  Phone number: [phone]    Follow up appointments:    Future Appointments   Date Time Provider Latisha Husain   6/16/2021  9:30 AM Suzette Ramirez PT [facility] MON PT [facility]   6/16/2021 11:00 AM Flori Richardson MD [facility]   6/21/2021  2:00 PM [facility] DEXA RM 1 [facility] RAD [facility]   6/21/2021  2:30 PM [facility] MAMMO RM [facility]   8/24/2021  1:00 PM Tomie Kussmaul, MD [facility]

## 2021-06-03 NOTE — TELEPHONE ENCOUNTER
Lisa [facility] Transitions Initial Follow Up Call    Outreach made within 2 business days of discharge: Yes    Patient: Roberto Carlos Fuentes Patient : 1950   MRN: [id number]  Reason for Admission: There are no discharge diagnoses documented for the most recent discharge. Discharge Date: 21       Spoke with: Ortho has reached out to patient for HFU.   Post op appointment scheduled    Discharge department/facility: [facility]    Scheduled appointment with PCP within 7-14 days    Follow Up  Future Appointments   Date Time Provider Latisha Husain   2021  9:30 AM Karla Melara PT [facility] MON PT [facility]   2021 11:00 AM Jamie Ambrose MD [facility]   2021  2:00 PM [facility] DEXA RM 1 [facility] RAD [facility]   2021  2:30 PM [facility] MAMMO RM 1 [facility]   2021  1:00 PM Trey Delarosa MD [address] [place] [address]

## 2021-06-08 DIAGNOSIS — N90.89 VULVAR IRRITATION: Primary | ICD-10-CM

## 2021-06-09 ENCOUNTER — APPOINTMENT (OUTPATIENT)
Dept: CT IMAGING | Age: 71
DRG: 389 | End: 2021-06-09
Payer: COMMERCIAL

## 2021-06-09 ENCOUNTER — TELEPHONE (OUTPATIENT)
Dept: ORTHOPEDIC SURGERY | Age: 71
End: 2021-06-09

## 2021-06-09 ENCOUNTER — HOSPITAL ENCOUNTER (INPATIENT)
Age: 71
LOS: 1 days | Discharge: HOME | DRG: 389 | End: 2021-06-10
Attending: EMERGENCY MEDICINE | Admitting: INTERNAL MEDICINE
Payer: COMMERCIAL

## 2021-06-09 ENCOUNTER — APPOINTMENT (OUTPATIENT)
Dept: GENERAL RADIOLOGY | Age: 71
DRG: 389 | End: 2021-06-09
Payer: COMMERCIAL

## 2021-06-09 DIAGNOSIS — K56.7 POSTOPERATIVE ILEUS (HCC): ICD-10-CM

## 2021-06-09 DIAGNOSIS — K59.00 CONSTIPATION, UNSPECIFIED CONSTIPATION TYPE: Primary | ICD-10-CM

## 2021-06-09 DIAGNOSIS — K91.89 POSTOPERATIVE ILEUS (HCC): ICD-10-CM

## 2021-06-09 LAB
ANION GAP SERPL CALCULATED.3IONS-SCNC: 12 MMOL/L (ref 3–16)
BASOPHILS ABSOLUTE: 0 K/UL (ref 0–0.2)
BASOPHILS RELATIVE PERCENT: 0.6 %
BUN BLDV-MCNC: 14 MG/DL (ref 7–20)
CALCIUM SERPL-MCNC: 8.8 MG/DL (ref 8.3–10.6)
CHLORIDE BLD-SCNC: 100 MMOL/L (ref 99–110)
CO2: 22 MMOL/L (ref 21–32)
CREAT SERPL-MCNC: 0.9 MG/DL (ref 0.6–1.2)
EOSINOPHILS ABSOLUTE: 0.1 K/UL (ref 0–0.6)
EOSINOPHILS RELATIVE PERCENT: 0.7 %
GFR AFRICAN AMERICAN: >60
GFR NON-AFRICAN AMERICAN: >60
GLUCOSE BLD-MCNC: 107 MG/DL (ref 70–99)
HCT VFR BLD CALC: 31.2 % (ref 36–48)
HEMOGLOBIN: 10.2 G/DL (ref 12–16)
LYMPHOCYTES ABSOLUTE: 0.7 K/UL (ref 1–5.1)
LYMPHOCYTES RELATIVE PERCENT: 8.2 %
MCH RBC QN AUTO: 31.9 PG (ref 26–34)
MCHC RBC AUTO-ENTMCNC: 32.8 G/DL (ref 31–36)
MCV RBC AUTO: 97.3 FL (ref 80–100)
MONOCYTES ABSOLUTE: 0.8 K/UL (ref 0–1.3)
MONOCYTES RELATIVE PERCENT: 9 %
NEUTROPHILS ABSOLUTE: 7 K/UL (ref 1.7–7.7)
NEUTROPHILS RELATIVE PERCENT: 81.5 %
PDW BLD-RTO: 14.2 % (ref 12.4–15.4)
PLATELET # BLD: 465 K/UL (ref 135–450)
PMV BLD AUTO: 7.7 FL (ref 5–10.5)
POTASSIUM SERPL-SCNC: 4.1 MMOL/L (ref 3.5–5.1)
RBC # BLD: 3.21 M/UL (ref 4–5.2)
SODIUM BLD-SCNC: 134 MMOL/L (ref 136–145)
WBC # BLD: 8.6 K/UL (ref 4–11)

## 2021-06-09 PROCEDURE — 74177 CT ABD & PELVIS W/CONTRAST: CPT

## 2021-06-09 PROCEDURE — 85025 COMPLETE CBC W/AUTO DIFF WBC: CPT

## 2021-06-09 PROCEDURE — 2580000003 HC RX 258: Performed by: PHYSICIAN ASSISTANT

## 2021-06-09 PROCEDURE — 6370000000 HC RX 637 (ALT 250 FOR IP): Performed by: EMERGENCY MEDICINE

## 2021-06-09 PROCEDURE — 6360000004 HC RX CONTRAST MEDICATION: Performed by: EMERGENCY MEDICINE

## 2021-06-09 PROCEDURE — 74018 RADEX ABDOMEN 1 VIEW: CPT

## 2021-06-09 PROCEDURE — 80048 BASIC METABOLIC PNL TOTAL CA: CPT

## 2021-06-09 PROCEDURE — 99285 EMERGENCY DEPT VISIT HI MDM: CPT

## 2021-06-09 RX ORDER — 0.9 % SODIUM CHLORIDE 0.9 %
1000 INTRAVENOUS SOLUTION INTRAVENOUS ONCE
Status: COMPLETED | OUTPATIENT
Start: 2021-06-09 | End: 2021-06-09

## 2021-06-09 RX ADMIN — MAGNESIUM CITRATE 296 ML: 1.75 LIQUID ORAL at 21:54

## 2021-06-09 RX ADMIN — Medication 240 ML: at 19:45

## 2021-06-09 RX ADMIN — SODIUM CHLORIDE 1000 ML: 9 INJECTION, SOLUTION INTRAVENOUS at 20:30

## 2021-06-09 RX ADMIN — IOPAMIDOL 75 ML: 755 INJECTION, SOLUTION INTRAVENOUS at 23:43

## 2021-06-09 ASSESSMENT — PAIN DESCRIPTION - LOCATION: LOCATION: ABDOMEN

## 2021-06-09 ASSESSMENT — PAIN DESCRIPTION - PAIN TYPE: TYPE: ACUTE PAIN

## 2021-06-09 ASSESSMENT — ENCOUNTER SYMPTOMS
ABDOMINAL PAIN: 0
NAUSEA: 0
RHINORRHEA: 0
CONSTIPATION: 1
VOMITING: 0
COUGH: 0
SHORTNESS OF BREATH: 0
DIARRHEA: 0

## 2021-06-09 ASSESSMENT — PAIN DESCRIPTION - ORIENTATION: ORIENTATION: LOWER

## 2021-06-09 ASSESSMENT — PAIN DESCRIPTION - DESCRIPTORS: DESCRIPTORS: CRAMPING;DISCOMFORT;TENDER

## 2021-06-09 ASSESSMENT — PAIN SCALES - GENERAL: PAINLEVEL_OUTOF10: 10

## 2021-06-09 NOTE — TELEPHONE ENCOUNTER
Spoke with pt, things are going pretty good. Incision status: No drainage or redness    Edema/Swelling/Teds:     Pain level and status: Very manageable     Use of pain medications: Using as needed. Blood thinner: ASA- no issues. Bowels: No BM since before surgery. Has been taking senekot, prunes, miralax BID. RN recommended pt use suppository for results. Pt to try suppositories and if no results try a fleets enema. If no results pt to call Surgeon or RN. RN explained the situation could become emergent and to go to ER with cramping or abdominal pain since it has been 8 days since BM. Pt verbalized understanding. RN will call pt tomorrow for check in. Home Care Agency active: [facility]- going well with PT. Outpatient therapy: NA    Do you have all of your medications: Yes    Changes in medications: no    Ortho Vitals: NA    Instructed pt to call Nurse Navigator or surgeon's office with any questions or concerns.      Follow up appointments:    Future Appointments   Date Time Provider Latisha Husain   6/16/2021  9:30 AM Luz Boland PT [facility] MON PT [facility]   6/16/2021 11:00 AM Demarcus Mcfadden MD [facility]   6/21/2021  2:00 PM [facility] DEXA RM 1 [facility] RAD [facility]   6/21/2021  2:30 PM [facility] MAMMO RM 1 [facility]ano   8/24/2021  1:00 PM MD SURINDER Leos

## 2021-06-10 ENCOUNTER — APPOINTMENT (OUTPATIENT)
Dept: GENERAL RADIOLOGY | Age: 71
DRG: 389 | End: 2021-06-10
Payer: COMMERCIAL

## 2021-06-10 ENCOUNTER — TELEPHONE (OUTPATIENT)
Dept: ORTHOPEDIC SURGERY | Age: 71
End: 2021-06-10

## 2021-06-10 ENCOUNTER — PATIENT MESSAGE (OUTPATIENT)
Dept: INTERNAL MEDICINE CLINIC | Age: 71
End: 2021-06-10

## 2021-06-10 VITALS
HEART RATE: 82 BPM | TEMPERATURE: 98.2 F | RESPIRATION RATE: 16 BRPM | SYSTOLIC BLOOD PRESSURE: 128 MMHG | BODY MASS INDEX: 34.23 KG/M2 | DIASTOLIC BLOOD PRESSURE: 66 MMHG | OXYGEN SATURATION: 95 % | HEIGHT: 62 IN | WEIGHT: 186 LBS

## 2021-06-10 PROBLEM — R10.9 ABDOMINAL PAIN: Status: ACTIVE | Noted: 2021-06-10

## 2021-06-10 PROBLEM — K56.41 FECAL IMPACTION (HCC): Status: ACTIVE | Noted: 2021-06-10

## 2021-06-10 PROBLEM — K56.7 ILEUS, POSTOPERATIVE (HCC): Status: ACTIVE | Noted: 2021-06-10

## 2021-06-10 PROBLEM — K91.89 ILEUS, POSTOPERATIVE (HCC): Status: ACTIVE | Noted: 2021-06-10

## 2021-06-10 PROBLEM — E66.9 OBESITY (BMI 30-39.9): Status: ACTIVE | Noted: 2021-06-10

## 2021-06-10 PROCEDURE — 6370000000 HC RX 637 (ALT 250 FOR IP): Performed by: NURSE PRACTITIONER

## 2021-06-10 PROCEDURE — 99024 POSTOP FOLLOW-UP VISIT: CPT | Performed by: NURSE PRACTITIONER

## 2021-06-10 PROCEDURE — 6360000002 HC RX W HCPCS: Performed by: NURSE PRACTITIONER

## 2021-06-10 PROCEDURE — 6370000000 HC RX 637 (ALT 250 FOR IP): Performed by: EMERGENCY MEDICINE

## 2021-06-10 PROCEDURE — APPNB45 APP NON BILLABLE 31-45 MINUTES: Performed by: NURSE PRACTITIONER

## 2021-06-10 PROCEDURE — 74018 RADEX ABDOMEN 1 VIEW: CPT

## 2021-06-10 PROCEDURE — 96375 TX/PRO/DX INJ NEW DRUG ADDON: CPT

## 2021-06-10 PROCEDURE — C9113 INJ PANTOPRAZOLE SODIUM, VIA: HCPCS | Performed by: NURSE PRACTITIONER

## 2021-06-10 PROCEDURE — 1200000000 HC SEMI PRIVATE

## 2021-06-10 PROCEDURE — 94760 N-INVAS EAR/PLS OXIMETRY 1: CPT

## 2021-06-10 PROCEDURE — 2580000003 HC RX 258: Performed by: EMERGENCY MEDICINE

## 2021-06-10 PROCEDURE — G0378 HOSPITAL OBSERVATION PER HR: HCPCS

## 2021-06-10 PROCEDURE — 96372 THER/PROPH/DIAG INJ SC/IM: CPT

## 2021-06-10 PROCEDURE — 96374 THER/PROPH/DIAG INJ IV PUSH: CPT

## 2021-06-10 PROCEDURE — 2580000003 HC RX 258: Performed by: NURSE PRACTITIONER

## 2021-06-10 RX ORDER — BUPROPION HYDROCHLORIDE 150 MG/1
150 TABLET ORAL EVERY MORNING
Status: DISCONTINUED | OUTPATIENT
Start: 2021-06-10 | End: 2021-06-10 | Stop reason: HOSPADM

## 2021-06-10 RX ORDER — PROMETHAZINE HYDROCHLORIDE 12.5 MG/1
12.5 TABLET ORAL 4 TIMES DAILY PRN
Qty: 20 TABLET | Refills: 0 | Status: CANCELLED | OUTPATIENT
Start: 2021-06-10 | End: 2021-06-17

## 2021-06-10 RX ORDER — GABAPENTIN 300 MG/1
300 CAPSULE ORAL 2 TIMES DAILY
Status: DISCONTINUED | OUTPATIENT
Start: 2021-06-10 | End: 2021-06-10 | Stop reason: HOSPADM

## 2021-06-10 RX ORDER — SODIUM CHLORIDE 0.9 % (FLUSH) 0.9 %
5-40 SYRINGE (ML) INJECTION PRN
Status: DISCONTINUED | OUTPATIENT
Start: 2021-06-10 | End: 2021-06-10 | Stop reason: HOSPADM

## 2021-06-10 RX ORDER — ONDANSETRON 4 MG/1
4 TABLET, ORALLY DISINTEGRATING ORAL EVERY 8 HOURS PRN
Status: DISCONTINUED | OUTPATIENT
Start: 2021-06-10 | End: 2021-06-10 | Stop reason: HOSPADM

## 2021-06-10 RX ORDER — ACETAMINOPHEN 325 MG/1
650 TABLET ORAL EVERY 6 HOURS PRN
Status: DISCONTINUED | OUTPATIENT
Start: 2021-06-10 | End: 2021-06-10 | Stop reason: HOSPADM

## 2021-06-10 RX ORDER — TRAZODONE HYDROCHLORIDE 50 MG/1
150 TABLET ORAL NIGHTLY PRN
Status: DISCONTINUED | OUTPATIENT
Start: 2021-06-10 | End: 2021-06-10 | Stop reason: HOSPADM

## 2021-06-10 RX ORDER — PANTOPRAZOLE SODIUM 40 MG/10ML
40 INJECTION, POWDER, LYOPHILIZED, FOR SOLUTION INTRAVENOUS DAILY
Status: DISCONTINUED | OUTPATIENT
Start: 2021-06-10 | End: 2021-06-10 | Stop reason: HOSPADM

## 2021-06-10 RX ORDER — ONDANSETRON 2 MG/ML
4 INJECTION INTRAMUSCULAR; INTRAVENOUS EVERY 6 HOURS PRN
Status: DISCONTINUED | OUTPATIENT
Start: 2021-06-10 | End: 2021-06-10 | Stop reason: HOSPADM

## 2021-06-10 RX ORDER — SODIUM CHLORIDE 9 MG/ML
25 INJECTION, SOLUTION INTRAVENOUS PRN
Status: DISCONTINUED | OUTPATIENT
Start: 2021-06-10 | End: 2021-06-10 | Stop reason: HOSPADM

## 2021-06-10 RX ORDER — SODIUM CHLORIDE, SODIUM LACTATE, POTASSIUM CHLORIDE, CALCIUM CHLORIDE 600; 310; 30; 20 MG/100ML; MG/100ML; MG/100ML; MG/100ML
INJECTION, SOLUTION INTRAVENOUS CONTINUOUS
Status: DISCONTINUED | OUTPATIENT
Start: 2021-06-10 | End: 2021-06-10

## 2021-06-10 RX ORDER — LEVOTHYROXINE SODIUM 0.15 MG/1
150 TABLET ORAL
Status: DISCONTINUED | OUTPATIENT
Start: 2021-06-10 | End: 2021-06-10 | Stop reason: HOSPADM

## 2021-06-10 RX ORDER — POLYETHYLENE GLYCOL 3350 17 G/17G
17 POWDER, FOR SOLUTION ORAL DAILY
Status: DISCONTINUED | OUTPATIENT
Start: 2021-06-10 | End: 2021-06-10 | Stop reason: HOSPADM

## 2021-06-10 RX ORDER — LEVOTHYROXINE SODIUM 0.15 MG/1
1 TABLET ORAL DAILY
Status: DISCONTINUED | OUTPATIENT
Start: 2021-06-10 | End: 2021-06-10 | Stop reason: SDUPTHER

## 2021-06-10 RX ORDER — ALPRAZOLAM 0.5 MG/1
1 TABLET ORAL 2 TIMES DAILY PRN
Status: DISCONTINUED | OUTPATIENT
Start: 2021-06-10 | End: 2021-06-10 | Stop reason: HOSPADM

## 2021-06-10 RX ORDER — POLYETHYLENE GLYCOL 3350 17 G/17G
17 POWDER, FOR SOLUTION ORAL 2 TIMES DAILY
Qty: 510 G | Refills: 0 | Status: ON HOLD
Start: 2021-06-10 | End: 2021-07-16 | Stop reason: HOSPADM

## 2021-06-10 RX ORDER — ACETAMINOPHEN 650 MG/1
650 SUPPOSITORY RECTAL EVERY 6 HOURS PRN
Status: DISCONTINUED | OUTPATIENT
Start: 2021-06-10 | End: 2021-06-10 | Stop reason: HOSPADM

## 2021-06-10 RX ORDER — SODIUM CHLORIDE 0.9 % (FLUSH) 0.9 %
5-40 SYRINGE (ML) INJECTION EVERY 12 HOURS SCHEDULED
Status: DISCONTINUED | OUTPATIENT
Start: 2021-06-10 | End: 2021-06-10 | Stop reason: HOSPADM

## 2021-06-10 RX ORDER — ALPRAZOLAM 0.5 MG/1
1 TABLET ORAL ONCE
Status: COMPLETED | OUTPATIENT
Start: 2021-06-10 | End: 2021-06-10

## 2021-06-10 RX ORDER — SODIUM CHLORIDE, SODIUM LACTATE, POTASSIUM CHLORIDE, CALCIUM CHLORIDE 600; 310; 30; 20 MG/100ML; MG/100ML; MG/100ML; MG/100ML
INJECTION, SOLUTION INTRAVENOUS CONTINUOUS
Status: DISCONTINUED | OUTPATIENT
Start: 2021-06-10 | End: 2021-06-10 | Stop reason: HOSPADM

## 2021-06-10 RX ADMIN — SODIUM CHLORIDE, POTASSIUM CHLORIDE, SODIUM LACTATE AND CALCIUM CHLORIDE 125 ML/HR: 600; 310; 30; 20 INJECTION, SOLUTION INTRAVENOUS at 00:46

## 2021-06-10 RX ADMIN — ENOXAPARIN SODIUM 40 MG: 40 INJECTION SUBCUTANEOUS at 09:33

## 2021-06-10 RX ADMIN — NALOXEGOL OXALATE 25 MG: 25 TABLET, FILM COATED ORAL at 00:18

## 2021-06-10 RX ADMIN — PANTOPRAZOLE SODIUM 40 MG: 40 INJECTION, POWDER, FOR SOLUTION INTRAVENOUS at 09:32

## 2021-06-10 RX ADMIN — SODIUM CHLORIDE, POTASSIUM CHLORIDE, SODIUM LACTATE AND CALCIUM CHLORIDE: 600; 310; 30; 20 INJECTION, SOLUTION INTRAVENOUS at 09:36

## 2021-06-10 RX ADMIN — ONDANSETRON 4 MG: 2 INJECTION INTRAMUSCULAR; INTRAVENOUS at 05:26

## 2021-06-10 RX ADMIN — Medication 10 ML: at 09:32

## 2021-06-10 RX ADMIN — GABAPENTIN 300 MG: 300 CAPSULE ORAL at 09:33

## 2021-06-10 RX ADMIN — ALPRAZOLAM 1 MG: 0.5 TABLET ORAL at 01:17

## 2021-06-10 RX ADMIN — ACETAMINOPHEN 650 MG: 325 TABLET ORAL at 09:33

## 2021-06-10 RX ADMIN — ACETAMINOPHEN 650 MG: 325 TABLET ORAL at 15:38

## 2021-06-10 RX ADMIN — SODIUM CHLORIDE, POTASSIUM CHLORIDE, SODIUM LACTATE AND CALCIUM CHLORIDE: 600; 310; 30; 20 INJECTION, SOLUTION INTRAVENOUS at 05:10

## 2021-06-10 RX ADMIN — BUPROPION HYDROCHLORIDE 150 MG: 150 TABLET, EXTENDED RELEASE ORAL at 09:33

## 2021-06-10 ASSESSMENT — PAIN DESCRIPTION - PAIN TYPE
TYPE: ACUTE PAIN
TYPE: SURGICAL PAIN
TYPE: SURGICAL PAIN

## 2021-06-10 ASSESSMENT — ENCOUNTER SYMPTOMS
NAUSEA: 0
CONSTIPATION: 1
ABDOMINAL PAIN: 1
VOMITING: 0
EYES NEGATIVE: 1
RESPIRATORY NEGATIVE: 1

## 2021-06-10 ASSESSMENT — PAIN DESCRIPTION - LOCATION
LOCATION: ABDOMEN
LOCATION: KNEE
LOCATION: KNEE

## 2021-06-10 ASSESSMENT — PAIN DESCRIPTION - ONSET: ONSET: ON-GOING

## 2021-06-10 ASSESSMENT — PAIN DESCRIPTION - ORIENTATION
ORIENTATION: LEFT
ORIENTATION: LEFT
ORIENTATION: LOWER

## 2021-06-10 ASSESSMENT — PAIN DESCRIPTION - DESCRIPTORS
DESCRIPTORS: ACHING
DESCRIPTORS: CRAMPING
DESCRIPTORS: ACHING

## 2021-06-10 ASSESSMENT — PAIN DESCRIPTION - FREQUENCY: FREQUENCY: CONTINUOUS

## 2021-06-10 ASSESSMENT — PAIN SCALES - GENERAL
PAINLEVEL_OUTOF10: 9
PAINLEVEL_OUTOF10: 5
PAINLEVEL_OUTOF10: 6

## 2021-06-10 NOTE — ED NOTES
Report given to [facility] RN. No further questions at this time.      Herminia Riedel, ANA  06/10/21 4730

## 2021-06-10 NOTE — CONSULTS
Gastroenterology Consult Note        Patient: Roberto Carlos Fuentes  : 1950  Acct#:      Date:  6/10/2021    Subjective:       History of Present Illness  Patient is a 78 y.o. female admitted with Ileus, postoperative ([facility] [address] [K91.89, K56.7] who is seen in consult for Post-Operative Ileus. S/P L TKA at [facility] 21. She was taking around 1 pain pill per day. She presented to the ER yesterday after not having a BM since surgery. In the ED she was given movantik, milk and molasses enema, Doculax and mag citrate. Since then she was admitted to the floor and has had multiple BMs. She has had around 10 BM since, formed and mucus. Brown in color, no melena or hematochezia. Abdominal pain has since resolved, no nausea or vomiting.        Past Medical History:   Diagnosis Date    Anal fissure 2013    Anxiety     Colon polyps     Depression     Fibroid uterus     Hypothyroidism     Morbid obesity ([facility] [address]     Osteopenia       Past Surgical History:   Procedure Laterality Date    ABDOMINAL HERNIA REPAIR      APPENDECTOMY  Age 27    CATARACT REMOVAL WITH IMPLANT Right 10/23/2017    with vitrectomy    CATARACT REMOVAL WITH IMPLANT Left 2018    with vitrectomy    COLONOSCOPY N/A 10/10/2018    COLONOSCOPY POLYPECTOMY SNARE/COLD BIOPSY performed by Dacia Caldwell MD at [address] [facility] Right 2019    RIGHT L4 AND L5 TRANSFORAMINAL EPIDURAL STEROID INJECTION WITH FLUOROSCOPY performed by Anabel Miranda MD at [address], [facility]. INJECT OTHER PERPHRL NERV Right 10/21/2019    RIGHT PIRIFORMIS INJECTION WITH FLUOROSCOPY (06373) performed by Anabel Miranda MD at [facility] 399, TOTAL ABDOMINAL      ovaries out also   [address] Right 2019    RIGHT L3 AND L4 LUMBAR TRANSFORAMINAL WITH FLUOROSCOPY performed by Anabel Miranda MD at [address] [place] [address] Right 5/15/2019    RIGHT L4 AND L5 TRANSFORAMINAL EPIDURAL STEROID INJECTION WITH FLUOROSCOPY performed by Toro Marion MD at [facility] 177 ESOPHAGOGASTRODUODENOSCOPY TRANSORAL DIAGNOSTIC N/A 10/10/2018    EGD performed by Glenys Paniagua MD at [address]    Fibroids    TOTAL KNEE ARTHROPLASTY Left 6/1/2021    LEFT TOTAL KNEE ARTHROPLASTY performed by Filemon Kang MD at [address]      Past Endoscopic History EGD and Colonoscopy 10/10/2018    - Paraesophageal hernia surgery consult  - repeat colon 5 years  - S/P duodenal switch  - 6mm splenic flexure polyp  - internal hemorrhoids              Admission Meds  No current facility-administered medications on file prior to encounter.      Current Outpatient Medications on File Prior to Encounter   Medication Sig Dispense Refill    polyethylene glycol (GLYCOLAX) 17 GM/SCOOP powder Take 17 g by mouth daily 510 g 0    ondansetron (ZOFRAN) 4 MG tablet Take 1 tablet by mouth 3 times daily as needed for Nausea or Vomiting 30 tablet 0    aspirin 325 MG EC tablet Take 1 tablet by mouth daily 45 tablet 0    clobetasol (TEMOVATE) 0.05 % ointment Apply topically nightly 45 g 1    buPROPion (WELLBUTRIN XL) 150 MG extended release tablet Take 1 tablet by mouth every morning 90 tablet 1    ALPRAZolam (XANAX) 1 MG tablet TAKE 1 TABLET BY MOUTH TWICE DAILY AS NEEDED FOR ANXIETY 35 tablet 2    traZODone (DESYREL) 50 MG tablet TAKE 3 TABLETS BY MOUTH EVERY NIGHT AT BEDTIME 270 tablet 1    levothyroxine (SYNTHROID) 150 MCG tablet TAKE 1 TABLET BY MOUTH EVERY DAY 90 tablet 1    omeprazole (PRILOSEC) 40 MG delayed release capsule TAKE 1 CAPSULE BY MOUTH EVERY MORNING BEFORE BREAKFAST 90 capsule 1    melatonin 5 MG TABS tablet Take 5 mg by mouth daily      Cholecalciferol (VITAMIN D) 1000 UNIT CAPS Take 2 capsules by mouth daily       Multiple Vitamin (MULTIVITAMIN PO) Take by mouth      gabapentin (NEURONTIN) 300 MG capsule TAKE ONE CAPSULE BY MOUTH TWICE DAILY (Patient taking differently: nightly. ) 60 capsule 5    Cyanocobalamin (VITAMIN B 12 PO) Take 500 mcg by mouth daily          Reports NSAID use for knee pain    Allergies  Allergies   Allergen Reactions    Codeine Nausea Only    Macrobid [Nitrofurantoin] Other (See Comments)     Fever, myalgias      Social   Social History     Tobacco Use    Smoking status: Never Smoker    Smokeless tobacco: Never Used   Substance Use Topics    Alcohol use: No        Family History   Problem Relation Age of Onset    Diabetes Father         Type II    Diabetes Sister         Type II    Diabetes Brother         Type II    Breast Cancer Sister 58    Lung Cancer Mother         Smoker    Heart Disease Sister         Bicuspid aortic valve x2        Review of Systems  Constitutional: negative for fevers, chills, sweats    Ears, nose, mouth, throat, and face: negative for nasal congestion and sore throat   Respiratory: negative for cough and shortness of breath   Cardiovascular: negative for chest pain and dyspnea   Gastrointestinal: see hpi   Genitourinary:negative for dysuria and frequency   Integument/breast: negative for pruritus and rash   Hematologic/lymphatic: negative for bleeding and easy bruising   Musculoskeletal:negative for arthralgias and myalgias. S/P L TKA  Neurological: negative for dizziness and weakness         Physical Exam  Blood pressure (!) 146/86, pulse 80, temperature 98.4 °F (36.9 °C), temperature source Infrared, resp. rate 16, height 5' 2\" (1.575 m), weight 186 lb (84.4 kg), SpO2 98 %, not currently breastfeeding. General appearance: alert, cooperative, no distress, appears stated age  Eyes: Anicteric  Head: Normocephalic, without obvious abnormality  Lungs: clear to auscultation bilaterally, Normal Effort  Heart: regular rate and rhythm, normal S1 and S2, no murmurs or rubs  Abdomen: soft, non-tender.  Bowel sounds normal. Mid line surgical incision with fullness related to known seroma on ct scan  Extremities: atraumatic, no cyanosis or edema  Skin: warm and dry, no jaundice  Neuro: Grossly intact, A&OX3  Musculoskeletal: 5/5  strength BUE, post op dressing. Data Review:    Recent Labs     06/09/21 2024   WBC 8.6   HGB 10.2*   HCT 31.2*   MCV 97.3   *     Recent Labs     06/09/21 2024   *   K 4.1      CO2 22   BUN 14   CREATININE 0.9     No results for input(s): AST, ALT, ALB, BILIDIR, BILITOT, ALKPHOS in the last 72 hours. No results for input(s): LIPASE, AMYLASE in the last 72 hours. No results for input(s): PROTIME, INR in the last 72 hours. No results for input(s): PTT in the last 72 hours. No results for input(s): OCCULTBLD in the last 72 hours. Imaging Studies:                                        CT-scan of abdomen and pelvis with IV contrast 6/9/2021  Impression   Interval development of diffuse gaseous distention of the colon which may be   related to a colonic ileus.  There is mild circumferential wall thickening of   the distal rectum, with associated perirectal stranding suspicious for acute   proctitis.       Anterior abdominal wall rounded postoperative seroma, similar to the previous   examination.       Other similar findings as above. Assessment:     Active Problems:    Ileus, postoperative (HCC)  Resolved Problems:    * No resolved hospital problems. *    Postoperative Ileus: L TKA at [facility] 6/1/2021, since then she had no BMs. She did not experience any nausea or vomiting. Started developing increasingly worse abdominal pain and cramping. Now passing frequent stool with intervention from ED. Was taking narcotic pain medication and had decreased ambulation during that period. S/P L TKA: PT/OT      Recommendations:   - KUB to document resolution of Ileus given recent BMs  - Adv diet  - On discharge Miralax BID, Benefiber 1tbsp daily. - okay to d/c if KUB shows improvement and tolerates advancement in diet.    - Low bar for intervention of constipation reoccurs. - Limit Narcotic pain medication  - Follow up colonoscopy per previous recs (2023)    Discussed with Dr. Borja Ip, [address]    I have personally performed a face to face diagnostic evaluation on this patient. I have interviewed and examined the patient and I agree with the findings and recommended plan of care. In summary, my findings and plan are the following: constipation s/p knee surgery on pain meds, now voluminous stools with laxatives and feels completely relieved, no abd pain, abd exam midline fullness c/w known seroma on ct scan and abd nontender, ct ? Proctitis but most likley from constipation/stercoral ulcer like pressure, kub ? Ileus and ?impaction but passing stools, jeremy offerred rectal exam ensure (unlikely) no rectal obstruction and disimpact if needed but pt declined. [id number] Zena[address] Barber from my standpoint resume home diet, agree benefiber daily and discussed how to titrate mirlax up now to clear stools then titrate down as becomes more mobile with knee recovery. Colonoscopy is up to date so hold off repeat unless pt doesn't completely recover but current picture seems c/w postop and pain med constipation. Discussed with pt/family. [id number] [address]ena[address] Barber for [employer] home to titrate miralax as outpt from my standpoint. Will sign off, call if needed.        Naun Rodriguez MD  [address]

## 2021-06-10 NOTE — PROGRESS NOTES
Patient provided with discharge instructions, discussed in detail, new medications reviewed including use and side effects. Patient verbalized understanding. All questions answered, family at the bedside to transport home. Patient discharged home.

## 2021-06-10 NOTE — ED PROVIDER NOTES
mg Intravenous Given 6/10/21 0526)   acetaminophen (TYLENOL) tablet 650 mg (650 mg Oral Given 6/10/21 1538)     Or   acetaminophen (TYLENOL) suppository 650 mg ( Rectal See Alternative 6/10/21 1538)   bisacodyl (DULCOLAX) EC tablet 5 mg (has no administration in time range)   levothyroxine (SYNTHROID) tablet 150 mcg (150 mcg Oral Not Given 6/10/21 0545)   0.9 % sodium chloride bolus (0 mLs Intravenous Stopped 6/9/21 2151)   milk and molasses enema 240 mL (240 mLs Rectal Given 6/9/21 1945)   magnesium citrate solution 296 mL (296 mLs Oral Given 6/9/21 2154)   naloxegol (MOVANTIK) tablet 25 mg (25 mg Oral Given 6/10/21 0018)   iopamidol (ISOVUE-370) 76 % injection 75 mL (75 mLs Intravenous Given 6/9/21 2343)   ALPRAZolam (XANAX) tablet 1 mg (1 mg Oral Given 6/10/21 0117)        CLINICAL IMPRESSION  1. Constipation, unspecified constipation type    2. Postoperative ileus (HCC)        Blood pressure 128/66, pulse 82, temperature 98.2 °F (36.8 °C), temperature source Oral, resp. rate 16, height 5' 2\" (1.575 m), weight 186 lb (84.4 kg), SpO2 95 %, not currently breastfeeding. DISPOSITION  Edson Floyd was admitted in stable condition. Patient afebrile and nontoxic. She appears uncomfortable on my exam, however in no distress. Abdomen is soft and without peritoneal signs. No bowel movement despite enema, movantik and magnesium citrate in ED. Rectal exam performed by Dr. Ira Morgan was without palpable impaction, unable to manually disimpact. Did proceed with CT imaging at this point which was not consistent with SBO. There is some proctitis noted however I suspect secondary to constipation, unlikely infectious etiology. Patient's symptoms attributable to post-operative ileus. As no relief able to be obtained in ED at this point will plan for admission for bowel regimen and PT. Patient was given scripts for the following medications. I counseled patient how to take these medications.    Discharge Medication List as of 6/10/2021  4:45 PM          This chart was created using Dragon dictation software. Efforts were made by me to ensure accuracy, however some errors may be present due to limitations of this technology.        Dickson Ruiz DO  06/10/21 3276

## 2021-06-10 NOTE — H&P
HOSPITALISTS HISTORY AND PHYSICAL    6/10/2021 1:26 AM    Patient Information:  Chaz Loja is a 78 y.o. female [id number]  PCP:  Chavo Silva MD (Tel: [phone] )    Chief complaint:    Chief Complaint   Patient presents with    Constipation     [facility] brought pt in from home, recent left tkr 8 days ago,  pt unable to have a bowel movement. pt has taken otc senna, mirlax and stool softner suppository with no relief        History of Present Illness:  Maida Bartholomew is a 78 y.o. female with recent left total knee arthroplasty on June 1, 2021. Patient presents to the emergency room for abdominal pain and constipation. She has not had a bowel movement since surgery. She is reports increasing abdominal pain. She has been passing gas. Daughter is at bedside and states they have tried several laxatives at home with no results. Patient describes cramping in her abdomen. She denies any nausea or vomiting. She has had decreased appetite. She states she has been ambulating at home. She is on percocet post op. She has a history of gastric bypass over 10 years ago. She was given molasses enema, Milk of mag, and movantik in ER. She started having liquid stools in ER. However still diffuse cramping. History obtained from patient       REVIEW OF SYSTEMS:   Review of Systems   Constitutional: Negative. HENT: Negative. Eyes: Negative. Respiratory: Negative. Cardiovascular: Negative. Gastrointestinal: Positive for abdominal pain and constipation. Negative for nausea and vomiting. Genitourinary: Negative. Musculoskeletal:        Left knee pain    Skin: Negative. Neurological: Negative. Hematological: Negative. Psychiatric/Behavioral: Negative. All other systems reviewed and are negative.       Past Medical History:   has a past medical history of Anal fissure, Anxiety, Colon polyps, Depression, Fibroid uterus, Hypothyroidism, Morbid obesity ([address] and Osteopenia. Past Surgical History:   has a past surgical history that includes Gastric bypass surgery (2000); Abdominal hernia repair (2008); jasmyne and bso (cervix removed) (1996); Appendectomy (Age 27); Cataract removal with implant (Right, 10/23/2017); Cataract removal with implant (Left, 02/26/2018); pr esophagogastroduodenoscopy transoral diagnostic (N/A, 10/10/2018); Colonoscopy (N/A, 10/10/2018); Lumbar spine surgery (Right, 1/7/2019); Hysterectomy, total abdominal; Lumbar spine surgery (Right, 5/15/2019); epidural steroid injection (Right, 8/26/2019); hc inject other perphrl nerv (Right, 10/21/2019); and Total knee arthroplasty (Left, 6/1/2021). Medications:  No current facility-administered medications on file prior to encounter.      Current Outpatient Medications on File Prior to Encounter   Medication Sig Dispense Refill    polyethylene glycol (GLYCOLAX) 17 GM/SCOOP powder Take 17 g by mouth daily 510 g 0    ondansetron (ZOFRAN) 4 MG tablet Take 1 tablet by mouth 3 times daily as needed for Nausea or Vomiting 30 tablet 0    aspirin 325 MG EC tablet Take 1 tablet by mouth daily 45 tablet 0    clobetasol (TEMOVATE) 0.05 % ointment Apply topically nightly 45 g 1    buPROPion (WELLBUTRIN XL) 150 MG extended release tablet Take 1 tablet by mouth every morning 90 tablet 1    ALPRAZolam (XANAX) 1 MG tablet TAKE 1 TABLET BY MOUTH TWICE DAILY AS NEEDED FOR ANXIETY 35 tablet 2    traZODone (DESYREL) 50 MG tablet TAKE 3 TABLETS BY MOUTH EVERY NIGHT AT BEDTIME 270 tablet 1    Multiple Vitamin (MULTIVITAMIN PO) Take by mouth      gabapentin (NEURONTIN) 300 MG capsule TAKE ONE CAPSULE BY MOUTH TWICE DAILY 60 capsule 5    levothyroxine (SYNTHROID) 150 MCG tablet TAKE 1 TABLET BY MOUTH EVERY DAY 90 tablet 1    omeprazole (PRILOSEC) 40 MG delayed release capsule TAKE 1 CAPSULE BY MOUTH EVERY MORNING BEFORE Status: She is alert and oriented to person, place, and time. Cranial Nerves: No cranial nerve deficit. Psychiatric:         Mood and Affect: Mood normal.         Behavior: Behavior normal.         Labs:  CBC:   Lab Results   Component Value Date    WBC 8.6 06/09/2021    RBC 3.21 06/09/2021    HGB 10.2 06/09/2021    HCT 31.2 06/09/2021    MCV 97.3 06/09/2021    MCH 31.9 06/09/2021    MCHC 32.8 06/09/2021    RDW 14.2 06/09/2021     06/09/2021    MPV 7.7 06/09/2021     BMP:    Lab Results   Component Value Date     06/09/2021    K 4.1 06/09/2021     06/09/2021    CO2 22 06/09/2021    BUN 14 06/09/2021    CREATININE 0.9 06/09/2021    CALCIUM 8.8 06/09/2021    GFRAA >60 06/09/2021    GFRAA >60 06/11/2013    LABGLOM >60 06/09/2021    GLUCOSE 107 06/09/2021     CT ABDOMEN PELVIS W IV CONTRAST Additional Contrast? None   Final Result   Interval development of diffuse gaseous distention of the colon which may be   related to a colonic ileus. There is mild circumferential wall thickening of   the distal rectum, with associated perirectal stranding suspicious for acute   proctitis. Anterior abdominal wall rounded postoperative seroma, similar to the previous   examination. Other similar findings as above. XR ABDOMEN (KUB) (SINGLE AP VIEW)   Final Result   Large amount stool burden throughout the colon and rectum. Chest Xray:   EKG:    I visualized CXR images and EKG strips    Problem List  Active Problems:    * No active hospital problems. *  Resolved Problems:    * No resolved hospital problems. *        Assessment/Plan:   1. Post-operative Ileus  Pt will be admitted to medical floor. She was unsuccessful at having a BM in ER after several medications given. NPO sips of water, consider advancing diet in am.  No further laxatives tonight since patient having cramping. Will give enema in am, if no BM. GI consult  ? Proctitis on CT scan. IVF  Ambulate patient. 2. Left TKA   Pain control sparingly, increase ambulation. 3. Anxiety  Continue home medications. DVT prophylaxis Lovenox  Code status Full   Diet NPO sips of meds. IV access peripheral     Admit as inpatient. I anticipate hospitalization spanning more than two midnights for investigation and treatment of the above medically necessary diagnoses. Please note that some part of this chart was generated using Dragon dictation software. Although every effort was made to ensure the accuracy of this automated transcription, some errors in transcription may have occurred inadvertently. If you may need any clarification, please do not hesitate to contact me through [facility].        OVIDIO Swanson CNP    6/10/2021 1:26 AM

## 2021-06-10 NOTE — ACP (ADVANCE CARE PLANNING)
Advanced Care Planning Note. Purpose of Encounter: Advanced care planning in light of obesity  Parties In Attendance: Patient, daughter  Decisional Capacity: Yes  Subjective: Patient with expected L Knee pain  Objective: Cr 0.9  Goals of Care Determination: Patient wants full support (CPR, vent, surgery, HD, trach, PEG)  Plan:  Ortho and GI consults. KUB. Disimpact  Code Status: Full code   Time spent on Advanced care Plannin minutes  Advanced Care Planning Documents: Completed advanced directives on chart, daughter is the POA.     Jose Velasquez MD  6/10/2021 4:07 PM

## 2021-06-10 NOTE — PROGRESS NOTES
[facility] Orthopedic Surgery   Progress Note    CHIEF COMPLAINT/DIAGNOSIS: S/p LEFT Total Knee Arthroplasty    SUBJECTIVE: The patient is sitting up in the bed. Admitted for observation last night due to fecal impaction. She was able to have a BM today. Reports moderate knee pain. Denies N/T. No narcotics in last 48 hrs. Using Tylenol and Advil interchangeably. Scheduled to resume home PT tomorrow. OBJECTIVE  Physical    VITALS:  BP (!) 146/86   Pulse 80   Temp 98.4 °F (36.9 °C) (Infrared)   Resp 16   Ht 5' 2\" (1.575 m)   Wt 186 lb (84.4 kg)   SpO2 98%   BMI 34.02 kg/m²     GENERAL: Alert and oriented x3, in no acute distress. MUSCULOSKELETAL: Able to dorsi and plantarflex the ankle without issue. INCISION:  Well approximated and open to air without drainage or erythema. Swelling/ecchyosis as expected. ROM: left knee ROM 0-65. Sensory:  Intact to light touch in peroneal and tibial distributions. Vascular:   2+ DP pulses with brisk cap refill;  calf soft and nontender        Data    ALL MEDICATIONS HAVE BEEN REVIEWED    CBC:   Recent Labs     06/09/21 2024   WBC 8.6   HGB 10.2*   HCT 31.2*   *     BMP:   Recent Labs     06/09/21 2024   *   K 4.1      CO2 22   BUN 14   CREATININE 0.9     INR: No results for input(s): INR in the last 72 hours. Post-op films show stable total knee arthroplasty construct without acute complication from 1/6/65. ASSESSMENT:  S/p LEFT Total Knee Arthroplasty (6/1/21), POD#9  Fecal impaction    PLAN:   - WB status:  WBAT; reviewed post op precautions  - DVT prophylaxis: ASA 325mg daily x 45 days  - resume home PT tomorrow.    - Pain Control:  Avoiding narcotics; tylenol/advil.   - Expected post op acute blood loss anemia: H/H today: 10.2/31.2  - Dispo: home today    Follow-up with Dr. Viet Cortés as scheduled on 6/16.  [phone]  Future Appointments   Date Time Provider Latisha Husain   6/16/2021  9:30 AM Katt Rich, PT TJ MON PT [facility]   6/16/2021 11:00 AM Demarcus Mcfadden MD [facility]   6/21/2021  2:00 PM [facility] DEXA RM 1 [facility] RAD [facility]   6/21/2021  2:30 PM [facility] SEBLEO RM [address] [facility]   8/24/2021  1:00 PM Augustine Caceres MD [facility] OVIDIO Del Valle - CNP  6/10/2021  9:15 AM

## 2021-06-10 NOTE — PROGRESS NOTES
0315 : Admission assessment complete. See flow sheet. Meds given per MAR. Pt admitted from home lives with daughter. Came in with post op ileus constipation. S/P TKA at [facility] 8 days ago. Pt is AxO x4. Take pills WWW. Pt expressed no other needs at this time. Will continue to educate patient as needed  Fall precautions in place, hourly rounding, call light and belongings in reach, bed in lowest position, wheels locked in place, side rails up x 2, walkways free of clutter    The care plan and education has been reviewed and mutually agreed upon with the patient.

## 2021-06-10 NOTE — DISCHARGE SUMMARY
Hospital Medicine Discharge Summary    Patient: Tony Burris     Gender: female  : 1950   Age: 78 y.o. MRN: [id number]    Admitting Physician: Ellen Calderon MD  Discharge Physician: Nikkie Holt MD     Code Status: Full Code     Admit Date: 2021   Discharge Date:   6/10/21    Disposition:  Home    Discharge Diagnoses: Active Hospital Problems    Diagnosis Date Noted    Ileus, postoperative ([address] [K91.89, K56.7] 06/10/2021    Fecal impaction ([address] [K56.41] 06/10/2021    Obesity (BMI 30-39. 9) [E66.9] 06/10/2021    Abdominal pain [R10.9] 06/10/2021    Restless leg syndrome [G25.81] 2017    GERD (gastroesophageal reflux disease) [K21.9] 2013    Major depressive disorder with single episode [F32.9]     Hypothyroidism [E03.9]        Follow-up appointments:  one week    Outpatient to do list: F/U with Ortho in 1 week. F/U with GI PRN. F/u with PCP    Condition at Discharge:  Stable    Hospital Course:   79 yo F with history of obesity, S/P L TKA with Dr Ro Hoover on 21 at [facility], RLS, GERD, hypothyroidism, depression who came to ER with complaints of abdominal pain. Found to have SEVERE fecal impaction that failed to be resolved despite aggressive care in ED. Admitted as inpatient as CT with concern for possible proctitis. Followed by GI and Ortho. Had loose stools after enemas and tolerated diet. Repeat KUB still showed impaction. She was offered rectal exam and disimpaction by GI which she refused. Narcotics reduced to as needed, she did not have a BM since her surgery. Will be on Miralax bid. F/u with PCP and Ortho. F/U with GI PRN.     Discharge Medications:   Current Discharge Medication List        Current Discharge Medication List      CONTINUE these medications which have CHANGED    Details   polyethylene glycol (GLYCOLAX) 17 GM/SCOOP powder Take 17 g by mouth 2 times daily  Qty: 510 g, Refills: 0           Current Discharge Medication List or rubs. No leg edema  Lungs:  No use of accessory musclesNormal respiratory effort. Clear to auscultation, bilaterally without Rales/Wheezes/Rhonchi. Abdomen: Soft, non-tender, non-distended, bowel sounds present, no masses  Musculoskeletal:  No clubbing, no cyanosis, No edema  Skin: L TKA incision is C/D/I  Neurologic:  Neurovascularly intact without any focal sensory/motor deficits. Cranial nerves: II-XII intact, grossly non-focal.  Psychiatric:  A & O x3  Neuro: Grossly intact, moves all four extremities     Labs: For convenience and continuity at follow-up the following most recent labs are provided:    Lab Results   Component Value Date    WBC 8.6 06/09/2021    HGB 10.2 06/09/2021    HCT 31.2 06/09/2021    MCV 97.3 06/09/2021     06/09/2021     06/09/2021    K 4.1 06/09/2021     06/09/2021    CO2 22 06/09/2021    BUN 14 06/09/2021    CREATININE 0.9 06/09/2021    CALCIUM 8.8 06/09/2021    ALKPHOS 77 05/26/2021    ALT 9 05/26/2021    AST 16 05/26/2021    BILITOT 0.3 05/26/2021    BILIDIR 0.13 06/11/2013    LABALBU 4.0 05/26/2021    LDLCALC 62 01/04/2021    TRIG 171 05/02/2017     Lab Results   Component Value Date    INR 0.88 05/26/2021       Radiology:  XR KNEE LEFT (1-2 VIEWS)    Result Date: 6/1/2021  2 views of the left knee History: Post op, pain. Findings: The patient is status post left knee arthroplasty. The alignment is congruent. There are no immediate complications. Soft tissue gas and swelling are present. Impression: Status post left knee arthroplasty with no immediate complication evident. XR ABDOMEN (KUB) (SINGLE AP VIEW)    Result Date: 6/10/2021  EXAMINATION: ONE SUPINE XRAY VIEW(S) OF THE ABDOMEN 6/10/2021 10:15 am COMPARISON: CT abdomen and abdominal radiographs June 9, 2020 and priors.  HISTORY: ORDERING SYSTEM PROVIDED HISTORY: Abdominal pain TECHNOLOGIST PROVIDED HISTORY: Reason for exam:->Abdominal pain Reason for Exam: Abdominal pain Acuity: Acute Type of Exam: Initial FINDINGS: Large amount of gas and stool are again seen throughout the abdomen and pelvis. Evaluation of small bowel loops is limited due to the degree of gas and stool throughout the colon. No obvious free air. No cyst antral changed compared to yesterday's study. Calcified phleboliths are again seen over the pelvis. Contrast is seen within the bladder from yesterday's CT. No acute osseous abnormality. Large stool burden consistent with constipation. No substantial change. XR ABDOMEN (KUB) (SINGLE AP VIEW)    Result Date: 6/9/2021  EXAMINATION: ONE SUPINE XRAY VIEW(S) OF THE ABDOMEN 6/9/2021 7:45 pm COMPARISON: None. HISTORY: ORDERING SYSTEM PROVIDED HISTORY: abd pain TECHNOLOGIST PROVIDED HISTORY: Reason for exam:->abd pain Reason for Exam: Constipation ([place] [facility] brought pt in from home, recent left tkr 8 days ago,  pt unable to have a bowel movement. pt has taken otc senna, mirlax and stool softner suppository with no relief) Acuity: Acute Type of Exam: Initial FINDINGS: Large amount of stool burden throughout the colon and rectum. No dilated loops of small bowel identified. Surgical clips project over the right upper quadrant. Phleboliths in the pelvis. No acute osseous abnormality identified. Large amount stool burden throughout the colon and rectum. CT ABDOMEN PELVIS W IV CONTRAST Additional Contrast? None    Result Date: 6/10/2021  EXAMINATION: CT OF THE ABDOMEN AND PELVIS WITH CONTRAST 6/9/2021 11:36 pm TECHNIQUE: CT of the abdomen and pelvis was performed with the administration of intravenous contrast. Multiplanar reformatted images are provided for review. Dose modulation, iterative reconstruction, and/or weight based adjustment of the mA/kV was utilized to reduce the radiation dose to as low as reasonably achievable. COMPARISON: 05/06/2021.  HISTORY: ORDERING SYSTEM PROVIDED HISTORY: abdominal pain, obstipation TECHNOLOGIST PROVIDED HISTORY: Additional Contrast?->None Reason for exam:->abdominal pain, obstipation Decision Support Exception - unselect if not a suspected or confirmed emergency medical condition->Emergency Medical Condition (MA) Reason for Exam: Constipation ([place] [facility] brought pt in from home, recent left tkr 8 days ago,  pt unable to have a bowel movement. pt has taken otc senna, mirlax and stool softner suppository with no relief) FINDINGS: Lower Chest: No parenchymal consolidation or pleural effusion is identified. Minimal dependent atelectasis. Atherosclerotic disease including coronary artery involvement. No distal esophageal thickening is identified. Organs: No enhancing mass identified in the liver or spleen. No adrenal mass is identified. No pancreatic mass. No peripancreatic inflammatory process is identified. Post cholecystectomy. No common bile duct filling defect. No enhancing renal mass is identified. No periureteral stranding is seen. No ureteral calculi. GI/Bowel: Diffuse gaseous distention of the colon. The bowel anastomosis within the right mid abdomen appears intact. Mild circumferential wall thickening of the rectum. Mild perirectal edematous change is identified. Pelvis: Mild bladder distention. Hysterectomy. No bulky pelvic lymphadenopathy is identified. Peritoneum/Retroperitoneum: No abdominal aortic aneurysm is identified. No aortic dissection is identified. No retroperitoneal or mesenteric lymphadenopathy is identified. Bones/Soft Tissues: No acute subcutaneous soft tissue abnormality is identified. Again identified is a rounded postoperative fluid collection seen within the midline which may represent a postoperative seroma. Degenerative changes are seen in the spine. No acute fracture. Interval development of diffuse gaseous distention of the colon which may be related to a colonic ileus.   There is mild circumferential wall thickening of the distal rectum, with associated perirectal stranding suspicious for acute proctitis. Anterior abdominal wall rounded postoperative seroma, similar to the previous examination. Other similar findings as above. CT CARDIAC CALCIUM SCORING    Result Date: 5/26/2021  CORONARY CALCIUM SCORING Individualized dose optimization technique was used in order to meet ALARA standards for radiation dose reduction. In addition to vendor specific dose reduction algorithms, the dose reduction techniques vary based on the specific scanner utilized but frequently include automated exposure control, adjustment of the mA and/or kV according to patient size, and use of iterative reconstruction technique. COMMENTS : Axial non-contrast CT of the heart is obtained. The coronary calcium score is 38. Review of the surrounding extra-cardiac structures demonstrates postoperative changes in the stomach. .     IMPRESSION : Coronary calcium score of 38 corresponds to at least mild atherosclerotic plaque with mild/minimal coronary narrowings likely. This is in the 50th-75th percentile for age and gender. The patient was seen and examined on day of discharge and this discharge summary is in conjunction with any daily progress note from day of discharge. Time Spent on discharge is 45 minutes  in the examination, evaluation, counseling and review of medications and discharge plan. Note that more than 30 minutes was spent in preparing discharge papers, discussing discharge with patient, medication review, etc.       Signed:    Magdalena Oliveros MD   6/10/2021      Thank you Suzan De Jesus MD for the opportunity to be involved in this patient's care.  If you have any questions or concerns please feel free to contact me at [address] [place] [address]

## 2021-06-10 NOTE — CARE COORDINATION
SW met with patient regarding her discharge plan. Patient stating that she was active with [facility] prior to admission and would like to be followed by [facility] at discharge. TERRANCE contacted Lio Portillo, Liaison for [facility].     Electronically signed by ZULEYKA Naik on 6/10/2021 at 4:16 PM

## 2021-06-10 NOTE — ED PROVIDER NOTES
otherwise has no complaints at this time. Nursing Notes were all reviewed and agreed with or any disagreements were addressed in the HPI. REVIEW OF SYSTEMS    (2-9 systems for level 4, 10 or more for level 5)     Review of Systems   Constitutional: Negative for activity change, appetite change, chills and fever. HENT: Negative for congestion and rhinorrhea. Respiratory: Negative for cough and shortness of breath. Cardiovascular: Negative for chest pain. Gastrointestinal: Positive for constipation. Negative for abdominal pain, diarrhea, nausea and vomiting. Genitourinary: Negative for difficulty urinating, dysuria and hematuria. Positives and Pertinent negatives as per HPI. Except as noted above in the ROS, all other systems were reviewed and negative.        PAST MEDICAL HISTORY     Past Medical History:   Diagnosis Date    Anal fissure 7/16/2013    Anxiety     Colon polyps     Depression     Fibroid uterus     Hypothyroidism     Morbid obesity ([address]     Osteopenia          SURGICAL HISTORY     Past Surgical History:   Procedure Laterality Date    ABDOMINAL HERNIA REPAIR  2008    APPENDECTOMY  Age 27    CATARACT REMOVAL WITH IMPLANT Right 10/23/2017    with vitrectomy    CATARACT REMOVAL WITH IMPLANT Left 02/26/2018    with vitrectomy    COLONOSCOPY N/A 10/10/2018    COLONOSCOPY POLYPECTOMY SNARE/COLD BIOPSY performed by Rufus Saint, MD at [address] Right 8/26/2019    RIGHT L4 AND L5 TRANSFORAMINAL EPIDURAL STEROID INJECTION WITH FLUOROSCOPY performed by Emily Andrade MD at [address] INJECT OTHER PERPHRL NERV Right 10/21/2019    RIGHT PIRIFORMIS INJECTION WITH FLUOROSCOPY (04796) performed by Emily Andrade MD at [facility] , TOTAL ABDOMINAL      ovaries out also   [address] Right 1/7/2019    RIGHT L3 AND L4 LUMBAR TRANSFORAMINAL WITH FLUOROSCOPY performed by Bryant NUÑEZ Mother         Smoker    Heart Disease Sister         Bicuspid aortic valve x2          SOCIAL HISTORY       Social History     Tobacco Use    Smoking status: Never Smoker    Smokeless tobacco: Never Used   Vaping Use    Vaping Use: Never used   Substance Use Topics    Alcohol use: No    Drug use: No       SCREENINGS             PHYSICAL EXAM    (up to 7 for level 4, 8 or more for level 5)     ED Triage Vitals [06/09/21 1855]   BP Temp Temp Source Pulse Resp SpO2 Height Weight   135/72 97.7 °F (36.5 °C) Oral 84 18 100 % 5' 2\" (1.575 m) 186 lb (84.4 kg)       Physical Exam  Vitals and nursing note reviewed. Constitutional:       Appearance: She is well-developed. She is not diaphoretic. HENT:      Head: Normocephalic and atraumatic. Right Ear: External ear normal.      Left Ear: External ear normal.      Nose: Nose normal.   Eyes:      General:         Right eye: No discharge. Left eye: No discharge. Neck:      Trachea: No tracheal deviation. Cardiovascular:      Rate and Rhythm: Normal rate and regular rhythm. Heart sounds: No murmur heard. Pulmonary:      Effort: Pulmonary effort is normal. No respiratory distress. Breath sounds: Normal breath sounds. No wheezing or rales. Abdominal:      General: Bowel sounds are normal. There is no distension. Palpations: Abdomen is soft. Tenderness: There is no abdominal tenderness. There is no guarding. Hernia: A hernia is present. Comments: Abdominal wall hernia noted. There is no abdominal tenderness. No rebound or guarding. Genitourinary:     Comments: Stool is palpated at the top of my finger, rectal tone. Stool is light brown in color  Musculoskeletal:         General: Normal range of motion. Cervical back: Normal range of motion and neck supple. Comments: Ecchymosis, and mild edema, normal postoperative state of left knee. No erythema, warmth or drainage.   Dorsalis pedis and posterior tibialis pulse are 2+. Normal sensation light touch. Neurovascular intact. Skin:     General: Skin is warm and dry. Neurological:      Mental Status: She is alert and oriented to person, place, and time. Psychiatric:         Behavior: Behavior normal.         DIAGNOSTIC RESULTS   LABS:    Labs Reviewed   CBC WITH AUTO DIFFERENTIAL - Abnormal; Notable for the following components:       Result Value    RBC 3.21 (*)     Hemoglobin 10.2 (*)     Hematocrit 31.2 (*)     Platelets 485 (*)     Lymphocytes Absolute 0.7 (*)     All other components within normal limits    Narrative:     Performed at:  [facility]  [address], [address]   Phone [phone]   BASIC METABOLIC PANEL - Abnormal; Notable for the following components:    Sodium 134 (*)     Glucose 107 (*)     All other components within normal limits    Narrative:     Performed at:  [facility]  [address]   Phone [phone]       All other labs were within normal range or not returned as of this dictation. EKG: All EKG's are interpreted by the Emergency Department Physician in the absence of a cardiologist.  Please see their note for interpretation of EKG. RADIOLOGY:   Non-plain film images such as CT, Ultrasound and MRI are read by the radiologist. Plain radiographic images are visualized and preliminarily interpreted by the ED Provider with the below findings:        Interpretation per the Radiologist below, if available at the time of this note:    XR ABDOMEN (KUB) (SINGLE AP VIEW)   Final Result   Large amount stool burden throughout the colon and rectum. XR ABDOMEN (KUB) (SINGLE AP VIEW)    Result Date: 6/9/2021  EXAMINATION: ONE SUPINE XRAY VIEW(S) OF THE ABDOMEN 6/9/2021 7:45 pm COMPARISON: None.  HISTORY: ORDERING SYSTEM PROVIDED HISTORY: abd pain TECHNOLOGIST PROVIDED HISTORY: Reason for exam:->abd pain Reason for Exam: Constipation ([place] [facility] brought pt in from home, recent left tkr 8 days ago,  pt unable to have a bowel movement. pt has taken otc senna, mirlax and stool softner suppository with no relief) Acuity: Acute Type of Exam: Initial FINDINGS: Large amount of stool burden throughout the colon and rectum. No dilated loops of small bowel identified. Surgical clips project over the right upper quadrant. Phleboliths in the pelvis. No acute osseous abnormality identified. Large amount stool burden throughout the colon and rectum. PROCEDURES   Unless otherwise noted below, none     Procedures    CRITICAL CARE TIME   N/A    CONSULTS:  None      EMERGENCY DEPARTMENT COURSE and DIFFERENTIAL DIAGNOSIS/MDM:   Vitals:    Vitals:    06/09/21 1930 06/09/21 1945 06/09/21 2000 06/09/21 2015   BP: (!) 142/68 138/67 (!) 146/66 (!) 148/67   Pulse:       Resp:       Temp:       TempSrc:       SpO2:       Weight:       Height:           Patient was given the following medications:  Medications   0.9 % sodium chloride bolus (1,000 mLs Intravenous New Bag 6/9/21 2030)   milk and molasses enema 240 mL (has no administration in time range)           Briefly, this is a 24-year-old female who presents to the emergency department today for evaluation for constipation. The patient had a total knee replacement performed 8 days ago. She has not been able to have a bowel movement since. She is passing some gas. On examination she does have an abdominal wall hernia, her abdomen is otherwise benign. On  examination she does have a small stool ball palpated at the very top of my finger however due to her discomfort, and location of the stool I am unable to disimpact the patient manually    CBC shows no evidence of leukocytosis, he is anemic however within the normal window for being postoperative. BMP is unremarkable. X-ray shows a large amount of stool burden throughout the colon and rectum.   Patient will be given fluids and milk of molasses enema, and I feel that if she is able to have a bowel movement, she will be able to be discharged. This marks in my shift, please see attending for details and final disposition    FINAL IMPRESSION      1. Constipation, unspecified constipation type    2. Postoperative ileus (HCC)          DISPOSITION/PLAN   DISPOSITION        PATIENT REFERRED TO:  No follow-up provider specified.     DISCHARGE MEDICATIONS:  New Prescriptions    No medications on file       DISCONTINUED MEDICATIONS:  Discontinued Medications    No medications on file              (Please note that portions of this note were completed with a voice recognition program.  Efforts were made to edit the dictations but occasionally words are mis-transcribed.)    Zee Cook PA-C (electronically signed)            Zee Cook PA-C  06/10/21 [id number]

## 2021-06-10 NOTE — ED PROVIDER NOTES
I independently performed a history and physical on [employer]  [facility]. All diagnostic, treatment, and disposition decisions were made by myself in conjunction with the advanced practice provider. Briefly, this is a 78 y.o. female here for constipation. She's 8d s/p TKR  On opioids. No BM since. On exam, Abdomen soft, somewhat distended. Rectal Exam (s/p Molasses enema) without any palpable stool impaction. Screenings                   MDM  83D with constipation s/p let side TKR 8 days ago. No impacted stool. Trial soap suds enema, [employer]. Patient signed out to [facility] shift @ 2300. Patient Referrals:  Jania Appiah MD  [address]  [phone]    Schedule an appointment as soon as possible for a visit in 2 days      [facility] Emergency Department  [address] [place] [address]  [phone]    As needed, If symptoms worsen      Discharge Medications:  New Prescriptions    No medications on file       FINAL IMPRESSION  1. Constipation, unspecified constipation type        Blood pressure (!) 143/67, pulse 84, temperature 97.7 °F (36.5 °C), temperature source Oral, resp. rate 18, height 5' 2\" (1.575 m), weight 186 lb (84.4 kg), SpO2 97 %, not currently breastfeeding. For further details of [facility] emergency department encounter, please see documentation by advanced practice provider, Emerald Kaminski.        Mitch Mishra MD  06/09/21 9612

## 2021-06-10 NOTE — PROGRESS NOTES
Shift assessment completed. VSS. Patient alert and oriented x 4. Patient reports pain to left knee as 5/10, prn tylenol given. Denies having any abdominal pain or nausea at this time. The care plan and education have been reviewed and mutually agreed upon with the patient. Fall precautions in place. Call light in reach. Will continue to monitor. 1456 Patient tolerating general diet well. Denies having any nausea at this time.

## 2021-06-10 NOTE — PROGRESS NOTES
[address] [phone] was active with this pt prior to admit. Discharge planner notified. Will follow.

## 2021-06-11 ENCOUNTER — CARE COORDINATION (OUTPATIENT)
Dept: CASE MANAGEMENT | Age: 71
End: 2021-06-11

## 2021-06-11 DIAGNOSIS — K56.41 FECAL IMPACTION (HCC): Primary | ICD-10-CM

## 2021-06-11 PROCEDURE — 1111F DSCHRG MED/CURRENT MED MERGE: CPT | Performed by: INTERNAL MEDICINE

## 2021-06-11 NOTE — TELEPHONE ENCOUNTER
[facility] Transitions Initial Follow Up Call    Outreach made within 2 business days of discharge: Yes    Patient: Suzen Holstein Patient : 1950   MRN: [id number]  Reason for Admission: There are no discharge diagnoses documented for the most recent discharge. Discharge Date: 6/10/21       Spoke with: You    Discharge department/facility: [facility]    TCM Interactive Patient Contact:  Was patient able to fill all prescriptions: Yes  Was patient instructed to bring all medications to the follow-up visit: No:   Is patient taking all medications as directed in the discharge summary?  Yes  Does patient understand their discharge instructions: Yes  Does patient have questions or concerns that need addressed prior to 7-14 day follow up office visit: yes     Scheduled appointment with PCP within 7-14 days    Follow Up  Future Appointments   Date Time Provider Latisha Husain   2021  9:30 AM Lexi Ruggiero PT [facility] MON  [facility]   2021 11:00 AM Liya No MD [address]   2021  2:00 PM [facility] DEXA RM 1 [facility] RAD [facility]   2021  2:30 PM [facility] MAMMO RM [address] [facility]   2021  1:00 PM Alysia Frost MD [address] [place], [place]

## 2021-06-11 NOTE — CARE COORDINATION
[facility] Transitions Initial Follow Up Call    Call within 2 business days of discharge: Yes    Patient: Rosi Irene Patient : 1950   MRN: [id number]  Reason for Admission:   Discharge Date: 6/10/21 RARS: Readmission Risk Score: 11      Last Discharge [facility]       Complaint Diagnosis Description Type Department Provider    21 Constipation Constipation, unspecified constipation type . .. ED to Hosp-Admission (Discharged) (ADMITTED) Brittani Grove MD; Merry Briggs. .. Non-face-to-face services provided:  Obtained and reviewed discharge summary and/or continuity of care documents  1111F completed   Transitions of Care Initial Call    Was this an external facility discharge? No Discharge Facility:     Challenges to be reviewed by the provider   Additional needs identified to be addressed with provider: No  none             Method of communication with provider : none      Advance Care Planning:   Does patient have an Advance Directive:  reviewed and current. Was this a readmission? Yes  Patient stated reason for admission: constipation  Patients top risk factors for readmission: medical condition-constipation    Care Transition Nurse (CTN) contacted the patient by telephone to perform post hospital discharge assessment. Verified name and  with patient as identifiers. Provided introduction to self, and explanation of the CTN role. CTN reviewed discharge instructions, medical action plan and red flags with patient who verbalized understanding. Patient given an opportunity to ask questions and does not have any further questions or concerns at this time. Were discharge instructions available to patient? Yes. Reviewed appropriate site of care based on symptoms and resources available to patient including: When to call 911. The patient agrees to contact the PCP office for questions related to their healthcare.      Medication reconciliation was performed with Follow Up  Future Appointments   Date Time Provider Latisha Husain   6/16/2021  9:30 AM Donato Holstein, PT [facility] MON [facility]   6/16/2021 11:00 AM Jyotsna Baker MD [facility]   6/21/2021  2:00 PM [facility] DEXA RM 1 [facility] RAD [place]   6/21/2021  2:30 PM [facility] MAMMO RM 1 [facility]   8/24/2021  1:00 PM Leonardo Bennett [place] [id number] Belinda Hathaway RN

## 2021-06-11 NOTE — TELEPHONE ENCOUNTER
From: Andreas Griffin  To: Shane Anderson MD  Sent: 6/10/2021 8:57 PM EDT  Subject: Non-Urgent Medical Question    I recently was admitted to [facility] for bowel obstruction. All results said constipation. Could you please look at all the test and results and guide me moving forward.

## 2021-06-11 NOTE — TELEPHONE ENCOUNTER
RN checked in on pt today since dc from hospital due to ileus. Pt states doing much better. Getting back into rhythm of exercising. Pt stated she didn't need anything right now. Will call with any questions or concerns.    Deonte Hodgson   Orthopedic Nurse Navigator   Phone number: [phone]

## 2021-06-14 NOTE — PROGRESS NOTES
1000 UNIT CAPS  Take 2 capsules by mouth daily              Cyanocobalamin (VITAMIN B 12 PO)  Take 500 mcg by mouth daily              gabapentin (NEURONTIN) 300 MG capsule  TAKE ONE CAPSULE BY MOUTH TWICE DAILY             levothyroxine (SYNTHROID) 150 MCG tablet  TAKE 1 TABLET BY MOUTH EVERY DAY             melatonin 5 MG TABS tablet  Take 5 mg by mouth daily             Multiple Vitamin (MULTIVITAMIN PO)  Take by mouth             omeprazole (PRILOSEC) 40 MG delayed release capsule  TAKE 1 CAPSULE BY MOUTH EVERY MORNING BEFORE BREAKFAST             polyethylene glycol (GLYCOLAX) 17 GM/SCOOP powder  Take 17 g by mouth 2 times daily             senna (SENOKOT) 8.6 MG tablet  Take 1 tablet by mouth daily             traZODone (DESYREL) 50 MG tablet  TAKE 3 TABLETS BY MOUTH EVERY NIGHT AT BEDTIME                   Medications marked \"taking\" at this time  Outpatient Medications Marked as Taking for the 6/15/21 encounter (Virtual Visit) with Dwain Rankin MD   Medication Sig Dispense Refill    senna (SENOKOT) 8.6 MG tablet Take 1 tablet by mouth daily      polyethylene glycol (GLYCOLAX) 17 GM/SCOOP powder Take 17 g by mouth 2 times daily 510 g 0    aspirin 325 MG EC tablet Take 1 tablet by mouth daily 45 tablet 0    buPROPion (WELLBUTRIN XL) 150 MG extended release tablet Take 1 tablet by mouth every morning 90 tablet 1    ALPRAZolam (XANAX) 1 MG tablet TAKE 1 TABLET BY MOUTH TWICE DAILY AS NEEDED FOR ANXIETY 35 tablet 2    traZODone (DESYREL) 50 MG tablet TAKE 3 TABLETS BY MOUTH EVERY NIGHT AT BEDTIME 270 tablet 1    Multiple Vitamin (MULTIVITAMIN PO) Take by mouth      gabapentin (NEURONTIN) 300 MG capsule TAKE ONE CAPSULE BY MOUTH TWICE DAILY (Patient taking differently: nightly. ) 60 capsule 5    levothyroxine (SYNTHROID) 150 MCG tablet TAKE 1 TABLET BY MOUTH EVERY DAY 90 tablet 1    omeprazole (PRILOSEC) 40 MG delayed release capsule TAKE 1 CAPSULE BY MOUTH EVERY MORNING BEFORE BREAKFAST 90 capsule 1 911 if deemed necessary. Patient identification was verified at the start of the visit: Yes    Services were provided through a video synchronous discussion virtually to substitute for in-person clinic visit. Patient and provider were located at their individual homes.

## 2021-06-15 ENCOUNTER — VIRTUAL VISIT (OUTPATIENT)
Dept: INTERNAL MEDICINE CLINIC | Age: 71
End: 2021-06-15
Payer: COMMERCIAL

## 2021-06-15 DIAGNOSIS — Z96.652 TOTAL KNEE REPLACEMENT STATUS, LEFT: ICD-10-CM

## 2021-06-15 DIAGNOSIS — K56.7 ILEUS, POSTOPERATIVE (HCC): Primary | ICD-10-CM

## 2021-06-15 DIAGNOSIS — K91.89 ILEUS, POSTOPERATIVE (HCC): Primary | ICD-10-CM

## 2021-06-15 PROBLEM — K56.41 FECAL IMPACTION (HCC): Status: RESOLVED | Noted: 2021-06-10 | Resolved: 2021-06-15

## 2021-06-15 PROBLEM — R10.9 ABDOMINAL PAIN: Status: RESOLVED | Noted: 2021-06-10 | Resolved: 2021-06-15

## 2021-06-15 PROCEDURE — 1111F DSCHRG MED/CURRENT MED MERGE: CPT | Performed by: INTERNAL MEDICINE

## 2021-06-15 PROCEDURE — 99495 TRANSJ CARE MGMT MOD F2F 14D: CPT | Performed by: INTERNAL MEDICINE

## 2021-06-15 RX ORDER — SENNA PLUS 8.6 MG/1
1 TABLET ORAL DAILY
COMMUNITY
End: 2022-02-24

## 2021-06-16 ENCOUNTER — HOSPITAL ENCOUNTER (OUTPATIENT)
Dept: PHYSICAL THERAPY | Age: 71
Setting detail: THERAPIES SERIES
Discharge: HOME | End: 2021-06-16
Payer: COMMERCIAL

## 2021-06-16 ENCOUNTER — OFFICE VISIT (OUTPATIENT)
Dept: ORTHOPEDIC SURGERY | Age: 71
End: 2021-06-16

## 2021-06-16 VITALS — WEIGHT: 186 LBS | HEIGHT: 62 IN | TEMPERATURE: 98.6 F | BODY MASS INDEX: 34.23 KG/M2

## 2021-06-16 DIAGNOSIS — Z96.652 S/P TOTAL KNEE ARTHROPLASTY, LEFT: Primary | ICD-10-CM

## 2021-06-16 PROCEDURE — 97110 THERAPEUTIC EXERCISES: CPT | Performed by: PHYSICAL THERAPIST

## 2021-06-16 PROCEDURE — 99024 POSTOP FOLLOW-UP VISIT: CPT | Performed by: ORTHOPAEDIC SURGERY

## 2021-06-16 PROCEDURE — 97016 VASOPNEUMATIC DEVICE THERAPY: CPT | Performed by: PHYSICAL THERAPIST

## 2021-06-16 PROCEDURE — 97161 PT EVAL LOW COMPLEX 20 MIN: CPT | Performed by: PHYSICAL THERAPIST

## 2021-06-16 NOTE — FLOWSHEET NOTE
Weight Shift     Ankle Pumps     ERMI [id number]              CKC     Calf raises     Wall sits     Step ups     1 leg stand     Squatting     CC TKE     Balance     bridges          PRE     Extension  RANGE:   Flexion  RANGE:        Quantum machines     Leg press      Leg extension     Leg curl          Manual interventions                 Patient Education:  Access Code: [email or web address]  URL: [email or web address]  Date: 06/16/2021  Prepared by: Barbara Suárez    Exercises  Long Sitting Calf Stretch with Strap - 2 x daily - 7 x weekly - 5 reps - 30 seconds hold  Seated Table Hamstring Stretch - 2 x daily - 7 x weekly - 5 reps - 30 seconds hold  Long Sitting Quad Set - 2 x daily - 7 x weekly - 10 reps - 10 seconds hold  Straight Leg Raise - 2 x daily - 7 x weekly - 3 sets - 10 reps  Sidelying Hip Abduction - 2 x daily - 7 x weekly - 3 sets - 10 reps  Supine Hip Adduction Isometric with Ball - 2 x daily - 7 x weekly - 10 reps - 10 sec hold  Sitting Heel Slide with Towel - 2 x daily - 7 x weekly - 10 reps - 10seconds hold  Seated Knee Flexion AAROM - 2 x daily - 7 x weekly - 10 reps - 10 sec hold      Therapeutic Exercise and NMR EXR  [x] (82972) Provided verbal/tactile cueing for activities related to strengthening, flexibility, endurance, ROM for improvements in LE, proximal hip, and core control with self care, mobility, lifting, ambulation.  [] (61147) Provided verbal/tactile cueing for activities related to improving balance, coordination, kinesthetic sense, posture, motor skill, proprioception  to assist with LE, proximal hip, and core control in self care, mobility, lifting, ambulation and eccentric single leg control.      NMR and Therapeutic Activities:    [] (85096 or 77821) Provided verbal/tactile cueing for activities related to improving balance, coordination, kinesthetic sense, posture, motor skill, proprioception and motor activation to allow for proper function of core, proximal hip and LE with self care and ADLs  [] (86361) Gait Re-education- Provided training and instruction to the patient for proper LE, core and proximal hip recruitment and positioning and eccentric body weight control with ambulation re-education including up and down stairs     Home Exercise Program:    [x] (74792) Reviewed/Progressed HEP activities related to strengthening, flexibility, endurance, ROM of core, proximal hip and LE for functional self-care, mobility, lifting and ambulation/stair navigation   [] (52694)Reviewed/Progressed HEP activities related to improving balance, coordination, kinesthetic sense, posture, motor skill, proprioception of core, proximal hip and LE for self care, mobility, lifting, and ambulation/stair navigation      Manual Treatments:  PROM / STM / Oscillations-Mobs:  G-I, II, III, IV (PA's, Inf., Post.)  [] (26952) Provided manual therapy to mobilize LE, proximal hip and/or LS spine soft tissue/joints for the purpose of modulating pain, promoting relaxation,  increasing ROM, reducing/eliminating soft tissue swelling/inflammation/restriction, improving soft tissue extensibility and allowing for proper ROM for normal function with self care, mobility, lifting and ambulation. Modalities: vaso x 15 min     Charges:  Timed Code Treatment Minutes: 25   Total Treatment Minutes: 65       [x] EVAL (LOW) 62896 (typically 20 minutes face-to-face)  [] EVAL (MOD) 19455 (typically 30 minutes face-to-face)  [] EVAL (HIGH) 79158 (typically 45 minutes face-to-face)  [] RE-EVAL   [x] AW(38671) x 2    [] IONTO  [] NMR (93956) x     [x] VASO  [] Manual (04713) x      [] Other:  [] TA x      [] Mech Traction (89297)  [] ES(attended) (60751)      [] ES (un) (98740):        GOALS:   Patient stated goal: walking. []? Progressing: []? Met: []? Not Met: []? Adjusted     Therapist goals for Patient:   Short Term Goals: To be achieved in: 2 weeks  1.  Independent in HEP and progression per patient tolerance, in order to prevent re-injury. []? Progressing: []? Met: []? Not Met: []? Adjusted   2. Patient will have a decrease in pain to facilitate improvement in movement, function, and ADLs as indicated by Functional Deficits. []? Progressing: []? Met: []? Not Met: []? Adjusted     Long Term Goals: To be achieved in: 12 weeks  1. Disability index score of 20% or less for the LEFS to assist with reaching prior level of function. []? Progressing: []? Met: []? Not Met: []? Adjusted  2. Patient will demonstrate increased AROM to [facility] to allow for proper joint functioning as indicated by patients Functional Deficits. []? Progressing: []? Met: []? Not Met: []? Adjusted  3. Patient will demonstrate an increase in Strength to good proximal hip strength and control, within 5lb HHD in LE to allow for proper functional mobility as indicated by patients Functional Deficits. []? Progressing: []? Met: []? Not Met: []? Adjusted  4. Patient will return to ADL and household functional activities without increased symptoms or restriction. []? Progressing: []? Met: []? Not Met: []? Adjusted  5. Pt will be able to walk with mild symptoms and restrictions. []? Progressing: []? Met: []? Not Met: []? Adjusted       Overall Progression Towards Functional goals/ Treatment Progress Update:  [] Patient is progressing as expected towards functional goals listed. [] Progression is slowed due to complexities/Impairments listed. [] Progression has been slowed due to co-morbidities.   [x] Plan just implemented, too soon to assess goals progression <30days   [] Goals require adjustment due to lack of progress  [] Patient is not progressing as expected and requires additional follow up with physician  [] Other    Prognosis for POC: [x] Good [] Fair  [] Poor      Patient requires continued skilled intervention: [x] Yes  [] No    Treatment/Activity Tolerance:  [] Patient able to complete treatment  [x] Patient limited by fatigue  [x] Patient limited by

## 2021-06-16 NOTE — PLAN OF CARE
The [address] [place] [address] and Araseli Deleon  [phone]                                                       Physical Therapy Certification    Dear Referring Practitioner: Santos Johnson MD,    We had the pleasure of evaluating the following patient for physical therapy services at [address] [place] [address]. A summary of our findings can be found in the initial assessment below. This includes our plan of care. If you have any questions or concerns regarding these findings, please do not hesitate to contact me at the office phone number checked above. Thank you for the referral.       Physician Signature:_______________________________Date:__________________  By signing above (or electronic signature), therapists plan is approved by physician      Patient: Sheng Thorpe   : 1950   MRN: [id number]  Referring Physician: Referring Practitioner: Santos Johnson MD      Evaluation Date: 2021      Medical Diagnosis Information:  Diagnosis: M17.12 (ICD-10-CM) - Osteoarthritis of left knee, unspecified osteoarthritis type  s/p Left TKR  DOS: 21   Treatment Diagnosis: increased pain; decreased ROM; decreased strength; gait abnormality; increased swelling                                         Insurance information: PT Insurance Information: [employer]     Precautions/ Contra-indications:  Left TKR    C-SSRS Triggered by Intake questionnaire (Past 2 wk assessment):   [] No, Questionnaire did not trigger screening. [x] Yes, Patient intake triggered further evaluation      [] C-SSRS Screening completed  [] PCP notified via Plan of Care  [] Emergency services notified     Latex Allergy:  [x]NO      []YES  Preferred Language for Healthcare:   [x]English       []other:    SUBJECTIVE: Patient stated complaint: Pt had a left TKR 2 weeks ago. She has been doing home health. Pt ambulates with a walker. Relevant Medical History: osteoporosis, OA, anxiety, depression  Functional Disability Index: WOMAC= 26%    Pain Scale: 6/10  Easing factors:   Provocative factors: sleeping, walking    Type: []Constant   [x]Intermittent  []Radiating []Localized []other:     Numbness/Tingling:     Occupation/School: Retired    Living Status/Prior Level of Function: Independent with ADLs and IADLs    OBJECTIVE:      Flexibility L R Comment   Hamstring moderate     Gastroc moderate     ITB      Quad              ROM PROM AROM Overpressure Comment    L R L R L R    Flexion 75         Extension                                  Strength L R Comment   Quad poor     Hamstring      Gastroc      Hip  flexion      Hip abd                      Special Test Results/Comment   Meniscal Click    Crepitus    Flexion Test    Valgus Laxity    Varus Laxity    Lachmans    Drop Back    Homans            Girth L R   Mid Patella     Suprapatellar     5cm above     15cm above       Reflexes/Sensation:    []Dermatomes/Myotomes intact    []Reflexes equal and normal bilaterally   []Other:    Joint mobility:    []Normal    []Hypo   []Hyper    Palpation:     Functional Mobility/Transfers: WFL with assist to lift leg    Posture: forward flexed during ambulation    Bandages/Dressings/Incisions: healing well    Gait: Independent ambulation with a walker. Poor gait mechanics due to decreased knee ROM and decreased strength. Orthopedic Special Tests:                        [x] Patient history, allergies, meds reviewed. Medical chart reviewed. See intake form. Review Of Systems (ROS):  [x]Performed Review of systems (Integumentary, CardioPulmonary, Neurological) by intake and observation. Intake form has been scanned into medical record. Patient has been instructed to contact their primary care physician regarding ROS issues if not already being addressed at this time.       Co-morbidities/Complexities (which will affect course of rehabilitation):  []None and measures addressing any of the following: body structures and functions (impairments), activity limitations, and/or participation restrictions;:  [] a total of 1-2 or more elements   [x] a total of 3 or more elements   [] a total of 4 or more elements   [x] A clinical presentation with:  [] stable and/or uncomplicated characteristics   [x] evolving clinical presentation with changing characteristics  [] unstable and unpredictable characteristics;   [x] Clinical decision making of [x] low, [] moderate, [] high complexity using standardized patient assessment instrument and/or measurable assessment of functional outcome. [x] EVAL (LOW) 85336 (typically 20 minutes face-to-face)  [] EVAL (MOD) 66559 (typically 30 minutes face-to-face)  [] EVAL (HIGH) 05501 (typically 45 minutes face-to-face)  [] RE-EVAL       PLAN  Frequency/Duration:  2 days per week for 12 Weeks:  Interventions:  [x]  Therapeutic exercise including: strength training, ROM, for Lower extremity and core   [x]  NMR activation and proprioception for LE, Glutes and Core   [x]  Manual therapy as indicated for LE, Hip and spine to include: Dry Needling/IASTM, STM, PROM, Gr I-IV mobilizations, manipulation. [x] Modalities as needed that may include: thermal agents, E-stim, Biofeedback, US, iontophoresis as indicated  [x] Patient education on joint protection, postural re-education, activity modification, progression of HEP. HEP instruction: Pt received a written HEP today. GOALS:   Patient stated goal: walking. [] Progressing: [] Met: [] Not Met: [] Adjusted    Therapist goals for Patient:   Short Term Goals: To be achieved in: 2 weeks  1. Independent in HEP and progression per patient tolerance, in order to prevent re-injury. [] Progressing: [] Met: [] Not Met: [] Adjusted   2. Patient will have a decrease in pain to facilitate improvement in movement, function, and ADLs as indicated by Functional Deficits.     [] Progressing: [] Met:

## 2021-06-16 NOTE — PROGRESS NOTES
Status post left total knee arthroplasty. She is doing well. She started first outpatient therapy today. She says she has been above 90 degrees at home. She is taking her aspirin was reminded to take this for 4 more weeks. She had to go the emergency department because she was constipated. Told her this may be from narcotic use she was on MiraLAX. This is cleared up now. I told her to take her MiraLAX does feel she is having normal bowel movements. ROS: Pertinent items are noted in HPI. No notes on file    Past Medical History:  7/16/2013: Anal fissure  No date: Anxiety  No date: Colon polyps  No date: Depression  No date: Fibroid uterus  No date: Hypothyroidism  No date: Morbid obesity ([address]  No date: Osteopenia     Past Surgical History:  2008: ABDOMINAL HERNIA REPAIR  Age 27: APPENDECTOMY  10/23/2017: CATARACT REMOVAL WITH IMPLANT; Right      Comment:  with vitrectomy  02/26/2018: CATARACT REMOVAL WITH IMPLANT; Left      Comment:  with vitrectomy  10/10/2018: COLONOSCOPY; N/A      Comment:  COLONOSCOPY POLYPECTOMY SNARE/COLD BIOPSY performed by                uSsan Montano MD at [address]  8/26/2019: EPIDURAL STEROID INJECTION; Right      Comment:  RIGHT L4 AND L5 TRANSFORAMINAL EPIDURAL STEROID                INJECTION WITH FLUOROSCOPY performed by China Harrison MD at [address] [facility]: GASTRIC BYPASS SURGERY  10/21/2019: HC INJECT OTHER PERPHRL NERV; Right      Comment:  RIGHT PIRIFORMIS INJECTION WITH FLUOROSCOPY (82901)                performed by China Harrison MD at [address]  No date: HYSTERECTOMY, TOTAL ABDOMINAL      Comment:  ovaries out also  1/7/2019: [address]; Right      Comment:  RIGHT L3 AND L4 LUMBAR TRANSFORAMINAL WITH FLUOROSCOPY                performed by China Harrison MD at [address]  5/15/2019: [address];  Right      Comment:  RIGHT L4 AND L5 TRANSFORAMINAL EPIDURAL STEROID                INJECTION WITH FLUOROSCOPY performed by Tigre Sotelo MD at [address]  10/10/2018: [place] ESOPHAGOGASTRODUODENOSCOPY TRANSORAL DIAGNOSTIC; N/A      Comment:  EGD performed by Anshul Scehrer MD at [address]: [facility] AND [facility]      Comment:  Fibroids  6/1/2021: TOTAL KNEE ARTHROPLASTY; Left      Comment:  LEFT TOTAL KNEE ARTHROPLASTY performed by Emanuel Gonzalez MD at [facility] of patient's family history indicates:  Problem: Diabetes      Relation: Father          Age of Onset: (Not Specified)          Comment: Type II  Problem: Diabetes      Relation: Sister          Age of Onset: (Not Specified)          Comment: Type II  Problem: Diabetes      Relation: Brother          Age of Onset: (Not Specified)          Comment: Type II  Problem: Breast Cancer      Relation: Sister          Age of Onset: 58  Problem: Lung Cancer      Relation: Mother          Age of Onset: (Not Specified)          Comment: Smoker  Problem: Heart Disease      Relation: Sister          Age of Onset: (Not Specified)          Comment: Bicuspid aortic valve x2      Social History    Socioeconomic History      Marital status:        Spouse name: None      Number of children: None      Years of education: None      Highest education level: None    Occupational History      Occupation: Computer work    Tobacco Use      Smoking status: Never Smoker      Smokeless tobacco: Never Used    Vaping Use      Vaping Use: Never used    Substance and Sexual Activity      Alcohol use: No      Drug use: No      Sexual activity: Never    Other Topics      Concerns:        None    Social History Narrative      None    Social Determinants of Health  Financial Resource Strain: Low Risk       Difficulty of Paying Living Expenses: Not hard at all  Food Insecurity: No Food Insecurity      Worried About [address] in the Last Year: Never true      Ran Out of Food in the Last Year: Never true  Transportation Needs: No D) 1000 UNIT CAPS, Take 2 capsules by mouth daily , Disp: , Rfl:     No current facility-administered medications for this visit. -- Codeine -- Nausea Only   -- Macrobid [Nitrofurantoin] -- Other (See Comments)    --  Fever, myalgias    VITAL SIGNS:  Temp 98.6 °F (37 °C)   Ht 5' 2\" (1.575 m)   Wt 186 lb (84.4 kg)   BMI 34.02 kg/m²   On examination today the wound looks very good. Is clean and dry and soft. There is no erythema warmth or effusion. She gets out to just 1 or 2 degrees short of full extension again flexes over the table to about 90 degrees today. Her calf is soft she has negative Homans. She appears a good medial lateral stability and good patellar tracking. She can almost fully extend actively her knee just probably 5 degrees short of full extension. We went over her postoperative films. This shows very good alignment and fit. Impression: Doing very well 2 weeks status post left total knee arthroplasty. Plan: She will continue outpatient physical therapy. She is to call for any problems otherwise we will see her in a month.

## 2021-06-18 ENCOUNTER — CARE COORDINATION (OUTPATIENT)
Dept: CASE MANAGEMENT | Age: 71
End: 2021-06-18

## 2021-06-18 ENCOUNTER — TELEPHONE (OUTPATIENT)
Dept: ORTHOPEDIC SURGERY | Age: 71
End: 2021-06-18

## 2021-06-18 NOTE — TELEPHONE ENCOUNTER
General Question     Subject: Robertgerber Fairchild 'SHASHANK\" is calling with some questions.   She wanted to know if she she was allowed to get a [address], and [address]    Patient [facility] Number: [phone]

## 2021-06-18 NOTE — CARE COORDINATION
Lisa 45 Transitions Follow Up Call    2021    Patient: Dewey Mina  Patient : 1950   MRN: [id number]  Reason for Admission:   Discharge Date: 6/10/21 RARS: Readmission Risk Score: 6         Spoke with: Rhonda 1 Transitions Subsequent and Final Call    Subsequent and Final Calls  Do you have any ongoing symptoms?: No  Have your medications changed?: No  Do you have any questions related to your medications?: No  Do you currently have any active services?: Yes  Are you currently active with any services?: Home Health, Outpatient/Community Services  Do you have any needs or concerns that I can assist you with?: No  Identified Barriers: None  Care Transitions Interventions  No Identified Needs  Other Interventions:         Pt states doing well, no issues or concerns. Having regular BMs. Had f/u with orthopedic MD, he was pleased. She is now going to OUTPT therapy.  Agreed to more CTC f/u calls      Follow Up  Future Appointments   Date Time Provider Latisha Husain   2021  2:00 PM [facility] DEXA RM 1 [facility] RAD [facility]   2021  2:30 PM [facility] MAMMO RM [address] [facility]   2021  3:00 PM [place], PT [facility] PT [place] [facility]   2021  2:30 PM Demarcus Mcfadden MD FF Ortho [facility]   2021  1:00 PM [id number]

## 2021-06-21 ENCOUNTER — HOSPITAL ENCOUNTER (OUTPATIENT)
Dept: GENERAL RADIOLOGY | Age: 71
Discharge: HOME | End: 2021-06-21
Payer: COMMERCIAL

## 2021-06-21 ENCOUNTER — HOSPITAL ENCOUNTER (OUTPATIENT)
Dept: WOMENS IMAGING | Age: 71
Discharge: HOME | End: 2021-06-21
Payer: COMMERCIAL

## 2021-06-21 DIAGNOSIS — Z12.31 SCREENING MAMMOGRAM, ENCOUNTER FOR: ICD-10-CM

## 2021-06-21 DIAGNOSIS — Z78.0 MENOPAUSE: ICD-10-CM

## 2021-06-21 PROCEDURE — 77080 DXA BONE DENSITY AXIAL: CPT

## 2021-06-21 PROCEDURE — 77067 SCR MAMMO BI INCL CAD: CPT

## 2021-06-23 ENCOUNTER — HOSPITAL ENCOUNTER (OUTPATIENT)
Dept: PHYSICAL THERAPY | Age: 71
Setting detail: THERAPIES SERIES
Discharge: HOME | End: 2021-06-23
Payer: COMMERCIAL

## 2021-06-23 PROCEDURE — 97110 THERAPEUTIC EXERCISES: CPT

## 2021-06-23 PROCEDURE — 97530 THERAPEUTIC ACTIVITIES: CPT

## 2021-06-23 PROCEDURE — 97140 MANUAL THERAPY 1/> REGIONS: CPT

## 2021-06-23 NOTE — FLOWSHEET NOTE
Stepper Nustep seat 8  L2 5' 6/23 added          Stretching       Hamstring on step  20\" 3 L 6/23   Lunge knee flexion stretch on step  10\" 10 L 6/23   Towel Pull       Inclined Calf  30\" 2    Hip Flexion       ITB       Groin       Quad                     strengthening       SLR Supine  2 10 L 6/23 has quad lag   SLR Abduction  1 10 L 6/23 could only do one set due to spasm in thigh   SLR Prone       SLR+ quad set              hooklying march 1 10 L/R 6/23    Quad sets [id number]             SAQ Medium bolster 2 10 L 6/23   Calf raises  1 10 B 6/23                 ROM       Sheet Pulls       Hang Weights       Passive       Active Heel slides 2 10 L 6/23 (2nd set w/ OP)   Weight Shift       Ankle Pumps       ERMI    Resume npv                 NMR/ CKC              TRX squats       Standing 3 way SLR       1 leg stand              CC TKE       Balance       bridges                     Therapeutic Activity       Fwd step up       Fwd step down       Lat step up       Lat dip/heel tap       Total gym squats       Sit to stand                                   Manual interventions x 14'       Patellar glides Gr 3  Sup-inf  Med/lat 3'  6/23   Scar mobs  3'  6/23   PROM Seated and supine knee flexion 5' 6/23 with gentle OP   Tib/Fem mobs Gr 3 to inc knee flex/ext 3'  6/23   IASTM/STM                       Patient Education:  6/23: instructed pt to try to avoid manually helping her LLE into bed/car etc.  Since she is able to do a SLR without assistance, she should try to actively lift LLE. Walked with patient to work on flexing L knee during swing phase and pushing RW instead of picking it up like a     Home Exercise program  Access Code: [email or web address]  URL: [email or web address]   Date: 06/16/2021  Prepared by: Raúl Mora  Exercises  Long Sitting Calf Stretch with Strap - 2 x daily - 7 x weekly - 5 reps - 30 seconds hold  Seated Table Hamstring Stretch - 2 x daily - 7 x weekly - 5 reps - 30 seconds hold  Long Sitting Quad Set - 2 x daily - 7 x weekly - 10 reps - 10 seconds hold  Straight Leg Raise - 2 x daily - 7 x weekly - 3 sets - 10 reps  Sidelying Hip Abduction - 2 x daily - 7 x weekly - 3 sets - 10 reps  Supine Hip Adduction Isometric with Ball - 2 x daily - 7 x weekly - 10 reps - 10 sec hold  Sitting Heel Slide with Towel - 2 x daily - 7 x weekly - 10 reps - 10seconds hold  Seated Knee Flexion AAROM - 2 x daily - 7 x weekly - 10 reps - 10 sec hold      Therapeutic Exercise and NMR EXR  [x] (44140) Provided verbal/tactile cueing for activities related to strengthening, flexibility, endurance, ROM for improvements in LE, proximal hip, and core control with self care, mobility, lifting, ambulation.  [] (27627) Provided verbal/tactile cueing for activities related to improving balance, coordination, kinesthetic sense, posture, motor skill, proprioception  to assist with LE, proximal hip, and core control in self care, mobility, lifting, ambulation and eccentric single leg control.      NMR and Therapeutic Activities:    [] (62473 or 59723) Provided verbal/tactile cueing for activities related to improving balance, coordination, kinesthetic sense, posture, motor skill, proprioception and motor activation to allow for proper function of core, proximal hip and LE with self care and ADLs  [] (54305) Gait Re-education- Provided training and instruction to the patient for proper LE, core and proximal hip recruitment and positioning and eccentric body weight control with ambulation re-education including up and down stairs     Home Exercise Program:    [] (55005) Reviewed/Progressed HEP activities related to strengthening, flexibility, endurance, ROM of core, proximal hip and LE for functional self-care, mobility, lifting and ambulation/stair navigation   [] (23010)Reviewed/Progressed HEP activities related to improving balance, coordination, kinesthetic sense, posture, motor skill, proprioception of core, proximal hip and LE for self care, mobility, lifting, and ambulation/stair navigation      Manual Treatments:  PROM / STM / Oscillations-Mobs:  G-I, II, III, IV (PA's, Inf., Post.)  [x] (77324) Provided manual therapy to mobilize LE, proximal hip and/or LS spine soft tissue/joints for the purpose of modulating pain, promoting relaxation,  increasing ROM, reducing/eliminating soft tissue swelling/inflammation/restriction, improving soft tissue extensibility and allowing for proper ROM for normal function with self care, mobility, lifting and ambulation. Modalities: 6/16/21 vasopneumatic compression x 15 min   6/23: pt to use her cold machine when she gets home    Charges:  Timed Code Treatment Minutes: 47   Total Treatment Minutes: 47       [] EVAL (LOW) 23764 (typically 20 minutes face-to-face)  [] EVAL (MOD) 37168 (typically 30 minutes face-to-face)  [] EVAL (HIGH) 84439 (typically 45 minutes face-to-face)  [] RE-EVAL   [x] PD(07180) x 2    [] IONTO  [] NMR (98856) x     [x] VASO  [x] Manual (25232) x   1   [] Other:  [] TA x      [] Mech Traction (93393)  [] ES(attended) (23735)      [] ES (un) (34478):        GOALS:   Patient stated goal: walking. []? Progressing: []? Met: []? Not Met: []? Adjusted     Therapist goals for Patient:   Short Term Goals: To be achieved in: 2 weeks  1. Independent in HEP and progression per patient tolerance, in order to prevent re-injury. []? Progressing: []? Met: []? Not Met: []? Adjusted   2. Patient will have a decrease in pain to facilitate improvement in movement, function, and ADLs as indicated by Functional Deficits. []? Progressing: []? Met: []? Not Met: []? Adjusted     Long Term Goals: To be achieved in: 12 weeks  1. Disability index score of 20% or less for the LEFS to assist with reaching prior level of function. []? Progressing: []? Met: []? Not Met: []? Adjusted  2.  Patient will demonstrate increased AROM to [facility] to allow for proper joint functioning as indicated above)  [] Plan of care initiated [] Hold pending MD visit [] Discharge      Electronically signed by: Yosef Soto, PT / DPT  Note: If patient does not return for scheduled/ recommended follow up visits, this note will serve as a discharge from care along with most recent update on progress.

## 2021-06-24 RX ORDER — GABAPENTIN 300 MG/1
CAPSULE ORAL
Qty: 60 CAPSULE | Refills: 5 | Status: SHIPPED | OUTPATIENT
Start: 2021-06-24 | End: 2022-01-06

## 2021-06-25 ENCOUNTER — CARE COORDINATION (OUTPATIENT)
Dept: CASE MANAGEMENT | Age: 71
End: 2021-06-25

## 2021-06-25 DIAGNOSIS — F51.04 PSYCHOPHYSIOLOGICAL INSOMNIA: ICD-10-CM

## 2021-06-25 DIAGNOSIS — F41.9 ANXIETY: ICD-10-CM

## 2021-06-25 RX ORDER — LEVOTHYROXINE SODIUM 0.15 MG/1
TABLET ORAL
Qty: 90 TABLET | Refills: 1 | Status: SHIPPED | OUTPATIENT
Start: 2021-06-25 | End: 2021-12-08

## 2021-06-25 RX ORDER — ALPRAZOLAM 1 MG/1
TABLET ORAL
Qty: 35 TABLET | Refills: 2 | Status: SHIPPED | OUTPATIENT
Start: 2021-06-25 | End: 2021-09-21

## 2021-06-25 NOTE — TELEPHONE ENCOUNTER
Last appointment: 6/15/2021  Next appointment: 8/24/2021  Last refill:   Synthroid 90 with 1 11/02/2020  Xanax 35 with 2 03/29/2021

## 2021-06-25 NOTE — CARE COORDINATION
[facility] Transitions Follow Up Call    2021    Patient: Sheng Thorpe  Patient : 1950   MRN: [id number]  Reason for Admission:   Discharge Date: 6/10/21 RARS: Readmission Risk Score: 11      Follow up call attempted, left contact info on vm      Follow Up  Future Appointments   Date Time Provider Latisha Husain   2021  4:00 PM Callum Richmond, PT [address]    2021  3:15 PM  [address], PTA [facility] PT [facility]   2021  4:50 PM Charlee Tamez MD [facility]   2021  2:45 PM  [address], PTA [facility] PT [facility]   2021  2:30 PM Jimmie Reform, PT [facility] PT [facility]   2021  2:30 PM Jimmie Reform, PT [facility]   7/15/2021  3:00 PM Callum Richmond, PT [facility]   2021  2:30 PM Lourdes Evans MD  [facility]   2021  3:00 PM Jimmie Reform, PT [facility]   2021  2:30 PM Jimmie Reform, PT [facility]   2021  2:30 PM Jimmie Reform, PT [facility]   2021  3:00 PM Callum Richmond, PT [facility]   8/3/2021  2:30 PM Jimmie Reform, PT [facility]   2021  3:00 PM Callum Richmond, PT [facility]   8/10/2021  2:30 PM Jimmie Reform, PT [facility]   2021  3:00 PM Callum Richmond, PT [facility]   2021  1:00 PM Juliette De La Garza MD [id number] [address] Fisher RN

## 2021-06-29 ENCOUNTER — HOSPITAL ENCOUNTER (OUTPATIENT)
Dept: PHYSICAL THERAPY | Age: 71
Setting detail: THERAPIES SERIES
Discharge: HOME | End: 2021-06-29
Payer: COMMERCIAL

## 2021-06-29 PROCEDURE — 97140 MANUAL THERAPY 1/> REGIONS: CPT

## 2021-06-29 PROCEDURE — 97110 THERAPEUTIC EXERCISES: CPT

## 2021-06-29 NOTE — FLOWSHEET NOTE
[facility] [address] [place] [address] [facility]    [facility] Therapy Daily Treatment Note  Date:  2021    Patient Name:  Dewey Mina    :  1950  MRN: [id number]  Restrictions/Precautions:    Medical/Treatment Diagnosis Information:  · Diagnosis: M17.12 (ICD-10-CM) - Osteoarthritis of left knee, unspecified osteoarthritis type  s/p Left TKR  DOS: 21  · Treatment Diagnosis: increased pain; decreased ROM; decreased strength; gait abnormality; increased swelling  Insurance/Certification information:  PT Insurance Information: Sandra Anguiano 8  Physician Information:  Referring Practitioner: Hiram Sesay MD  Has the plan of care been signed (Y/N):        [x]  Yes-  []  No     Date of Patient follow up with Physician: 21      Is this a Progress Report:     []  Yes  [x]  No        If Yes:  Date Range for reporting period:  Beginning 21  Ending     Progress report will be due (10 Rx or 30 days whichever is less): 3/17/23      Recertification will be due (POC Duration  / 90 days whichever is less): 21    Visit # Insurance Allowable Auth Required   3 MN []  Yes [x]  No        Functional Scale:        Date assessed:          LEFS: raw score=35; dysfunction =56%    21     Latex Allergy:  [x]NO      []YES  Preferred Language for Healthcare:   [x]English       []other:    Pain level:  2-3/10     SUBJECTIVE:   Pt reports pain is low currently , alittle bit of stiffness but overall doing well. OBJECTIVE:    Observation: : avoids bending/weighting leg during sit to stand. Tends to use RW like a  SW. Needs cues to bend knee during swing phase. Pt tends to manually assist LLE onto table, stepper, car.       Test measurements:     : L knee 108 flexion PROM   : AROM L knee (supine): QS lacks 4, SAQ lacks 7; knee flexion 90        RESTRICTIONS/PRECAUTIONS:  Left TKR, WBAT    Exercises/Interventions:   Exercise/Equipment  TE x  Resistance Sets/time Repetitions Other comments    6/23 added   BIKE  3'  Full revolutions 6/29   Stretching       Hamstring on step  20\" 3 L 6/23   Lunge knee flexion stretch on step  10\" 10 L 6/23   Towel Pull       Inclined Calf  30\" 2    Hip Flexion       ITB       Groin       Quad                     strengthening       SLR Supine  2 10 L 6/23 has quad lag   SLR Abduction  1 10 L 6/23 could only do one set due to spasm in thigh   SLR Prone       SLR+ quad set              hooklying march  1 10 L/R 6/23    Quad sets [id number]      LAQ  1 10 L 6/29   SAQ Medium bolster 2 10 L 6/23   Calf raises  1 10 B 6/23   Mini squats   1 10 6/29                               ROM       Sheet Pulls       Hang Weights       Passive       Active Heel slides 2 10 L 6/23 (2nd set w/ OP)   Weight Shift       Ankle Pumps       ERMI    Resume npv                 NMR/ CKC              TRX squats       Standing 3 way SLR       1 leg stand              CC TKE [place] 1 10 6/29   Balance       bridges                     Therapeutic Activity       Fwd step up       Fwd step down       Lat step up       Lat dip/heel tap       Total gym squats       Sit to stand                                   Manual interventions x       Patellar glides Gr 3  Sup-inf  Med/lat 3'  6/23   Scar mobs    6/23   PROM Seated and supine knee flexion 5'  6/23 with gentle OP   Tib/Fem mobs Gr 3 to inc knee flex/ext 2'   6/23   IASTM/STM                       Patient Education:  6/23: instructed pt to try to avoid manually helping her LLE into bed/car etc.  Since she is able to do a SLR without assistance, she should try to actively lift LLE. Walked with patient to work on flexing L knee during swing phase and pushing RW instead of picking it up like a SW    Home Exercise program  Access Code: [email or web address]  URL: [email or web address]   Date: 06/16/2021  Prepared by: Romero Held  Exercises  Long Sitting Calf Stretch with Strap - 2 x daily - 7 x weekly - 5 reps - 30 seconds hold  Seated Table Hamstring Stretch - 2 x daily - 7 x weekly - 5 reps - 30 seconds hold  Long Sitting Quad Set - 2 x daily - 7 x weekly - 10 reps - 10 seconds hold  Straight Leg Raise - 2 x daily - 7 x weekly - 3 sets - 10 reps  Sidelying Hip Abduction - 2 x daily - 7 x weekly - 3 sets - 10 reps  Supine Hip Adduction Isometric with Ball - 2 x daily - 7 x weekly - 10 reps - 10 sec hold  Sitting Heel Slide with Towel - 2 x daily - 7 x weekly - 10 reps - 10seconds hold  Seated Knee Flexion AAROM - 2 x daily - 7 x weekly - 10 reps - 10 sec hold      Therapeutic Exercise and NMR EXR  [x] (35361) Provided verbal/tactile cueing for activities related to strengthening, flexibility, endurance, ROM for improvements in LE, proximal hip, and core control with self care, mobility, lifting, ambulation.  [] (50861) Provided verbal/tactile cueing for activities related to improving balance, coordination, kinesthetic sense, posture, motor skill, proprioception  to assist with LE, proximal hip, and core control in self care, mobility, lifting, ambulation and eccentric single leg control.      NMR and Therapeutic Activities:    [] (04839 or 02885) Provided verbal/tactile cueing for activities related to improving balance, coordination, kinesthetic sense, posture, motor skill, proprioception and motor activation to allow for proper function of core, proximal hip and LE with self care and ADLs  [] (39699) Gait Re-education- Provided training and instruction to the patient for proper LE, core and proximal hip recruitment and positioning and eccentric body weight control with ambulation re-education including up and down stairs     Home Exercise Program:    [] (49154) Reviewed/Progressed HEP activities related to strengthening, flexibility, endurance, ROM of core, proximal hip and LE for functional self-care, mobility, lifting and ambulation/stair navigation   [] (51787)Reviewed/Progressed HEP activities related to improving balance, coordination, kinesthetic sense, posture, motor skill, proprioception of core, proximal hip and LE for self care, mobility, lifting, and ambulation/stair navigation      Manual Treatments:  PROM / STM / Oscillations-Mobs:  G-I, II, III, IV (PA's, Inf., Post.)  [x] (74160) Provided manual therapy to mobilize LE, proximal hip and/or LS spine soft tissue/joints for the purpose of modulating pain, promoting relaxation,  increasing ROM, reducing/eliminating soft tissue swelling/inflammation/restriction, improving soft tissue extensibility and allowing for proper ROM for normal function with self care, mobility, lifting and ambulation. Modalities: 6/16/21 vasopneumatic compression x 15 min   6/23: pt to use her cold machine when she gets home    Charges:  Timed Code Treatment Minutes: 42   Total Treatment Minutes: 42       [] EVAL (LOW) 94346 (typically 20 minutes face-to-face)  [] EVAL (MOD) 31273 (typically 30 minutes face-to-face)  [] EVAL (HIGH) 63090 (typically 45 minutes face-to-face)  [] RE-EVAL   [x] KZ(94202) x 2    [] IONTO  [] NMR (48172) x     [] VASO   [x] Manual (70133) x   1   [] Other:  [] TA x      [] Mech Traction (19563)  [] ES(attended) (99160)      [] ES (un) (52866):        GOALS:   Patient stated goal: walking. []? Progressing: []? Met: []? Not Met: []? Adjusted     Therapist goals for Patient:   Short Term Goals: To be achieved in: 2 weeks  1. Independent in HEP and progression per patient tolerance, in order to prevent re-injury. []? Progressing: []? Met: []? Not Met: []? Adjusted   2. Patient will have a decrease in pain to facilitate improvement in movement, function, and ADLs as indicated by Functional Deficits. []? Progressing: []? Met: []? Not Met: []? Adjusted     Long Term Goals: To be achieved in: 12 weeks  1. Disability index score of 20% or less for the LEFS to assist with reaching prior level of function. []? Progressing: []? Met: []? Not Met: []? Adjusted  2.  Patient will demonstrate increased AROM to [facility] to allow for proper joint functioning as indicated by patients Functional Deficits. []? Progressing: []? Met: []? Not Met: []? Adjusted  3. Patient will demonstrate an increase in Strength to good proximal hip strength and control, within 5lb HHD in LE to allow for proper functional mobility as indicated by patients Functional Deficits. []? Progressing: []? Met: []? Not Met: []? Adjusted  4. Patient will return to ADL and household functional activities without increased symptoms or restriction. []? Progressing: []? Met: []? Not Met: []? Adjusted  5. Pt will be able to walk with mild symptoms and restrictions. []? Progressing: []? Met: []? Not Met: []? Adjusted       Overall Progression Towards Functional goals/ Treatment Progress Update:  [] Patient is progressing as expected towards functional goals listed. [] Progression is slowed due to complexities/Impairments listed. [] Progression has been slowed due to co-morbidities. [x] Plan just implemented, too soon to assess goals progression <30days   [] Goals require adjustment due to lack of progress  [] Patient is not progressing as expected and requires additional follow up with physician  [] Other    Prognosis for POC: [x] Good [] Fair  [] Poor    Patient requires continued skilled intervention: [x] Yes  [] No    Assessment:  Pt with good visit today, less pain and guarding, able to get to 108 flexion today. Progressions added in bold, pt able to do full revolutions on bike. Pt continues with quad lag with SLR - PT giving max cueing to try and improve. Continue to progress as tolerated.       Treatment/Activity Tolerance:  [x] Patient able to complete treatment  [] Patient limited by fatigue  [x] Patient limited by pain     [] Patient limited by other medical complications  [] Other:         PLAN: See eval  [x] Continue per plan of care [] Alter current plan (see comments above)  [] Plan of care initiated []

## 2021-06-30 ENCOUNTER — CARE COORDINATION (OUTPATIENT)
Dept: CASE MANAGEMENT | Age: 71
End: 2021-06-30

## 2021-06-30 NOTE — CARE COORDINATION
[facility] Transitions Follow Up Call    2021    Patient: Rosi Irene  Patient : 1950   MRN: [id number]  Reason for Admission:   Discharge Date: 6/10/21 RARS: Readmission Risk Score: 11       2nd and final attempt at a Follow up call, left contact info on       Follow Up  Future Appointments   Date Time Provider Latisha Husain   2021  3:15 PM Tuscar[place] Donaldo, PTA [facility]   2021  4:50 PM Fiordaliza Mansfield MD [address] [place] [address]   2021  2:45 PM Tus[place] Donaldo, PTA [facility] PT [facility]   2021  2:30 PM Thressa Hanson, [address] [place]   2021  2:30 PM Thressa Hanson, [address] [facility]   7/15/2021  3:00 PM Nyra Paling, PT [facility]   2021  2:30 PM Kaity Mcnair MD [facility]   2021  3:00 PM Thressa Hanson, PT [facility]   2021  2:30 PM Thressa Hanson, PT [facility]   2021  2:30 PM Thressa Hanson, PT [facility]   2021  3:00 PM Nyra Paling, PT [facility]   8/3/2021  2:30 PM Thressa Hanson, PT [facility]   2021  3:00 PM Nyra Paling, PT [facility]   8/10/2021  2:30 PM Thressa Hanson, [address] [place]   2021  3:00 PM Nyra Paling, PT [facility]   2021  1:00 PM [place]ido [place] [id number] Belinda Dillon RN

## 2021-07-02 ENCOUNTER — HOSPITAL ENCOUNTER (OUTPATIENT)
Dept: PHYSICAL THERAPY | Age: 71
Setting detail: THERAPIES SERIES
Discharge: HOME | End: 2021-07-02
Payer: COMMERCIAL

## 2021-07-02 PROCEDURE — 97112 NEUROMUSCULAR REEDUCATION: CPT | Performed by: SPECIALIST/TECHNOLOGIST

## 2021-07-02 PROCEDURE — 97110 THERAPEUTIC EXERCISES: CPT | Performed by: SPECIALIST/TECHNOLOGIST

## 2021-07-02 PROCEDURE — 97016 VASOPNEUMATIC DEVICE THERAPY: CPT | Performed by: SPECIALIST/TECHNOLOGIST

## 2021-07-02 PROCEDURE — 97140 MANUAL THERAPY 1/> REGIONS: CPT | Performed by: SPECIALIST/TECHNOLOGIST

## 2021-07-02 NOTE — FLOWSHEET NOTE
[facility]    Physical Therapy Daily Treatment Note  Date:  2021    Patient Name:  Joyce López    :  1950  MRN: [id number]  Restrictions/Precautions:    Medical/Treatment Diagnosis Information:  · Diagnosis: M17.12 (ICD-10-CM) - Osteoarthritis of left knee, unspecified osteoarthritis type  s/p Left TKR  DOS: 21  · Treatment Diagnosis: increased pain; decreased ROM; decreased strength; gait abnormality; increased swelling  Insurance/Certification information:  PT Insurance Information: Sandra Anguiano 8  Physician Information:  Referring Practitioner: Bernardino Valerio MD  Has the plan of care been signed (Y/N):        [x]  Yes-  []  No     Date of Patient follow up with Physician: 21      Is this a Progress Report:     []  Yes  [x]  No        If Yes:  Date Range for reporting period:  Beginning 21  Ending     Progress report will be due (10 Rx or 30 days whichever is less):       Recertification will be due (POC Duration  / 90 days whichever is less): 21    Visit # Insurance Allowable Auth Required   4 MN []  Yes [x]  No        Functional Scale:        Date assessed:          LEFS: raw score=35; dysfunction =56%    21     Latex Allergy:  [x]NO      []YES  Preferred Language for Healthcare:   [x]English       []other:    Pain level:  2-3/10     SUBJECTIVE:   Pt reports pain is low currently,  has a little bit of stiffness but overall doing well. Pt eager to doing stairs     OBJECTIVE:    Observation: : avoids bending/weighting leg during sit to stand. Tends to use RW like a  SW. Needs cues to bend knee during swing phase. Pt tends to manually assist LLE onto table, stepper, car.       Test measurements:        Left knee ROM -5/ 0 after stretching   : L knee 108 flexion PROM   : AROM L knee (supine): QS lacks 4, SAQ lacks 7; knee flexion 90     RESTRICTIONS/PRECAUTIONS:  Left TKR, WBAT    Exercises/Interventions:   Exercise/Equipment  TE x  Resistance Sets/time Repetitions Other comments    6/23 added   BIKE  5'  Full revolutions 6/29   Stretching       Hamstring on step/ EOB  20\" 3x L 6/23   Lunge knee flexion stretch on step  10\" 10 L 6/23   Towel Pull       Inclined Calf  30\" 2x    Hip Flexion       ITB       Groin       Quad                     strengthening       SLR Supine  2 10x L 6/23 has quad lag   SLR Abduction  1 10 L 6/23 could only do one set due to spasm in thigh   SLR Prone       SLR+ quad set              hooklying march 1 10 L/R 6/23    Quad sets [id number] Prop for extension     LAQ Unable to fully extend 1 15x L 6/29   SAQ Medium bolster 2 10x L 6/23   Calf raises  2 10 B 6/23   Mini squats   1 10 6/29                               ROM       Sheet Pulls       Hang Weights       Passive       Active Heel slides 2 10 L 6/23 (2nd set w/ OP)   Weight Shift       Ankle Pumps       ERMI    Resume npv                 NMR/ CKC              TRX squats       Standing 3 way SLR       1 leg stand              CC TKE  purple TB 2 10 Added 7/2    Balance       bridges        FSU 4\" up & up and over  x15   Added 7/2   Gait mechanics with TKE emphasis with knee flexion   5'      Therapeutic Activity       Fwd step up       Fwd step down       Lat step up       Lat dip/heel tap       Total gym squats       Sit to stand                                   Manual interventions x       Patellar glides Gr 3  Sup-inf  Med/lat 3'  6/23   Scar mobs    6/23   PROM Seated and supine knee flexion 5'  6/23 with gentle OP   Tib/Fem mobs Gr 3 to inc knee flex/ext 2'   6/23   IASTM/STM                       Patient Education:  6/23: instructed pt to try to avoid manually helping her LLE into bed/car etc.  Since she is able to do a SLR without assistance, she should try to actively lift LLE.   Walked with patient to work on flexing L knee during swing phase and pushing RW instead of picking it up like a     Home Exercise program  Access Code: [email or web address]  URL: [email or web address]   Date: 06/16/2021  Prepared by: Seven Gerardo  Exercises  Long Sitting Calf Stretch with Strap - 2 x daily - 7 x weekly - 5 reps - 30 seconds hold  Seated Table Hamstring Stretch - 2 x daily - 7 x weekly - 5 reps - 30 seconds hold  Long Sitting Quad Set - 2 x daily - 7 x weekly - 10 reps - 10 seconds hold  Straight Leg Raise - 2 x daily - 7 x weekly - 3 sets - 10 reps  Sidelying Hip Abduction - 2 x daily - 7 x weekly - 3 sets - 10 reps  Supine Hip Adduction Isometric with Ball - 2 x daily - 7 x weekly - 10 reps - 10 sec hold  Sitting Heel Slide with Towel - 2 x daily - 7 x weekly - 10 reps - 10seconds hold  Seated Knee Flexion AAROM - 2 x daily - 7 x weekly - 10 reps - 10 sec hold      Therapeutic Exercise and NMR EXR  [x] (08549) Provided verbal/tactile cueing for activities related to strengthening, flexibility, endurance, ROM for improvements in LE, proximal hip, and core control with self care, mobility, lifting, ambulation.  [] (15168) Provided verbal/tactile cueing for activities related to improving balance, coordination, kinesthetic sense, posture, motor skill, proprioception  to assist with LE, proximal hip, and core control in self care, mobility, lifting, ambulation and eccentric single leg control.      NMR and Therapeutic Activities:    [] (16836 or 45879) Provided verbal/tactile cueing for activities related to improving balance, coordination, kinesthetic sense, posture, motor skill, proprioception and motor activation to allow for proper function of core, proximal hip and LE with self care and ADLs  [] (51273) Gait Re-education- Provided training and instruction to the patient for proper LE, core and proximal hip recruitment and positioning and eccentric body weight control with ambulation re-education including up and down stairs     Home Exercise Program:    [] (35833) Reviewed/Progressed HEP activities related to strengthening, flexibility, endurance, ROM of core, proximal hip and LE for functional self-care, mobility, lifting and ambulation/stair navigation   [] (80652)Reviewed/Progressed HEP activities related to improving balance, coordination, kinesthetic sense, posture, motor skill, proprioception of core, proximal hip and LE for self care, mobility, lifting, and ambulation/stair navigation      Manual Treatments:  PROM / STM / Oscillations-Mobs:  G-I, II, III, IV (PA's, Inf., Post.)  [x] (97398) Provided manual therapy to mobilize LE, proximal hip and/or LS spine soft tissue/joints for the purpose of modulating pain, promoting relaxation,  increasing ROM, reducing/eliminating soft tissue swelling/inflammation/restriction, improving soft tissue extensibility and allowing for proper ROM for normal function with self care, mobility, lifting and ambulation. Modalities: 7/2/21 vasopneumatic for swelling and edema 15'  6/16/21 vasopneumatic compression x 15 min   6/23: pt to use her cold machine when she gets home    Charges:  Timed Code Treatment Minutes: 45   Total Treatment Minutes: 60       [] EVAL (LOW) 65759 (typically 20 minutes face-to-face)  [] EVAL (MOD) 68138 (typically 30 minutes face-to-face)  [] EVAL (HIGH) 45964 (typically 45 minutes face-to-face)  [] RE-EVAL   [x] VC(74136) x 1    [] IONTO  [x] NMR (32701) x 1    [x] VASO   [x] Manual (78853) x   1   [] Other:  [] TA x      [] Mech Traction (76861)  [] ES(attended) (93522)      [] ES (un) (67456):        GOALS:   Patient stated goal: walking. []? Progressing: []? Met: []? Not Met: []? Adjusted     Therapist goals for Patient:   Short Term Goals: To be achieved in: 2 weeks  1. Independent in HEP and progression per patient tolerance, in order to prevent re-injury. []? Progressing: []? Met: []? Not Met: []? Adjusted   2. Patient will have a decrease in pain to facilitate improvement in movement, function, and ADLs as indicated by Functional Deficits. using her quad with functional stairs and TKE. Eccentric weakness with descending stairs. Pt improved tolerance with obtaining knee extension today after progression of stairs and TKE initially. Pt performed all exercises well but still needs to improve quad activity and stability prior to coming off walker. Continue to progress as tolerated. Treatment/Activity Tolerance:  [x] Patient able to complete treatment  [] Patient limited by fatigue  [x] Patient limited by pain     [] Patient limited by other medical complications  [] Other:         PLAN: HOME NMES due to poor quad activity? [x] Continue per plan of care [] Alter current plan (see comments above)  [] Plan of care initiated [] Hold pending MD visit [] Discharge      Electronically signed by:  Kait Gardner, PTA [id number]  Note: If patient does not return for scheduled/ recommended follow up visits, this note will serve as a discharge from care along with most recent update on progress.

## 2021-07-06 ENCOUNTER — VIRTUAL VISIT (OUTPATIENT)
Dept: GYNECOLOGY | Age: 71
End: 2021-07-06
Payer: COMMERCIAL

## 2021-07-06 DIAGNOSIS — Z78.0 MENOPAUSE: ICD-10-CM

## 2021-07-06 DIAGNOSIS — M81.8 OTHER OSTEOPOROSIS WITHOUT CURRENT PATHOLOGICAL FRACTURE: Primary | ICD-10-CM

## 2021-07-06 PROCEDURE — 99213 OFFICE O/P EST LOW 20 MIN: CPT | Performed by: OBSTETRICS & GYNECOLOGY

## 2021-07-07 ENCOUNTER — HOSPITAL ENCOUNTER (OUTPATIENT)
Dept: PHYSICAL THERAPY | Age: 71
Setting detail: THERAPIES SERIES
Discharge: HOME | End: 2021-07-07
Payer: COMMERCIAL

## 2021-07-07 PROCEDURE — 97140 MANUAL THERAPY 1/> REGIONS: CPT | Performed by: SPECIALIST/TECHNOLOGIST

## 2021-07-07 PROCEDURE — 97112 NEUROMUSCULAR REEDUCATION: CPT | Performed by: SPECIALIST/TECHNOLOGIST

## 2021-07-07 PROCEDURE — 97110 THERAPEUTIC EXERCISES: CPT | Performed by: SPECIALIST/TECHNOLOGIST

## 2021-07-07 NOTE — FLOWSHEET NOTE
[facility] [address] [place] [address] [facility]    Physical Therapy Daily Treatment Note  Date:  2021    Patient Name:  Rio Ramírez    :  1950  MRN: [id number]  Restrictions/Precautions:    Medical/Treatment Diagnosis Information:  · Diagnosis: M17.12 (ICD-10-CM) - Osteoarthritis of left knee, unspecified osteoarthritis type  s/p Left TKR  DOS: 21  · Treatment Diagnosis: increased pain; decreased ROM; decreased strength; gait abnormality; increased swelling  Insurance/Certification information:  PT Insurance Information: Sandra Anguiano 8  Physician Information:  Referring Practitioner: Lieutenant Anita HELTON  Has the plan of care been signed (Y/N):        [x]  Yes-  []  No     Date of Patient follow up with Physician: 21      Is this a Progress Report:     []  Yes  [x]  No        If Yes:  Date Range for reporting period:  Beginning 21  Ending     Progress report will be due (10 Rx or 30 days whichever is less): 05      Recertification will be due (POC Duration  / 90 days whichever is less): 21    Visit # Insurance Allowable Auth Required   4 MN []  Yes [x]  No        Functional Scale:        Date assessed:          LEFS: raw score=35; dysfunction =56%    21     Latex Allergy:  [x]NO      []YES  Preferred Language for Healthcare:   [x]English       []other:    Pain level:  2-3/10     SUBJECTIVE:     Doing much better and feels her mobility is much improved. CPM is being picked up tomorrow and is also using a cryocuff to ice her knee. Dr Emma Mayen     OBJECTIVE:    Observation: : avoids bending/weighting leg during sit to stand. Tends to use RW like a  SW. Needs cues to bend knee during swing phase. Pt tends to manually assist LLE onto table, stepper, car.     Test measurements:       left knee incision healing well and has been using Vit E to massage.  Gait mildly antalgic  Left 0-120 ERMI   21   Left knee ROM -5/ 0 after stretching   6/29: L knee 108 flexion PROM   6/23: AROM L knee (supine): QS lacks 4, SAQ lacks 7; knee flexion 90     RESTRICTIONS/PRECAUTIONS:  Left TKR, WBAT    Exercises/Interventions:   Exercise/Equipment  TE x  Resistance Sets/time Repetitions Other comments    6/23 added   BIKE   ERMI   5'   10\" 10x  Full revolutions 6/29   added 7/7   Stretching       Hamstring on step/ EOB toes  20\" 3x L 6/23   Lunge knee flexion stretch on step  10\" 10 L 6/23   Towel Pull       Inclined Calf  30\" 2x    Hip Flexion       ITB       Groin       Quad       Bridge   1 15x Added 7/7          strengthening       SLR Supine  2 10x L    SLR Abduction  SL  clams   orange 2 10 L ea 6/23 could only do one set due to spasm in thigh   SLR Prone       SLR+ quad set              Quad sets [id number] Prop for extension     LAQ  2 10x L  7/7          Calf raises  2 10 B 6/23   Mini squats   1 10 6/29                               ROM       Sheet Pulls       Hang Weights       Passive       Weight Shift       Ankle Pumps       ERMI    Resume npv                 NMR/ CKC              TRX squats       Standing 3 way SLR       1 leg stand              CC TKE  purple TB 2 10 Added 7/2    Balance       bridges        FSU 6\" up & up and over  x15   Added 7/2   Gait mechanics with TKE emphasis with knee flexion   5'  Using cane today Added 7/7   Therapeutic Activity       Fwd step up       Fwd step down       Lat step up       Lat dip/heel tap       Total gym squats       Sit to stand                                   Manual interventions x  8'       Patellar glides Gr 3  Sup-inf  Med/lat 3'  6/23   Scar mobs    6/23   PROM Seated and supine knee flexion 5'  6/23 with gentle OP    6/23   STM Over incision 5'  7/7                   Patient Education:  6/23: instructed pt to try to avoid manually helping her LLE into bed/car etc.  Since she is able to do a SLR without assistance, she should try to actively lift LLE.   Walked with patient to work on flexing L knee during swing phase and pushing RW instead of picking it up like a SW    Home Exercise program  Access Code: [email or web address]  URL: [email or web address]   Date: 06/16/2021  Prepared by: Maura Almanza  Exercises  Long Sitting Calf Stretch with Strap - 2 x daily - 7 x weekly - 5 reps - 30 seconds hold  Seated Table Hamstring Stretch - 2 x daily - 7 x weekly - 5 reps - 30 seconds hold  Long Sitting Quad Set - 2 x daily - 7 x weekly - 10 reps - 10 seconds hold  Straight Leg Raise - 2 x daily - 7 x weekly - 3 sets - 10 reps  Sidelying Hip Abduction - 2 x daily - 7 x weekly - 3 sets - 10 reps  Supine Hip Adduction Isometric with Ball - 2 x daily - 7 x weekly - 10 reps - 10 sec hold  Sitting Heel Slide with Towel - 2 x daily - 7 x weekly - 10 reps - 10seconds hold  Seated Knee Flexion AAROM - 2 x daily - 7 x weekly - 10 reps - 10 sec hold      Therapeutic Exercise and NMR EXR  [x] (41567) Provided verbal/tactile cueing for activities related to strengthening, flexibility, endurance, ROM for improvements in LE, proximal hip, and core control with self care, mobility, lifting, ambulation.  [] (79137) Provided verbal/tactile cueing for activities related to improving balance, coordination, kinesthetic sense, posture, motor skill, proprioception  to assist with LE, proximal hip, and core control in self care, mobility, lifting, ambulation and eccentric single leg control.      NMR and Therapeutic Activities:    [] (79988 or 95925) Provided verbal/tactile cueing for activities related to improving balance, coordination, kinesthetic sense, posture, motor skill, proprioception and motor activation to allow for proper function of core, proximal hip and LE with self care and ADLs  [] (90825) Gait Re-education- Provided training and instruction to the patient for proper LE, core and proximal hip recruitment and positioning and eccentric body weight control with ambulation re-education including up and down stairs Home Exercise Program:    [] (97883) Reviewed/Progressed HEP activities related to strengthening, flexibility, endurance, ROM of core, proximal hip and LE for functional self-care, mobility, lifting and ambulation/stair navigation   [] (68321)Reviewed/Progressed HEP activities related to improving balance, coordination, kinesthetic sense, posture, motor skill, proprioception of core, proximal hip and LE for self care, mobility, lifting, and ambulation/stair navigation      Manual Treatments:  PROM / STM / Oscillations-Mobs:  G-I, II, III, IV (PA's, Inf., Post.)  [x] (33262) Provided manual therapy to mobilize LE, proximal hip and/or LS spine soft tissue/joints for the purpose of modulating pain, promoting relaxation,  increasing ROM, reducing/eliminating soft tissue swelling/inflammation/restriction, improving soft tissue extensibility and allowing for proper ROM for normal function with self care, mobility, lifting and ambulation. Modalities: 7/7/21  10'  6/16/21 vasopneumatic compression x 15 min   6/23: pt to use her cold machine when she gets home    Charges:  Timed Code Treatment Minutes: 45   Total Treatment Minutes: 60       [] EVAL (LOW) 31838 (typically 20 minutes face-to-face)  [] EVAL (MOD) 72151 (typically 30 minutes face-to-face)  [] EVAL (HIGH) 65493 (typically 45 minutes face-to-face)  [] RE-EVAL   [x] YM(70966) x 1    [] IONTO  [x] NMR (33102) x 1    [] VASO   [x] Manual (50952) x   1   [] Other:  [] TA x      [] Mech Traction (21378)  [] ES(attended) (97564)      [] ES (un) (38402):        GOALS:   Patient stated goal: walking. []? Progressing: []? Met: []? Not Met: []? Adjusted     Therapist goals for Patient:   Short Term Goals: To be achieved in: 2 weeks  1. Independent in HEP and progression per patient tolerance, in order to prevent re-injury. []? Progressing: []? Met: []? Not Met: []? Adjusted   2.  Patient will have a decrease in pain to facilitate improvement in movement, function, and ADLs as indicated by Functional Deficits. []? Progressing: []? Met: []? Not Met: []? Adjusted     Long Term Goals: To be achieved in: 12 weeks  1. Disability index score of 20% or less for the LEFS to assist with reaching prior level of function. []? Progressing: []? Met: []? Not Met: []? Adjusted  2. Patient will demonstrate increased AROM to [facility] to allow for proper joint functioning as indicated by patients Functional Deficits. []? Progressing: []? Met: []? Not Met: []? Adjusted  3. Patient will demonstrate an increase in Strength to good proximal hip strength and control, within 5lb HHD in LE to allow for proper functional mobility as indicated by patients Functional Deficits. []? Progressing: []? Met: []? Not Met: []? Adjusted  4. Patient will return to ADL and household functional activities without increased symptoms or restriction. []? Progressing: []? Met: []? Not Met: []? Adjusted  5. Pt will be able to walk with mild symptoms and restrictions. []? Progressing: []? Met: []? Not Met: []? Adjusted       Overall Progression Towards Functional goals/ Treatment Progress Update:  [] Patient is progressing as expected towards functional goals listed. [] Progression is slowed due to complexities/Impairments listed. [] Progression has been slowed due to co-morbidities. [x] Plan just implemented, too soon to assess goals progression <30days   [] Goals require adjustment due to lack of progress  [] Patient is not progressing as expected and requires additional follow up with physician  [] Other    Prognosis for POC: [x] Good [] Fair  [] Poor    Patient requires continued skilled intervention: [x] Yes  [] No    Assessment:  Pt with good visit today, able to get to 0- 120 degrees w/ use of ERMI. Focus of todays session was obtaining full extension and using her quad with functional stairs and TKE. Eccentric weakness with descending stairs and used her hands to assist mechanics. With cuing, patient was able to to increase focus on descending stairs by eccentrically lowering with her quads. Pt improved tolerance with obtaining knee extension today after progression of stairs and TKE initially. Began use of cane in clinic today and tolerated well with only deficits at heelstrike. Pt performed all exercises well but still needs to improve quad activity and stability prior to coming off walker. Quad strength and endurance more challenged with progression of WB exercises. Continue to progress as tolerated with increased focus on quads and normal mechanics. Treatment/Activity Tolerance:  [x] Patient able to complete treatment  [] Patient limited by fatigue  [x] Patient limited by pain     [] Patient limited by other medical complications  [] Other:         PLAN:  Patient advised to get a cane but not to use FT just yet, continue with walker  [x] Continue per plan of care [] Alter current plan (see comments above)  [] Plan of care initiated [] Hold pending MD visit [] Discharge      Electronically signed by:  Meng Hinojosa, PTA [id number]  Note: If patient does not return for scheduled/ recommended follow up visits, this note will serve as a discharge from care along with most recent update on progress.

## 2021-07-08 ENCOUNTER — APPOINTMENT (OUTPATIENT)
Dept: CT IMAGING | Age: 71
DRG: 871 | End: 2021-07-08
Payer: COMMERCIAL

## 2021-07-08 ENCOUNTER — HOSPITAL ENCOUNTER (INPATIENT)
Age: 71
LOS: 8 days | Discharge: HOME | DRG: 871 | End: 2021-07-16
Attending: INTERNAL MEDICINE | Admitting: INTERNAL MEDICINE
Payer: COMMERCIAL

## 2021-07-08 DIAGNOSIS — N12 PYELONEPHRITIS: Primary | ICD-10-CM

## 2021-07-08 PROBLEM — N10 ACUTE PYELONEPHRITIS: Status: ACTIVE | Noted: 2021-07-08

## 2021-07-08 LAB
A/G RATIO: 0.8 (ref 1.1–2.2)
ALBUMIN SERPL-MCNC: 2.9 G/DL (ref 3.4–5)
ALP BLD-CCNC: 95 U/L (ref 40–129)
ALT SERPL-CCNC: 10 U/L (ref 10–40)
ANION GAP SERPL CALCULATED.3IONS-SCNC: 13 MMOL/L (ref 3–16)
ANISOCYTOSIS: ABNORMAL
AST SERPL-CCNC: 42 U/L (ref 15–37)
BACTERIA: ABNORMAL /HPF
BANDED NEUTROPHILS RELATIVE PERCENT: 5 % (ref 0–7)
BASOPHILS ABSOLUTE: 0.2 K/UL (ref 0–0.2)
BASOPHILS RELATIVE PERCENT: 1 %
BILIRUB SERPL-MCNC: 0.4 MG/DL (ref 0–1)
BILIRUBIN URINE: ABNORMAL
BLOOD, URINE: ABNORMAL
BUN BLDV-MCNC: 23 MG/DL (ref 7–20)
BURR CELLS: ABNORMAL
CALCIUM SERPL-MCNC: 8.3 MG/DL (ref 8.3–10.6)
CHLORIDE BLD-SCNC: 94 MMOL/L (ref 99–110)
CLARITY: ABNORMAL
CO2: 20 MMOL/L (ref 21–32)
COLOR: ABNORMAL
COMMENT UA: ABNORMAL
CREAT SERPL-MCNC: 1 MG/DL (ref 0.6–1.2)
EOSINOPHILS ABSOLUTE: 0.2 K/UL (ref 0–0.6)
EOSINOPHILS RELATIVE PERCENT: 1 %
EPITHELIAL CELLS, UA: 3 /HPF (ref 0–5)
GFR AFRICAN AMERICAN: >60
GFR NON-AFRICAN AMERICAN: 55
GLOBULIN: 3.6 G/DL
GLUCOSE BLD-MCNC: 100 MG/DL (ref 70–99)
GLUCOSE URINE: NEGATIVE MG/DL
HCT VFR BLD CALC: 36 % (ref 36–48)
HEMOGLOBIN: 11.5 G/DL (ref 12–16)
HYALINE CASTS: 6 /LPF (ref 0–8)
KETONES, URINE: NEGATIVE MG/DL
LEUKOCYTE ESTERASE, URINE: ABNORMAL
LIPASE: 33 U/L (ref 13–60)
LYMPHOCYTES ABSOLUTE: 1 K/UL (ref 1–5.1)
LYMPHOCYTES RELATIVE PERCENT: 6 %
MCH RBC QN AUTO: 29.9 PG (ref 26–34)
MCHC RBC AUTO-ENTMCNC: 31.8 G/DL (ref 31–36)
MCV RBC AUTO: 93.9 FL (ref 80–100)
MICROSCOPIC EXAMINATION: YES
MONOCYTES ABSOLUTE: 0.7 K/UL (ref 0–1.3)
MONOCYTES RELATIVE PERCENT: 4 %
NEUTROPHILS ABSOLUTE: 15 K/UL (ref 1.7–7.7)
NEUTROPHILS RELATIVE PERCENT: 83 %
NITRITE, URINE: NEGATIVE
PDW BLD-RTO: 15.3 % (ref 12.4–15.4)
PH UA: 6 (ref 5–8)
PLATELET # BLD: 237 K/UL (ref 135–450)
PLATELET SLIDE REVIEW: ADEQUATE
PMV BLD AUTO: 9.5 FL (ref 5–10.5)
POTASSIUM SERPL-SCNC: 4.7 MMOL/L (ref 3.5–5.1)
PROTEIN UA: 100 MG/DL
RBC # BLD: 3.84 M/UL (ref 4–5.2)
RBC UA: 14 /HPF (ref 0–4)
SLIDE REVIEW: ABNORMAL
SODIUM BLD-SCNC: 127 MMOL/L (ref 136–145)
SPECIFIC GRAVITY UA: 1.02 (ref 1–1.03)
TOTAL PROTEIN: 6.5 G/DL (ref 6.4–8.2)
TROPONIN: <0.01 NG/ML
URINE REFLEX TO CULTURE: YES
URINE TYPE: ABNORMAL
UROBILINOGEN, URINE: 1 E.U./DL
WBC # BLD: 17.1 K/UL (ref 4–11)
WBC UA: >900 /HPF (ref 0–5)

## 2021-07-08 PROCEDURE — 87040 BLOOD CULTURE FOR BACTERIA: CPT

## 2021-07-08 PROCEDURE — 36415 COLL VENOUS BLD VENIPUNCTURE: CPT

## 2021-07-08 PROCEDURE — 6360000004 HC RX CONTRAST MEDICATION: Performed by: PHYSICIAN ASSISTANT

## 2021-07-08 PROCEDURE — 87086 URINE CULTURE/COLONY COUNT: CPT

## 2021-07-08 PROCEDURE — 84484 ASSAY OF TROPONIN QUANT: CPT

## 2021-07-08 PROCEDURE — 93005 ELECTROCARDIOGRAM TRACING: CPT | Performed by: PHYSICIAN ASSISTANT

## 2021-07-08 PROCEDURE — 1200000000 HC SEMI PRIVATE

## 2021-07-08 PROCEDURE — 81001 URINALYSIS AUTO W/SCOPE: CPT

## 2021-07-08 PROCEDURE — 83690 ASSAY OF LIPASE: CPT

## 2021-07-08 PROCEDURE — 99283 EMERGENCY DEPT VISIT LOW MDM: CPT

## 2021-07-08 PROCEDURE — 87088 URINE BACTERIA CULTURE: CPT

## 2021-07-08 PROCEDURE — 87186 SC STD MICRODIL/AGAR DIL: CPT

## 2021-07-08 PROCEDURE — 87150 DNA/RNA AMPLIFIED PROBE: CPT

## 2021-07-08 PROCEDURE — 85025 COMPLETE CBC W/AUTO DIFF WBC: CPT

## 2021-07-08 PROCEDURE — 80053 COMPREHEN METABOLIC PANEL: CPT

## 2021-07-08 PROCEDURE — 74177 CT ABD & PELVIS W/CONTRAST: CPT

## 2021-07-08 RX ORDER — 0.9 % SODIUM CHLORIDE 0.9 %
1000 INTRAVENOUS SOLUTION INTRAVENOUS ONCE
Status: COMPLETED | OUTPATIENT
Start: 2021-07-08 | End: 2021-07-09

## 2021-07-08 RX ADMIN — IOPAMIDOL 75 ML: 755 INJECTION, SOLUTION INTRAVENOUS at 21:35

## 2021-07-08 ASSESSMENT — ENCOUNTER SYMPTOMS
DIARRHEA: 0
ABDOMINAL DISTENTION: 1
VOMITING: 1
ABDOMINAL PAIN: 1
WHEEZING: 0
RHINORRHEA: 0
SHORTNESS OF BREATH: 0
COUGH: 0
NAUSEA: 0

## 2021-07-08 ASSESSMENT — PAIN DESCRIPTION - DESCRIPTORS: DESCRIPTORS: ACHING

## 2021-07-08 ASSESSMENT — PAIN DESCRIPTION - LOCATION: LOCATION: ABDOMEN

## 2021-07-08 ASSESSMENT — PAIN DESCRIPTION - PAIN TYPE: TYPE: ACUTE PAIN

## 2021-07-08 ASSESSMENT — PAIN SCALES - GENERAL: PAINLEVEL_OUTOF10: 2

## 2021-07-08 NOTE — ED PROVIDER NOTES
[address] [facility]        Pt Name: Germain Christianson  MRN: [id number]  [place] 1950  Date of evaluation: 7/8/2021  Provider: Toan Gomez PA-C  PCP: Alexandra Gonzales MD  Note Started: 7:58 PM EDT       LINDA. I have evaluated this patient. My supervising physician was available for consultation. CHIEF COMPLAINT       Chief Complaint   Patient presents with    Abdominal Pain     with feeling bloated and N/V x 1 week but getting worse. Also having chills       HISTORY OF PRESENT ILLNESS   (Location, Timing/Onset, Context/Setting, Quality, Duration, Modifying Factors, Severity, Associated Signs and Symptoms)  Note limiting factors. Chief Complaint: Abdominal pain     Germain Christianson is a 78 y.o. female who presents for evaluation of generalized malaise and chills for the past several days. States that yesterday had single episode of emesis and has intermittent abdominal cramping. States that she feels bloated. She denies any diarrhea or urinary complaints. No documented fevers. She denies any chest pain or shortness of breath. No history of similar symptoms. She has history of hernia repair but no other abdominal surgeries. Additionally, she also reports a 20 pound unintentional weight loss over the past 3 weeks. States that she contacted her PCP regarding symptoms they recommended her come to the ED. She has no other complaints or concerns at this time. Nursing Notes were all reviewed and agreed with or any disagreements were addressed in the HPI. REVIEW OF SYSTEMS    (2-9 systems for level 4, 10 or more for level 5)     Review of Systems   Constitutional: Positive for chills. Negative for appetite change and fever. HENT: Negative for congestion and rhinorrhea. Eyes: Negative for visual disturbance. Respiratory: Negative for cough, shortness of breath and wheezing. Cardiovascular: Negative for chest pain. Gastrointestinal: Positive for abdominal distention, abdominal pain and vomiting. Negative for diarrhea and nausea. Genitourinary: Negative for difficulty urinating, dysuria and hematuria. Musculoskeletal: Negative for neck pain and neck stiffness. Skin: Negative for rash. Neurological: Negative for dizziness, syncope, weakness, light-headedness and headaches. Positives and Pertinent negatives as per HPI. Except as noted above in the ROS, all other systems were reviewed and negative.        PAST MEDICAL HISTORY     Past Medical History:   Diagnosis Date    Anal fissure 7/16/2013    Anxiety     Colon polyps     Depression     Fibroid uterus     Hypothyroidism     Morbid obesity ([address]     Osteopenia          SURGICAL HISTORY     Past Surgical History:   Procedure Laterality Date    ABDOMINAL HERNIA REPAIR  2008    APPENDECTOMY  Age 27    CATARACT REMOVAL WITH IMPLANT Right 10/23/2017    with vitrectomy    CATARACT REMOVAL WITH IMPLANT Left 02/26/2018    with vitrectomy    COLONOSCOPY N/A 10/10/2018    COLONOSCOPY POLYPECTOMY SNARE/COLD BIOPSY performed by Tammy Linares MD at [address]  Right 8/26/2019    RIGHT L4 AND L5 TRANSFORAMINAL EPIDURAL STEROID INJECTION WITH FLUOROSCOPY performed by Sangeetha Vela MD at [address] INJECT OTHER PERPHRL NERV Right 10/21/2019    RIGHT PIRIFORMIS INJECTION WITH FLUOROSCOPY (05996) performed by Sangeetha Vela MD at [facility], TOTAL ABDOMINAL      ovaries out also   [facility] Right 1/7/2019    RIGHT L3 AND L4 LUMBAR TRANSFORAMINAL WITH FLUOROSCOPY performed by Sangeetha Vela MD at [address] [place] [address] Right 5/15/2019    RIGHT L4 AND L5 TRANSFORAMINAL EPIDURAL STEROID INJECTION WITH FLUOROSCOPY performed by Sangeetha Vela MD at [place] [address] ESOPHAGOGASTRODUODENOSCOPY TRANSORAL DIAGNOSTIC N/A 10/10/2018    EGD performed by Shorty Sanders Alanna Stone MD at [address]    Fibroids    TOTAL KNEE ARTHROPLASTY Left 6/1/2021    LEFT TOTAL KNEE ARTHROPLASTY performed by Carlos Ordoñez MD at [facility]       Previous Medications    ALPRAZOLAM (XANAX) 1 MG TABLET    TAKE 1 TABLET BY MOUTH TWICE DAILY AS NEEDED FOR ANXIETY    ASPIRIN 325 MG EC TABLET    Take 1 tablet by mouth daily    BUPROPION (WELLBUTRIN XL) 150 MG EXTENDED RELEASE TABLET    Take 1 tablet by mouth every morning    CHOLECALCIFEROL (VITAMIN D) 1000 UNIT CAPS    Take 2 capsules by mouth daily     CYANOCOBALAMIN (VITAMIN B 12 PO)    Take 500 mcg by mouth daily     GABAPENTIN (NEURONTIN) 300 MG CAPSULE    TAKE 1 CAPSULE BY MOUTH TWICE DAILY    LEVOTHYROXINE (SYNTHROID) 150 MCG TABLET    TAKE 1 TABLET BY MOUTH EVERY DAY    MELATONIN 5 MG TABS TABLET    Take 5 mg by mouth daily    MULTIPLE VITAMIN (MULTIVITAMIN PO)    Take by mouth    OMEPRAZOLE (PRILOSEC) 40 MG DELAYED RELEASE CAPSULE    TAKE 1 CAPSULE BY MOUTH EVERY MORNING BEFORE BREAKFAST    POLYETHYLENE GLYCOL (GLYCOLAX) 17 GM/SCOOP POWDER    Take 17 g by mouth 2 times daily    SENNA (SENOKOT) 8.6 MG TABLET    Take 1 tablet by mouth daily     TRAZODONE (DESYREL) 50 MG TABLET    TAKE 3 TABLETS BY MOUTH EVERY NIGHT AT BEDTIME         ALLERGIES     Macrobid [nitrofurantoin] and Codeine    FAMILYHISTORY       Family History   Problem Relation Age of Onset    Diabetes Father         Type II    Diabetes Sister         Type II    Diabetes Brother         Type II    Breast Cancer Sister 58    Lung Cancer Mother         Smoker    Heart Disease Sister         Bicuspid aortic valve x2          SOCIAL HISTORY       Social History     Tobacco Use    Smoking status: Never Smoker    Smokeless tobacco: Never Used   Vaping Use    Vaping Use: Never used   Substance Use Topics    Alcohol use: No    Drug use: No       SCREENINGS             PHYSICAL EXAM    (up to 7 for level 4, 8 or more the following components:    Sodium 127 (*)     Chloride 94 (*)     CO2 20 (*)     Glucose 100 (*)     BUN 23 (*)     GFR Non- 55 (*)     Albumin 2.9 (*)     Albumin/Globulin Ratio 0.8 (*)     AST 42 (*)     All other components within normal limits    Narrative:     Performed at:  [facility] [address] [employer] [facility] [employer]   Phone [phone]   URINE RT REFLEX TO CULTURE - Abnormal; Notable for the following components:    Clarity, UA TURBID (*)     Bilirubin Urine SMALL (*)     Blood, Urine MODERATE (*)     Protein,  (*)     Leukocyte Esterase, Urine LARGE (*)     All other components within normal limits    Narrative:     Performed at:  [facility] [address] [employer] [address] [facility] [employer]   Phone [phone]   MICROSCOPIC URINALYSIS - Abnormal; Notable for the following components:    Bacteria, UA 4+ (*)     WBC, UA >900 (*)     RBC, UA 14 (*)     All other components within normal limits    Narrative:     Performed at:  [facility] [address] [address] [employer]   Phone [phone]   CULTURE, URINE   CULTURE, BLOOD 1   CULTURE, BLOOD 2   LIPASE    Narrative:     Performed at:  [facility] [address], [facility] [employer]   Phone [phone]   TROPONIN    Narrative:     Performed at:  [facility] [address] [facility]  [employer] [facility] [employer]   Phone [phone]   LACTATE, SEPSIS   LACTATE, SEPSIS       When ordered only abnormal lab results are displayed. All other labs were within normal range or not returned as of this dictation. EKG: When ordered, EKG's are interpreted by the Emergency Department Physician in the absence of a cardiologist.  Please see their note for interpretation of EKG.     RADIOLOGY:   Non-plain film images such as CT, Ultrasound and MRI are read by the radiologist. [facility] radiographic images are visualized and preliminarily interpreted by the ED Provider with the below findings:        Interpretation per the Radiologist below, if available at the time of this note:    CT ABDOMEN PELVIS W IV CONTRAST Additional Contrast? None   Final Result   1. Mild urothelial thickening of the ureters diffusely. Recommend clinical   correlation for ascending urinary tract infection. 2. Unchanged subcutaneous fluid collection of the anterior abdominal wall at   midline measuring 9.7 cm. No results found. PROCEDURES   Unless otherwise noted below, none     Procedures    CRITICAL CARE TIME   N/A    CONSULTS:  None      EMERGENCY DEPARTMENT COURSE and DIFFERENTIAL DIAGNOSIS/MDM:   Vitals:    Vitals:    07/1950   BP: 103/65   Pulse: 95   Resp: 20   Temp: 98.1 °F (36.7 °C)   SpO2: 97%   Weight: 170 lb (77.1 kg)   Height: 5' 2.5\" (1.588 m)       Patient was given the following medications:  Medications   cefTRIAXone (ROCEPHIN) 1000 mg in sterile water 10 mL IV syringe (has no administration in time range)   0.9 % sodium chloride bolus (has no administration in time range)   iopamidol (ISOVUE-370) 76 % injection 75 mL (75 mLs Intravenous Given 7/8/21 2135)           Patient presents for evaluation of abdominal pain nausea vomiting bloating chills and weight loss. On exam, she is resting comfortably in bed in no acute distress and nontoxic. Vitals are stable and she is afebrile. Lungs are clear to auscultation bilaterally. She does have a ventral hernia that is relatively large and I am not able to reduce this at the bedside. She does not have any significant tenderness associated with this. Remainder of abdomen is nice and soft, no peritoneal signs. Please see attending note for EKG interpretation. CBC and CMP are remarkable for leukocytosis 17.1. She has urinalysis positive for large leukocytes, moderate blood, 4+ bacteria and greater than 900 white blood cells. Slightly hyponatremic. Lipase 33. Troponin is negative. CT shows urothelial thickening of ureters diffusely concerning for a sending urinary tract infection. Lactate and cultures are pending. Patient was started on fluid resuscitation IV Rocephin and will be admitted for further evaluation management of this. Hospitalist resume care of the patient at this time. Patient was informed and agreeable. She is stable for admission. FINAL IMPRESSION      1. Pyelonephritis          DISPOSITION/PLAN   DISPOSITION Decision To Admit 07/08/2021 10:48:54 PM      PATIENT REFERRED TO:  No follow-up provider specified.     DISCHARGE MEDICATIONS:  New Prescriptions    No medications on file       DISCONTINUED MEDICATIONS:  Discontinued Medications    No medications on file              (Please note that portions of this note were completed with a voice recognition program.  Efforts were made to edit the dictations but occasionally words are mis-transcribed.)    [facility] (electronically signed)           Jeanmarie Hunter PA-C  07/08/21 2778

## 2021-07-09 ENCOUNTER — HOSPITAL ENCOUNTER (OUTPATIENT)
Dept: PHYSICAL THERAPY | Age: 71
Setting detail: THERAPIES SERIES
Discharge: HOME | End: 2021-07-09
Payer: COMMERCIAL

## 2021-07-09 PROBLEM — E87.1 HYPONATREMIA: Status: ACTIVE | Noted: 2021-07-09

## 2021-07-09 PROBLEM — E43 SEVERE MALNUTRITION (HCC): Status: ACTIVE | Noted: 2021-07-09

## 2021-07-09 LAB
ALBUMIN SERPL-MCNC: 2.5 G/DL (ref 3.4–5)
ANION GAP SERPL CALCULATED.3IONS-SCNC: 10 MMOL/L (ref 3–16)
ANION GAP SERPL CALCULATED.3IONS-SCNC: 11 MMOL/L (ref 3–16)
BASOPHILS ABSOLUTE: 0 K/UL (ref 0–0.2)
BASOPHILS RELATIVE PERCENT: 0.2 %
BUN BLDV-MCNC: 24 MG/DL (ref 7–20)
BUN BLDV-MCNC: 25 MG/DL (ref 7–20)
CALCIUM SERPL-MCNC: 7.8 MG/DL (ref 8.3–10.6)
CALCIUM SERPL-MCNC: 8 MG/DL (ref 8.3–10.6)
CHLORIDE BLD-SCNC: 101 MMOL/L (ref 99–110)
CHLORIDE BLD-SCNC: 99 MMOL/L (ref 99–110)
CO2: 20 MMOL/L (ref 21–32)
CO2: 20 MMOL/L (ref 21–32)
CREAT SERPL-MCNC: 1.2 MG/DL (ref 0.6–1.2)
CREAT SERPL-MCNC: 1.2 MG/DL (ref 0.6–1.2)
EKG ATRIAL RATE: 94 BPM
EKG DIAGNOSIS: NORMAL
EKG P AXIS: 59 DEGREES
EKG P-R INTERVAL: 192 MS
EKG Q-T INTERVAL: 378 MS
EKG QRS DURATION: 98 MS
EKG QTC CALCULATION (BAZETT): 472 MS
EKG R AXIS: -23 DEGREES
EKG T AXIS: 38 DEGREES
EKG VENTRICULAR RATE: 94 BPM
EOSINOPHILS ABSOLUTE: 0 K/UL (ref 0–0.6)
EOSINOPHILS RELATIVE PERCENT: 0 %
GFR AFRICAN AMERICAN: 54
GFR AFRICAN AMERICAN: 54
GFR NON-AFRICAN AMERICAN: 45
GFR NON-AFRICAN AMERICAN: 45
GLUCOSE BLD-MCNC: 112 MG/DL (ref 70–99)
GLUCOSE BLD-MCNC: 133 MG/DL (ref 70–99)
GLUCOSE BLD-MCNC: 91 MG/DL (ref 70–99)
HCT VFR BLD CALC: 33.9 % (ref 36–48)
HEMOGLOBIN: 11 G/DL (ref 12–16)
LACTIC ACID, SEPSIS: 1.2 MMOL/L (ref 0.4–1.9)
LYMPHOCYTES ABSOLUTE: 0.2 K/UL (ref 1–5.1)
LYMPHOCYTES RELATIVE PERCENT: 1.5 %
MCH RBC QN AUTO: 30 PG (ref 26–34)
MCHC RBC AUTO-ENTMCNC: 32.3 G/DL (ref 31–36)
MCV RBC AUTO: 92.8 FL (ref 80–100)
MONOCYTES ABSOLUTE: 1.1 K/UL (ref 0–1.3)
MONOCYTES RELATIVE PERCENT: 7.6 %
NEUTROPHILS ABSOLUTE: 12.8 K/UL (ref 1.7–7.7)
NEUTROPHILS RELATIVE PERCENT: 90.7 %
PDW BLD-RTO: 14.9 % (ref 12.4–15.4)
PERFORMED ON: NORMAL
PHOSPHORUS: 3.7 MG/DL (ref 2.5–4.9)
PLATELET # BLD: 193 K/UL (ref 135–450)
PMV BLD AUTO: 8.9 FL (ref 5–10.5)
POTASSIUM SERPL-SCNC: 2.9 MMOL/L (ref 3.5–5.1)
POTASSIUM SERPL-SCNC: 3.7 MMOL/L (ref 3.5–5.1)
RBC # BLD: 3.65 M/UL (ref 4–5.2)
REPORT: NORMAL
SODIUM BLD-SCNC: 129 MMOL/L (ref 136–145)
SODIUM BLD-SCNC: 132 MMOL/L (ref 136–145)
TSH REFLEX: 0.28 UIU/ML (ref 0.27–4.2)
WBC # BLD: 14.1 K/UL (ref 4–11)

## 2021-07-09 PROCEDURE — 80069 RENAL FUNCTION PANEL: CPT

## 2021-07-09 PROCEDURE — 84443 ASSAY THYROID STIM HORMONE: CPT

## 2021-07-09 PROCEDURE — 97530 THERAPEUTIC ACTIVITIES: CPT

## 2021-07-09 PROCEDURE — 97116 GAIT TRAINING THERAPY: CPT

## 2021-07-09 PROCEDURE — 97535 SELF CARE MNGMENT TRAINING: CPT

## 2021-07-09 PROCEDURE — 85025 COMPLETE CBC W/AUTO DIFF WBC: CPT

## 2021-07-09 PROCEDURE — 6370000000 HC RX 637 (ALT 250 FOR IP): Performed by: INTERNAL MEDICINE

## 2021-07-09 PROCEDURE — 6360000002 HC RX W HCPCS: Performed by: INTERNAL MEDICINE

## 2021-07-09 PROCEDURE — 87086 URINE CULTURE/COLONY COUNT: CPT

## 2021-07-09 PROCEDURE — 6360000002 HC RX W HCPCS: Performed by: PHYSICIAN ASSISTANT

## 2021-07-09 PROCEDURE — 36415 COLL VENOUS BLD VENIPUNCTURE: CPT

## 2021-07-09 PROCEDURE — 83605 ASSAY OF LACTIC ACID: CPT

## 2021-07-09 PROCEDURE — 93010 ELECTROCARDIOGRAM REPORT: CPT | Performed by: INTERNAL MEDICINE

## 2021-07-09 PROCEDURE — 97161 PT EVAL LOW COMPLEX 20 MIN: CPT

## 2021-07-09 PROCEDURE — 2580000003 HC RX 258: Performed by: NURSE PRACTITIONER

## 2021-07-09 PROCEDURE — 2580000003 HC RX 258: Performed by: INTERNAL MEDICINE

## 2021-07-09 PROCEDURE — 97165 OT EVAL LOW COMPLEX 30 MIN: CPT

## 2021-07-09 PROCEDURE — 2580000003 HC RX 258: Performed by: PHYSICIAN ASSISTANT

## 2021-07-09 PROCEDURE — 1200000000 HC SEMI PRIVATE

## 2021-07-09 RX ORDER — SODIUM CHLORIDE 0.9 % (FLUSH) 0.9 %
5-40 SYRINGE (ML) INJECTION EVERY 12 HOURS SCHEDULED
Status: DISCONTINUED | OUTPATIENT
Start: 2021-07-09 | End: 2021-07-16 | Stop reason: HOSPADM

## 2021-07-09 RX ORDER — GABAPENTIN 300 MG/1
300 CAPSULE ORAL 2 TIMES DAILY
Status: DISCONTINUED | OUTPATIENT
Start: 2021-07-09 | End: 2021-07-16 | Stop reason: HOSPADM

## 2021-07-09 RX ORDER — ALPRAZOLAM 0.5 MG/1
1 TABLET ORAL 2 TIMES DAILY PRN
Status: DISCONTINUED | OUTPATIENT
Start: 2021-07-09 | End: 2021-07-09

## 2021-07-09 RX ORDER — POLYETHYLENE GLYCOL 3350 17 G/17G
17 POWDER, FOR SOLUTION ORAL DAILY PRN
Status: DISCONTINUED | OUTPATIENT
Start: 2021-07-09 | End: 2021-07-16 | Stop reason: HOSPADM

## 2021-07-09 RX ORDER — ONDANSETRON 4 MG/1
4 TABLET, ORALLY DISINTEGRATING ORAL EVERY 8 HOURS PRN
Status: DISCONTINUED | OUTPATIENT
Start: 2021-07-09 | End: 2021-07-16 | Stop reason: HOSPADM

## 2021-07-09 RX ORDER — LEVOTHYROXINE SODIUM 0.15 MG/1
150 TABLET ORAL
Status: DISCONTINUED | OUTPATIENT
Start: 2021-07-09 | End: 2021-07-16 | Stop reason: HOSPADM

## 2021-07-09 RX ORDER — LORAZEPAM 2 MG/ML
1 INJECTION INTRAMUSCULAR EVERY 6 HOURS PRN
Status: DISCONTINUED | OUTPATIENT
Start: 2021-07-09 | End: 2021-07-16 | Stop reason: HOSPADM

## 2021-07-09 RX ORDER — PANTOPRAZOLE SODIUM 40 MG/1
40 TABLET, DELAYED RELEASE ORAL
Status: DISCONTINUED | OUTPATIENT
Start: 2021-07-09 | End: 2021-07-16 | Stop reason: HOSPADM

## 2021-07-09 RX ORDER — POTASSIUM CHLORIDE 20 MEQ/1
20 TABLET, EXTENDED RELEASE ORAL
Status: COMPLETED | OUTPATIENT
Start: 2021-07-09 | End: 2021-07-10

## 2021-07-09 RX ORDER — SODIUM CHLORIDE 9 MG/ML
INJECTION, SOLUTION INTRAVENOUS CONTINUOUS
Status: DISCONTINUED | OUTPATIENT
Start: 2021-07-09 | End: 2021-07-09

## 2021-07-09 RX ORDER — ASPIRIN 81 MG/1
81 TABLET, CHEWABLE ORAL DAILY
Status: DISCONTINUED | OUTPATIENT
Start: 2021-07-09 | End: 2021-07-16 | Stop reason: HOSPADM

## 2021-07-09 RX ORDER — LANOLIN ALCOHOL/MO/W.PET/CERES
5 CREAM (GRAM) TOPICAL NIGHTLY PRN
Status: DISCONTINUED | OUTPATIENT
Start: 2021-07-09 | End: 2021-07-16 | Stop reason: HOSPADM

## 2021-07-09 RX ORDER — SENNA AND DOCUSATE SODIUM 50; 8.6 MG/1; MG/1
2 TABLET, FILM COATED ORAL 2 TIMES DAILY
Status: DISCONTINUED | OUTPATIENT
Start: 2021-07-09 | End: 2021-07-09

## 2021-07-09 RX ORDER — SODIUM CHLORIDE 9 MG/ML
INJECTION, SOLUTION INTRAVENOUS CONTINUOUS
Status: ACTIVE | OUTPATIENT
Start: 2021-07-09 | End: 2021-07-11

## 2021-07-09 RX ORDER — 0.9 % SODIUM CHLORIDE 0.9 %
1000 INTRAVENOUS SOLUTION INTRAVENOUS ONCE
Status: COMPLETED | OUTPATIENT
Start: 2021-07-09 | End: 2021-07-09

## 2021-07-09 RX ORDER — TRAZODONE HYDROCHLORIDE 50 MG/1
150 TABLET ORAL NIGHTLY
Status: DISCONTINUED | OUTPATIENT
Start: 2021-07-09 | End: 2021-07-16 | Stop reason: HOSPADM

## 2021-07-09 RX ORDER — BUPROPION HYDROCHLORIDE 150 MG/1
150 TABLET ORAL EVERY MORNING
Status: DISCONTINUED | OUTPATIENT
Start: 2021-07-09 | End: 2021-07-16 | Stop reason: HOSPADM

## 2021-07-09 RX ORDER — ACETAMINOPHEN 650 MG/1
650 SUPPOSITORY RECTAL EVERY 6 HOURS PRN
Status: DISCONTINUED | OUTPATIENT
Start: 2021-07-09 | End: 2021-07-16 | Stop reason: HOSPADM

## 2021-07-09 RX ORDER — ONDANSETRON 2 MG/ML
4 INJECTION INTRAMUSCULAR; INTRAVENOUS EVERY 6 HOURS PRN
Status: DISCONTINUED | OUTPATIENT
Start: 2021-07-09 | End: 2021-07-16 | Stop reason: HOSPADM

## 2021-07-09 RX ORDER — SODIUM CHLORIDE 9 MG/ML
25 INJECTION, SOLUTION INTRAVENOUS PRN
Status: DISCONTINUED | OUTPATIENT
Start: 2021-07-09 | End: 2021-07-16 | Stop reason: HOSPADM

## 2021-07-09 RX ORDER — SENNA AND DOCUSATE SODIUM 50; 8.6 MG/1; MG/1
2 TABLET, FILM COATED ORAL DAILY
Status: DISCONTINUED | OUTPATIENT
Start: 2021-07-09 | End: 2021-07-16 | Stop reason: HOSPADM

## 2021-07-09 RX ORDER — POTASSIUM CHLORIDE 7.45 MG/ML
10 INJECTION INTRAVENOUS
Status: COMPLETED | OUTPATIENT
Start: 2021-07-09 | End: 2021-07-09

## 2021-07-09 RX ORDER — LEVOTHYROXINE SODIUM 0.15 MG/1
1 TABLET ORAL DAILY
Status: DISCONTINUED | OUTPATIENT
Start: 2021-07-09 | End: 2021-07-09 | Stop reason: SDUPTHER

## 2021-07-09 RX ORDER — ACETAMINOPHEN 325 MG/1
650 TABLET ORAL EVERY 6 HOURS PRN
Status: DISCONTINUED | OUTPATIENT
Start: 2021-07-09 | End: 2021-07-16 | Stop reason: HOSPADM

## 2021-07-09 RX ORDER — SODIUM CHLORIDE 0.9 % (FLUSH) 0.9 %
5-40 SYRINGE (ML) INJECTION PRN
Status: DISCONTINUED | OUTPATIENT
Start: 2021-07-09 | End: 2021-07-16 | Stop reason: HOSPADM

## 2021-07-09 RX ADMIN — ACETAMINOPHEN 650 MG: 325 TABLET ORAL at 03:15

## 2021-07-09 RX ADMIN — ACETAMINOPHEN 650 MG: 325 TABLET ORAL at 22:11

## 2021-07-09 RX ADMIN — BUPROPION HYDROCHLORIDE 150 MG: 150 TABLET, EXTENDED RELEASE ORAL at 09:38

## 2021-07-09 RX ADMIN — STANDARDIZED SENNA CONCENTRATE AND DOCUSATE SODIUM 2 TABLET: 8.6; 5 TABLET ORAL at 09:38

## 2021-07-09 RX ADMIN — LORAZEPAM 1 MG: 2 INJECTION INTRAMUSCULAR; INTRAVENOUS at 15:37

## 2021-07-09 RX ADMIN — Medication 1000 MG: at 00:08

## 2021-07-09 RX ADMIN — PANTOPRAZOLE SODIUM 40 MG: 40 TABLET, DELAYED RELEASE ORAL at 06:25

## 2021-07-09 RX ADMIN — SODIUM CHLORIDE 1000 ML: 9 INJECTION, SOLUTION INTRAVENOUS at 13:02

## 2021-07-09 RX ADMIN — TRAZODONE HYDROCHLORIDE 150 MG: 50 TABLET ORAL at 22:11

## 2021-07-09 RX ADMIN — Medication 10 ML: at 09:38

## 2021-07-09 RX ADMIN — POTASSIUM CHLORIDE 10 MEQ: 7.46 INJECTION, SOLUTION INTRAVENOUS at 07:37

## 2021-07-09 RX ADMIN — POTASSIUM CHLORIDE 20 MEQ: 20 TABLET, EXTENDED RELEASE ORAL at 09:38

## 2021-07-09 RX ADMIN — POTASSIUM CHLORIDE 10 MEQ: 7.46 INJECTION, SOLUTION INTRAVENOUS at 06:29

## 2021-07-09 RX ADMIN — Medication 1000 MG: at 13:03

## 2021-07-09 RX ADMIN — SODIUM CHLORIDE: 9 INJECTION, SOLUTION INTRAVENOUS at 04:19

## 2021-07-09 RX ADMIN — GABAPENTIN 300 MG: 300 CAPSULE ORAL at 09:38

## 2021-07-09 RX ADMIN — SODIUM CHLORIDE 1000 ML: 9 INJECTION, SOLUTION INTRAVENOUS at 00:19

## 2021-07-09 RX ADMIN — ACETAMINOPHEN 650 MG: 325 TABLET ORAL at 15:38

## 2021-07-09 RX ADMIN — LORAZEPAM 1 MG: 2 INJECTION INTRAMUSCULAR; INTRAVENOUS at 01:37

## 2021-07-09 RX ADMIN — Medication 10 ML: at 22:11

## 2021-07-09 RX ADMIN — TRAZODONE HYDROCHLORIDE 150 MG: 50 TABLET ORAL at 03:14

## 2021-07-09 RX ADMIN — SODIUM CHLORIDE: 9 INJECTION, SOLUTION INTRAVENOUS at 09:42

## 2021-07-09 RX ADMIN — GABAPENTIN 300 MG: 300 CAPSULE ORAL at 22:11

## 2021-07-09 RX ADMIN — ASPIRIN 81 MG: 81 TABLET, CHEWABLE ORAL at 09:38

## 2021-07-09 RX ADMIN — LEVOTHYROXINE SODIUM 150 MCG: 0.15 TABLET ORAL at 06:25

## 2021-07-09 RX ADMIN — ENOXAPARIN SODIUM 40 MG: 40 INJECTION SUBCUTANEOUS at 09:38

## 2021-07-09 ASSESSMENT — PAIN SCALES - GENERAL
PAINLEVEL_OUTOF10: 0
PAINLEVEL_OUTOF10: 1
PAINLEVEL_OUTOF10: 0
PAINLEVEL_OUTOF10: 7
PAINLEVEL_OUTOF10: 0

## 2021-07-09 NOTE — H&P
STEROID INJECTION WITH FLUOROSCOPY performed by Josh Kc MD at [facility]    [facility] INJECT OTHER PERPHRL NERV Right 10/21/2019    RIGHT PIRIFORMIS INJECTION WITH FLUOROSCOPY (85604) performed by Josh Kc MD at [facility] 399, TOTAL ABDOMINAL      ovaries out also   [address] Right 1/7/2019    RIGHT L3 AND L4 LUMBAR TRANSFORAMINAL WITH FLUOROSCOPY performed by Josh Kc MD at [address] [place] [address] Right 5/15/2019    RIGHT L4 AND L5 TRANSFORAMINAL EPIDURAL STEROID INJECTION WITH FLUOROSCOPY performed by Josh Kc MD at [place] [address] ESOPHAGOGASTRODUODENOSCOPY TRANSORAL DIAGNOSTIC N/A 10/10/2018    EGD performed by Lenora Christianson MD at [address]    Fibroids    TOTAL KNEE ARTHROPLASTY Left 6/1/2021    LEFT TOTAL KNEE ARTHROPLASTY performed by Syl Villalpando MD at [address]       Medications Prior to Admission:      Prior to Admission medications    Medication Sig Start Date End Date Taking?  Authorizing Provider   levothyroxine (SYNTHROID) 150 MCG tablet TAKE 1 TABLET BY MOUTH EVERY DAY 6/25/21  Yes Alexandra Gonzales MD   ALPRAZolam Carlynn Remak) 1 MG tablet TAKE 1 TABLET BY MOUTH TWICE DAILY AS NEEDED FOR ANXIETY 6/25/21 9/25/21 Yes Alexandra Gonzales MD   gabapentin (NEURONTIN) 300 MG capsule TAKE 1 CAPSULE BY MOUTH TWICE DAILY 6/24/21 12/24/21 Yes Alexandra Gonzales MD   senna (SENOKOT) 8.6 MG tablet Take 1 tablet by mouth daily    Yes Historical Provider, MD   polyethylene glycol (GLYCOLAX) 17 GM/SCOOP powder Take 17 g by mouth 2 times daily 6/10/21 7/10/21 Yes Kyleigh Patel MD   aspirin 325 MG EC tablet Take 1 tablet by mouth daily 6/1/21 7/16/21 Yes Nessa Raya DO   buPROPion (WELLBUTRIN XL) 150 MG extended release tablet Take 1 tablet by mouth every morning 4/23/21  Yes Alexandra Gonzales MD   traZODone (DESYREL) 50 MG tablet TAKE 3 TABLETS BY MOUTH EVERY NIGHT AT BEDTIME 2/26/21  Yes Carley Soto MD   Multiple Vitamin (MULTIVITAMIN PO) Take by mouth   Yes Historical Provider, MD   omeprazole (PRILOSEC) 40 MG delayed release capsule TAKE 1 CAPSULE BY MOUTH EVERY MORNING BEFORE BREAKFAST 9/1/20  Yes Carley Soto MD   melatonin 5 MG TABS tablet Take 5 mg by mouth daily   Yes Historical Provider, MD   Cyanocobalamin (VITAMIN B 12 PO) Take 500 mcg by mouth daily    Yes Historical Provider, MD   Cholecalciferol (VITAMIN D) 1000 UNIT CAPS Take 2 capsules by mouth daily    Yes Historical Provider, MD       Allergies:  Macrobid [nitrofurantoin] and Codeine    Social History:      The patient currently lives at home    TOBACCO:   reports that she has never smoked. She has never used smokeless tobacco.  ETOH:   reports no history of alcohol use. E-Cigarettes/Vaping Use     Questions Responses    E-Cigarette/Vaping Use Never User    Start Date     Passive Exposure     Quit Date     Counseling Given     Comments             Family History:      Reviewed in detail and negative for DM, CAD, Cancer, CVA. Positive as follows:        Problem Relation Age of Onset    Diabetes Father         Type II    Diabetes Sister         Type II    Diabetes Brother         Type II    Breast Cancer Sister 58    Lung Cancer Mother         Smoker    Heart Disease Sister         Bicuspid aortic valve x2       REVIEW OF SYSTEMS COMPLETED:   Pertinent positives as noted in the HPI. All other systems reviewed and negative. PHYSICAL EXAM PERFORMED:    /75   Pulse 95   Temp 97.9 °F (36.6 °C)   Resp 18   Ht 5' 2.5\" (1.588 m)   Wt 170 lb (77.1 kg)   SpO2 95%   BMI 30.60 kg/m²     General appearance:  No apparent distress, appears stated age and cooperative. HEENT:  Normal cephalic, atraumatic without obvious deformity. Pupils equal, round, and reactive to light. Extra ocular muscles intact. Conjunctivae/corneas clear. Neck: Supple, with full range of motion. No jugular venous distention.  Trachea midline. Respiratory:  Normal respiratory effort. Clear to auscultation, bilaterally without Rales/Wheezes/Rhonchi. Cardiovascular:  Regular rate and rhythm with normal S1/S2 without murmurs, rubs or gallops. Abdomen: Soft, non-tender, non-distended with normal bowel sounds. Musculoskeletal:  No clubbing, cyanosis or edema bilaterally. Full range of motion without deformity. Skin: Skin color, texture, turgor normal.  No rashes or lesions. Neurologic:  Neurovascularly intact without any focal sensory/motor deficits. Cranial nerves: II-XII intact, grossly non-focal.  Psychiatric:  Alert and oriented, thought content appropriate, normal insight  Capillary Refill: Brisk,3 seconds, normal  Peripheral Pulses: +2 palpable, equal bilaterally       Labs:     Recent Labs     07/08/21 2007   WBC 17.1*   HGB 11.5*   HCT 36.0        Recent Labs     07/08/21 2007   *   K 4.7   CL 94*   CO2 20*   BUN 23*   CREATININE 1.0   CALCIUM 8.3     Recent Labs     07/08/21 2007   AST 42*   ALT 10   BILITOT 0.4   ALKPHOS 95     No results for input(s): INR in the last 72 hours. Recent Labs     07/08/21 2007   TROPONINI <0.01       Urinalysis:      Lab Results   Component Value Date    NITRU Negative 07/08/2021    WBCUA >900 07/08/2021    BACTERIA 4+ 07/08/2021    RBCUA 14 07/08/2021    BLOODU MODERATE 07/08/2021    SPECGRAV 1.019 07/08/2021    GLUCOSEU Negative 07/08/2021       Radiology:     CT abdomen pelvis with IV contrast: I have reviewed the CT images with the following interpretation: Ascending urinary tract infection  EKG:  I have reviewed the EKG with the following interpretation: Normal sinus rhythm, no evidence of acute ischemia      ASSESSMENT:    1. Acute pyelonephritis  2. Hyponatremia - likely due to dehydration   3. Anxiety and agitation   4. Abdominal discomfort  5. Hypothyroidism  6. GERD  7. Anemia  8. Unintentional weight loss -20 pounds over the past 3 weeks    PLAN:    1.  Admit to MedSurg  2. Start IV ceftriaxone 1 g IV every twice daily pending urine culture  3. IV normal saline at 100 ml/hr to provide reliable hydration and monitor sodium level   4. IV Ativan as needed for anxiety and agitation  5. Consult dietitian to evaluate for lack of appetite and weight loss  6. Add laxatives and titrate as needed as she had significant constipation and fecal impaction in her most recent admission  7. Continue PTA meds        DVT Prophylaxis: Lovenox  Diet: ADULT DIET; Regular  Code Status: Full Code    PT/OT Eval Status: PT/OT consulted    Willie Lucas as inpatient       Mitch Montemayor MD    Thank you Andreina Vasquez MD for the opportunity to be involved in this patient's care. If you have any questions or concerns please feel free to contact me at [phone].

## 2021-07-09 NOTE — PLAN OF CARE
Problem: Falls - Risk of:  Goal: Will remain free from falls  Description: Will remain free from falls  7/9/2021 1803 by Mary Grace Rice, RN  Outcome: Ongoing  Note: Pt uses call light appropriately. Uses walker for transfers with staff assistance.       Problem: Pain:  Goal: Pain level will decrease  Description: Pain level will decrease  7/9/2021 1803 by Mary Grace Rice, RN  Outcome: Ongoing  Note: Pt c/o feeling \"achy\" which was relieved with PRN Tylenol

## 2021-07-09 NOTE — FLOWSHEET NOTE
Physical Therapy  Cancellation/No-show Note  Patient Name:  Denise Rios  :  1950   Date:  2021  Cancelled visits to date: 1  No-shows to date: 0    Patient status for today's appointment patient:  [x]  Cancelled   []  Rescheduled appointment  []  No-show     Reason given by patient:  []  Patient ill  []  Conflicting appointment  []  No transportation    []  Conflict with work  []  No reason given  [x]  Other:     Comments: In the hospital       Phone call information:   []  Phone call made today to patient at _ time at number provided:      []  Patient answered, conversation as follows:    []  Patient did not answer, message left as follows:  []  Phone call not made today  [x]  Phone call not needed - pt contacted us to cancel and provided reason for cancellation.      Electronically signed by:  Herminia Ruano PT

## 2021-07-09 NOTE — PROGRESS NOTES
bso (cervix removed) (1996); Appendectomy (Age 27); Cataract removal with implant (Right, 10/23/2017); Cataract removal with implant (Left, 02/26/2018); pr esophagogastroduodenoscopy transoral diagnostic (N/A, 10/10/2018); Colonoscopy (N/A, 10/10/2018); Lumbar spine surgery (Right, 1/7/2019); Hysterectomy, total abdominal; Lumbar spine surgery (Right, 5/15/2019); epidural steroid injection (Right, 8/26/2019); hc inject other perphrl nerv (Right, 10/21/2019); and Total knee arthroplasty (Left, 6/1/2021). Restrictions  Restrictions/Precautions  Restrictions/Precautions: Weight Bearing, Fall Risk  Required Braces or Orthoses?: No  Lower Extremity Weight Bearing Restrictions  Left Lower Extremity Weight Bearing: Weight Bearing As Tolerated  Position Activity Restriction  Spinal Precautions: Left TKA on 6/1/21- going to outpatient PT at [facility]  Other position/activity restrictions: Earle Rodriguez is a 78 y.o. female who presents for evaluation of generalized malaise and chills for the past several days. States that yesterday had single episode of emesis and has intermittent abdominal cramping. States that she feels bloated. She denies any diarrhea or urinary complaints. No documented fevers. She denies any chest pain or shortness of breath. No history of similar symptoms. She has history of hernia repair but no other abdominal surgeries. Additionally, she also reports a 20 pound unintentional weight loss over the past 3 weeks. States that she contacted her PCP regarding symptoms they recommended her come to the ED. She has no other complaints or concerns at this time.   Vision/Hearing  Vision: Impaired  Vision Exceptions: Wears glasses for reading  Hearing: Within functional limits     Subjective  General  Chart Reviewed: Yes  Family / Caregiver Present: No  Diagnosis: Acute Pyelonephritis  Follows Commands: Within Functional Limits  General Comment  Comments: Pt supine in bed upon arrival; agreeable to PT/OT  Subjective  Subjective: Pt denies pain  Pain Screening  Patient Currently in Pain: Denies          Orientation  Orientation  Overall Orientation Status: Within Functional Limits  Social/Functional History  Social/Functional History  Lives With: Daughter  Type of Home: House  Home Layout: Two level, Performs ADL's on one level, Able to Live on Main level with bedroom/bathroom ([address] [facility] [facility], bedroom and bathroom on 1st floor)  Home Access: Stairs to enter without rails  Entrance Stairs - Number of Steps: 2 steps to enter, no handrail  Bathroom Shower/Tub: Walk-in shower, Shower chair with back  [employer]: Standard  Bathroom Equipment: Hand-held shower, Grab bars in shower  Home Equipment: Rolling walker, Cane, Crutches  ADL Assistance: Independent  Homemaking Assistance: Independent  Homemaking Responsibilities: Yes  Meal Prep Responsibility: Secondary  Laundry Responsibility: Secondary  Cleaning Responsibility: Secondary  Ambulation Assistance: Independent  Transfer Assistance: Independent  Active : Yes  Occupation: Retired  Type of occupation: Retired from [employer]. Leisure & Hobbies: Reading  IADL Comments: Lives with daughter, daughter works full time. Daughter does primary homemaking, patient goes to grocery store and uses electric scooter  Additional Comments: No recent falls, uses RW.   Cognition        Objective          AROM RLE (degrees)  RLE AROM: WFL  PROM LLE (degrees)  LLE PROM: WFL  AROM LLE (degrees)  LLE AROM : WFL  LLE General AROM: mild delay with knee extension, and slight lack of extension due to recent L TKA; lacking ~5-10 deg  Strength RLE  Comment: hip grossly -4/5, knee and ankle wfls  Strength LLE  Comment: hip grossly -4/5, knee 4/5, ankle wfls  Tone RLE  RLE Tone: Normotonic  Tone LLE  LLE Tone: Normotonic  Sensation  Overall Sensation Status: WNL  Bed mobility  Supine to Sit: Independent  Sit to Supine: Independent  Scooting: Independent  Transfers  Sit to Stand: Modified independent  Stand to sit: Modified independent  Ambulation  Ambulation?: Yes  Ambulation 1  Assistance: Modified Independent  Quality of Gait: Pt amb with fluid, step through pattern; no LOB, no buckling of LLE  Gait Deviations: Slow Mary Anne  Distance: 200 ft  Stairs/Curb  Stairs?: No     Balance  Posture: Good  Sitting - Static: Good  Sitting - Dynamic: Good  Standing - Static: Good  Standing - Dynamic: Good (with RW)  Exercises  Straight Leg Raise: 10x independent, with min ext lag  Quad Sets: 5x,10 sec with overpressure for extension supine in bed  Gluteal Sets: pt instructed in GS     Plan   Safety Devices  Type of devices:  All fall risk precautions in place, Call light within reach, Bed alarm in place, Gait belt, Patient at risk for falls, Left in bed, Nurse notified    G-Code       OutComes Score                                                  AM-PAC Score  AM-PAC Inpatient Mobility Raw Score : 24 (07/09/21 1122)  AM-PAC Inpatient T-Scale Score : 61.14 (07/09/21 1122)  Mobility Inpatient CMS 0-100% Score: 0 (07/09/21 1122)  Mobility Inpatient CMS G-Code Modifier : [facility] (07/09/21 1122)          Goals          Therapy Time   Individual Concurrent Group Co-treatment   Time In 1014         Time Out 1057         Minutes 43         Timed Code Treatment Minutes: [address], [address] [facility], [address] [place] [address]

## 2021-07-09 NOTE — PROGRESS NOTES
Occupational Therapy   Occupational Therapy Initial Assessment and Discharge Summary   Date: 2021   Patient Name: Mono Valdez  MRN: [id number]     : 1950    Date of Service: 2021    Discharge Recommendations: Mono Valdez scored a 23/24 on the AM-PAC ADL Inpatient form. At this time, no further OT is recommended upon discharge due to indep and at baseline for ADLs. Recommend patient returns to prior setting with prior services. OT Equipment Recommendations  Equipment Needed: No    Assessment   Assessment: Patient admitted for acute pyelonephritis. Patient had recent left TKA as well. Patient at baseline function with ADLs (indep with RW, indep ADLs)  Treatment Diagnosis: Debility due to acute pyelonephritis  Prognosis: Good  Decision Making: Low Complexity  History: recent left TKA, recent 20# weight loss, lives with daughter, indep ADLs, uses RW  Exam: UE AROM WNL, reports indep toileting, indep grooming, indep socks  Assistance / Modification: Stable presentation, RW, monitored BP (tends to run low)  OT Education: OT Role;Plan of Care  Patient Education: Patient verbalized understanding  No Skilled OT: Independent with functional mobility; Independent with ADL's;At baseline function; Safe to return home  REQUIRES OT FOLLOW UP: No  Activity Tolerance  Activity Tolerance: Patient Tolerated treatment well  Safety Devices  Safety Devices in place: Yes  Type of devices: All fall risk precautions in place;Nurse notified;Call light within reach; Left in bed;Bed alarm in place  Restraints  Initially in place: No           Patient Diagnosis(es): The encounter diagnosis was Pyelonephritis. has a past medical history of Anal fissure, Anxiety, Colon polyps, Depression, Fibroid uterus, Hypothyroidism, Morbid obesity ([address] and Osteopenia. has a past surgical history that includes Gastric bypass surgery (); Abdominal hernia repair (); jasmyne and bso (cervix removed) ();  Appendectomy (Age 27); Cataract removal with implant (Right, 10/23/2017); Cataract removal with implant (Left, 02/26/2018); pr esophagogastroduodenoscopy transoral diagnostic (N/A, 10/10/2018); Colonoscopy (N/A, 10/10/2018); Lumbar spine surgery (Right, 1/7/2019); Hysterectomy, total abdominal; Lumbar spine surgery (Right, 5/15/2019); epidural steroid injection (Right, 8/26/2019); hc inject other perphrl nerv (Right, 10/21/2019); and Total knee arthroplasty (Left, 6/1/2021). Treatment Diagnosis: Debility due to acute pyelonephritis      Restrictions  Restrictions/Precautions  Restrictions/Precautions: Weight Bearing, Fall Risk  Required Braces or Orthoses?: No  Lower Extremity Weight Bearing Restrictions  Left Lower Extremity Weight Bearing: Weight Bearing As Tolerated  Position Activity Restriction  Spinal Precautions: Left TKA on 6/1/21- going to outpatient PT at [facility]  Other position/activity restrictions: Sean Carter is a 78 y.o. female who presents for evaluation of generalized malaise and chills for the past several days. States that yesterday had single episode of emesis and has intermittent abdominal cramping. States that she feels bloated. She denies any diarrhea or urinary complaints. No documented fevers. She denies any chest pain or shortness of breath. No history of similar symptoms. She has history of hernia repair but no other abdominal surgeries. Additionally, she also reports a 20 pound unintentional weight loss over the past 3 weeks. States that she contacted her PCP regarding symptoms they recommended her come to the ED. She has no other complaints or concerns at this time. Subjective   General  Chart Reviewed: Yes  Family / Caregiver Present: No  Subjective  Subjective: Patient supine in bed, pleasant and cooperative. General Comment  Comments: Blood  pressure-- EOB 95/59 HR 82.  Standing 95/61 HR 97. Patient reporting no symptoms    Social/Functional History  Social/Functional History  Lives With: Daughter  Type of Home: House  Home Layout: Two level, Performs ADL's on one level, Able to Live on Main level with bedroom/bathroom ([address], bedroom and bathroom on 1st floor)  Home Access: Stairs to enter without rails  Entrance Stairs - Number of Steps: 2 steps to enter, no handrail  Bathroom Shower/Tub: Walk-in shower, Shower chair with back  [employer]: Standard  Bathroom Equipment: Hand-held shower, Grab bars in [address]: Rolling walker, Cane, Crutches  ADL Assistance: Independent  Homemaking Assistance: Independent  Homemaking Responsibilities: Yes  Meal Prep Responsibility: Secondary  Laundry Responsibility: Secondary  Cleaning Responsibility: Secondary  Ambulation Assistance: Independent  Transfer Assistance: Independent  Active : Yes  Occupation: Retired  Type of occupation: Retired from [employer]. Leisure & Hobbies: Reading  IADL Comments: Lives with daughter, daughter works full time. Daughter does primary homemaking, patient goes to grocery store and uses electric scooter  Additional Comments: No recent falls, uses RW. Objective   Vision: Impaired  Vision Exceptions: Wears glasses for reading  Hearing: Within functional limits    Orientation  Overall Orientation Status: Within Normal Limits     Balance  Sitting Balance: Independent  Standing Balance: Modified independent   Standing Balance  Time: @ 10 minutes  Activity: Grooming at sink, up and down hallway x 200 feet with RW  Comment: Modified indep, 1 cue needed to stand fully upright  Toilet Transfers  Toilet Transfers Comments: Patient reports performed indep this AM  ADL  Feeding: Independent  Grooming: Independent  LE Dressing: Independent;Setup (setup)  Toileting: Independent  Additional Comments: Declined ADLs, stated already performed this AM. Stated indep with toilet transfers.  Patient indep socks don and doff  Tone RUE  RUE Tone: Normotonic  Tone LUE  LUE Tone: Normotonic  Coordination  Movements Are Fluid And Coordinated: Yes     Bed mobility  Supine to Sit: Independent  Sit to Supine: Independent  Scooting: Independent  Transfers  Sit to stand: Independent  Stand to sit: Independent  Vision - Basic Assessment  Prior Vision: Wears glasses only for reading  Patient Visual Report: No visual complaint reported.   Cognition  Overall Cognitive Status: WNL  Perception  Overall Perceptual Status: WFL     Sensation  Overall Sensation Status: WNL        LUE AROM (degrees)  LUE AROM : WNL  Left Hand AROM (degrees)  Left Hand AROM: WNL  RUE AROM (degrees)  RUE AROM : WNL  Right Hand AROM (degrees)  Right Hand AROM: WNL  LUE Strength  Gross LUE Strength: WFL  RUE Strength  Gross RUE Strength: WFL                   Plan   Plan  Times per week: no acute care goals indicated, patient indep and at baseline    G-Code     OutComes Score                                                  AM-PAC Score        AM-PAC Inpatient Daily Activity Raw Score: 23 (07/09/21 1107)  AM-PAC Inpatient ADL T-Scale Score : 51.12 (07/09/21 1107)  ADL Inpatient CMS 0-100% Score: 15.86 (07/09/21 1107)  ADL Inpatient CMS G-Code Modifier : CI (07/09/21 1107)    Goals  Short term goals  Time Frame for Short term goals: No acute care goals indicated, patient indep with ADLs  Patient Goals   Patient goals : Go home hopefully today or tomorrow       Therapy Time   Individual Concurrent Group Co-treatment   Time In 1015         Time Out 1057         Minutes 42              Timed Code Treatment Minutes:  27      Total Treatment Minutes:  [facility], OTR/L [address]

## 2021-07-09 NOTE — PROGRESS NOTES
[facility]    7/9/2021 7:47 AM        Name: Rio Ramírez . Admitted: 7/8/2021  Primary Care Provider: Nelda Hastings MD (Tel: [phone])      Subjective: Viviana Murrayodore     Pt admitted this am with pyelonephritis   Reports sx of UTI for the past week  + dysuria, frequency and urgency  T max 101     No current reports of pain or nausea  Ate 50 % of breakfast    Low K noted   Recent LTK at [facility] on 6/1/21    Reviewed interval ancillary notes    Current Medications  sodium chloride flush 0.9 % injection 5-40 mL, 2 times per day  sodium chloride flush 0.9 % injection 5-40 mL, PRN  0.9 % sodium chloride infusion, PRN  enoxaparin (LOVENOX) injection 40 mg, Daily  ondansetron (ZOFRAN-ODT) disintegrating tablet 4 mg, Q8H PRN   Or  ondansetron (ZOFRAN) injection 4 mg, Q6H PRN  polyethylene glycol (GLYCOLAX) packet 17 g, Daily PRN  acetaminophen (TYLENOL) tablet 650 mg, Q6H PRN   Or  acetaminophen (TYLENOL) suppository 650 mg, Q6H PRN  cefTRIAXone (ROCEPHIN) 1000 mg in sterile water 10 mL IV syringe, Q12H  aspirin chewable tablet 81 mg, Daily  buPROPion (WELLBUTRIN XL) extended release tablet 150 mg, QAM  traZODone (DESYREL) tablet 150 mg, Nightly  gabapentin (NEURONTIN) capsule 300 mg, BID  melatonin tablet 4.5 mg, Nightly PRN  pantoprazole (PROTONIX) tablet 40 mg, QAM AC  0.9 % sodium chloride infusion, Continuous  LORazepam (ATIVAN) injection 1 mg, Q6H PRN  sennosides-docusate sodium (SENOKOT-S) 8.6-50 MG tablet 2 tablet, Daily  levothyroxine (SYNTHROID) tablet 150 mcg, QAM AC  potassium chloride (KLOR-CON M) extended release tablet 20 mEq, Daily with breakfast        Objective:  BP 99/62   Pulse 111   Temp 100.2 °F (37.9 °C) (Axillary)   Resp 18   Ht 5' 2.5\" (1.588 m)   Wt 175 lb 0.7 oz (79.4 kg)   SpO2 93%   BMI 31.51 kg/m²   No intake or output data in the 24 hours ending 07/09/21 0747   Wt Readings from Last 3 Encounters:   07/09/21 175 lb 0.7 oz (79.4 kg)   06/16/21 186 lb (84.4 kg)   06/09/21 186 lb (84.4 kg)       General appearance:  Appears comfortable  Eyes: Sclera clear. Pupils equal.  ENT: Moist oral mucosa. Trachea midline, no adenopathy. Cardiovascular: Regular rhythm, normal S1, S2. No murmur. No edema in lower extremities  Respiratory: Not using accessory muscles. Good inspiratory effort. Clear to auscultation bilaterally, no wheeze or crackles. GI: Abdomen soft, no tenderness, not distended, normal bowel sounds  Musculoskeletal: No cyanosis in digits, neck supple  Neurology: CN 2-12 grossly intact. No speech or motor deficits  Psych: Normal affect. Alert and oriented in time, place and person  Skin: Warm, dry, normal turgor,  Left TKR scar is well healed     Labs and Tests:  CBC:   Recent Labs     07/08/21 2007 07/09/21  0504   WBC 17.1* 14.1*   HGB 11.5* 11.0*    193     BMP:    Recent Labs     07/08/21 2007 07/09/21  0504   * 129*   K 4.7 2.9*   CL 94* 99   CO2 20* 20*   BUN 23* 25*   CREATININE 1.0 1.2   GLUCOSE 100* 133*     Hepatic:   Recent Labs     07/08/21 2007   AST 42*   ALT 10   BILITOT 0.4   ALKPHOS 95     CT abd:    Mild urothelial thickening of the ureters diffusely.  Recommend clinical   correlation for ascending urinary tract infection. 2. Unchanged subcutaneous fluid collection of the anterior abdominal wall at   midline measuring 9.7 cm.               Problem List  Principal Problem:    Acute pyelonephritis  Active Problems:    Anxiety    Hypothyroidism    GERD (gastroesophageal reflux disease)    Anemia    Hyponatremia  Resolved Problems:    * No resolved hospital problems. *       Assessment & Plan:   1. Hyponatremia:  Na is trending up however this appears to be an acute issue likely r/t dehydration. Recheck Na at noon and continue with IV hydration. If Na remains low then will add nephrology consult   2.  Pyelonephritis: currently on rocephin , will follow the cultures. 3. Sepsis :  Cultures are pending. She received 1 liter bolus in the ED, will given an additional 500 cc bolus now and monitor her bp closely   4. Hypothyroidism:  Continue home therapy. I added TSH  5. Hypokalemia:  On replacement therapy   6. Ambulate in room and johnson         Diet: ADULT DIET;  Regular  Code:Full Code  DVT PPX      OVIDIO Ness CNP   7/9/2021 7:47 AM

## 2021-07-09 NOTE — PROGRESS NOTES
Physician Progress Note      Jailene Conteh  CSN #:                  [id number]  :                       1950  ADMIT DATE:       2021 7:46 PM  [address] [place] DATE:  RESPONDING  PROVIDER #:        [facility] L HARMEET Florian CNP          QUERY TEXT:    Pt admitted with Acute Pyelonephritis. Pt noted to have Elevated WBC, Temp,   and HR. If possible, please document in the progress notes and discharge   summary if you are evaluating and /or treating any of the following: The medical record reflects the following:  Risk Factors: Acute Pyelonephritis  Clinical Indicators: Acute Pyelonephritis, WBC 17.1, Temp 101 F,   Treatment:  IV ceftriaxone, pending urine culture  Options provided:  -- Sepsis, present on admission  -- Sepsis, present on admission, now resolved  -- Sepsis, not present on admission  -- Acute Pyelonephritis without Sepsis  -- Sepsis was ruled out  -- Other - I will add my own diagnosis  -- Disagree - Not applicable / Not valid  -- Disagree - Clinically unable to determine / Unknown  -- Refer to Clinical Documentation Reviewer    PROVIDER RESPONSE TEXT:    This patient has sepsis which was present on admission.     Query created by: Malini Shultz on 2021 12:21 PM      Electronically signed by:  Denisse Florian CNP 2021 12:54   PM

## 2021-07-09 NOTE — PROGRESS NOTES
Message sent to hosp. Patient admitted for Acute Pyelonephritis. Lab just called to inform me of critical Potassium level of 2.9. Can I get orders for potassium replacement.  Thank you

## 2021-07-09 NOTE — PROGRESS NOTES
Comprehensive Nutrition Assessment    Type and Reason for Visit:  Initial, Consult    Nutrition Recommendations/Plan:   1. Continue current diet  2. Encourage po  3. RD ordered Ensure HP BID    Nutrition Assessment:  Pt has not been eating well for the past month. Pt was afraid of becoming constipated and was eating less. Pt has lost 11lbs in 1 month resulting in a -6% change in wt. Pt then started to feel sick a few days ago with n/v. Pt is starting to feel a little better and consumed 50% of breakfast this am. But pt is agreeable to trying a supplement. RD to order Ensure HP BID. Pt denies any other needs at this time. Malnutrition Assessment:  Malnutrition Status:  Severe malnutrition    Context:  Chronic Illness     Findings of the 6 clinical characteristics of malnutrition:  Energy Intake:  7 - 75% or less estimated energy requirements for 1 month or longer  Weight Loss:  7 - Greater than 5% over 1 month     Body Fat Loss:  Unable to assess (Pt wrapped up in blankets)     Muscle Mass Loss:  Unable to assess    Fluid Accumulation:  No significant fluid accumulation     Strength:  Not Performed    Estimated Daily Nutrient Needs:  Energy (kcal):  0393-6505 tari (15-18 cals/79kg); Weight Used for Energy Requirements:  Current     Protein (g):   gms (1.2-2.0 gms/51kg IBW); Weight Used for Protein Requirements:  Ideal        Fluid (ml/day):   ; Method Used for Fluid Requirements:  1 ml/kcal      Nutrition Related Findings:  Na 129L, K 2.9L (on klorcon), No edema. Wounds:  None       Current Nutrition Therapies:    ADULT DIET; Regular    Anthropometric Measures:  · Height: 5' 2.5\" (158.8 cm)  · Current Body Weight: 175 lb (79.4 kg)   · Ideal Body Weight: 113 lbs; % Ideal Body Weight     · BMI: 31.5  · BMI Categories: Obese Class 1 (BMI 30.0-34. 9)       Nutrition Diagnosis:   · Severe malnutrition related to inadequate protein-energy intake as evidenced by weight loss greater than or equal to 5% in 1 month, poor intake prior to admission      Nutrition Interventions:   Food and/or Nutrient Delivery:  Continue Current Diet, Start Oral Nutrition Supplement  Nutrition Education/Counseling:  Education not indicated   Coordination of Nutrition Care:  Continue to monitor while inpatient    Goals:  PO greater than 75% at meals and supp by f/u       Nutrition Monitoring and Evaluation:   Behavioral-Environmental Outcomes:  None Identified   Food/Nutrient Intake Outcomes:  Food and Nutrient Intake, Supplement Intake  Physical Signs/Symptoms Outcomes:  Weight     Discharge Planning:     Too soon to determine     Electronically signed by Jillian Gamez RD, [address] , LD on 7/9/21 at 12:43 PM EDT    Contact: [phone]

## 2021-07-10 LAB
ANION GAP SERPL CALCULATED.3IONS-SCNC: 10 MMOL/L (ref 3–16)
BASOPHILS ABSOLUTE: 0 K/UL (ref 0–0.2)
BASOPHILS RELATIVE PERCENT: 0.2 %
BUN BLDV-MCNC: 17 MG/DL (ref 7–20)
CALCIUM SERPL-MCNC: 7.9 MG/DL (ref 8.3–10.6)
CHLORIDE BLD-SCNC: 108 MMOL/L (ref 99–110)
CO2: 18 MMOL/L (ref 21–32)
CREAT SERPL-MCNC: 1 MG/DL (ref 0.6–1.2)
EOSINOPHILS ABSOLUTE: 0 K/UL (ref 0–0.6)
EOSINOPHILS RELATIVE PERCENT: 0.3 %
GFR AFRICAN AMERICAN: >60
GFR NON-AFRICAN AMERICAN: 55
GLUCOSE BLD-MCNC: 79 MG/DL (ref 70–99)
HCT VFR BLD CALC: 32.9 % (ref 36–48)
HEMOGLOBIN: 10.4 G/DL (ref 12–16)
LYMPHOCYTES ABSOLUTE: 0.7 K/UL (ref 1–5.1)
LYMPHOCYTES RELATIVE PERCENT: 5.6 %
MAGNESIUM: 1.9 MG/DL (ref 1.8–2.4)
MCH RBC QN AUTO: 30.2 PG (ref 26–34)
MCHC RBC AUTO-ENTMCNC: 31.8 G/DL (ref 31–36)
MCV RBC AUTO: 94.9 FL (ref 80–100)
MONOCYTES ABSOLUTE: 1.1 K/UL (ref 0–1.3)
MONOCYTES RELATIVE PERCENT: 8.9 %
NEUTROPHILS ABSOLUTE: 10.6 K/UL (ref 1.7–7.7)
NEUTROPHILS RELATIVE PERCENT: 85 %
ORGANISM: ABNORMAL
PDW BLD-RTO: 15.1 % (ref 12.4–15.4)
PLATELET # BLD: 185 K/UL (ref 135–450)
PMV BLD AUTO: 8.8 FL (ref 5–10.5)
POTASSIUM SERPL-SCNC: 4.2 MMOL/L (ref 3.5–5.1)
RBC # BLD: 3.46 M/UL (ref 4–5.2)
SODIUM BLD-SCNC: 136 MMOL/L (ref 136–145)
URINE CULTURE, ROUTINE: ABNORMAL
URINE CULTURE, ROUTINE: NORMAL
WBC # BLD: 12.5 K/UL (ref 4–11)

## 2021-07-10 PROCEDURE — 83735 ASSAY OF MAGNESIUM: CPT

## 2021-07-10 PROCEDURE — 6360000002 HC RX W HCPCS: Performed by: NURSE PRACTITIONER

## 2021-07-10 PROCEDURE — 36415 COLL VENOUS BLD VENIPUNCTURE: CPT

## 2021-07-10 PROCEDURE — 6370000000 HC RX 637 (ALT 250 FOR IP): Performed by: INTERNAL MEDICINE

## 2021-07-10 PROCEDURE — 1200000000 HC SEMI PRIVATE

## 2021-07-10 PROCEDURE — 87040 BLOOD CULTURE FOR BACTERIA: CPT

## 2021-07-10 PROCEDURE — 80048 BASIC METABOLIC PNL TOTAL CA: CPT

## 2021-07-10 PROCEDURE — 85025 COMPLETE CBC W/AUTO DIFF WBC: CPT

## 2021-07-10 PROCEDURE — 2580000003 HC RX 258: Performed by: INTERNAL MEDICINE

## 2021-07-10 PROCEDURE — 2580000003 HC RX 258: Performed by: NURSE PRACTITIONER

## 2021-07-10 PROCEDURE — 6360000002 HC RX W HCPCS: Performed by: INTERNAL MEDICINE

## 2021-07-10 RX ADMIN — CEFTRIAXONE SODIUM 2000 MG: 2 INJECTION, POWDER, FOR SOLUTION INTRAMUSCULAR; INTRAVENOUS at 01:12

## 2021-07-10 RX ADMIN — BUPROPION HYDROCHLORIDE 150 MG: 150 TABLET, EXTENDED RELEASE ORAL at 09:29

## 2021-07-10 RX ADMIN — SODIUM CHLORIDE: 9 INJECTION, SOLUTION INTRAVENOUS at 19:10

## 2021-07-10 RX ADMIN — ASPIRIN 81 MG: 81 TABLET, CHEWABLE ORAL at 09:28

## 2021-07-10 RX ADMIN — CEFTRIAXONE SODIUM 2000 MG: 2 INJECTION, POWDER, FOR SOLUTION INTRAMUSCULAR; INTRAVENOUS at 12:42

## 2021-07-10 RX ADMIN — GABAPENTIN 300 MG: 300 CAPSULE ORAL at 21:13

## 2021-07-10 RX ADMIN — POTASSIUM CHLORIDE 20 MEQ: 20 TABLET, EXTENDED RELEASE ORAL at 10:00

## 2021-07-10 RX ADMIN — PANTOPRAZOLE SODIUM 40 MG: 40 TABLET, DELAYED RELEASE ORAL at 05:23

## 2021-07-10 RX ADMIN — CEFTRIAXONE SODIUM 2000 MG: 2 INJECTION, POWDER, FOR SOLUTION INTRAMUSCULAR; INTRAVENOUS at 23:20

## 2021-07-10 RX ADMIN — LORAZEPAM 1 MG: 2 INJECTION INTRAMUSCULAR; INTRAVENOUS at 21:13

## 2021-07-10 RX ADMIN — STANDARDIZED SENNA CONCENTRATE AND DOCUSATE SODIUM 2 TABLET: 8.6; 5 TABLET ORAL at 09:27

## 2021-07-10 RX ADMIN — SODIUM CHLORIDE: 9 INJECTION, SOLUTION INTRAVENOUS at 08:51

## 2021-07-10 RX ADMIN — ACETAMINOPHEN 650 MG: 325 TABLET ORAL at 09:28

## 2021-07-10 RX ADMIN — TRAZODONE HYDROCHLORIDE 150 MG: 50 TABLET ORAL at 21:13

## 2021-07-10 RX ADMIN — GABAPENTIN 300 MG: 300 CAPSULE ORAL at 09:28

## 2021-07-10 RX ADMIN — ACETAMINOPHEN 650 MG: 325 TABLET ORAL at 23:17

## 2021-07-10 RX ADMIN — ACETAMINOPHEN 650 MG: 325 TABLET ORAL at 16:22

## 2021-07-10 RX ADMIN — LEVOTHYROXINE SODIUM 150 MCG: 0.15 TABLET ORAL at 05:23

## 2021-07-10 RX ADMIN — ENOXAPARIN SODIUM 40 MG: 40 INJECTION SUBCUTANEOUS at 09:29

## 2021-07-10 RX ADMIN — Medication 10 ML: at 21:00

## 2021-07-10 ASSESSMENT — PAIN DESCRIPTION - LOCATION: LOCATION: BACK

## 2021-07-10 ASSESSMENT — PAIN SCALES - GENERAL
PAINLEVEL_OUTOF10: 0
PAINLEVEL_OUTOF10: 3
PAINLEVEL_OUTOF10: 0
PAINLEVEL_OUTOF10: 3
PAINLEVEL_OUTOF10: 0

## 2021-07-10 ASSESSMENT — PAIN DESCRIPTION - ORIENTATION: ORIENTATION: LOWER;MID

## 2021-07-10 NOTE — PROGRESS NOTES
[facility] PROGRESS NOTE    7/10/2021 7:39 AM        Name: Rio Ramírez . Admitted: 7/8/2021  Primary Care Provider: Nelda Hastings MD (Tel: [phone])      Subjective: Viviana [place] Pt admitted UTI  /  E coli bacteremia    T max 101.1 this am    Seen while up in the chair  Reports that she feels much better today.   Looks well     Recent LTK at [facility] on 6/1/21    Reviewed interval ancillary notes    Current Medications  sodium chloride flush 0.9 % injection 5-40 mL, 2 times per day  sodium chloride flush 0.9 % injection 5-40 mL, PRN  0.9 % sodium chloride infusion, PRN  enoxaparin (LOVENOX) injection 40 mg, Daily  ondansetron (ZOFRAN-ODT) disintegrating tablet 4 mg, Q8H PRN   Or  ondansetron (ZOFRAN) injection 4 mg, Q6H PRN  polyethylene glycol (GLYCOLAX) packet 17 g, Daily PRN  acetaminophen (TYLENOL) tablet 650 mg, Q6H PRN   Or  acetaminophen (TYLENOL) suppository 650 mg, Q6H PRN  aspirin chewable tablet 81 mg, Daily  buPROPion (WELLBUTRIN XL) extended release tablet 150 mg, QAM  traZODone (DESYREL) tablet 150 mg, Nightly  gabapentin (NEURONTIN) capsule 300 mg, BID  melatonin tablet 4.5 mg, Nightly PRN  pantoprazole (PROTONIX) tablet 40 mg, QAM AC  LORazepam (ATIVAN) injection 1 mg, Q6H PRN  sennosides-docusate sodium (SENOKOT-S) 8.6-50 MG tablet 2 tablet, Daily  levothyroxine (SYNTHROID) tablet 150 mcg, QAM AC  potassium chloride (KLOR-CON M) extended release tablet 20 mEq, Daily with breakfast  0.9 % sodium chloride infusion, Continuous  cefTRIAXone (ROCEPHIN) 2000 mg IVPB in D5W 50ml minibag, Q12H        Objective:  /61   Pulse 84   Temp 98.3 °F (36.8 °C) (Oral)   Resp 16   Ht 5' 2.5\" (1.588 m)   Wt 175 lb 9.6 oz (79.7 kg)   SpO2 95%   BMI 31.61 kg/m²     Intake/Output Summary (Last 24 hours) at 7/10/2021 0739  Last data filed at 7/9/2021 1844  Gross per 24 hour   Intake 1014.82 ml   Output --   Net 1014.82 ml      Wt Readings from Last 3 Encounters:   07/10/21 175 lb 9.6 oz (79.7 kg)   06/16/21 186 lb (84.4 kg)   06/09/21 186 lb (84.4 kg)       General appearance:  Appears comfortable, alert and pleasant   Eyes: Sclera clear. Pupils equal.  ENT: Moist oral mucosa. Trachea midline, no adenopathy. Cardiovascular: Regular rhythm, normal S1, S2. No murmur. No edema in lower extremities  Respiratory: Not using accessory muscles. Good inspiratory effort. Clear to auscultation bilaterally, no wheeze or crackles. GI: Abdomen soft, no tenderness, not distended, normal bowel sounds, no CVA tenderness   Musculoskeletal: No cyanosis in digits, neck supple  Neurology: CN 2-12 grossly intact. No speech or motor deficits  Psych: Normal affect. Alert and oriented in time, place and person  Skin: Warm, dry, normal turgor,  Left TKR scar is well healed     Labs and Tests:  CBC:   Recent Labs     07/08/21 2007 07/09/21  0504 07/10/21  0426   WBC 17.1* 14.1* 12.5*   HGB 11.5* 11.0* 10.4*    193 185     BMP:    Recent Labs     07/09/21  0504 07/09/21  1148 07/10/21  0426   * 132* 136   K 2.9* 3.7 4.2   CL 99 101 108   CO2 20* 20* 18*   BUN 25* 24* 17   CREATININE 1.2 1.2 1.0   GLUCOSE 133* 112* 79     Hepatic:   Recent Labs     07/08/21 2007   AST 42*   ALT 10   BILITOT 0.4   ALKPHOS 95     CT abd:    Mild urothelial thickening of the ureters diffusely.  Recommend clinical   correlation for ascending urinary tract infection. 2. Unchanged subcutaneous fluid collection of the anterior abdominal wall at   midline measuring 9.7 cm.           Escherichia coli (1)    Antibiotic Interpretation WALESKA Status    ampicillin Resistant >=32 mcg/mL     ceFAZolin Sensitive <=4 mcg/mL      NOTE: Cefazolin should only be used for uncomplicated UTI         for E.coli or Klebsiella pneumoniae.    cefepime Sensitive <=0.12 mcg/mL     cefTRIAXone Sensitive <=0.25 mcg/mL     ciprofloxacin Sensitive <=0.25 mcg/mL     ertapenem Sensitive <=0.12 mcg/mL     gentamicin Sensitive <=1 mcg/mL     levofloxacin Sensitive 1 mcg/mL     nitrofurantoin Sensitive <=16 mcg/mL     piperacillin-tazobactam Sensitive <=4 mcg/mL     trimethoprim-sulfamethoxazole Resistant >=320 mcg/mL           Problem List  Principal Problem:    Acute pyelonephritis  Active Problems:    Anxiety    Hypothyroidism    GERD (gastroesophageal reflux disease)    Anemia    Hyponatremia    Severe malnutrition (HCC)  Resolved Problems:    * No resolved hospital problems. *       Assessment & Plan:   1. UTI:  On Rocephin, cultures with E Coli that is mostly  pan sensitive   2. E coli Bacteremia: rocephin increased to 2 gms on 7/9/21. Will repeat BC today and add echo   3. Sepsis: resolving. She has been aggressively hydrated. 4. Hyponatremia:  Likely due to dehydration,  Now resolved. 5. Hypothyroidism:  Continue home therapy. TSH in range   6. Hypokalemia:  Resolved with replacement therapy   7. S/P Left TKA in June:  She scored 24/24  8. Anticipate eventual d/c home on oral antibiotic therapy         Diet: ADULT DIET; Regular  Adult Oral Nutrition Supplement;  Low Calorie/High Protein Oral Supplement  Code:Full Code  DVT PPX      OVIDIO Manzanares CNP   7/10/2021 7:39 AM

## 2021-07-10 NOTE — PLAN OF CARE
Problem: Falls - Risk of:  Goal: Will remain free from falls  Description: Will remain free from falls  7/10/2021 0044 by Tristin Armijo RN  Outcome: Ongoing  7/9/2021 1803 by Vimal Shukla RN  Outcome: Ongoing  Note: Pt uses call light appropriately. Uses walker for transfers with staff assistance.    Goal: Absence of physical injury  Description: Absence of physical injury  Outcome: Ongoing     Problem: Pain:  Goal: Pain level will decrease  Description: Pain level will decrease  7/10/2021 0044 by Tristin Armijo RN  Outcome: Ongoing  7/9/2021 1803 by Vimal Shukla RN  Outcome: Ongoing  Note: Pt c/o feeling \"achy\" which was relieved with PRN Tylenol  Goal: Control of acute pain  Description: Control of acute pain  Outcome: Ongoing  Goal: Control of chronic pain  Description: Control of chronic pain  Outcome: Ongoing     Problem: Nutrition  Goal: Optimal nutrition therapy  7/10/2021 0044 by Tristin Armijo RN  Outcome: Ongoing  7/9/2021 1245 by Liya Keller RD, CNSC, LD  Outcome: Ongoing

## 2021-07-11 LAB
ANION GAP SERPL CALCULATED.3IONS-SCNC: 13 MMOL/L (ref 3–16)
BASOPHILS ABSOLUTE: 0 K/UL (ref 0–0.2)
BASOPHILS RELATIVE PERCENT: 0.1 %
BLOOD CULTURE, ROUTINE: ABNORMAL
BLOOD CULTURE, ROUTINE: ABNORMAL
BUN BLDV-MCNC: 11 MG/DL (ref 7–20)
CALCIUM SERPL-MCNC: 8.2 MG/DL (ref 8.3–10.6)
CHLORIDE BLD-SCNC: 106 MMOL/L (ref 99–110)
CO2: 18 MMOL/L (ref 21–32)
CREAT SERPL-MCNC: 0.9 MG/DL (ref 0.6–1.2)
CULTURE, BLOOD 2: ABNORMAL
EOSINOPHILS ABSOLUTE: 0 K/UL (ref 0–0.6)
EOSINOPHILS RELATIVE PERCENT: 0.3 %
GFR AFRICAN AMERICAN: >60
GFR NON-AFRICAN AMERICAN: >60
GLUCOSE BLD-MCNC: 80 MG/DL (ref 70–99)
HCT VFR BLD CALC: 38 % (ref 36–48)
HEMOGLOBIN: 11.8 G/DL (ref 12–16)
LYMPHOCYTES ABSOLUTE: 0.5 K/UL (ref 1–5.1)
LYMPHOCYTES RELATIVE PERCENT: 5.2 %
MCH RBC QN AUTO: 30 PG (ref 26–34)
MCHC RBC AUTO-ENTMCNC: 31.2 G/DL (ref 31–36)
MCV RBC AUTO: 96.2 FL (ref 80–100)
MONOCYTES ABSOLUTE: 0.9 K/UL (ref 0–1.3)
MONOCYTES RELATIVE PERCENT: 8.5 %
NEUTROPHILS ABSOLUTE: 8.7 K/UL (ref 1.7–7.7)
NEUTROPHILS RELATIVE PERCENT: 85.9 %
ORGANISM: ABNORMAL
PDW BLD-RTO: 15.6 % (ref 12.4–15.4)
PLATELET # BLD: 194 K/UL (ref 135–450)
PMV BLD AUTO: 8.8 FL (ref 5–10.5)
POTASSIUM SERPL-SCNC: 4.2 MMOL/L (ref 3.5–5.1)
RBC # BLD: 3.95 M/UL (ref 4–5.2)
SODIUM BLD-SCNC: 137 MMOL/L (ref 136–145)
WBC # BLD: 10.1 K/UL (ref 4–11)

## 2021-07-11 PROCEDURE — 6360000002 HC RX W HCPCS: Performed by: NURSE PRACTITIONER

## 2021-07-11 PROCEDURE — 85025 COMPLETE CBC W/AUTO DIFF WBC: CPT

## 2021-07-11 PROCEDURE — 80048 BASIC METABOLIC PNL TOTAL CA: CPT

## 2021-07-11 PROCEDURE — 36415 COLL VENOUS BLD VENIPUNCTURE: CPT

## 2021-07-11 PROCEDURE — 2580000003 HC RX 258: Performed by: INTERNAL MEDICINE

## 2021-07-11 PROCEDURE — 6370000000 HC RX 637 (ALT 250 FOR IP): Performed by: INTERNAL MEDICINE

## 2021-07-11 PROCEDURE — 1200000000 HC SEMI PRIVATE

## 2021-07-11 PROCEDURE — 2580000003 HC RX 258: Performed by: NURSE PRACTITIONER

## 2021-07-11 PROCEDURE — 6360000002 HC RX W HCPCS: Performed by: INTERNAL MEDICINE

## 2021-07-11 RX ORDER — SODIUM CHLORIDE 9 MG/ML
INJECTION, SOLUTION INTRAVENOUS CONTINUOUS
Status: DISCONTINUED | OUTPATIENT
Start: 2021-07-11 | End: 2021-07-12

## 2021-07-11 RX ADMIN — GABAPENTIN 300 MG: 300 CAPSULE ORAL at 22:14

## 2021-07-11 RX ADMIN — MEROPENEM 1000 MG: 1 INJECTION, POWDER, FOR SOLUTION INTRAVENOUS at 10:24

## 2021-07-11 RX ADMIN — LEVOTHYROXINE SODIUM 150 MCG: 0.15 TABLET ORAL at 05:53

## 2021-07-11 RX ADMIN — STANDARDIZED SENNA CONCENTRATE AND DOCUSATE SODIUM 2 TABLET: 8.6; 5 TABLET ORAL at 09:01

## 2021-07-11 RX ADMIN — ACETAMINOPHEN 650 MG: 325 TABLET ORAL at 10:35

## 2021-07-11 RX ADMIN — ASPIRIN 81 MG: 81 TABLET, CHEWABLE ORAL at 09:01

## 2021-07-11 RX ADMIN — SODIUM CHLORIDE: 9 INJECTION, SOLUTION INTRAVENOUS at 10:21

## 2021-07-11 RX ADMIN — MEROPENEM 1000 MG: 1 INJECTION, POWDER, FOR SOLUTION INTRAVENOUS at 16:25

## 2021-07-11 RX ADMIN — SODIUM CHLORIDE: 9 INJECTION, SOLUTION INTRAVENOUS at 03:55

## 2021-07-11 RX ADMIN — Medication 10 ML: at 22:15

## 2021-07-11 RX ADMIN — LORAZEPAM 1 MG: 2 INJECTION INTRAMUSCULAR; INTRAVENOUS at 16:29

## 2021-07-11 RX ADMIN — GABAPENTIN 300 MG: 300 CAPSULE ORAL at 09:01

## 2021-07-11 RX ADMIN — PANTOPRAZOLE SODIUM 40 MG: 40 TABLET, DELAYED RELEASE ORAL at 05:53

## 2021-07-11 RX ADMIN — BUPROPION HYDROCHLORIDE 150 MG: 150 TABLET, EXTENDED RELEASE ORAL at 09:01

## 2021-07-11 RX ADMIN — ENOXAPARIN SODIUM 40 MG: 40 INJECTION SUBCUTANEOUS at 08:59

## 2021-07-11 RX ADMIN — TRAZODONE HYDROCHLORIDE 150 MG: 50 TABLET ORAL at 22:14

## 2021-07-11 ASSESSMENT — PAIN SCALES - GENERAL
PAINLEVEL_OUTOF10: 0
PAINLEVEL_OUTOF10: 0
PAINLEVEL_OUTOF10: 3
PAINLEVEL_OUTOF10: 0

## 2021-07-11 NOTE — PROGRESS NOTES
[facility] PROGRESS NOTE    7/11/2021 7:24 AM        Name: Nancy Rojas . Admitted: 7/8/2021  Primary Care Provider: Dipak Begum MD (Tel: [phone])      Subjective: Shahla [place] Pt admitted UTI  /  E coli bacteremia    T max 102.2 this am despite being on 2 gms rocephin daily.     I have changed antibiotics to merrem this am     Recent LTK at [facility] on 6/1/21    She continues to feel well despite fever this am   Currently up in the chair  Has eaten 50 % of breakfast     Reviewed interval ancillary notes    Current Medications  perflutren lipid microspheres (DEFINITY) injection 1.65 mg, ONCE PRN  sodium chloride flush 0.9 % injection 5-40 mL, 2 times per day  sodium chloride flush 0.9 % injection 5-40 mL, PRN  0.9 % sodium chloride infusion, PRN  enoxaparin (LOVENOX) injection 40 mg, Daily  ondansetron (ZOFRAN-ODT) disintegrating tablet 4 mg, Q8H PRN   Or  ondansetron (ZOFRAN) injection 4 mg, Q6H PRN  polyethylene glycol (GLYCOLAX) packet 17 g, Daily PRN  acetaminophen (TYLENOL) tablet 650 mg, Q6H PRN   Or  acetaminophen (TYLENOL) suppository 650 mg, Q6H PRN  aspirin chewable tablet 81 mg, Daily  buPROPion (WELLBUTRIN XL) extended release tablet 150 mg, QAM  traZODone (DESYREL) tablet 150 mg, Nightly  gabapentin (NEURONTIN) capsule 300 mg, BID  melatonin tablet 4.5 mg, Nightly PRN  pantoprazole (PROTONIX) tablet 40 mg, QAM AC  LORazepam (ATIVAN) injection 1 mg, Q6H PRN  sennosides-docusate sodium (SENOKOT-S) 8.6-50 MG tablet 2 tablet, Daily  levothyroxine (SYNTHROID) tablet 150 mcg, QAM AC  0.9 % sodium chloride infusion, Continuous  cefTRIAXone (ROCEPHIN) 2000 mg IVPB in D5W 50ml minibag, Q12H        Objective:  /76   Pulse 91   Temp 98 °F (36.7 °C) (Axillary)   Resp 18   Ht 5' 2.5\" (1.588 m)   Wt 175 lb 9.6 oz (79.7 kg)   SpO2 97%   BMI 31.61 kg/m²   No intake or output data in the 24 hours ending 07/11/21 0724   Wt Readings from Last 3 Encounters:   07/10/21 175 lb 9.6 oz (79.7 kg)   06/16/21 186 lb (84.4 kg)   06/09/21 186 lb (84.4 kg)       General appearance:  Appears comfortable, alert and pleasant   Eyes: Sclera clear. Pupils equal.  ENT: Moist oral mucosa. Trachea midline, no adenopathy. Cardiovascular: Regular rhythm, normal S1, S2. No murmur. No edema in lower extremities  Respiratory: Not using accessory muscles. Good inspiratory effort. Clear to auscultation bilaterally, no wheeze or crackles. GI: Abdomen soft, no tenderness, not distended, normal bowel sounds, no CVA tenderness   Musculoskeletal: No cyanosis in digits, neck supple  Neurology: CN 2-12 grossly intact. No speech or motor deficits  Psych: Normal affect. Alert and oriented in time, place and person  Skin: Warm, dry, normal turgor,  Left TKR scar is well healed and unremarkable     Labs and Tests:  CBC:   Recent Labs     07/09/21  0504 07/10/21  0426 07/11/21  0508   WBC 14.1* 12.5* 10.1   HGB 11.0* 10.4* 11.8*    185 194     BMP:    Recent Labs     07/09/21  1148 07/10/21  0426 07/11/21  0508   * 136 137   K 3.7 4.2 4.2    108 106   CO2 20* 18* 18*   BUN 24* 17 11   CREATININE 1.2 1.0 0.9   GLUCOSE 112* 79 80     Hepatic:   Recent Labs     07/08/21 2007   AST 42*   ALT 10   BILITOT 0.4   ALKPHOS 95     CT abd:    Mild urothelial thickening of the ureters diffusely.  Recommend clinical   correlation for ascending urinary tract infection. 2. Unchanged subcutaneous fluid collection of the anterior abdominal wall at   midline measuring 9.7 cm.           Escherichia coli (1)    Antibiotic Interpretation WALESKA Status    ampicillin Resistant >=32 mcg/mL     ceFAZolin Sensitive <=4 mcg/mL      NOTE: Cefazolin should only be used for uncomplicated UTI         for E.coli or Klebsiella pneumoniae.    cefepime Sensitive <=0.12 mcg/mL     cefTRIAXone Sensitive <=0.25 mcg/mL     ciprofloxacin Sensitive <=0.25 mcg/mL ertapenem Sensitive <=0.12 mcg/mL     gentamicin Sensitive <=1 mcg/mL     levofloxacin Sensitive 1 mcg/mL     nitrofurantoin Sensitive <=16 mcg/mL     piperacillin-tazobactam Sensitive <=4 mcg/mL     trimethoprim-sulfamethoxazole Resistant >=320 mcg/mL           Problem List  Principal Problem:    Acute pyelonephritis  Active Problems:    Anxiety    Hypothyroidism    GERD (gastroesophageal reflux disease)    Anemia    Hyponatremia    Severe malnutrition (HCC)  Resolved Problems:    * No resolved hospital problems. *       Assessment & Plan:   1. UTI:  On Rocephin, however remains febrile,  Will stop rocephin and add merrem. cultures with E Coli that is mostly  pan sensitive . ID consult added   2. E coli Bacteremia: now on Merrem. Repeat BC drawn on 7/10. Echo ordered   3. Sepsis: resolving. She has been aggressively hydrated. 4. Hyponatremia:  Likely due to dehydration,  Now resolved. 5. Hypothyroidism:  Continue home therapy. TSH in range   6. Hypokalemia:  Resolved with replacement therapy   7. S/P Left TKA in June:  She scored 24/24 on therapy evaluations. Ambulation was encouraged   8. Anticipate eventual d/c home on oral antibiotic therapy         Diet: ADULT DIET; Regular  Adult Oral Nutrition Supplement;  Low Calorie/High Protein Oral Supplement  Code:Full Code  DVT PPX      OVIDIO Manzanares CNP   7/11/2021 7:24 AM

## 2021-07-12 PROBLEM — N12 PYELONEPHRITIS: Status: ACTIVE | Noted: 2021-07-08

## 2021-07-12 LAB
ANION GAP SERPL CALCULATED.3IONS-SCNC: 10 MMOL/L (ref 3–16)
BASOPHILS ABSOLUTE: 0 K/UL (ref 0–0.2)
BASOPHILS RELATIVE PERCENT: 0.2 %
BUN BLDV-MCNC: 8 MG/DL (ref 7–20)
CALCIUM SERPL-MCNC: 8.1 MG/DL (ref 8.3–10.6)
CHLORIDE BLD-SCNC: 106 MMOL/L (ref 99–110)
CO2: 21 MMOL/L (ref 21–32)
CREAT SERPL-MCNC: 1 MG/DL (ref 0.6–1.2)
EOSINOPHILS ABSOLUTE: 0 K/UL (ref 0–0.6)
EOSINOPHILS RELATIVE PERCENT: 0.3 %
GFR AFRICAN AMERICAN: >60
GFR NON-AFRICAN AMERICAN: 55
GLUCOSE BLD-MCNC: 104 MG/DL (ref 70–99)
HCT VFR BLD CALC: 33 % (ref 36–48)
HEMOGLOBIN: 10.6 G/DL (ref 12–16)
LV EF: 63 %
LVEF MODALITY: NORMAL
LYMPHOCYTES ABSOLUTE: 0.9 K/UL (ref 1–5.1)
LYMPHOCYTES RELATIVE PERCENT: 9.3 %
MCH RBC QN AUTO: 30 PG (ref 26–34)
MCHC RBC AUTO-ENTMCNC: 32.1 G/DL (ref 31–36)
MCV RBC AUTO: 93.5 FL (ref 80–100)
MONOCYTES ABSOLUTE: 1.2 K/UL (ref 0–1.3)
MONOCYTES RELATIVE PERCENT: 12.8 %
NEUTROPHILS ABSOLUTE: 7.1 K/UL (ref 1.7–7.7)
NEUTROPHILS RELATIVE PERCENT: 77.4 %
PDW BLD-RTO: 15.5 % (ref 12.4–15.4)
PLATELET # BLD: 230 K/UL (ref 135–450)
PMV BLD AUTO: 7.9 FL (ref 5–10.5)
POTASSIUM SERPL-SCNC: 3.7 MMOL/L (ref 3.5–5.1)
RBC # BLD: 3.53 M/UL (ref 4–5.2)
SODIUM BLD-SCNC: 137 MMOL/L (ref 136–145)
WBC # BLD: 9.2 K/UL (ref 4–11)

## 2021-07-12 PROCEDURE — 6370000000 HC RX 637 (ALT 250 FOR IP): Performed by: INTERNAL MEDICINE

## 2021-07-12 PROCEDURE — 99223 1ST HOSP IP/OBS HIGH 75: CPT | Performed by: INTERNAL MEDICINE

## 2021-07-12 PROCEDURE — 2580000003 HC RX 258: Performed by: INTERNAL MEDICINE

## 2021-07-12 PROCEDURE — 6360000002 HC RX W HCPCS: Performed by: INTERNAL MEDICINE

## 2021-07-12 PROCEDURE — 6360000002 HC RX W HCPCS: Performed by: NURSE PRACTITIONER

## 2021-07-12 PROCEDURE — 2580000003 HC RX 258: Performed by: NURSE PRACTITIONER

## 2021-07-12 PROCEDURE — 36415 COLL VENOUS BLD VENIPUNCTURE: CPT

## 2021-07-12 PROCEDURE — 80048 BASIC METABOLIC PNL TOTAL CA: CPT

## 2021-07-12 PROCEDURE — 85025 COMPLETE CBC W/AUTO DIFF WBC: CPT

## 2021-07-12 PROCEDURE — 1200000000 HC SEMI PRIVATE

## 2021-07-12 PROCEDURE — 93306 TTE W/DOPPLER COMPLETE: CPT

## 2021-07-12 RX ORDER — CIPROFLOXACIN 2 MG/ML
400 INJECTION, SOLUTION INTRAVENOUS EVERY 12 HOURS
Status: DISCONTINUED | OUTPATIENT
Start: 2021-07-12 | End: 2021-07-14

## 2021-07-12 RX ADMIN — MEROPENEM 1000 MG: 1 INJECTION, POWDER, FOR SOLUTION INTRAVENOUS at 00:15

## 2021-07-12 RX ADMIN — STANDARDIZED SENNA CONCENTRATE AND DOCUSATE SODIUM 2 TABLET: 8.6; 5 TABLET ORAL at 09:39

## 2021-07-12 RX ADMIN — PANTOPRAZOLE SODIUM 40 MG: 40 TABLET, DELAYED RELEASE ORAL at 06:29

## 2021-07-12 RX ADMIN — ACETAMINOPHEN 650 MG: 325 TABLET ORAL at 09:43

## 2021-07-12 RX ADMIN — GABAPENTIN 300 MG: 300 CAPSULE ORAL at 09:39

## 2021-07-12 RX ADMIN — BUPROPION HYDROCHLORIDE 150 MG: 150 TABLET, EXTENDED RELEASE ORAL at 09:39

## 2021-07-12 RX ADMIN — LEVOTHYROXINE SODIUM 150 MCG: 0.15 TABLET ORAL at 06:29

## 2021-07-12 RX ADMIN — Medication 10 ML: at 09:39

## 2021-07-12 RX ADMIN — SODIUM CHLORIDE 25 ML: 9 INJECTION, SOLUTION INTRAVENOUS at 23:51

## 2021-07-12 RX ADMIN — LORAZEPAM 1 MG: 2 INJECTION INTRAMUSCULAR; INTRAVENOUS at 22:06

## 2021-07-12 RX ADMIN — ACETAMINOPHEN 650 MG: 325 TABLET ORAL at 15:05

## 2021-07-12 RX ADMIN — Medication 10 ML: at 22:05

## 2021-07-12 RX ADMIN — ENOXAPARIN SODIUM 40 MG: 40 INJECTION SUBCUTANEOUS at 09:39

## 2021-07-12 RX ADMIN — MEROPENEM 1000 MG: 1 INJECTION, POWDER, FOR SOLUTION INTRAVENOUS at 09:36

## 2021-07-12 RX ADMIN — GABAPENTIN 300 MG: 300 CAPSULE ORAL at 22:04

## 2021-07-12 RX ADMIN — LORAZEPAM 1 MG: 2 INJECTION INTRAMUSCULAR; INTRAVENOUS at 15:13

## 2021-07-12 RX ADMIN — ASPIRIN 81 MG: 81 TABLET, CHEWABLE ORAL at 09:39

## 2021-07-12 RX ADMIN — TRAZODONE HYDROCHLORIDE 150 MG: 50 TABLET ORAL at 22:04

## 2021-07-12 RX ADMIN — CIPROFLOXACIN 400 MG: 2 INJECTION, SOLUTION INTRAVENOUS at 12:06

## 2021-07-12 RX ADMIN — ACETAMINOPHEN 650 MG: 325 TABLET ORAL at 22:05

## 2021-07-12 ASSESSMENT — ENCOUNTER SYMPTOMS
DIARRHEA: 0
CHOKING: 0
APNEA: 0
EYE DISCHARGE: 0
COLOR CHANGE: 0
SHORTNESS OF BREATH: 0
NAUSEA: 0
STRIDOR: 0
PHOTOPHOBIA: 0
CHEST TIGHTNESS: 0
COUGH: 0
ABDOMINAL PAIN: 0
TROUBLE SWALLOWING: 0
FACIAL SWELLING: 0
EYE REDNESS: 0
BLOOD IN STOOL: 0
RHINORRHEA: 0

## 2021-07-12 ASSESSMENT — PAIN SCALES - GENERAL
PAINLEVEL_OUTOF10: 0
PAINLEVEL_OUTOF10: 10
PAINLEVEL_OUTOF10: 0

## 2021-07-12 NOTE — PROGRESS NOTES
Sent message to NABEEL Patel: Patient's temp was 100.5, tylenol was given. At recheck temp is 102.0.

## 2021-07-12 NOTE — PROGRESS NOTES
Temp 102.7. Tylenol given. Assessment complete, see doc flowsheets. Scheduled PO meds given, pt tolerated well. PIV flushed without complications. Pt resting comfortably in bed. States pain level is a 0 out of 10. Denies any further needs at this time. Call light is within reach. Fall precautions in place.

## 2021-07-12 NOTE — CONSULTS
Infectious Diseases   Consult Note        Admission Date: 7/8/2021  Hospital Day: Hospital Day: 5   Attending: Richie Shaw MD  Date of service: 7/12/21     Reason for admission: Acute pyelonephritis [N10]    Chief complaint/ Reason for consult: High fever, E. coli bacteremia and pyelonephritis    Microbiology:        I have reviewed allavailable micro lab data and cultures    · Blood culture (2/2) - collected on 7/8/2021: E. coli    Susceptibility    Escherichia coli (2)    Antibiotic Interpretation WALESKA Status    ampicillin Resistant >=32 mcg/mL     ceFAZolin Sensitive <=4 mcg/mL     cefepime Sensitive <=0.12 mcg/mL     cefTRIAXone Sensitive <=0.25 mcg/mL     ciprofloxacin Sensitive 0.5 mcg/mL     ertapenem Sensitive <=0.12 mcg/mL     gentamicin Sensitive <=1 mcg/mL     levofloxacin Sensitive 0.5 mcg/mL     piperacillin-tazobactam Sensitive <=4 mcg/mL     trimethoprim-sulfamethoxazole Resistant >=320 mcg/mL           · Urine culture  - collected on 7/8/2021: Greater than 100,000 CFU per mL of E. coli    Susceptibility    Escherichia coli (1)    Antibiotic Interpretation WALESKA Status    ampicillin Resistant >=32 mcg/mL     ceFAZolin Sensitive <=4 mcg/mL      NOTE: Cefazolin should only be used for uncomplicated UTI         for E.coli or Klebsiella pneumoniae.    cefepime Sensitive <=0.12 mcg/mL     cefTRIAXone Sensitive <=0.25 mcg/mL     ciprofloxacin Sensitive <=0.25 mcg/mL     ertapenem Sensitive <=0.12 mcg/mL     gentamicin Sensitive <=1 mcg/mL     levofloxacin Sensitive 1 mcg/mL     nitrofurantoin Sensitive <=16 mcg/mL     piperacillin-tazobactam Sensitive <=4 mcg/mL     trimethoprim-sulfamethoxazole Resistant >=320 mcg/mL     Lab and Collection      Antibiotics and immunizations:       Current antibiotics: All antibiotics and their doses were reviewed by me    Recent Abx Admin                   meropenem (MERREM) 1,000 mg in sodium chloride 0.9 % 100 mL IVPB (mini-bag) (mg) 1,000 mg New Bag 07/12/21 0985 1,000 mg New Bag  0015     1,000 mg New Bag 07/11/21 1625                  Immunization History: All immunization history was reviewed by me today. Immunization History   Administered Date(s) Administered    Influenza Virus Vaccine 10/29/2015, 10/23/2018, 12/10/2019    Influenza, Quadv, IM, PF (6 mo and older Fluzone, Flulaval, Fluarix, and 3 yrs and older Afluria) 10/25/2016, 10/03/2017    Pneumococcal Conjugate 13-valent ([id number]) 11/28/2017    Pneumococcal Polysaccharide ([id number]) 12/11/2018    Tdap (Boostrix, Adacel) 01/17/2013    Zoster Live (Zostavax) 11/12/2013       Known drug allergies: All allergies were reviewed and updated    Allergies   Allergen Reactions    Macrobid [Nitrofurantoin] Other (See Comments)     Fever, myalgias    Codeine Nausea Only       Social history:     Social History:  All social andepidemiologic history was reviewed and updated by me today as needed. · Tobacco use:   reports that she has never smoked. She has never used smokeless tobacco.  · Alcohol use:   reports no history of alcohol use. · Currently lives in: [place]  ·  reports no history of drug use. COVID VACCINATION AND LAB RESULT RECORDS:     Internal Administration   First Dose      Second Dose           Last COVID Lab No results found for: SARS-COV-2, SARS-COV-2 RNA, SARS-COV-2, SARS-COV-2, SARS-COV-2 BY PCR, SARS-COV-2, SARS-COV-2, SARS-COV-2         Assessment:     The patient is a 78 y.o. old female who  has a past medical history of Anal fissure (7/16/2013), Anxiety, Colon polyps, Depression, Fibroid uterus, Hypothyroidism, Morbid obesity ([address] and Osteopenia.  with following problems:    · High fevers  · E. coli bacteremia  · Complicated E. coli UTI  · Anterior abdominal wall midline fluid collection, measuring 9.7 cm, does not appear infected on clinical exam  · History left total knee arthroplasty on 6/1/2021  · History of weight loss surgery and midline abdominal incisional hernia repair  · History of depression  · Hypothyroidism  · Osteopenia  · Obesity Class 1 due to excess calorie intake : Body mass index is 31.61 kg/m². Discussion:      The patient had a CT scan of abdomen pelvis IV contrast on 721. Images reviewed. It showed mild urothelial thickening of bilateral ureters as well as an anterior abdominal wall subcutaneous fluid collection measuring 9.7 cm. Both blood culture and urine cultures were positive for E. coli. Isolate was ampicillin resistant    Blood cultures were repeated on 7/10/2021 and are negative so far    Serum creatinine is 1.0. White cell count is 9200    Plan:     Diagnostic Workup:    · Follow-up repeat blood cultures from 7/10/2021  · Continue to follow fever curve, WBC count and blood cultures  · Follow up on liverand renal functions closely    Antimicrobials:    · Will stop IV ceftriaxone today  · Will order IV Cipro 400 mg every 12 hours  · We will follow up on the culture results and clinical progress and will make further recommendations accordingly. · Continue close vitals monitoring. · Maintain good glycemic control. · Fall precautions. Aspiration precautions. · Continue to watch for new fever or diarrhea. · DVT prophylaxis. · Discussed all above with patient and RN. Drug Monitoring:    · Continue serial monitoring for antibiotic toxicity as follows: *CBC, CMP, QTc interval  · Continue to watch for following: new or worsening fever, hypotension, hives, lip swelling and redness or purulence at vascular access sites. I/v access Management:    · Continue to monitor i.v access sites for erythema, induration, discharge or tenderness. · As always, continue efforts to minimizetubes/lines/drains as clinically appropriate to reduce chances of line associated infections. Current isolation precautions: There are no current isolations documented for this patient.        Level of complexity of consult: High     Risk of Complications/Morbidity: High     · Illness(es)/ Infection present that pose threat to life/bodily function. · There is potential for severe exacerbation of infection/side effects of treatment. · Therapy requires intensive monitoring for antimicrobial agent toxicity. Thank you for involving me in the care of your patient. I will continue to follow. If you have any additional questions, please do not hesitate to contact me. Subjective:     Presenting complaint in ER:     Chief Complaint   Patient presents with    Abdominal Pain     with feeling bloated and N/V x 1 week but getting worse. Also having chills        HPI: Leela Nathan is a 78 y.o. female patient, who was seen at the request of Dr. Conor Singh MD.    History was obtained from chart review and the patient. The patient was admitted on 7/8/2021. I have been consulted to see the patient for above mentioned reason(s). The patient has multiple medical comorbidities, and presented to the ER for generalized malaise and chills that was going on for several days before presentation to the ER. The patient was also having a bloating sensation. In the ER, she had a white cell count of 14,100. Patient was started on empiric IV ceftriaxone. Blood cultures and urine cultures were sent and came back positive for E. coli      Her white cell count has been improving since then, however, patient continues to spike high fevers and spiked a fever to 102.7 today    I have been asked for my opinion for management for this patient. Past Medical History: All past medical history reviewed today. Past Medical History:   Diagnosis Date    Anal fissure 7/16/2013    Anxiety     Colon polyps     Depression     Fibroid uterus     Hypothyroidism     Morbid obesity ([address]     Osteopenia          Past Surgical History: All pastsurgical history was reviewed today.     Past Surgical History:   Procedure Laterality Date    ABDOMINAL HERNIA REPAIR  2008    APPENDECTOMY  Age 27    CATARACT REMOVAL WITH IMPLANT Right 10/23/2017    with vitrectomy    CATARACT REMOVAL WITH IMPLANT Left 02/26/2018    with vitrectomy    COLONOSCOPY N/A 10/10/2018    COLONOSCOPY POLYPECTOMY SNARE/COLD BIOPSY performed by Crystal Casillas MD at [address] Right 8/26/2019    RIGHT L4 AND L5 TRANSFORAMINAL EPIDURAL STEROID INJECTION WITH FLUOROSCOPY performed by Abimael Anglin MD at [facility]    HC INJECT OTHER PERPHRL NERV Right 10/21/2019    RIGHT PIRIFORMIS INJECTION WITH FLUOROSCOPY (58503) performed by Abimael Anglin MD at [facility], TOTAL ABDOMINAL      ovaries out also   [address] Right 1/7/2019    RIGHT L3 AND L4 LUMBAR TRANSFORAMINAL WITH FLUOROSCOPY performed by Abimael Anglin MD at [facility] Right 5/15/2019    RIGHT L4 AND L5 TRANSFORAMINAL EPIDURAL STEROID INJECTION WITH FLUOROSCOPY performed by Abimael Anglin MD at [facility] [address] ESOPHAGOGASTRODUODENOSCOPY TRANSORAL DIAGNOSTIC N/A 10/10/2018    EGD performed by Crystal Casillas MD at [address]    Fibroids    TOTAL KNEE ARTHROPLASTY Left 6/1/2021    LEFT TOTAL KNEE ARTHROPLASTY performed by Kaity Garsia MD at [facility] OR         Family History: All family history was reviewed today. Problem Relation Age of Onset    Diabetes Father         Type II    Diabetes Sister         Type II    Diabetes Brother         Type II    Breast Cancer Sister 58    Lung Cancer Mother         Smoker    Heart Disease Sister         Bicuspid aortic valve x2         Medications: All current and past medications were reviewed.     Medications Prior to Admission: levothyroxine (SYNTHROID) 150 MCG tablet, TAKE 1 TABLET BY MOUTH EVERY DAY  ALPRAZolam (XANAX) 1 MG tablet, TAKE 1 TABLET BY MOUTH TWICE DAILY AS NEEDED FOR ANXIETY  gabapentin (NEURONTIN) 300 MG capsule, TAKE 1 CAPSULE BY MOUTH TWICE DAILY  senna (SENOKOT) 8.6 MG tablet, Take 1 tablet by mouth daily   [] polyethylene glycol (GLYCOLAX) 17 GM/SCOOP powder, Take 17 g by mouth 2 times daily  aspirin 325 MG EC tablet, Take 1 tablet by mouth daily  buPROPion (WELLBUTRIN XL) 150 MG extended release tablet, Take 1 tablet by mouth every morning  traZODone (DESYREL) 50 MG tablet, TAKE 3 TABLETS BY MOUTH EVERY NIGHT AT BEDTIME  Multiple Vitamin (MULTIVITAMIN PO), Take by mouth  omeprazole (PRILOSEC) 40 MG delayed release capsule, TAKE 1 CAPSULE BY MOUTH EVERY MORNING BEFORE BREAKFAST  melatonin 5 MG TABS tablet, Take 5 mg by mouth daily  Cyanocobalamin (VITAMIN B 12 PO), Take 500 mcg by mouth daily   Cholecalciferol (VITAMIN D) 1000 UNIT CAPS, Take 2 capsules by mouth daily      meropenem  1,000 mg Intravenous Q8H    sodium chloride flush  5-40 mL Intravenous 2 times per day    enoxaparin  40 mg Subcutaneous Daily    aspirin  81 mg Oral Daily    buPROPion  150 mg Oral QAM    traZODone  150 mg Oral Nightly    gabapentin  300 mg Oral BID    pantoprazole  40 mg Oral QAM AC    sennosides-docusate sodium  2 tablet Oral Daily    levothyroxine  150 mcg Oral QAM AC          REVIEW OF SYSTEMS:       Review of Systems   Constitutional: Positive for fatigue. Negative for chills, diaphoresis and unexpected weight change. HENT: Negative for congestion, ear discharge, ear pain, facial swelling, hearing loss, rhinorrhea and trouble swallowing. Eyes: Negative for photophobia, discharge, redness and visual disturbance. Respiratory: Negative for apnea, cough, choking, chest tightness, shortness of breath and stridor. Cardiovascular: Negative for chest pain and palpitations. Gastrointestinal: Negative for abdominal pain, blood in stool, diarrhea and nausea. Endocrine: Negative for polydipsia, polyphagia and polyuria.    Genitourinary: Negative for difficulty urinating, dysuria, frequency, hematuria, menstrual problem and vaginal discharge. Musculoskeletal: Negative for arthralgias, joint swelling, myalgias and neck stiffness. Skin: Negative for color change and rash. Allergic/Immunologic: Negative for immunocompromised state. Neurological: Negative for dizziness, seizures, speech difficulty, light-headedness and headaches. Hematological: Negative for adenopathy. Psychiatric/Behavioral: Negative for agitation, hallucinations and suicidal ideas. Objective:       PHYSICAL EXAM:      Vitals:   Vitals:    07/11/21 2302 07/12/21 0506 07/12/21 0941 07/12/21 1045   BP: (!) 147/77 (!) 148/73 125/70    Pulse: 112 111 103    Resp: 18 18 18    Temp: 100.6 °F (38.1 °C) 99.6 °F (37.6 °C) 102.7 °F (39.3 °C) 99.7 °F (37.6 °C)   TempSrc: Oral Oral Oral Oral   SpO2: 95% 96% 92%    Weight:       Height:           Physical Exam  Vitals and nursing note reviewed. Constitutional:       General: She is not in acute distress. Appearance: She is well-developed. She is not diaphoretic. HENT:      Head: Normocephalic. Right Ear: External ear normal.      Left Ear: External ear normal.      Nose: Nose normal.   Eyes:      General: No scleral icterus. Right eye: No discharge. Left eye: No discharge. Conjunctiva/sclera: Conjunctivae normal.      Pupils: Pupils are equal, round, and reactive to light. Cardiovascular:      Rate and Rhythm: Normal rate and regular rhythm. Heart sounds: No murmur heard. No friction rub. Pulmonary:      Effort: Pulmonary effort is normal.      Breath sounds: No stridor. No wheezing or rales. Chest:      Chest wall: No tenderness. Abdominal:      Palpations: Abdomen is soft. There is no mass. Tenderness: There is no abdominal tenderness. There is no guarding or rebound. Comments: Has some abdominal swelling at the site of previous hernia repair and abdominal midline.  No local redness or warmth or tenderness or other signs of infection in that area   Musculoskeletal:         General: No tenderness. Cervical back: Normal range of motion and neck supple. Lymphadenopathy:      Cervical: No cervical adenopathy. Skin:     General: Skin is warm and dry. Findings: No erythema or rash. Neurological:      Mental Status: She is alert and oriented to person, place, and time. Motor: No abnormal muscle tone. Psychiatric:         Judgment: Judgment normal.          Lines: All vascular access sites are healthy with no local erythema, discharge or tenderness. Intake and output:     No intake/output data recorded. Lab Data:   All available labs were reviewed by me today. CBC:   Recent Labs     07/10/21  0426 07/11/21  0508 07/12/21  0535   WBC 12.5* 10.1 9.2   RBC 3.46* 3.95* 3.53*   HGB 10.4* 11.8* 10.6*   HCT 32.9* 38.0 33.0*    194 230   MCV 94.9 96.2 93.5   MCH 30.2 30.0 30.0   MCHC 31.8 31.2 32.1   RDW 15.1 15.6* 15.5*        BMP:  Recent Labs     07/10/21  0426 07/11/21  0508 07/12/21  0535    137 137   K 4.2 4.2 3.7    106 106   CO2 18* 18* 21   BUN 17 11 8   CREATININE 1.0 0.9 1.0   CALCIUM 7.9* 8.2* 8.1*   GLUCOSE 79 80 104*        Hepatic FunctionPanel:   Lab Results   Component Value Date    ALKPHOS 95 07/08/2021    ALT 10 07/08/2021    AST 42 07/08/2021    PROT 6.5 07/08/2021    PROT 6.5 01/08/2013    BILITOT 0.4 07/08/2021    BILIDIR 0.13 06/11/2013    IBILI 0.2 06/11/2013    LABALBU 2.5 07/09/2021       CPK: No results found for: CKTOTAL  ESR: No results found for: SEDRATE  CRP: No results found for: CRP      Imaging: All pertinent images and reports for the current visit were reviewed by meduring this visit. CT ABDOMEN PELVIS W IV CONTRAST Additional Contrast? None   Final Result   1. Mild urothelial thickening of the ureters diffusely. Recommend clinical   correlation for ascending urinary tract infection.    2. Unchanged subcutaneous fluid collection of the anterior abdominal wall at midline measuring 9.7 cm. Outside records:    Labs, Microbiology, Radiology and pertinent results from Care everywhere, if available, were reviewed as a part ofthe consultation. 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    Major depressive disorder with single episode F32.9    Anxiety F41.9    Hypothyroidism E03.9    Family history of bicuspid aortic valve Z82.79    Osteopenia M85.80    GERD (gastroesophageal reflux disease) K21.9    Anal fissure K60.2    Psychophysiological insomnia F51.04    Anemia D64.9    Colon polyps K63.5    Osteoarthritis of left knee M17.12    Restless leg syndrome G25.81    Ganglion cyst of wrist, left M67.432    Neck pain, chronic M54.2, G89.29    Hypertriglyceridemia K26.6    Periumbilical mass Y53.43    Coronary artery calcification I25.10, I25.84    Total knee replacement status, left Z96.652    Ileus, postoperative (HCC) K91.89, K56.7    Pyelonephritis N12    Hyponatremia E87.1    Severe malnutrition (HCC) D20    Complicated UTI (urinary tract infection) N39.0    E coli bacteremia R78.81, B96.20    High fever R50.9    History of depression Z86.59         Please note that this chart was generated using Dragon dictation software. Although every effort was made to ensure the accuracy of this automated transcription, some errors in transcription may have occurred inadvertently. If you may need any clarification, please do not hesitate to contact me through EPIC or at the phone number provided below with my electronic signature.   Any pictures or media included in this note were obtained after taking informed verbal consent from the patient and with their approval to include those in the patient's medical record.       Nura Lux MD, MPH  7/12/21, 10:55 AM EDT   [facility] Infectious Disease   [address] [place] [address] [address] [facility], [address] [place] [address]  Office: [phone]  Fax: [phone]  Clinic days:  Tuesday & Thursday

## 2021-07-13 ENCOUNTER — APPOINTMENT (OUTPATIENT)
Dept: CT IMAGING | Age: 71
DRG: 871 | End: 2021-07-13
Payer: COMMERCIAL

## 2021-07-13 ENCOUNTER — APPOINTMENT (OUTPATIENT)
Dept: PHYSICAL THERAPY | Age: 71
End: 2021-07-13
Payer: COMMERCIAL

## 2021-07-13 LAB
ANION GAP SERPL CALCULATED.3IONS-SCNC: 11 MMOL/L (ref 3–16)
BASOPHILS ABSOLUTE: 0 K/UL (ref 0–0.2)
BASOPHILS RELATIVE PERCENT: 0.3 %
BUN BLDV-MCNC: 7 MG/DL (ref 7–20)
CALCIUM SERPL-MCNC: 8.3 MG/DL (ref 8.3–10.6)
CHLORIDE BLD-SCNC: 104 MMOL/L (ref 99–110)
CO2: 21 MMOL/L (ref 21–32)
CREAT SERPL-MCNC: 0.8 MG/DL (ref 0.6–1.2)
EOSINOPHILS ABSOLUTE: 0 K/UL (ref 0–0.6)
EOSINOPHILS RELATIVE PERCENT: 0.5 %
GFR AFRICAN AMERICAN: >60
GFR NON-AFRICAN AMERICAN: >60
GLUCOSE BLD-MCNC: 91 MG/DL (ref 70–99)
HCT VFR BLD CALC: 33.3 % (ref 36–48)
HEMOGLOBIN: 10.7 G/DL (ref 12–16)
LYMPHOCYTES ABSOLUTE: 0.8 K/UL (ref 1–5.1)
LYMPHOCYTES RELATIVE PERCENT: 7.7 %
MCH RBC QN AUTO: 30 PG (ref 26–34)
MCHC RBC AUTO-ENTMCNC: 32 G/DL (ref 31–36)
MCV RBC AUTO: 93.6 FL (ref 80–100)
MONOCYTES ABSOLUTE: 1.2 K/UL (ref 0–1.3)
MONOCYTES RELATIVE PERCENT: 11.5 %
NEUTROPHILS ABSOLUTE: 8.1 K/UL (ref 1.7–7.7)
NEUTROPHILS RELATIVE PERCENT: 80 %
PDW BLD-RTO: 15.5 % (ref 12.4–15.4)
PLATELET # BLD: 252 K/UL (ref 135–450)
PMV BLD AUTO: 7.8 FL (ref 5–10.5)
POTASSIUM SERPL-SCNC: 3.5 MMOL/L (ref 3.5–5.1)
RBC # BLD: 3.56 M/UL (ref 4–5.2)
SODIUM BLD-SCNC: 136 MMOL/L (ref 136–145)
WBC # BLD: 10.1 K/UL (ref 4–11)

## 2021-07-13 PROCEDURE — 74177 CT ABD & PELVIS W/CONTRAST: CPT

## 2021-07-13 PROCEDURE — 85025 COMPLETE CBC W/AUTO DIFF WBC: CPT

## 2021-07-13 PROCEDURE — 6370000000 HC RX 637 (ALT 250 FOR IP): Performed by: INTERNAL MEDICINE

## 2021-07-13 PROCEDURE — 2580000003 HC RX 258: Performed by: INTERNAL MEDICINE

## 2021-07-13 PROCEDURE — 6360000002 HC RX W HCPCS: Performed by: INTERNAL MEDICINE

## 2021-07-13 PROCEDURE — C1751 CATH, INF, PER/CENT/MIDLINE: HCPCS

## 2021-07-13 PROCEDURE — 80048 BASIC METABOLIC PNL TOTAL CA: CPT

## 2021-07-13 PROCEDURE — 6370000000 HC RX 637 (ALT 250 FOR IP): Performed by: PHYSICIAN ASSISTANT

## 2021-07-13 PROCEDURE — 36569 INSJ PICC 5 YR+ W/O IMAGING: CPT

## 2021-07-13 PROCEDURE — 2580000003 HC RX 258: Performed by: PHYSICIAN ASSISTANT

## 2021-07-13 PROCEDURE — 6360000004 HC RX CONTRAST MEDICATION: Performed by: INTERNAL MEDICINE

## 2021-07-13 PROCEDURE — 36415 COLL VENOUS BLD VENIPUNCTURE: CPT

## 2021-07-13 PROCEDURE — 02HV33Z INSERTION OF INFUSION DEVICE INTO SUPERIOR VENA CAVA, PERCUTANEOUS APPROACH: ICD-10-PCS | Performed by: HOSPITALIST

## 2021-07-13 PROCEDURE — 99233 SBSQ HOSP IP/OBS HIGH 50: CPT | Performed by: INTERNAL MEDICINE

## 2021-07-13 PROCEDURE — 1200000000 HC SEMI PRIVATE

## 2021-07-13 RX ORDER — LIDOCAINE HYDROCHLORIDE 10 MG/ML
5 INJECTION, SOLUTION EPIDURAL; INFILTRATION; INTRACAUDAL; PERINEURAL ONCE
Status: DISCONTINUED | OUTPATIENT
Start: 2021-07-13 | End: 2021-07-16 | Stop reason: HOSPADM

## 2021-07-13 RX ORDER — SODIUM CHLORIDE 0.9 % (FLUSH) 0.9 %
5-40 SYRINGE (ML) INJECTION EVERY 12 HOURS SCHEDULED
Status: DISCONTINUED | OUTPATIENT
Start: 2021-07-13 | End: 2021-07-16 | Stop reason: HOSPADM

## 2021-07-13 RX ORDER — ALPRAZOLAM 0.5 MG/1
0.5 TABLET ORAL 2 TIMES DAILY PRN
Status: DISCONTINUED | OUTPATIENT
Start: 2021-07-13 | End: 2021-07-16 | Stop reason: HOSPADM

## 2021-07-13 RX ORDER — SODIUM CHLORIDE 9 MG/ML
25 INJECTION, SOLUTION INTRAVENOUS PRN
Status: DISCONTINUED | OUTPATIENT
Start: 2021-07-13 | End: 2021-07-16 | Stop reason: HOSPADM

## 2021-07-13 RX ORDER — SODIUM CHLORIDE 0.9 % (FLUSH) 0.9 %
5-40 SYRINGE (ML) INJECTION PRN
Status: DISCONTINUED | OUTPATIENT
Start: 2021-07-13 | End: 2021-07-16 | Stop reason: HOSPADM

## 2021-07-13 RX ADMIN — CIPROFLOXACIN 400 MG: 2 INJECTION, SOLUTION INTRAVENOUS at 11:52

## 2021-07-13 RX ADMIN — STANDARDIZED SENNA CONCENTRATE AND DOCUSATE SODIUM 2 TABLET: 8.6; 5 TABLET ORAL at 09:58

## 2021-07-13 RX ADMIN — CIPROFLOXACIN 400 MG: 2 INJECTION, SOLUTION INTRAVENOUS at 00:00

## 2021-07-13 RX ADMIN — IOPAMIDOL 75 ML: 755 INJECTION, SOLUTION INTRAVENOUS at 17:52

## 2021-07-13 RX ADMIN — TRAZODONE HYDROCHLORIDE 150 MG: 50 TABLET ORAL at 20:29

## 2021-07-13 RX ADMIN — Medication 10 ML: at 09:58

## 2021-07-13 RX ADMIN — CIPROFLOXACIN 400 MG: 2 INJECTION, SOLUTION INTRAVENOUS at 23:01

## 2021-07-13 RX ADMIN — ONDANSETRON 4 MG: 2 INJECTION INTRAMUSCULAR; INTRAVENOUS at 19:24

## 2021-07-13 RX ADMIN — PANTOPRAZOLE SODIUM 40 MG: 40 TABLET, DELAYED RELEASE ORAL at 06:02

## 2021-07-13 RX ADMIN — GABAPENTIN 300 MG: 300 CAPSULE ORAL at 20:29

## 2021-07-13 RX ADMIN — MEROPENEM 1000 MG: 1 INJECTION, POWDER, FOR SOLUTION INTRAVENOUS at 16:02

## 2021-07-13 RX ADMIN — LEVOTHYROXINE SODIUM 150 MCG: 0.15 TABLET ORAL at 06:02

## 2021-07-13 RX ADMIN — LORAZEPAM 1 MG: 2 INJECTION INTRAMUSCULAR; INTRAVENOUS at 11:08

## 2021-07-13 RX ADMIN — SODIUM CHLORIDE 25 ML: 9 INJECTION, SOLUTION INTRAVENOUS at 11:51

## 2021-07-13 RX ADMIN — ACETAMINOPHEN 650 MG: 325 TABLET ORAL at 19:24

## 2021-07-13 RX ADMIN — GABAPENTIN 300 MG: 300 CAPSULE ORAL at 09:58

## 2021-07-13 RX ADMIN — LORAZEPAM 1 MG: 2 INJECTION INTRAMUSCULAR; INTRAVENOUS at 04:43

## 2021-07-13 RX ADMIN — ENOXAPARIN SODIUM 40 MG: 40 INJECTION SUBCUTANEOUS at 09:58

## 2021-07-13 RX ADMIN — MEROPENEM 1000 MG: 1 INJECTION, POWDER, FOR SOLUTION INTRAVENOUS at 20:30

## 2021-07-13 RX ADMIN — BUPROPION HYDROCHLORIDE 150 MG: 150 TABLET, EXTENDED RELEASE ORAL at 09:58

## 2021-07-13 RX ADMIN — ALPRAZOLAM 0.5 MG: 0.5 TABLET ORAL at 16:03

## 2021-07-13 RX ADMIN — Medication 10 ML: at 11:08

## 2021-07-13 RX ADMIN — SODIUM CHLORIDE 25 ML: 9 INJECTION, SOLUTION INTRAVENOUS at 16:02

## 2021-07-13 RX ADMIN — ASPIRIN 81 MG: 81 TABLET, CHEWABLE ORAL at 09:58

## 2021-07-13 RX ADMIN — ACETAMINOPHEN 650 MG: 325 TABLET ORAL at 11:07

## 2021-07-13 RX ADMIN — ACETAMINOPHEN 650 MG: 325 TABLET ORAL at 04:44

## 2021-07-13 ASSESSMENT — ENCOUNTER SYMPTOMS
CHOKING: 0
EYE DISCHARGE: 0
NAUSEA: 0
PHOTOPHOBIA: 0
DIARRHEA: 0
BLOOD IN STOOL: 0
COLOR CHANGE: 0
EYE REDNESS: 0
FACIAL SWELLING: 0
SHORTNESS OF BREATH: 0
TROUBLE SWALLOWING: 0
STRIDOR: 0
COUGH: 0
APNEA: 0
CHEST TIGHTNESS: 0
ABDOMINAL PAIN: 0
RHINORRHEA: 0

## 2021-07-13 ASSESSMENT — PAIN SCALES - GENERAL
PAINLEVEL_OUTOF10: 0
PAINLEVEL_OUTOF10: 7
PAINLEVEL_OUTOF10: 0
PAINLEVEL_OUTOF10: 4
PAINLEVEL_OUTOF10: 0

## 2021-07-13 NOTE — CARE COORDINATION
CM reviewed chart for d/c planning. ID following pt. Pt has had fevers. Pt currently on IV Cipro and IV Merrem. CM left vm for Afsaneh with authorGEN [phone] to check IV benefits. No therapy evals ordered at this time. CM will follow for d/c plan.     Malu Oquendo RN, BSN  [phone]

## 2021-07-13 NOTE — PROGRESS NOTES
[facility] PROGRESS NOTE    7/13/2021 5:05 PM        Name: Mono Valdez . Admitted: 7/8/2021  Primary Care Provider: Chio Schmid MD (Tel: [phone])      Subjective: Rekhasherley Claudiojabaris Pt admitted UTI  /  E coli bacteremia  T max 102    She is on Cipro and is still having fevers. Feels ok. No flank pain or dysuria.      Recent LTK at [facility] on 6/1/21      Reviewed interval ancillary notes    Current Medications  meropenem (MERREM) 1,000 mg in sodium chloride 0.9 % 100 mL IVPB (mini-bag), Q8H  ALPRAZolam (XANAX) tablet 0.5 mg, BID PRN  lidocaine PF 1 % injection 5 mL, Once  sodium chloride flush 0.9 % injection 5-40 mL, 2 times per day  sodium chloride flush 0.9 % injection 5-40 mL, PRN  0.9 % sodium chloride infusion, PRN  ciprofloxacin (CIPRO) IVPB 400 mg, Q12H  perflutren lipid microspheres (DEFINITY) injection 1.65 mg, ONCE PRN  sodium chloride flush 0.9 % injection 5-40 mL, 2 times per day  sodium chloride flush 0.9 % injection 5-40 mL, PRN  0.9 % sodium chloride infusion, PRN  enoxaparin (LOVENOX) injection 40 mg, Daily  ondansetron (ZOFRAN-ODT) disintegrating tablet 4 mg, Q8H PRN   Or  ondansetron (ZOFRAN) injection 4 mg, Q6H PRN  polyethylene glycol (GLYCOLAX) packet 17 g, Daily PRN  acetaminophen (TYLENOL) tablet 650 mg, Q6H PRN   Or  acetaminophen (TYLENOL) suppository 650 mg, Q6H PRN  aspirin chewable tablet 81 mg, Daily  buPROPion (WELLBUTRIN XL) extended release tablet 150 mg, QAM  traZODone (DESYREL) tablet 150 mg, Nightly  gabapentin (NEURONTIN) capsule 300 mg, BID  melatonin tablet 4.5 mg, Nightly PRN  pantoprazole (PROTONIX) tablet 40 mg, QAM AC  LORazepam (ATIVAN) injection 1 mg, Q6H PRN  sennosides-docusate sodium (SENOKOT-S) 8.6-50 MG tablet 2 tablet, Daily  levothyroxine (SYNTHROID) tablet 150 mcg, QAM AC        Objective:  /65   Pulse 88   Temp 99.6 °F (37.6 °C) (Oral)   Resp 18   Ht 5' 2.5\" (1.588 m)   Wt 169 lb (76.7 kg)   SpO2 98%   BMI 30.42 kg/m²   No intake or output data in the 24 hours ending 07/13/21 1705   Wt Readings from Last 3 Encounters:   07/13/21 169 lb (76.7 kg)   06/16/21 186 lb (84.4 kg)   06/09/21 186 lb (84.4 kg)       General appearance:  Appears comfortable, alert and pleasant   Eyes: Sclera clear. Pupils equal.  ENT: Moist oral mucosa. Trachea midline, no adenopathy. Cardiovascular: Regular rhythm, normal S1, S2. No murmur. No edema in lower extremities  Respiratory: Not using accessory muscles. Good inspiratory effort. Clear to auscultation bilaterally, no wheeze or crackles. GI: Abdomen soft, no tenderness, not distended, normal bowel sounds, no CVA tenderness   Musculoskeletal: No cyanosis in digits, neck supple  Neurology: CN 2-12 grossly intact. No speech or motor deficits  Psych: Normal affect. Alert and oriented in time, place and person  Skin: Warm, dry, normal turgor,  Left TKR scar is well healed and unremarkable     Labs and Tests:  CBC:   Recent Labs     07/11/21  0508 07/12/21  0535 07/13/21  0523   WBC 10.1 9.2 10.1   HGB 11.8* 10.6* 10.7*    230 252     BMP:    Recent Labs     07/11/21  0508 07/12/21  0535 07/13/21  0523    137 136   K 4.2 3.7 3.5    106 104   CO2 18* 21 21   BUN 11 8 7   CREATININE 0.9 1.0 0.8   GLUCOSE 80 104* 91     Hepatic:   No results for input(s): AST, ALT, ALB, BILITOT, ALKPHOS in the last 72 hours. CT abd:    Mild urothelial thickening of the ureters diffusely.  Recommend clinical   correlation for ascending urinary tract infection. 2. Unchanged subcutaneous fluid collection of the anterior abdominal wall at   midline measuring 9.7 cm.           Escherichia coli (1)    Antibiotic Interpretation WALESKA Status    ampicillin Resistant >=32 mcg/mL     ceFAZolin Sensitive <=4 mcg/mL      NOTE: Cefazolin should only be used for uncomplicated UTI         for E.coli or Klebsiella pneumoniae.    cefepime Sensitive <=0.12 mcg/mL     cefTRIAXone Sensitive <=0.25 mcg/mL     ciprofloxacin Sensitive <=0.25 mcg/mL     ertapenem Sensitive <=0.12 mcg/mL     gentamicin Sensitive <=1 mcg/mL     levofloxacin Sensitive 1 mcg/mL     nitrofurantoin Sensitive <=16 mcg/mL     piperacillin-tazobactam Sensitive <=4 mcg/mL     trimethoprim-sulfamethoxazole Resistant >=320 mcg/mL         ECHO   Irregular rhythm   Left ventricular systolic function is normal with ejection fraction   estimated at 60-65 %. No regional wall motion abnormalities are noted. Normal left ventricular diastolic filling pressure. There is mild concentric left ventricular hypertrophy. Aortic valve appears sclerotic but opens adequately. Mitral valve leaflets appear mildly thickened. Mild tricuspid regurgitation. Systolic pulmonary artery pressure (SPAP) is normal and estimated at 30 mmHg   (right atrial pressure 8 mmHg). Previous echo done 3013 - EF 55%  Problem List  Principal Problem:    Acute pyelonephritis  Active Problems:    Class 1 obesity due to excess calories with body mass index (BMI) of 31.0 to 31.9 in adult    Anxiety    Hypothyroidism    GERD (gastroesophageal reflux disease)    Anemia    Hyponatremia    Severe malnutrition (HCC)    Complicated UTI (urinary tract infection)    E coli bacteremia    High fever    History of depression  Resolved Problems:    * No resolved hospital problems. *       Assessment & Plan:   1. UTI:  On cipro. Cultures positive for ecoli. Discussed with ID-add Merrem and rescan abdomen to r/o abscess. 2. E coli Bacteremia: now on Merrem. Repeat BC drawn on 7/10-negative thus far. Echo ordered   3. Sepsis: resolved. 4. Hyponatremia:  Likely due to dehydration,  Now resolved. 5. Hypothyroidism:  Continue home therapy. TSH in range   6. Hypokalemia:  Resolved with replacement therapy   7. S/P Left TKA in June:  She scored 24/24 on therapy evaluations.   Ambulation was encouraged         Diet: ADULT DIET; Regular  Adult Oral Nutrition Supplement;  Low Calorie/High Protein Oral Supplement  Code:Full Code  DVT PPX      Mary Mclaughlin PA-C   7/13/2021 5:05 PM

## 2021-07-13 NOTE — CONSULTS
TRIMMABLE POWER MIDLINE PROCEDURE NOTE  Chart reviewed for allergies, diagnosis, labs, known contraindications, reason for line placement and planned length of treatment. Insertion procedure discussed with patient/family member. Informed consent not required for Midline placement. Three patient identifiers - Patient name,   and MRN -  completed &  confirmed verbally. Hat, mask and eye shield donned. Midline site scrubbed with Chloraprep  One-Step applicator  for 30 seconds x 1. Hand Hygiene  performed with 3% Chlorhexidine surgical scrub x1 min prior to  Sterile gloves, sterile gown being donned. Patient draped using maximal sterile barrier technique ( head to toe ). Midline site scrubbed a 2nd time with Chloraprep One-Step applicator x 30 sec. Vein located by [employer] and site marked with sterile pen. 1% Lidocaine 5 ml injected intradermal pre-insertion. Midline inserted. Positive brisk blood return obtained Midline flushed with 10 mls  0.9% Sterile Sodium Chloride. Midline flushes easily with no resistance. Valve placed on all lumens followed by Alcohol Swab Caps on end of each. Skin prep applied to site. Bio-patch in place. Catheter secured with non-sutured locking device per hospital protocol. Sterile Tegaderm applied over Midline site. Sterile field maintained during procedure. Positioning wire accounted for post procedure. Pt. Response to procedure, tolerated well. Appearance of site: Clean dry and intact without bleeding or edema. All edges of Tegaderm occlusive. Site marked with date and initials of RN placing line. Top 2 side rails in up position, call button in reach, RN notified of all of the above. A Trimmable Power Midline cut at 15 CM placed in the L UA  Basilic vein. 0 cm showing from insertion site.

## 2021-07-13 NOTE — PROGRESS NOTES
Infectious Diseases   Progress Note      Admission Date: 7/8/2021  Hospital Day: Hospital Day: 6   Attending: Darene Carrel, MD  Date of service: 7/13/2021     Chief complaint/ Reason for consult:     · High fevers  · E. coli bacteremia  · Complicated E. coli UTI  · Anterior abdominal wall midline fluid collection, measuring 9.7 cm, does not appear infected on clinical exam  · History left total knee arthroplasty on 6/1/2021    Microbiology:        I have reviewed allavailable micro lab data and cultures    · Blood culture (2/2) - collected on 7/8/2021: E. Coli    Susceptibility    Escherichia coli (2)    Antibiotic Interpretation WALESKA Status    ampicillin Resistant >=32 mcg/mL     ceFAZolin Sensitive <=4 mcg/mL     cefepime Sensitive <=0.12 mcg/mL     cefTRIAXone Sensitive <=0.25 mcg/mL     ciprofloxacin Sensitive 0.5 mcg/mL     ertapenem Sensitive <=0.12 mcg/mL     gentamicin Sensitive <=1 mcg/mL     levofloxacin Sensitive 0.5 mcg/mL     piperacillin-tazobactam Sensitive <=4 mcg/mL     trimethoprim-sulfamethoxazole Resistant >=320 mcg/mL           · Blood culture - collectedon 7/10/2021: Negative  · Urine culture  - collected on 7/8/2021: Greater than 100,000 CFU per mL of E. Coli    Susceptibility    Escherichia coli (1)    Antibiotic Interpretation WALESKA Status    ampicillin Resistant >=32 mcg/mL     ceFAZolin Sensitive <=4 mcg/mL      NOTE: Cefazolin should only be used for uncomplicated UTI         for E.coli or Klebsiella pneumoniae.    cefepime Sensitive <=0.12 mcg/mL     cefTRIAXone Sensitive <=0.25 mcg/mL     ciprofloxacin Sensitive <=0.25 mcg/mL     ertapenem Sensitive <=0.12 mcg/mL     gentamicin Sensitive <=1 mcg/mL     levofloxacin Sensitive 1 mcg/mL     nitrofurantoin Sensitive <=16 mcg/mL     piperacillin-tazobactam Sensitive <=4 mcg/mL     trimethoprim-sulfamethoxazole Resistant >=320 mcg/mL     Lab and Collection          Antibiotics and immunizations:       Current antibiotics: All antibiotics and their doses were reviewed by me    Recent Abx Admin                   ciprofloxacin (CIPRO) IVPB 400 mg (mg) 400 mg New Bag 07/13/21 1152     400 mg New Bag  0000                  Immunization History: All immunization history was reviewed by me today. Immunization History   Administered Date(s) Administered    Influenza Virus Vaccine 10/29/2015, 10/23/2018, 12/10/2019    Influenza, Quadv, IM, PF (6 mo and older Fluzone, Flulaval, Fluarix, and 3 yrs and older Afluria) 10/25/2016, 10/03/2017    Pneumococcal Conjugate 13-valent ([id number]) 11/28/2017    Pneumococcal Polysaccharide ([id number]) 12/11/2018    Tdap (Boostrix, Adacel) 01/17/2013    Zoster Live (Zostavax) 11/12/2013       Known drug allergies: All allergies were reviewed and updated    Allergies   Allergen Reactions    Macrobid [Nitrofurantoin] Other (See Comments)     Fever, myalgias    Codeine Nausea Only       Social history:     Social History:  All social andepidemiologic history was reviewed and updated by me today as needed. · Tobacco use:   reports that she has never smoked. She has never used smokeless tobacco.  · Alcohol use:   reports no history of alcohol use. · Currently lives in: [facility]  ·  reports no history of drug use. COVID VACCINATION AND LAB RESULT RECORDS:     Internal Administration   First Dose      Second Dose           Last COVID Lab No results found for: SARS-COV-2, SARS-COV-2 RNA, SARS-COV-2, SARS-COV-2, SARS-COV-2 BY PCR, SARS-COV-2, SARS-COV-2, SARS-COV-2         Assessment:     The patient is a 78 y.o. old female who  has a past medical history of Anal fissure (7/16/2013), Anxiety, Colon polyps, Depression, Fibroid uterus, Hypothyroidism, Morbid obesity ([address] and Osteopenia.  with following problems:    · High fevers-she continues to have high fevers  · E. coli bacteremia-currently on IV Cipro  · Complicated E. coli UTI-currently on IV Cipro  · Anterior abdominal wall midline fluid collection, measuring 9.7 cm, does not appear infected on clinical exam  · History left total knee arthroplasty on 6/1/2021  · History of weight loss surgery and midline abdominal incisional hernia repair  · History of depression  · Hypothyroidism  · Osteopenia  · Obesity Class 1 due to excess calorie intake : Body mass index is 31.61 kg/m². Discussion:      I had switched the patient to IV ciprofloxacin yesterday as she was having fevers despite IV ceftriaxone to rule out the possibility of drug fever. The patient continues to have fevers up to 102.8 despite being on IV ciprofloxacin. White cell count is 10,100 today. Repeat blood cultures from 7/10/2021 are negative so far    Serum creatinine 0.8 today. 2D echo was done yesterday. No valvular vegetations. Had a small loose stool today      Plan:     Diagnostic Workup:    · Repeat CT scan of abdomen and pelvis with IV contrast to rule out any renal abscess or other pathologies to explain fever  · Continue to follow  fever curve, WBC count and blood cultures. · Continue to monitor blood counts, liver and renal function. · Will check TSH level    Antimicrobials:    · Will continue IV Cipro 400 mg every 12 hours  · Will add IV meropenem 1 g every 8 hour for 12 gram-negative coverage given ongoing high fevers  · Continue probiotics twice daily  · We will follow up on the culture results and clinical progress and will make further recommendations accordingly. · Continue close vitals monitoring. · Maintain good glycemic control. · Fall precautions. Aspiration precautions. · Continue to watch for new fever or diarrhea. · DVT prophylaxis. · Discussed all above with patient and RN.   · Discussed with hospitalist      Drug Monitoring:    · Continue monitoring for antibiotic toxicity as follows: CBC, CMP, QTc interval  · Continue to watch for following: new or worsening fever, new hypotension, hives, lip swelling and redness or purulence at vascular access sites. I/v access Management:    · Continue to monitor i.v access sites for erythema, induration, discharge or tenderness. · As always, continue efforts to minimize tubes/lines/drains as clinically appropriate to reduce chances of line associated infections. Patient education and counseling:        · The patient was educated in detail about the side-effects of various antibiotics and things to watch for like new rashes, lip swelling, severe reaction, worsening diarrhea, break through fever etc.  · Discussed patient's condition and what to expect. All of the patient's questions were addressed in a satisfactory manner and patient verbalized understanding all instructions. Level of complexity of visit: High     Risk of Complications/Morbidity: High     · Illness(es)/ Infection present that pose threat to life/bodily function. · There is potential for severe exacerbation of infection/side effects of treatment. · Therapy requires intensive monitoring for antimicrobial agent toxicity. Thank you for involving me in the care of your patient. I will continue to follow. If you have anyadditional questions, please do not hesitate to contact me. Subjective: Interval history: Interval history was obtained from chart review and patient/ RN. The patient is on IV ciprofloxacin. She is having high fevers up to 1-2.8 despite ciprofloxacin. She is tolerating antibiotics okay     REVIEW OF SYSTEMS:      Review of Systems   Constitutional: Positive for fatigue and fever. Negative for chills, diaphoresis and unexpected weight change. HENT: Negative for congestion, ear discharge, ear pain, facial swelling, hearing loss, rhinorrhea and trouble swallowing. Eyes: Negative for photophobia, discharge, redness and visual disturbance. Respiratory: Negative for apnea, cough, choking, chest tightness, shortness of breath and stridor. Cardiovascular: Negative for chest pain and palpitations. Gastrointestinal: Negative for abdominal pain, blood in stool, diarrhea and nausea. Endocrine: Negative for polydipsia, polyphagia and polyuria. Genitourinary: Negative for difficulty urinating, dysuria, frequency, hematuria, menstrual problem and vaginal discharge. Musculoskeletal: Negative for arthralgias, joint swelling, myalgias and neck stiffness. Skin: Negative for color change and rash. Allergic/Immunologic: Negative for immunocompromised state. Neurological: Negative for dizziness, seizures, speech difficulty, light-headedness and headaches. Hematological: Negative for adenopathy. Psychiatric/Behavioral: Negative for agitation, hallucinations and suicidal ideas. Past Medical History: All past medical history reviewed today. Past Medical History:   Diagnosis Date    Anal fissure 7/16/2013    Anxiety     Colon polyps     Depression     Fibroid uterus     Hypothyroidism     Morbid obesity ([address]     Osteopenia        Past Surgical History: All past surgical history was reviewed today.     Past Surgical History:   Procedure Laterality Date    ABDOMINAL HERNIA REPAIR  2008    APPENDECTOMY  Age 27    CATARACT REMOVAL WITH IMPLANT Right 10/23/2017    with vitrectomy    CATARACT REMOVAL WITH IMPLANT Left 02/26/2018    with vitrectomy    COLONOSCOPY N/A 10/10/2018    COLONOSCOPY POLYPECTOMY SNARE/COLD BIOPSY performed by Nick Cuevas MD at [address] Right 8/26/2019    RIGHT L4 AND L5 TRANSFORAMINAL EPIDURAL STEROID INJECTION WITH FLUOROSCOPY performed by Merari Ernst MD at [address] INJECT OTHER PERPHRL NERV Right 10/21/2019    RIGHT PIRIFORMIS INJECTION WITH FLUOROSCOPY (02863) performed by Merari Ernst MD at [facility], TOTAL ABDOMINAL      ovaries out also   [address] Right 1/7/2019    RIGHT L3 AND L4 LUMBAR TRANSFORAMINAL WITH FLUOROSCOPY performed by Laura NUÑEZ Elissa Nino MD at [address] [place] [address] Right 5/15/2019    RIGHT L4 AND L5 TRANSFORAMINAL EPIDURAL STEROID INJECTION WITH FLUOROSCOPY performed by Miriam Hunt MD at [place] [address] ESOPHAGOGASTRODUODENOSCOPY TRANSORAL DIAGNOSTIC N/A 10/10/2018    EGD performed by Lady Ronan MD at [address]    Fibroids    TOTAL KNEE ARTHROPLASTY Left 6/1/2021    LEFT TOTAL KNEE ARTHROPLASTY performed by Soha Mercer MD at [facility] OR       Family History: All family history was reviewed today. Problem Relation Age of Onset    Diabetes Father         Type II    Diabetes Sister         Type II    Diabetes Brother         Type II    Breast Cancer Sister 58    Lung Cancer Mother         Smoker    Heart Disease Sister         Bicuspid aortic valve x2       Objective:       PHYSICAL EXAM:      Vitals:   Vitals:    07/13/21 0430 07/13/21 0600 07/13/21 0910 07/13/21 1108   BP:   (!) 105/52    Pulse:   91    Resp:   18    Temp:  102.5 °F (39.2 °C) 101.5 °F (38.6 °C) 101.5 °F (38.6 °C)   TempSrc:   Oral Oral   SpO2:   94%    Weight: 169 lb (76.7 kg)      Height:           Physical Exam  Vitals and nursing note reviewed. Constitutional:       General: She is not in acute distress. Appearance: She is well-developed. She is not diaphoretic. Comments: Appears tired. HENT:      Head: Normocephalic. Right Ear: External ear normal.      Left Ear: External ear normal.      Nose: Nose normal.   Eyes:      General: No scleral icterus. Right eye: No discharge. Left eye: No discharge. Conjunctiva/sclera: Conjunctivae normal.      Pupils: Pupils are equal, round, and reactive to light. Cardiovascular:      Rate and Rhythm: Normal rate and regular rhythm. Heart sounds: No murmur heard. No friction rub. Pulmonary:      Effort: Pulmonary effort is normal.      Breath sounds: No stridor. No wheezing or rales.    Chest:      Chest wall: No tenderness. Abdominal:      Palpations: Abdomen is soft. There is no mass. Tenderness: There is no abdominal tenderness. There is no guarding or rebound. Musculoskeletal:         General: No tenderness. Cervical back: Normal range of motion and neck supple. Lymphadenopathy:      Cervical: No cervical adenopathy. Skin:     General: Skin is warm and dry. Findings: No erythema or rash. Neurological:      Mental Status: She is alert and oriented to person, place, and time. Motor: No abnormal muscle tone. Psychiatric:         Judgment: Judgment normal.           Lines: All vascular access sites are healthy with no local erythema, discharge or tenderness. Intake and output:    No intake/output data recorded. Lab Data:   All available labs and old records have been reviewed by me. CBC:  Recent Labs     07/11/21  0508 07/12/21  0535 07/13/21  0523   WBC 10.1 9.2 10.1   RBC 3.95* 3.53* 3.56*   HGB 11.8* 10.6* 10.7*   HCT 38.0 33.0* 33.3*    230 252   MCV 96.2 93.5 93.6   MCH 30.0 30.0 30.0   MCHC 31.2 32.1 32.0   RDW 15.6* 15.5* 15.5*        BMP:  Recent Labs     07/11/21  0508 07/12/21  0535 07/13/21  0523    137 136   K 4.2 3.7 3.5    106 104   CO2 18* 21 21   BUN 11 8 7   CREATININE 0.9 1.0 0.8   CALCIUM 8.2* 8.1* 8.3   GLUCOSE 80 104* 91        Hepatic Function Panel:   Lab Results   Component Value Date    ALKPHOS 95 07/08/2021    ALT 10 07/08/2021    AST 42 07/08/2021    PROT 6.5 07/08/2021    PROT 6.5 01/08/2013    BILITOT 0.4 07/08/2021    BILIDIR 0.13 06/11/2013    IBILI 0.2 06/11/2013    LABALBU 2.5 07/09/2021       CPK: No results found for: CKTOTAL  ESR: No results found for: SEDRATE  CRP: No results found for: CRP        Imaging: All pertinent images and reports for the current visit were reviewed by me during this visit. CT ABDOMEN PELVIS W IV CONTRAST Additional Contrast? None   Final Result   1.  Mild urothelial thickening of the ureters diffusely. Recommend clinical   correlation for ascending urinary tract infection. 2. Unchanged subcutaneous fluid collection of the anterior abdominal wall at   midline measuring 9.7 cm. CT ABDOMEN PELVIS W IV CONTRAST Additional Contrast? None    (Results Pending)       Medications: All current and past medications were reviewed.      meropenem  1,000 mg Intravenous Q8H    ciprofloxacin  400 mg Intravenous Q12H    sodium chloride flush  5-40 mL Intravenous 2 times per day    enoxaparin  40 mg Subcutaneous Daily    aspirin  81 mg Oral Daily    buPROPion  150 mg Oral QAM    traZODone  150 mg Oral Nightly    gabapentin  300 mg Oral BID    pantoprazole  40 mg Oral QAM AC    sennosides-docusate sodium  2 tablet Oral Daily    levothyroxine  150 mcg Oral QAM AC        sodium chloride 25 mL (07/13/21 1151)       perflutren lipid microspheres, sodium chloride flush, sodium chloride, ondansetron **OR** ondansetron, polyethylene glycol, acetaminophen **OR** acetaminophen, melatonin, LORazepam      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    Major depressive disorder with single episode F32.9    Anxiety F41.9    Hypothyroidism E03.9    Family history of bicuspid aortic valve Z82.79    Osteopenia M85.80    GERD (gastroesophageal reflux disease) K21.9    Anal fissure K60.2    Psychophysiological insomnia F51.04    Anemia D64.9    Colon polyps K63.5    Osteoarthritis of left knee M17.12    Restless leg syndrome G25.81    Ganglion cyst of wrist, left M67.432    Neck pain, chronic M54.2, G89.29    Hypertriglyceridemia K33.5    Periumbilical mass C43.23    Coronary artery calcification I25.10, I25.84    Total knee replacement status, left Z96.652    Ileus,

## 2021-07-14 LAB
ANION GAP SERPL CALCULATED.3IONS-SCNC: 10 MMOL/L (ref 3–16)
BASOPHILS ABSOLUTE: 0 K/UL (ref 0–0.2)
BASOPHILS RELATIVE PERCENT: 0.2 %
BLOOD CULTURE, ROUTINE: NORMAL
BUN BLDV-MCNC: 6 MG/DL (ref 7–20)
CALCIUM SERPL-MCNC: 8 MG/DL (ref 8.3–10.6)
CHLORIDE BLD-SCNC: 101 MMOL/L (ref 99–110)
CO2: 23 MMOL/L (ref 21–32)
CREAT SERPL-MCNC: 0.9 MG/DL (ref 0.6–1.2)
CULTURE, BLOOD 2: NORMAL
EOSINOPHILS ABSOLUTE: 0 K/UL (ref 0–0.6)
EOSINOPHILS RELATIVE PERCENT: 0.3 %
GFR AFRICAN AMERICAN: >60
GFR NON-AFRICAN AMERICAN: >60
GLUCOSE BLD-MCNC: 98 MG/DL (ref 70–99)
HCT VFR BLD CALC: 30.5 % (ref 36–48)
HEMOGLOBIN: 9.8 G/DL (ref 12–16)
LYMPHOCYTES ABSOLUTE: 0.6 K/UL (ref 1–5.1)
LYMPHOCYTES RELATIVE PERCENT: 5.3 %
MCH RBC QN AUTO: 29.3 PG (ref 26–34)
MCHC RBC AUTO-ENTMCNC: 32 G/DL (ref 31–36)
MCV RBC AUTO: 91.7 FL (ref 80–100)
MONOCYTES ABSOLUTE: 0.9 K/UL (ref 0–1.3)
MONOCYTES RELATIVE PERCENT: 8.2 %
NEUTROPHILS ABSOLUTE: 9.5 K/UL (ref 1.7–7.7)
NEUTROPHILS RELATIVE PERCENT: 86 %
PDW BLD-RTO: 15.2 % (ref 12.4–15.4)
PLATELET # BLD: 338 K/UL (ref 135–450)
PMV BLD AUTO: 7.6 FL (ref 5–10.5)
POTASSIUM SERPL-SCNC: 3.2 MMOL/L (ref 3.5–5.1)
RBC # BLD: 3.33 M/UL (ref 4–5.2)
SODIUM BLD-SCNC: 134 MMOL/L (ref 136–145)
WBC # BLD: 11 K/UL (ref 4–11)

## 2021-07-14 PROCEDURE — 85025 COMPLETE CBC W/AUTO DIFF WBC: CPT

## 2021-07-14 PROCEDURE — 87088 URINE BACTERIA CULTURE: CPT

## 2021-07-14 PROCEDURE — 87186 SC STD MICRODIL/AGAR DIL: CPT

## 2021-07-14 PROCEDURE — 87205 SMEAR GRAM STAIN: CPT

## 2021-07-14 PROCEDURE — 6370000000 HC RX 637 (ALT 250 FOR IP): Performed by: INTERNAL MEDICINE

## 2021-07-14 PROCEDURE — 1200000000 HC SEMI PRIVATE

## 2021-07-14 PROCEDURE — 6360000002 HC RX W HCPCS: Performed by: INTERNAL MEDICINE

## 2021-07-14 PROCEDURE — 2500000003 HC RX 250 WO HCPCS: Performed by: NURSE PRACTITIONER

## 2021-07-14 PROCEDURE — 6370000000 HC RX 637 (ALT 250 FOR IP): Performed by: PHYSICIAN ASSISTANT

## 2021-07-14 PROCEDURE — 6360000002 HC RX W HCPCS: Performed by: NURSE PRACTITIONER

## 2021-07-14 PROCEDURE — 80048 BASIC METABOLIC PNL TOTAL CA: CPT

## 2021-07-14 PROCEDURE — 87070 CULTURE OTHR SPECIMN AEROBIC: CPT

## 2021-07-14 PROCEDURE — 2580000003 HC RX 258: Performed by: INTERNAL MEDICINE

## 2021-07-14 PROCEDURE — 2580000003 HC RX 258: Performed by: PHYSICIAN ASSISTANT

## 2021-07-14 PROCEDURE — 99233 SBSQ HOSP IP/OBS HIGH 50: CPT | Performed by: INTERNAL MEDICINE

## 2021-07-14 PROCEDURE — 10061 I&D ABSCESS COMP/MULTIPLE: CPT | Performed by: SURGERY

## 2021-07-14 PROCEDURE — APPNB30 APP NON BILLABLE TIME 0-30 MINS: Performed by: NURSE PRACTITIONER

## 2021-07-14 PROCEDURE — 36415 COLL VENOUS BLD VENIPUNCTURE: CPT

## 2021-07-14 RX ORDER — POTASSIUM CHLORIDE 20 MEQ/1
40 TABLET, EXTENDED RELEASE ORAL PRN
Status: DISCONTINUED | OUTPATIENT
Start: 2021-07-14 | End: 2021-07-16 | Stop reason: HOSPADM

## 2021-07-14 RX ORDER — LINEZOLID 2 MG/ML
600 INJECTION, SOLUTION INTRAVENOUS EVERY 12 HOURS
Status: DISCONTINUED | OUTPATIENT
Start: 2021-07-14 | End: 2021-07-16

## 2021-07-14 RX ORDER — LIDOCAINE HYDROCHLORIDE AND EPINEPHRINE 10; 10 MG/ML; UG/ML
20 INJECTION, SOLUTION INFILTRATION; PERINEURAL ONCE
Status: COMPLETED | OUTPATIENT
Start: 2021-07-14 | End: 2021-07-14

## 2021-07-14 RX ORDER — SODIUM CHLORIDE 9 MG/ML
INJECTION, SOLUTION INTRAVENOUS CONTINUOUS
Status: DISCONTINUED | OUTPATIENT
Start: 2021-07-14 | End: 2021-07-15

## 2021-07-14 RX ORDER — POTASSIUM CHLORIDE 7.45 MG/ML
10 INJECTION INTRAVENOUS PRN
Status: DISCONTINUED | OUTPATIENT
Start: 2021-07-14 | End: 2021-07-16 | Stop reason: HOSPADM

## 2021-07-14 RX ORDER — MORPHINE SULFATE 4 MG/ML
4 INJECTION, SOLUTION INTRAMUSCULAR; INTRAVENOUS ONCE
Status: COMPLETED | OUTPATIENT
Start: 2021-07-14 | End: 2021-07-14

## 2021-07-14 RX ADMIN — MEROPENEM 1000 MG: 1 INJECTION, POWDER, FOR SOLUTION INTRAVENOUS at 05:11

## 2021-07-14 RX ADMIN — TRAZODONE HYDROCHLORIDE 150 MG: 50 TABLET ORAL at 20:30

## 2021-07-14 RX ADMIN — GABAPENTIN 300 MG: 300 CAPSULE ORAL at 20:30

## 2021-07-14 RX ADMIN — ACETAMINOPHEN 650 MG: 325 TABLET ORAL at 11:58

## 2021-07-14 RX ADMIN — PANTOPRAZOLE SODIUM 40 MG: 40 TABLET, DELAYED RELEASE ORAL at 05:13

## 2021-07-14 RX ADMIN — STANDARDIZED SENNA CONCENTRATE AND DOCUSATE SODIUM 2 TABLET: 8.6; 5 TABLET ORAL at 08:55

## 2021-07-14 RX ADMIN — Medication 10 ML: at 10:28

## 2021-07-14 RX ADMIN — ACETAMINOPHEN 650 MG: 325 TABLET ORAL at 05:18

## 2021-07-14 RX ADMIN — LIDOCAINE HYDROCHLORIDE,EPINEPHRINE BITARTRATE 20 ML: 10; .01 INJECTION, SOLUTION INFILTRATION; PERINEURAL at 10:15

## 2021-07-14 RX ADMIN — POTASSIUM CHLORIDE 40 MEQ: 1500 TABLET, EXTENDED RELEASE ORAL at 19:30

## 2021-07-14 RX ADMIN — BUPROPION HYDROCHLORIDE 150 MG: 150 TABLET, EXTENDED RELEASE ORAL at 08:55

## 2021-07-14 RX ADMIN — GABAPENTIN 300 MG: 300 CAPSULE ORAL at 08:55

## 2021-07-14 RX ADMIN — ALPRAZOLAM 0.5 MG: 0.5 TABLET ORAL at 08:55

## 2021-07-14 RX ADMIN — Medication 10 ML: at 20:31

## 2021-07-14 RX ADMIN — MEROPENEM 1000 MG: 1 INJECTION, POWDER, FOR SOLUTION INTRAVENOUS at 17:17

## 2021-07-14 RX ADMIN — SODIUM CHLORIDE: 9 INJECTION, SOLUTION INTRAVENOUS at 14:19

## 2021-07-14 RX ADMIN — MORPHINE SULFATE 4 MG: 4 INJECTION, SOLUTION INTRAMUSCULAR; INTRAVENOUS at 10:14

## 2021-07-14 RX ADMIN — LINEZOLID 600 MG: 600 INJECTION, SOLUTION INTRAVENOUS at 14:22

## 2021-07-14 RX ADMIN — LEVOTHYROXINE SODIUM 150 MCG: 0.15 TABLET ORAL at 05:12

## 2021-07-14 ASSESSMENT — PAIN SCALES - GENERAL
PAINLEVEL_OUTOF10: 0
PAINLEVEL_OUTOF10: 3
PAINLEVEL_OUTOF10: 3
PAINLEVEL_OUTOF10: 4
PAINLEVEL_OUTOF10: 0
PAINLEVEL_OUTOF10: 4
PAINLEVEL_OUTOF10: 0
PAINLEVEL_OUTOF10: 4
PAINLEVEL_OUTOF10: 0
PAINLEVEL_OUTOF10: 0

## 2021-07-14 ASSESSMENT — ENCOUNTER SYMPTOMS
FACIAL SWELLING: 0
DIARRHEA: 0
STRIDOR: 0
TROUBLE SWALLOWING: 0
PHOTOPHOBIA: 0
EYE DISCHARGE: 0
CHOKING: 0
APNEA: 0
RHINORRHEA: 0
CHEST TIGHTNESS: 0
ABDOMINAL PAIN: 1
BLOOD IN STOOL: 0
SHORTNESS OF BREATH: 0
COLOR CHANGE: 0
EYE REDNESS: 0
NAUSEA: 0
COUGH: 0

## 2021-07-14 ASSESSMENT — PAIN DESCRIPTION - LOCATION
LOCATION: ABDOMEN
LOCATION: ABDOMEN

## 2021-07-14 ASSESSMENT — PAIN DESCRIPTION - PAIN TYPE
TYPE: ACUTE PAIN
TYPE: ACUTE PAIN

## 2021-07-14 NOTE — PLAN OF CARE
Problem: Falls - Risk of:  Goal: Will remain free from falls  Description: Will remain free from falls  Outcome: Ongoing  Note: Pt is wearing the fall bracelet, S.A.F.E. sign is posted outside door, and yellow blanket is on the bed. Pt informed of fall risks, verbalizes understanding, and agrees to ask for help to ambulate. Will monitor.       Problem: Pain:  Goal: Pain level will decrease  Description: Pain level will decrease  Outcome: Ongoing

## 2021-07-14 NOTE — PROGRESS NOTES
[facility] PROGRESS NOTE    7/14/2021 8:04 AM        Name: Marlen Sifuentes . Admitted: 7/8/2021  Primary Care Provider: Jennifer Mathis MD (Tel: [phone])      Subjective: Scott Smyth Pt admitted UTI  /  E coli bacteremia  T max 102    She is on Cipro and Merrem. Repeat CT revealed stranding of abdominal wall fluid collection.        Reviewed interval ancillary notes    Current Medications  potassium chloride (KLOR-CON M) extended release tablet 40 mEq, PRN   Or  potassium bicarb-citric acid (EFFER-K) effervescent tablet 40 mEq, PRN   Or  potassium chloride 10 mEq/100 mL IVPB (Peripheral Line), PRN  meropenem (MERREM) 1,000 mg in sodium chloride 0.9 % 100 mL IVPB (mini-bag), Q8H  ALPRAZolam (XANAX) tablet 0.5 mg, BID PRN  lidocaine PF 1 % injection 5 mL, Once  sodium chloride flush 0.9 % injection 5-40 mL, 2 times per day  sodium chloride flush 0.9 % injection 5-40 mL, PRN  0.9 % sodium chloride infusion, PRN  ciprofloxacin (CIPRO) IVPB 400 mg, Q12H  perflutren lipid microspheres (DEFINITY) injection 1.65 mg, ONCE PRN  sodium chloride flush 0.9 % injection 5-40 mL, 2 times per day  sodium chloride flush 0.9 % injection 5-40 mL, PRN  0.9 % sodium chloride infusion, PRN  enoxaparin (LOVENOX) injection 40 mg, Daily  ondansetron (ZOFRAN-ODT) disintegrating tablet 4 mg, Q8H PRN   Or  ondansetron (ZOFRAN) injection 4 mg, Q6H PRN  polyethylene glycol (GLYCOLAX) packet 17 g, Daily PRN  acetaminophen (TYLENOL) tablet 650 mg, Q6H PRN   Or  acetaminophen (TYLENOL) suppository 650 mg, Q6H PRN  aspirin chewable tablet 81 mg, Daily  buPROPion (WELLBUTRIN XL) extended release tablet 150 mg, QAM  traZODone (DESYREL) tablet 150 mg, Nightly  gabapentin (NEURONTIN) capsule 300 mg, BID  melatonin tablet 4.5 mg, Nightly PRN  pantoprazole (PROTONIX) tablet 40 mg, QAM AC  LORazepam (ATIVAN) injection 1 mg, Q6H PRN  sennosides-docusate sodium (SENOKOT-S) 8.6-50 MG tablet 2 tablet, Daily  levothyroxine (SYNTHROID) tablet 150 mcg, QAM AC        Objective:  BP (!) 104/59   Pulse 92   Temp 101.7 °F (38.7 °C) (Oral)   Resp 18   Ht 5' 2.5\" (1.588 m)   Wt 169 lb (76.7 kg)   SpO2 94%   BMI 30.42 kg/m²   No intake or output data in the 24 hours ending 07/14/21 0804   Wt Readings from Last 3 Encounters:   07/13/21 169 lb (76.7 kg)   06/16/21 186 lb (84.4 kg)   06/09/21 186 lb (84.4 kg)       General appearance:  Appears comfortable, alert and pleasant   Eyes: Sclera clear. Pupils equal.  ENT: Moist oral mucosa. Trachea midline, no adenopathy. Cardiovascular: Regular rhythm, normal S1, S2. No murmur. No edema in lower extremities  Respiratory: Not using accessory muscles. Good inspiratory effort. Clear to auscultation bilaterally, no wheeze or crackles. GI: Abdomen soft, no tenderness, not distended, normal bowel sounds, no CVA tenderness   Musculoskeletal: No cyanosis in digits, neck supple  Neurology: CN 2-12 grossly intact. No speech or motor deficits  Psych: Normal affect. Alert and oriented in time, place and person  Skin: cellulitis abdominal wall    Labs and Tests:  CBC:   Recent Labs     07/12/21  0535 07/13/21  0523 07/14/21  0459   WBC 9.2 10.1 11.0   HGB 10.6* 10.7* 9.8*    252 338     BMP:    Recent Labs     07/12/21  0535 07/13/21  0523 07/14/21  0459    136 134*   K 3.7 3.5 3.2*    104 101   CO2 21 21 23   BUN 8 7 6*   CREATININE 1.0 0.8 0.9   GLUCOSE 104* 91 98     Hepatic:   No results for input(s): AST, ALT, ALB, BILITOT, ALKPHOS in the last 72 hours. CT abd:    Mild urothelial thickening of the ureters diffusely.  Recommend clinical   correlation for ascending urinary tract infection.    2. Unchanged subcutaneous fluid collection of the anterior abdominal wall at   midline measuring 9.7 cm.           Escherichia coli (1)    Antibiotic Interpretation WALESKA Status    ampicillin Resistant >=32 mcg/mL     ceFAZolin Sensitive <=4 mcg/mL      NOTE: Cefazolin should only be used for uncomplicated UTI         for E.coli or Klebsiella pneumoniae. cefepime Sensitive <=0.12 mcg/mL     cefTRIAXone Sensitive <=0.25 mcg/mL     ciprofloxacin Sensitive <=0.25 mcg/mL     ertapenem Sensitive <=0.12 mcg/mL     gentamicin Sensitive <=1 mcg/mL     levofloxacin Sensitive 1 mcg/mL     nitrofurantoin Sensitive <=16 mcg/mL     piperacillin-tazobactam Sensitive <=4 mcg/mL     trimethoprim-sulfamethoxazole Resistant >=320 mcg/mL         ECHO   Irregular rhythm   Left ventricular systolic function is normal with ejection fraction   estimated at 60-65 %. No regional wall motion abnormalities are noted. Normal left ventricular diastolic filling pressure. There is mild concentric left ventricular hypertrophy. Aortic valve appears sclerotic but opens adequately. Mitral valve leaflets appear mildly thickened. Mild tricuspid regurgitation. Systolic pulmonary artery pressure (SPAP) is normal and estimated at 30 mmHg   (right atrial pressure 8 mmHg). Previous echo done 3013 - EF 55%  Problem List  Principal Problem:    Acute pyelonephritis  Active Problems:    Class 1 obesity due to excess calories with body mass index (BMI) of 31.0 to 31.9 in adult    Anxiety    Hypothyroidism    GERD (gastroesophageal reflux disease)    Anemia    Hyponatremia    Severe malnutrition (HCC)    Complicated UTI (urinary tract infection)    E coli bacteremia    High fever    History of depression  Resolved Problems:    * No resolved hospital problems. *       Assessment & Plan:   1. UTI:  On cipro and Merrem. 2. E coli Bacteremia: repeat cultures negative to date   3. Abdominal wall abscess-suspect this is etiology of persistent revers. Keep NPO, consult surgery. 4. Hypokalemia-replace, check Mg.   5. Hypothyroidism:  Continue home therapy. TSH in range    6. S/P Left TKA in June:  She scored 24/24 on therapy evaluations.   Ambulation was encouraged         Diet: Diet NPO  Code:Full Code  DVT PPX      Claudean Bogus, PA-C   7/14/2021 8:04 AM

## 2021-07-14 NOTE — PROGRESS NOTES
[address]  Requested to check home infusion benefits. Referral to [facility] [phone] to run benefits per payer. [facility] will assign home care agency. Discharge planner notified.

## 2021-07-14 NOTE — CARE COORDINATION
TERRANCE contacted Afsaneh wallace/Toan who stated she did not receive any information or fax yesterday to check this pt's IV benefits. SW provided information over the phone. Jose De Jesus Oxana will run benefits and informed SW of benefit information. Addendum:    Patient's IV benefits. .. Zyvox = $5/week and $4/day  Mirum = $5/week and $4/day    [facility] can accept if IV meds are required at discharge.       Electronically signed by ZULEYKA Howe, RAMU on 7/14/2021 at 2:44 PM

## 2021-07-14 NOTE — PROGRESS NOTES
Infectious Diseases   Progress Note      Admission Date: 7/8/2021  Hospital Day: Hospital Day: 7   Attending: Frances Vela MD  Date of service: 7/14/2021     Chief complaint/ Reason for consult:     · High fevers  · E. coli bacteremia  · Complicated E. coli UTI  · Anterior abdominal wall midline fluid collection, measuring 9.7 cm, does not appear infected on clinical exam  · History left total knee arthroplasty on 6/1/2021    Microbiology:        I have reviewed allavailable micro lab data and cultures    · Blood culture (2/2) - collected on 7/8/2021: E. Coli    Susceptibility    Escherichia coli (2)    Antibiotic Interpretation WALESKA Status    ampicillin Resistant >=32 mcg/mL     ceFAZolin Sensitive <=4 mcg/mL     cefepime Sensitive <=0.12 mcg/mL     cefTRIAXone Sensitive <=0.25 mcg/mL     ciprofloxacin Sensitive 0.5 mcg/mL     ertapenem Sensitive <=0.12 mcg/mL     gentamicin Sensitive <=1 mcg/mL     levofloxacin Sensitive 0.5 mcg/mL     piperacillin-tazobactam Sensitive <=4 mcg/mL     trimethoprim-sulfamethoxazole Resistant >=320 mcg/mL           · Blood culture - collectedon 7/10/2021: Negative  · Urine culture  - collected on 7/8/2021: Greater than 100,000 CFU per mL of E. Coli    Susceptibility    Escherichia coli (1)    Antibiotic Interpretation WALESKA Status    ampicillin Resistant >=32 mcg/mL     ceFAZolin Sensitive <=4 mcg/mL      NOTE: Cefazolin should only be used for uncomplicated UTI         for E.coli or Klebsiella pneumoniae.    cefepime Sensitive <=0.12 mcg/mL     cefTRIAXone Sensitive <=0.25 mcg/mL     ciprofloxacin Sensitive <=0.25 mcg/mL     ertapenem Sensitive <=0.12 mcg/mL     gentamicin Sensitive <=1 mcg/mL     levofloxacin Sensitive 1 mcg/mL     nitrofurantoin Sensitive <=16 mcg/mL     piperacillin-tazobactam Sensitive <=4 mcg/mL     trimethoprim-sulfamethoxazole Resistant >=320 mcg/mL     Lab and Collection          Antibiotics and immunizations:       Current antibiotics: All antibiotics and their doses were reviewed by me    Recent Abx Admin                   meropenem (MERREM) 1,000 mg in sodium chloride 0.9 % 100 mL IVPB (mini-bag) (mg) 1,000 mg New Bag 07/14/21 0511     1,000 mg New Bag 07/13/21 2030     1,000 mg New Bag  1602    ciprofloxacin (CIPRO) IVPB 400 mg (mg) 400 mg New Bag 07/13/21 2301     400 mg New Bag  1152                  Immunization History: All immunization history was reviewed by me today. Immunization History   Administered Date(s) Administered    Influenza Virus Vaccine 10/29/2015, 10/23/2018, 12/10/2019    Influenza, Quadv, IM, PF (6 mo and older Fluzone, Flulaval, Fluarix, and 3 yrs and older Afluria) 10/25/2016, 10/03/2017    Pneumococcal Conjugate 13-valent ([id number]) 11/28/2017    Pneumococcal Polysaccharide ([id number]) 12/11/2018    Tdap (Boostrix, Adacel) 01/17/2013    Zoster Live (Zostavax) 11/12/2013       Known drug allergies: All allergies were reviewed and updated    Allergies   Allergen Reactions    Macrobid [Nitrofurantoin] Other (See Comments)     Fever, myalgias    Codeine Nausea Only       Social history:     Social History:  All social andepidemiologic history was reviewed and updated by me today as needed. · Tobacco use:   reports that she has never smoked. She has never used smokeless tobacco.  · Alcohol use:   reports no history of alcohol use. · Currently lives in: [facility]  ·  reports no history of drug use. COVID VACCINATION AND LAB RESULT RECORDS:     Internal Administration   First Dose      Second Dose           Last COVID Lab No results found for: SARS-COV-2, SARS-COV-2 RNA, SARS-COV-2, SARS-COV-2, SARS-COV-2 BY PCR, SARS-COV-2, SARS-COV-2, SARS-COV-2         Assessment:     The patient is a 78 y.o. old female who  has a past medical history of Anal fissure (7/16/2013), Anxiety, Colon polyps, Depression, Fibroid uterus, Hypothyroidism, Morbid obesity ([address] and Osteopenia.  with following problems:    · High fevers-looks like from abdominal wall abscess  · E. coli bacteremia-currently covered with meropenem, improving  · Complicated E. coli UTI-covered with meropenem and Cipro  · Anterior abdominal wall midline fluid collection, measuring 9.7 cm -  repeat CT scan shows worsening abscess  · History left total knee arthroplasty on 6/1/2021  · History of weight loss surgery and midline abdominal incisional hernia repair  · History of depression  · Hypothyroidism  · Osteopenia  · Obesity Class 1 due to excess calorie intake : Body mass index is 31.61 kg/m². Discussion:      The patient is on IV ciprofloxacin. I had added empiric meropenem yesterday due to her ongoing fevers    I have ordered a repeat CT scan of abdomen pelvis with IV contrast.  Although patient does not have much complaints here, area concerning for an abscess according to the CT report with increasing wall thickening. No evidence of renal abscess on CT scan      Serum creatinine 0.9 today. White cell count is 11,000. Plan:     Diagnostic Workup:      · Continue to follow  fever curve, WBC count and blood cultures. · Continue to monitor blood counts, liver and renal function. Antimicrobials:    · Recommend general surgery consult and drainage of the abdominal abscess. Evaluate for cultures  · We will stop empiric ciprofloxacin today  · We will leave her on empiric IV meropenem 1 g every 8 hour for now  · We will add empiric MRSA coverage with linezolid 600 mg every 12 hour  · Continue to watch for new fevers  · We will follow up on the culture results and clinical progress and will make further recommendations accordingly. · Continue close vitals monitoring. · Maintain good glycemic control. · Fall precautions. Aspiration precautions. · Continue to watch for new fever or diarrhea. · DVT prophylaxis. · Discussed all above with patient and RN.       Drug Monitoring:    · Continue monitoring for antibiotic toxicity as follows: CBC, CMP   · Continue to watch for following: new or worsening fever, new hypotension, hives, lip swelling and redness or purulence at vascular access sites. I/v access Management:    · Continue to monitor i.v access sites for erythema, induration, discharge or tenderness. · As always, continue efforts to minimize tubes/lines/drains as clinically appropriate to reduce chances of line associated infections. Patient education and counseling:        · The patient was educated in detail about the side-effects of various antibiotics and things to watch for like new rashes, lip swelling, severe reaction, worsening diarrhea, break through fever etc.  · Discussed patient's condition and what to expect. All of the patient's questions were addressed in a satisfactory manner and patient verbalized understanding all instructions. Level of complexity of visit: High     Risk of Complications/Morbidity: High     · Illness(es)/ Infection present that pose threat to life/bodily function. · There is potential for severe exacerbation of infection/side effects of treatment. · Therapy requires intensive monitoring for antimicrobial agent toxicity. Thank you for involving me in the care of your patient. I will continue to follow. If you have anyadditional questions, please do not hesitate to contact me. Subjective: Interval history: Interval history was obtained from chart review and patient/ RN. The patient is on IV ciprofloxacin as well as IV meropenem now. Unfortunately, she continues to spike fevers up to 101.7. She has undergone abdominal wall abscess drainage today     REVIEW OF SYSTEMS:      Review of Systems   Constitutional: Positive for fatigue and fever. Negative for chills, diaphoresis and unexpected weight change. HENT: Negative for congestion, ear discharge, ear pain, facial swelling, hearing loss, rhinorrhea and trouble swallowing.     Eyes: Negative for photophobia, discharge, redness and visual disturbance. Respiratory: Negative for apnea, cough, choking, chest tightness, shortness of breath and stridor. Cardiovascular: Negative for chest pain and palpitations. Gastrointestinal: Positive for abdominal pain (Some postop pain at the abdominal wall abscess   drainage site). Negative for blood in stool, diarrhea and nausea. Endocrine: Negative for polydipsia, polyphagia and polyuria. Genitourinary: Negative for difficulty urinating, dysuria, frequency, hematuria, menstrual problem and vaginal discharge. Musculoskeletal: Negative for arthralgias, joint swelling, myalgias and neck stiffness. Skin: Negative for color change and rash. Allergic/Immunologic: Negative for immunocompromised state. Neurological: Negative for dizziness, seizures, speech difficulty, light-headedness and headaches. Hematological: Negative for adenopathy. Psychiatric/Behavioral: Negative for agitation, hallucinations and suicidal ideas. Past Medical History: All past medical history reviewed today. Past Medical History:   Diagnosis Date    Anal fissure 7/16/2013    Anxiety     Colon polyps     Depression     Fibroid uterus     Hypothyroidism     Morbid obesity ([address]     Osteopenia        Past Surgical History: All past surgical history was reviewed today.     Past Surgical History:   Procedure Laterality Date    ABDOMINAL HERNIA REPAIR  2008    APPENDECTOMY  Age 27    CATARACT REMOVAL WITH IMPLANT Right 10/23/2017    with vitrectomy    CATARACT REMOVAL WITH IMPLANT Left 02/26/2018    with vitrectomy    COLONOSCOPY N/A 10/10/2018    COLONOSCOPY POLYPECTOMY SNARE/COLD BIOPSY performed by Genevieve Redmond MD at [address]  Right 8/26/2019    RIGHT L4 AND L5 TRANSFORAMINAL EPIDURAL STEROID INJECTION WITH FLUOROSCOPY performed by Michelle Cameron MD at [address]. INJECT OTHER PERPHRL NERV Right 10/21/2019    RIGHT PIRIFORMIS INJECTION WITH FLUOROSCOPY (47324) performed by Brendan Young MD at [facility] , TOTAL ABDOMINAL      ovaries out also   [address] Right 1/7/2019    RIGHT L3 AND L4 LUMBAR TRANSFORAMINAL WITH FLUOROSCOPY performed by Brendan Young MD at [facility] Right 5/15/2019    RIGHT L4 AND L5 TRANSFORAMINAL EPIDURAL STEROID INJECTION WITH FLUOROSCOPY performed by Brendan Young MD at [facility] [address] ESOPHAGOGASTRODUODENOSCOPY TRANSORAL DIAGNOSTIC N/A 10/10/2018    EGD performed by Denis Rodriguez MD at [facility]    Fibroids    TOTAL KNEE ARTHROPLASTY Left 6/1/2021    LEFT TOTAL KNEE ARTHROPLASTY performed by Aden Oneill MD at [address] OR       Family History: All family history was reviewed today. Problem Relation Age of Onset    Diabetes Father         Type II    Diabetes Sister         Type II    Diabetes Brother         Type II    Breast Cancer Sister 58    Lung Cancer Mother         Smoker    Heart Disease Sister         Bicuspid aortic valve x2       Objective:       PHYSICAL EXAM:      Vitals:   Vitals:    07/14/21 0100 07/14/21 0456 07/14/21 0845 07/14/21 1057   BP: 115/64 (!) 104/59 (!) 92/54 (!) 96/56   Pulse: 88 92 80 76   Resp: 18 18 18 16   Temp: 98.8 °F (37.1 °C) 101.7 °F (38.7 °C) 99.6 °F (37.6 °C) 99 °F (37.2 °C)   TempSrc: Oral Oral Oral Oral   SpO2: 98% 94% 96% 91%   Weight:       Height:           Physical Exam  Vitals and nursing note reviewed. Constitutional:       General: She is not in acute distress. Appearance: She is well-developed. She is not diaphoretic. HENT:      Head: Normocephalic. Right Ear: External ear normal.      Left Ear: External ear normal.      Nose: Nose normal.   Eyes:      General: No scleral icterus. Right eye: No discharge. Left eye: No discharge.       Conjunctiva/sclera: Conjunctivae normal.      Pupils: Pupils are equal, round, and reactive to light. Cardiovascular:      Rate and Rhythm: Normal rate and regular rhythm. Heart sounds: No murmur heard. No friction rub. Pulmonary:      Effort: Pulmonary effort is normal.      Breath sounds: No stridor. No wheezing or rales. Chest:      Chest wall: No tenderness. Abdominal:      Palpations: Abdomen is soft. There is no mass. Tenderness: There is abdominal tenderness. There is no guarding or rebound. Comments: Abdominal wall abscess drainage site is noted   Musculoskeletal:         General: No tenderness. Cervical back: Normal range of motion and neck supple. Lymphadenopathy:      Cervical: No cervical adenopathy. Skin:     General: Skin is warm and dry. Findings: No erythema or rash. Neurological:      Mental Status: She is alert and oriented to person, place, and time. Motor: No abnormal muscle tone. Psychiatric:         Judgment: Judgment normal.              Lines: All vascular access sites are healthy with no local erythema, discharge or tenderness. Intake and output:    No intake/output data recorded. Lab Data:   All available labs and old records have been reviewed by me.     CBC:  Recent Labs     07/12/21  0535 07/13/21  0523 07/14/21  0459   WBC 9.2 10.1 11.0   RBC 3.53* 3.56* 3.33*   HGB 10.6* 10.7* 9.8*   HCT 33.0* 33.3* 30.5*    252 338   MCV 93.5 93.6 91.7   MCH 30.0 30.0 29.3   MCHC 32.1 32.0 32.0   RDW 15.5* 15.5* 15.2        BMP:  Recent Labs     07/12/21  0535 07/13/21  0523 07/14/21  0459    136 134*   K 3.7 3.5 3.2*    104 101   CO2 21 21 23   BUN 8 7 6*   CREATININE 1.0 0.8 0.9   CALCIUM 8.1* 8.3 8.0*   GLUCOSE 104* 91 98        Hepatic Function Panel:   Lab Results   Component Value Date    ALKPHOS 95 07/08/2021    ALT 10 07/08/2021    AST 42 07/08/2021    PROT 6.5 07/08/2021    PROT 6.5 01/08/2013    BILITOT 0.4 07/08/2021    BILIDIR 0.13 06/11/2013    IBILI 0.2 06/11/2013    LABALBU 2.5 07/09/2021       CPK: No ALPRAZolam, sodium chloride flush, sodium chloride, perflutren lipid microspheres, sodium chloride flush, sodium chloride, ondansetron **OR** ondansetron, polyethylene glycol, acetaminophen **OR** acetaminophen, melatonin, LORazepam      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    Major depressive disorder with single episode F32.9    Anxiety F41.9    Hypothyroidism E03.9    Family history of bicuspid aortic valve Z82.79    Osteopenia M85.80    GERD (gastroesophageal reflux disease) K21.9    Anal fissure K60.2    Psychophysiological insomnia F51.04    Anemia D64.9    Colon polyps K63.5    Osteoarthritis of left knee M17.12    Restless leg syndrome G25.81    Ganglion cyst of wrist, left M67.432    Neck pain, chronic M54.2, G89.29    Hypertriglyceridemia U80.7    Periumbilical mass R52.36    Coronary artery calcification I25.10, I25.84    Total knee replacement status, left Z96.652    Ileus, postoperative (HCC) K91.89, K56.7    Acute pyelonephritis N10    Hyponatremia E87.1    Severe malnutrition (HCC) Z94    Complicated UTI (urinary tract infection) N39.0    E coli bacteremia R78.81, B96.20    High fever R50.9    History of depression Z86.59    Abdominal wall abscess L02.211       Please note that this chart was generated using Dragon dictation software. Although every effort was made to ensure the accuracy of this automated transcription, some errors in transcription may have occurred inadvertently. If you may need any clarification, please do not hesitate to contact me through EPIC or at the phone number provided below with my electronic signature.   Any pictures or media included in this note were obtained after taking informed verbal consent from the patient and with their approval to include those in the patient's medical record.     Jovanni Arthur MD, MPH  7/14/2021 , 11:39 AM   [facility]   [address] [place] [address], [facility] [address] [facility], [address] [place] [address]  Office: [phone]  Fax: [phone]  Clinic days:  Tuesday & Thursday

## 2021-07-15 ENCOUNTER — HOSPITAL ENCOUNTER (OUTPATIENT)
Dept: PHYSICAL THERAPY | Age: 71
Setting detail: THERAPIES SERIES
Discharge: HOME | End: 2021-07-15
Payer: COMMERCIAL

## 2021-07-15 LAB
ANION GAP SERPL CALCULATED.3IONS-SCNC: 9 MMOL/L (ref 3–16)
BASOPHILS ABSOLUTE: 0 K/UL (ref 0–0.2)
BASOPHILS RELATIVE PERCENT: 0.2 %
BUN BLDV-MCNC: 6 MG/DL (ref 7–20)
CALCIUM SERPL-MCNC: 7.9 MG/DL (ref 8.3–10.6)
CHLORIDE BLD-SCNC: 102 MMOL/L (ref 99–110)
CO2: 25 MMOL/L (ref 21–32)
CREAT SERPL-MCNC: 0.8 MG/DL (ref 0.6–1.2)
EOSINOPHILS ABSOLUTE: 0.1 K/UL (ref 0–0.6)
EOSINOPHILS RELATIVE PERCENT: 0.8 %
GFR AFRICAN AMERICAN: >60
GFR NON-AFRICAN AMERICAN: >60
GLUCOSE BLD-MCNC: 124 MG/DL (ref 70–99)
HCT VFR BLD CALC: 29.3 % (ref 36–48)
HEMOGLOBIN: 9.6 G/DL (ref 12–16)
LYMPHOCYTES ABSOLUTE: 0.6 K/UL (ref 1–5.1)
LYMPHOCYTES RELATIVE PERCENT: 7 %
MCH RBC QN AUTO: 30.1 PG (ref 26–34)
MCHC RBC AUTO-ENTMCNC: 32.7 G/DL (ref 31–36)
MCV RBC AUTO: 92.3 FL (ref 80–100)
MONOCYTES ABSOLUTE: 0.4 K/UL (ref 0–1.3)
MONOCYTES RELATIVE PERCENT: 5 %
NEUTROPHILS ABSOLUTE: 7.7 K/UL (ref 1.7–7.7)
NEUTROPHILS RELATIVE PERCENT: 87 %
PDW BLD-RTO: 15.6 % (ref 12.4–15.4)
PLATELET # BLD: 368 K/UL (ref 135–450)
PMV BLD AUTO: 7.6 FL (ref 5–10.5)
POTASSIUM SERPL-SCNC: 3.6 MMOL/L (ref 3.5–5.1)
RBC # BLD: 3.18 M/UL (ref 4–5.2)
SODIUM BLD-SCNC: 136 MMOL/L (ref 136–145)
WBC # BLD: 8.9 K/UL (ref 4–11)

## 2021-07-15 PROCEDURE — 99024 POSTOP FOLLOW-UP VISIT: CPT | Performed by: SURGERY

## 2021-07-15 PROCEDURE — 6370000000 HC RX 637 (ALT 250 FOR IP): Performed by: PHYSICIAN ASSISTANT

## 2021-07-15 PROCEDURE — APPNB30 APP NON BILLABLE TIME 0-30 MINS: Performed by: NURSE PRACTITIONER

## 2021-07-15 PROCEDURE — 99232 SBSQ HOSP IP/OBS MODERATE 35: CPT | Performed by: INTERNAL MEDICINE

## 2021-07-15 PROCEDURE — 2580000003 HC RX 258: Performed by: INTERNAL MEDICINE

## 2021-07-15 PROCEDURE — 6370000000 HC RX 637 (ALT 250 FOR IP): Performed by: INTERNAL MEDICINE

## 2021-07-15 PROCEDURE — 2580000003 HC RX 258: Performed by: PHYSICIAN ASSISTANT

## 2021-07-15 PROCEDURE — 6360000002 HC RX W HCPCS: Performed by: INTERNAL MEDICINE

## 2021-07-15 PROCEDURE — APPSS15 APP SPLIT SHARED TIME 0-15 MINUTES: Performed by: NURSE PRACTITIONER

## 2021-07-15 PROCEDURE — 80048 BASIC METABOLIC PNL TOTAL CA: CPT

## 2021-07-15 PROCEDURE — 1200000000 HC SEMI PRIVATE

## 2021-07-15 PROCEDURE — 85025 COMPLETE CBC W/AUTO DIFF WBC: CPT

## 2021-07-15 RX ORDER — LACTOBACILLUS RHAMNOSUS GG 10B CELL
1 CAPSULE ORAL 2 TIMES DAILY WITH MEALS
Status: DISCONTINUED | OUTPATIENT
Start: 2021-07-15 | End: 2021-07-16 | Stop reason: HOSPADM

## 2021-07-15 RX ADMIN — PANTOPRAZOLE SODIUM 40 MG: 40 TABLET, DELAYED RELEASE ORAL at 06:29

## 2021-07-15 RX ADMIN — LEVOTHYROXINE SODIUM 150 MCG: 0.15 TABLET ORAL at 06:29

## 2021-07-15 RX ADMIN — ONDANSETRON 4 MG: 2 INJECTION INTRAMUSCULAR; INTRAVENOUS at 15:28

## 2021-07-15 RX ADMIN — SODIUM CHLORIDE: 9 INJECTION, SOLUTION INTRAVENOUS at 01:32

## 2021-07-15 RX ADMIN — Medication 10 ML: at 20:06

## 2021-07-15 RX ADMIN — Medication 10 ML: at 22:36

## 2021-07-15 RX ADMIN — Medication 10 ML: at 20:04

## 2021-07-15 RX ADMIN — Medication 1 CAPSULE: at 16:52

## 2021-07-15 RX ADMIN — LINEZOLID 600 MG: 600 INJECTION, SOLUTION INTRAVENOUS at 15:08

## 2021-07-15 RX ADMIN — MEROPENEM 1000 MG: 1 INJECTION, POWDER, FOR SOLUTION INTRAVENOUS at 10:09

## 2021-07-15 RX ADMIN — ACETAMINOPHEN 650 MG: 325 TABLET ORAL at 00:35

## 2021-07-15 RX ADMIN — ASPIRIN 81 MG: 81 TABLET, CHEWABLE ORAL at 10:00

## 2021-07-15 RX ADMIN — TRAZODONE HYDROCHLORIDE 150 MG: 50 TABLET ORAL at 20:04

## 2021-07-15 RX ADMIN — BUPROPION HYDROCHLORIDE 150 MG: 150 TABLET, EXTENDED RELEASE ORAL at 10:00

## 2021-07-15 RX ADMIN — Medication 1 CAPSULE: at 12:58

## 2021-07-15 RX ADMIN — ACETAMINOPHEN 650 MG: 325 TABLET ORAL at 13:00

## 2021-07-15 RX ADMIN — STANDARDIZED SENNA CONCENTRATE AND DOCUSATE SODIUM 2 TABLET: 8.6; 5 TABLET ORAL at 10:00

## 2021-07-15 RX ADMIN — LINEZOLID 600 MG: 600 INJECTION, SOLUTION INTRAVENOUS at 00:22

## 2021-07-15 RX ADMIN — SODIUM CHLORIDE 25 ML: 9 INJECTION, SOLUTION INTRAVENOUS at 13:02

## 2021-07-15 RX ADMIN — Medication 10 ML: at 10:01

## 2021-07-15 RX ADMIN — GABAPENTIN 300 MG: 300 CAPSULE ORAL at 10:00

## 2021-07-15 RX ADMIN — SODIUM CHLORIDE 3000 MG: 900 INJECTION INTRAVENOUS at 20:08

## 2021-07-15 RX ADMIN — MEROPENEM 1000 MG: 1 INJECTION, POWDER, FOR SOLUTION INTRAVENOUS at 01:35

## 2021-07-15 RX ADMIN — ENOXAPARIN SODIUM 40 MG: 40 INJECTION SUBCUTANEOUS at 10:00

## 2021-07-15 RX ADMIN — GABAPENTIN 300 MG: 300 CAPSULE ORAL at 20:04

## 2021-07-15 RX ADMIN — ALPRAZOLAM 0.5 MG: 0.5 TABLET ORAL at 20:04

## 2021-07-15 RX ADMIN — SODIUM CHLORIDE 3000 MG: 900 INJECTION INTRAVENOUS at 13:07

## 2021-07-15 ASSESSMENT — ENCOUNTER SYMPTOMS
FACIAL SWELLING: 0
TROUBLE SWALLOWING: 0
RHINORRHEA: 0
COLOR CHANGE: 0
BLOOD IN STOOL: 0
APNEA: 0
EYE DISCHARGE: 0
STRIDOR: 0
SHORTNESS OF BREATH: 0
EYE REDNESS: 0
CHEST TIGHTNESS: 0
ABDOMINAL PAIN: 0
COUGH: 0
NAUSEA: 0
DIARRHEA: 0
CHOKING: 0
PHOTOPHOBIA: 0

## 2021-07-15 ASSESSMENT — PAIN SCALES - GENERAL
PAINLEVEL_OUTOF10: 2
PAINLEVEL_OUTOF10: 0
PAINLEVEL_OUTOF10: 3
PAINLEVEL_OUTOF10: 0
PAINLEVEL_OUTOF10: 0

## 2021-07-15 ASSESSMENT — PAIN DESCRIPTION - PAIN TYPE: TYPE: ACUTE PAIN

## 2021-07-15 ASSESSMENT — PAIN DESCRIPTION - LOCATION: LOCATION: KNEE

## 2021-07-15 NOTE — PROGRESS NOTES
[facility] PROGRESS NOTE    7/15/2021 1:03 PM        Name: Arya Reid . Admitted: 7/8/2021  Primary Care Provider: Fabiola Gerber MD (Tel: [phone])      Subjective: Levon Love Pt admitted UTI  /  E coli bacteremia. Was found to have abdominal wall abscess Tuesday. She had I and D at bedside on Wednesday. Afebrile for 24 hours. She is on zyvox and Merrem.        Reviewed interval ancillary notes    Current Medications  ampicillin-sulbactam (UNASYN) 3000 mg ivpb minibag, Q8H  lactobacillus (CULTURELLE) capsule 1 capsule, BID WC  potassium chloride (KLOR-CON M) extended release tablet 40 mEq, PRN   Or  potassium bicarb-citric acid (EFFER-K) effervescent tablet 40 mEq, PRN   Or  potassium chloride 10 mEq/100 mL IVPB (Peripheral Line), PRN  linezolid (ZYVOX) IVPB 600 mg, Q12H  ALPRAZolam (XANAX) tablet 0.5 mg, BID PRN  lidocaine PF 1 % injection 5 mL, Once  sodium chloride flush 0.9 % injection 5-40 mL, 2 times per day  sodium chloride flush 0.9 % injection 5-40 mL, PRN  0.9 % sodium chloride infusion, PRN  perflutren lipid microspheres (DEFINITY) injection 1.65 mg, ONCE PRN  sodium chloride flush 0.9 % injection 5-40 mL, 2 times per day  sodium chloride flush 0.9 % injection 5-40 mL, PRN  0.9 % sodium chloride infusion, PRN  enoxaparin (LOVENOX) injection 40 mg, Daily  ondansetron (ZOFRAN-ODT) disintegrating tablet 4 mg, Q8H PRN   Or  ondansetron (ZOFRAN) injection 4 mg, Q6H PRN  polyethylene glycol (GLYCOLAX) packet 17 g, Daily PRN  acetaminophen (TYLENOL) tablet 650 mg, Q6H PRN   Or  acetaminophen (TYLENOL) suppository 650 mg, Q6H PRN  aspirin chewable tablet 81 mg, Daily  buPROPion (WELLBUTRIN XL) extended release tablet 150 mg, QAM  traZODone (DESYREL) tablet 150 mg, Nightly  gabapentin (NEURONTIN) capsule 300 mg, BID  melatonin tablet 4.5 mg, Nightly PRN  pantoprazole (PROTONIX) tablet 40 mg, QAM AC  LORazepam (ATIVAN) injection 1 mg, Q6H PRN  sennosides-docusate sodium (SENOKOT-S) 8.6-50 MG tablet 2 tablet, Daily  levothyroxine (SYNTHROID) tablet 150 mcg, QAM AC        Objective:  /64   Pulse 97   Temp 97.9 °F (36.6 °C) (Oral)   Resp 18   Ht 5' 2.5\" (1.588 m)   Wt 171 lb (77.6 kg)   SpO2 96%   BMI 30.78 kg/m²     Intake/Output Summary (Last 24 hours) at 7/15/2021 1303  Last data filed at 7/15/2021 0923  Gross per 24 hour   Intake 120 ml   Output --   Net 120 ml      Wt Readings from Last 3 Encounters:   07/15/21 171 lb (77.6 kg)   06/16/21 186 lb (84.4 kg)   06/09/21 186 lb (84.4 kg)       General appearance:  Appears comfortable, alert and pleasant   Eyes: Sclera clear. Pupils equal.  ENT: Moist oral mucosa. Trachea midline, no adenopathy. Cardiovascular: Regular rhythm, normal S1, S2. No murmur. No edema in lower extremities  Respiratory: Not using accessory muscles. Good inspiratory effort. Clear to auscultation bilaterally, no wheeze or crackles. GI: Abdomen soft, no tenderness, not distended, normal bowel sounds, no CVA tenderness   Musculoskeletal: No cyanosis in digits, neck supple  Neurology: CN 2-12 grossly intact. No speech or motor deficits  Psych: Normal affect. Alert and oriented in time, place and person  Skin: cellulitis abdominal wall-open wound from I and D draining brown thin liquid  Labs and Tests:  CBC:   Recent Labs     07/13/21  0523 07/14/21  0459 07/15/21  0620   WBC 10.1 11.0 8.9   HGB 10.7* 9.8* 9.6*    338 368     BMP:    Recent Labs     07/13/21  0523 07/14/21  0459 07/15/21  0620    134* 136   K 3.5 3.2* 3.6    101 102   CO2 21 23 25   BUN 7 6* 6*   CREATININE 0.8 0.9 0.8   GLUCOSE 91 98 124*     Hepatic:   No results for input(s): AST, ALT, ALB, BILITOT, ALKPHOS in the last 72 hours. CT abd:    Mild urothelial thickening of the ureters diffusely.  Recommend clinical   correlation for ascending urinary tract infection.    2. Unchanged subcutaneous fluid collection of the anterior abdominal wall at   midline measuring 9.7 cm.           Escherichia coli (1)    Antibiotic Interpretation WALESKA Status    ampicillin Resistant >=32 mcg/mL     ceFAZolin Sensitive <=4 mcg/mL      NOTE: Cefazolin should only be used for uncomplicated UTI         for E.coli or Klebsiella pneumoniae. cefepime Sensitive <=0.12 mcg/mL     cefTRIAXone Sensitive <=0.25 mcg/mL     ciprofloxacin Sensitive <=0.25 mcg/mL     ertapenem Sensitive <=0.12 mcg/mL     gentamicin Sensitive <=1 mcg/mL     levofloxacin Sensitive 1 mcg/mL     nitrofurantoin Sensitive <=16 mcg/mL     piperacillin-tazobactam Sensitive <=4 mcg/mL     trimethoprim-sulfamethoxazole Resistant >=320 mcg/mL         ECHO   Irregular rhythm   Left ventricular systolic function is normal with ejection fraction   estimated at 60-65 %. No regional wall motion abnormalities are noted. Normal left ventricular diastolic filling pressure. There is mild concentric left ventricular hypertrophy. Aortic valve appears sclerotic but opens adequately. Mitral valve leaflets appear mildly thickened. Mild tricuspid regurgitation. Systolic pulmonary artery pressure (SPAP) is normal and estimated at 30 mmHg   (right atrial pressure 8 mmHg). Previous echo done 3013 - EF 55%  Problem List  Principal Problem:    Pyelonephritis  Active Problems:    Class 1 obesity due to excess calories with body mass index (BMI) of 31.0 to 31.9 in adult    Anxiety    Hypothyroidism    GERD (gastroesophageal reflux disease)    Anemia    Hyponatremia    Severe malnutrition (HCC)    Complicated UTI (urinary tract infection)    E coli bacteremia    High fever    History of depression    Abdominal wall abscess  Resolved Problems:    * No resolved hospital problems. *       Assessment & Plan:   1. UTI: on Merrem. Cultures positive for ecoli. 2. E coli Bacteremia: repeat cultures negative to date   3. Abdominal wall abscess-s/p I and at bedside.  Suspect this was

## 2021-07-15 NOTE — PROGRESS NOTES
Infectious Diseases   Progress Note      Admission Date: 7/8/2021  Hospital Day: Hospital Day: 8   Attending: Gustavo Marques MD  Date of service: 7/15/2021     Chief complaint/ Reason for consult:     · High fevers  · E. coli bacteremia  · Complicated E. coli UTI  · Anterior abdominal wall midline fluid collection, measuring 9.7 cm, does not appear infected on clinical exam  · History left total knee arthroplasty on 6/1/2021    Microbiology:        I have reviewed allavailable micro lab data and cultures    · Blood culture (2/2) - collected on 7/8/2021: E. Coli    Susceptibility    Escherichia coli (2)    Antibiotic Interpretation WALESKA Status    ampicillin Resistant >=32 mcg/mL     ceFAZolin Sensitive <=4 mcg/mL     cefepime Sensitive <=0.12 mcg/mL     cefTRIAXone Sensitive <=0.25 mcg/mL     ciprofloxacin Sensitive 0.5 mcg/mL     ertapenem Sensitive <=0.12 mcg/mL     gentamicin Sensitive <=1 mcg/mL     levofloxacin Sensitive 0.5 mcg/mL     piperacillin-tazobactam Sensitive <=4 mcg/mL     trimethoprim-sulfamethoxazole Resistant >=320 mcg/mL           · Blood culture - collectedon 7/10/2021: Negative  · Urine culture  - collected on 7/8/2021: Greater than 100,000 CFU per mL of E. Coli    Susceptibility    Escherichia coli (1)    Antibiotic Interpretation WALESKA Status    ampicillin Resistant >=32 mcg/mL     ceFAZolin Sensitive <=4 mcg/mL      NOTE: Cefazolin should only be used for uncomplicated UTI         for E.coli or Klebsiella pneumoniae.    cefepime Sensitive <=0.12 mcg/mL     cefTRIAXone Sensitive <=0.25 mcg/mL     ciprofloxacin Sensitive <=0.25 mcg/mL     ertapenem Sensitive <=0.12 mcg/mL     gentamicin Sensitive <=1 mcg/mL     levofloxacin Sensitive 1 mcg/mL     nitrofurantoin Sensitive <=16 mcg/mL     piperacillin-tazobactam Sensitive <=4 mcg/mL     trimethoprim-sulfamethoxazole Resistant >=320 mcg/mL     Lab and Collection          Antibiotics and immunizations:       Current antibiotics: All antibiotics and their doses were reviewed by me    Recent Abx Admin                   meropenem (MERREM) 1,000 mg in sodium chloride 0.9 % 100 mL IVPB (mini-bag) (mg) 1,000 mg New Bag 07/15/21 1009     1,000 mg New Bag  0135     1,000 mg New Bag 07/14/21 1717    linezolid (ZYVOX) IVPB 600 mg (mg) 600 mg New Bag 07/15/21 0022     600 mg New Bag 07/14/21 1422                  Immunization History: All immunization history was reviewed by me today. Immunization History   Administered Date(s) Administered    Influenza Virus Vaccine 10/29/2015, 10/23/2018, 12/10/2019    Influenza, Quadv, IM, PF (6 mo and older Fluzone, Flulaval, Fluarix, and 3 yrs and older Afluria) 10/25/2016, 10/03/2017    Pneumococcal Conjugate 13-valent ([id number]) 11/28/2017    Pneumococcal Polysaccharide ([id number]) 12/11/2018    Tdap (Boostrix, Adacel) 01/17/2013    Zoster Live (Zostavax) 11/12/2013       Known drug allergies: All allergies were reviewed and updated    Allergies   Allergen Reactions    Macrobid [Nitrofurantoin] Other (See Comments)     Fever, myalgias    Codeine Nausea Only       Social history:     Social History:  All social andepidemiologic history was reviewed and updated by me today as needed. · Tobacco use:   reports that she has never smoked. She has never used smokeless tobacco.  · Alcohol use:   reports no history of alcohol use. · Currently lives in: [address] [place] [address] Dr Hope  reports no history of drug use. COVID VACCINATION AND LAB RESULT RECORDS:     Internal Administration   First Dose      Second Dose           Last COVID Lab No results found for: SARS-COV-2, SARS-COV-2 RNA, SARS-COV-2, SARS-COV-2, SARS-COV-2 BY PCR, SARS-COV-2, SARS-COV-2, SARS-COV-2         Assessment:     The patient is a 78 y.o. old female who  has a past medical history of Anal fissure (7/16/2013), Anxiety, Colon polyps, Depression, Fibroid uterus, Hypothyroidism, Morbid obesity ([address] and Osteopenia.  with following problems:    · High appropriate to reduce chances of line associated infections. Patient education and counseling:       · The patient was educated in detail about the side-effects of various antibiotics and things to watch for like new rashes, lip swelling, severe reaction, worsening diarrhea, break through fever etc.  · Discussed patient's condition and what to expect. All of the patient's questions were addressed in a satisfactory manner and patient verbalized understanding all instructions. Thank you for involving me in the care of your patient. I will continue to follow. If you have anyadditional questions, please do not hesitate to contact me. Subjective: Interval history: Interval history was obtained from chart review and patient/ RN. He is afebrile now. Bedside I&D of the abdominal abscess done yesterday. She is tolerating antibiotics okay     REVIEW OF SYSTEMS:      Review of Systems   Constitutional: Positive for fatigue. Negative for chills, diaphoresis and unexpected weight change. HENT: Negative for congestion, ear discharge, ear pain, facial swelling, hearing loss, rhinorrhea and trouble swallowing. Eyes: Negative for photophobia, discharge, redness and visual disturbance. Respiratory: Negative for apnea, cough, choking, chest tightness, shortness of breath and stridor. Cardiovascular: Negative for chest pain and palpitations. Gastrointestinal: Negative for abdominal pain, blood in stool, diarrhea and nausea. Endocrine: Negative for polydipsia, polyphagia and polyuria. Genitourinary: Negative for difficulty urinating, dysuria, frequency, hematuria, menstrual problem and vaginal discharge. Musculoskeletal: Negative for arthralgias, joint swelling, myalgias and neck stiffness. Skin: Negative for color change and rash. Allergic/Immunologic: Negative for immunocompromised state. Neurological: Negative for dizziness, seizures, speech difficulty, light-headedness and headaches. Hematological: Negative for adenopathy. Psychiatric/Behavioral: Negative for agitation, hallucinations and suicidal ideas. Past Medical History: All past medical history reviewed today. Past Medical History:   Diagnosis Date    Anal fissure 7/16/2013    Anxiety     Colon polyps     Depression     Fibroid uterus     Hypothyroidism     Morbid obesity ([address]     Osteopenia        Past Surgical History: All past surgical history was reviewed today. Past Surgical History:   Procedure Laterality Date    ABDOMINAL HERNIA REPAIR  2008    APPENDECTOMY  Age 27    CATARACT REMOVAL WITH IMPLANT Right 10/23/2017    with vitrectomy    CATARACT REMOVAL WITH IMPLANT Left 02/26/2018    with vitrectomy    COLONOSCOPY N/A 10/10/2018    COLONOSCOPY POLYPECTOMY SNARE/COLD BIOPSY performed by Valerio Huffman MD at [address] Right 8/26/2019    RIGHT L4 AND L5 TRANSFORAMINAL EPIDURAL STEROID INJECTION WITH FLUOROSCOPY performed by Joe Gonzalez MD at [facility]    HC INJECT OTHER PERPHRL NERV Right 10/21/2019    RIGHT PIRIFORMIS INJECTION WITH FLUOROSCOPY (24477) performed by Joe Gonzalez MD at [facility], TOTAL ABDOMINAL      ovaries out also   [address] Right 1/7/2019    RIGHT L3 AND L4 LUMBAR TRANSFORAMINAL WITH FLUOROSCOPY performed by Joe Gonzalez MD at [facility] Right 5/15/2019    RIGHT L4 AND L5 TRANSFORAMINAL EPIDURAL STEROID INJECTION WITH FLUOROSCOPY performed by Joe Gonzalez MD at [facility] ESOPHAGOGASTRODUODENOSCOPY TRANSORAL DIAGNOSTIC N/A 10/10/2018    EGD performed by Valerio Huffman MD at [address]    Fibroids    TOTAL KNEE ARTHROPLASTY Left 6/1/2021    LEFT TOTAL KNEE ARTHROPLASTY performed by Criss Huffman MD at [address] OR       Family History: All family history was reviewed today.         Problem Relation Age of Onset    Diabetes Father         Type II    Diabetes Sister         Type II    Diabetes Brother         Type II    Breast Cancer Sister 58    Lung Cancer Mother         Smoker    Heart Disease Sister         Bicuspid aortic valve x2       Objective:       PHYSICAL EXAM:      Vitals:   Vitals:    07/14/21 1959 07/15/21 0013 07/15/21 0440 07/15/21 0830   BP: 103/67 107/64 (!) 97/54 108/64   Pulse: 80 94 79 80   Resp: 18 18 18 18   Temp: 97.5 °F (36.4 °C) 97.5 °F (36.4 °C) 97.4 °F (36.3 °C) 98.6 °F (37 °C)   TempSrc: Oral Oral Oral Oral   SpO2: 97% 95% 97%    Weight:   171 lb (77.6 kg)    Height:           Physical Exam  Vitals and nursing note reviewed. Constitutional:       General: She is not in acute distress. Appearance: She is well-developed. She is not diaphoretic. HENT:      Head: Normocephalic. Right Ear: External ear normal.      Left Ear: External ear normal.      Nose: Nose normal.   Eyes:      General: No scleral icterus. Right eye: No discharge. Left eye: No discharge. Conjunctiva/sclera: Conjunctivae normal.      Pupils: Pupils are equal, round, and reactive to light. Cardiovascular:      Rate and Rhythm: Normal rate and regular rhythm. Heart sounds: No murmur heard. No friction rub. Pulmonary:      Effort: Pulmonary effort is normal.      Breath sounds: No stridor. No wheezing or rales. Chest:      Chest wall: No tenderness. Abdominal:      Palpations: Abdomen is soft. There is no mass. Tenderness: There is no abdominal tenderness. There is no guarding or rebound. Comments: Abdominal abscess I&D site okay. Musculoskeletal:         General: No tenderness. Cervical back: Normal range of motion and neck supple. Lymphadenopathy:      Cervical: No cervical adenopathy. Skin:     General: Skin is warm and dry. Findings: No erythema or rash. Neurological:      Mental Status: She is alert and oriented to person, place, and time. PELVIS W IV CONTRAST Additional Contrast? None   Final Result   1. Mild urothelial thickening of the ureters diffusely. Recommend clinical   correlation for ascending urinary tract infection. 2. Unchanged subcutaneous fluid collection of the anterior abdominal wall at   midline measuring 9.7 cm. Medications: All current and past medications were reviewed.      linezolid  600 mg Intravenous Q12H    meropenem  1,000 mg Intravenous Q8H    lidocaine 1 % injection  5 mL Intradermal Once    sodium chloride flush  5-40 mL Intravenous 2 times per day    sodium chloride flush  5-40 mL Intravenous 2 times per day    enoxaparin  40 mg Subcutaneous Daily    aspirin  81 mg Oral Daily    buPROPion  150 mg Oral QAM    traZODone  150 mg Oral Nightly    gabapentin  300 mg Oral BID    pantoprazole  40 mg Oral QAM AC    sennosides-docusate sodium  2 tablet Oral Daily    levothyroxine  150 mcg Oral QAM AC        sodium chloride 100 mL/hr at 07/15/21 0132    sodium chloride 25 mL (07/13/21 1602)    sodium chloride 25 mL (07/13/21 1151)       potassium chloride **OR** potassium alternative oral replacement **OR** potassium chloride, ALPRAZolam, sodium chloride flush, sodium chloride, perflutren lipid microspheres, sodium chloride flush, sodium chloride, ondansetron **OR** ondansetron, polyethylene glycol, acetaminophen **OR** acetaminophen, melatonin, LORazepam      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    Major depressive disorder with single episode F32.9    Anxiety F41.9    Hypothyroidism E03.9    Family history of bicuspid aortic valve Z82.79    Osteopenia M85.80    GERD (gastroesophageal reflux disease) K21.9    Anal fissure K60.2    Psychophysiological insomnia F51.04    Anemia D64.9    Colon polyps K63.5    Osteoarthritis of left knee M17.12    Restless leg syndrome G25.81    Ganglion cyst of wrist, left M67.432    Neck pain, chronic M54.2, G89.29    Hypertriglyceridemia N56.1    Periumbilical mass P42.54    Coronary artery calcification I25.10, I25.84    Total knee replacement status, left Z96.652    Ileus, postoperative (HCC) K91.89, K56.7    Acute pyelonephritis N10    Hyponatremia E87.1    Severe malnutrition (HCC) K25    Complicated UTI (urinary tract infection) N39.0    E coli bacteremia R78.81, B96.20    High fever R50.9    History of depression Z86.59    Abdominal wall abscess L02.211       Please note that this chart was generated using Dragon dictation software. Although every effort was made to ensure the accuracy of this automated transcription, some errors in transcription may have occurred inadvertently. If you may need any clarification, please do not hesitate to contact me through EPIC or at the phone number provided below with my electronic signature. Any pictures or media included in this note were obtained after taking informed verbal consent from the patient and with their approval to include those in the patient's medical record.     Yusra White MD, MPH  7/15/2021 , 10:46 AM   [facility] Infectious Disease   [address] [place] [address], [address] [place] [address]  Office: [phone]  Fax: [phone]  Clinic days:  Tuesday & Thursday

## 2021-07-15 NOTE — PROGRESS NOTES
BILITOT, ALKPHOS in the last 72 hours. Amylase:    Lab Results   Component Value Date    AMYLASE 59 09/11/2018     Lipase:    Lab Results   Component Value Date    LIPASE 33.0 07/08/2021    LIPASE 40.0 09/11/2018      Mag:    Lab Results   Component Value Date    MG 1.90 07/10/2021     Phos:     Lab Results   Component Value Date    PHOS 3.7 07/09/2021      Coags:   Lab Results   Component Value Date    PROTIME 10.2 05/26/2021    INR 0.88 05/26/2021    APTT 30.3 05/26/2021       Cultures:  Anaerobic culture  No results found for: LABANAE  Fungus stain  No results found for requested labs within last 30 days. Gram stain  No results found for requested labs within last 30 days. Organism  Lab Results   Component Value Date/Time    ORG Escherichia coli DNA Detected (A) 07/08/2021 11:20 PM    ORG Escherichia coli (A) 07/08/2021 11:20 PM    ORG Escherichia coli (A) 07/08/2021 11:20 PM     Surgical culture  No results found for: CXSURG  Blood culture 1  Results in Past 30 Days  Result Component Current Result Ref Range Previous Result Ref Range   Blood Culture, Routine No Growth after 4 days of incubation. (7/10/2021)  See additional report for complete BCID panel. (7/8/2021)      Blood culture 2  Results in Past 30 Days  Result Component Current Result Ref Range Previous Result Ref Range   Culture, Blood 2 No Growth after 4 days of incubation. (7/10/2021)  POSITIVE for  No further workup  Isolated two of two sets  Refer to (culture [id number]) for sensitivity results   (7/8/2021)      Fecal occult  No results found for requested labs within last 30 days. GI bacterial pathogens by PCR  No results found for requested labs within last 30 days. C. difficile  No results found for requested labs within last 30 days.      Urine culture  Lab Results   Component Value Date    LABURIN No growth at 18 to 36 hours 07/09/2021       Pathology:  No relevant pathology     Imaging:  I have personally reviewed the following films:    CT ABDOMEN PELVIS W IV CONTRAST Additional Contrast? None    Result Date: 7/14/2021  EXAMINATION: CT OF THE ABDOMEN AND PELVIS WITH CONTRAST 7/13/2021 5:52 pm TECHNIQUE: CT of the abdomen and pelvis was performed with the administration of intravenous contrast. Multiplanar reformatted images are provided for review. Dose modulation, iterative reconstruction, and/or weight based adjustment of the mA/kV was utilized to reduce the radiation dose to as low as reasonably achievable. COMPARISON: 07/08/2021 HISTORY: ORDERING SYSTEM PROVIDED HISTORY: persistent fever, UTI TECHNOLOGIST PROVIDED HISTORY: Reason for exam:->persistent fever, UTI Additional Contrast?->None Reason for Exam: persistent fever, UTI Acuity: Acute Type of Exam: Initial FINDINGS: Lower Chest: No acute infiltrate at the lung bases. Organs: The liver, spleen, pancreas and adrenal glands are unremarkable. Status post cholecystectomy with stable prominence of the extrahepatic biliary tree. Mild heterogeneity of the nephrogram on the left with focal enlargement in the anterior aspect of the upper pole, increased from 06/09/2021 and consistent with the clinical history of UTI. The finding is stable compared to 07/08/2021. No significant hydronephrosis or infiltration of the perinephric fat. No evidence of renal abscess. GI/Bowel: No pericolonic inflammatory changes. Liquid stool in the proximal colon. Mildly distended fluid-filled small bowel loops with no perienteric inflammatory changes. Postoperative changes involving the stomach suggesting previous gastric bypass. Pelvis: No pelvic mass or free pelvic fluid. The uterus is absent. Partial distention of the urinary bladder. Peritoneum/Retroperitoneum: The abdominal aorta is normal in caliber with scattered atherosclerotic plaque. No retroperitoneal adenopathy or upper abdominal ascites.  Bones/Soft Tissues: Fluid collection in the subcutaneous fat in the anterior abdominal wall measuring 6.4 x 10.3 x 11.4 cm. There is increased wall thickening and infiltration of the adjacent subcutaneous fat. Findings are indicative of an abscess. No gas is seen. There is infiltration throughout much of the remainder of the anterior subcutaneous fat within the abdominal wall and pannus. No other focal collection. No acute osseous findings. 1. Persistent anterior abdominal wall fluid collection. There is increased wall thickening and infiltration of the adjacent subcutaneous fat indicative of an abscess. 2. Heterogeneity of the left-sided nephrogram with focal enlargement of the upper pole, consistent with the clinical history of UTI. No focal abscess. No significant hydronephrosis. Continued imaging surveillance recommended. 3. Mildly fluid-filled distended small bowel loops with liquid stool in the proximal colon, possibly a mild enterocolitis.      Scheduled Meds:   ampicillin-sulbactam  3,000 mg Intravenous Q8H    lactobacillus  1 capsule Oral BID WC    linezolid  600 mg Intravenous Q12H    lidocaine 1 % injection  5 mL Intradermal Once    sodium chloride flush  5-40 mL Intravenous 2 times per day    sodium chloride flush  5-40 mL Intravenous 2 times per day    enoxaparin  40 mg Subcutaneous Daily    aspirin  81 mg Oral Daily    buPROPion  150 mg Oral QAM    traZODone  150 mg Oral Nightly    gabapentin  300 mg Oral BID    pantoprazole  40 mg Oral QAM AC    sennosides-docusate sodium  2 tablet Oral Daily    levothyroxine  150 mcg Oral QAM AC     Continuous Infusions:   sodium chloride 25 mL (07/13/21 1602)    sodium chloride 25 mL (07/15/21 1302)     PRN Meds:.potassium chloride **OR** potassium alternative oral replacement **OR** potassium chloride, ALPRAZolam, sodium chloride flush, sodium chloride, perflutren lipid microspheres, sodium chloride flush, sodium chloride, ondansetron **OR** ondansetron, polyethylene glycol, acetaminophen **OR** acetaminophen, melatonin, LORazepam      Assessment:  78 y.o. female admitted with   1. Pyelonephritis        Status-post incision and drainage of abscess on abdominal wall on 7/14/2021  Urinary tract infection  E. coli bacteremia      Plan:  1. Local wound care with wet-to-dry dressings BID and PRN for excess drainage, follow cultures  2. General diet as tolerated  3. Antibiotics per ID  4. Activity as tolerated  5. PRN analgesics and antiemetics--minimizing narcotics as tolerated  6. Management of medical comorbid etiologies per primary team and consulting services  7. Disposition: Anticipate discharge home in the next 24-48 hours--when abdominal wall erythema is improving    EDUCATION:  Educated patient on plan of care and disease process--all questions answered. Plans discussed with patient, nursing, and SHONNA Serra. Reviewed and discussed with Dr. Sindy Hernandez. Signed:  OVIDIO Calvin - CNP  7/15/2021 1:03 PM    I have personally performed a face to face diagnostic evaluation on this patient. I have interviewed and examined the patient and I agree with the assessment above. In summary, my findings and plan are the following:   Ms. Heather Lopez is a 78 y.o. female who presents with   Status-post incision and drainage of abscess on abdominal wall on 7/14/2021  Urinary tract infection  E. coli bacteremia    Plan:  1. Continue local wound care  2. Antibiotics per ID, follow culture, monitor cellulitis, if worsens or fails to improve will need further incision and drainage  3. Pain control, minimize narcotics  4. Defer management of remainder of medical comorbidities to primary and consulting teams  5. Discharge planning, anticipate 1-2 days from surgical standpoint    Champ Hernandez MD, FACS  7/15/2021  3:02 PM

## 2021-07-15 NOTE — PROGRESS NOTES
not indicated   Coordination of Nutrition Care:  Continue to monitor while inpatient    Goals:  PO to remain greater than 75% at meals       Nutrition Monitoring and Evaluation:   Behavioral-Environmental Outcomes:  None Identified   Food/Nutrient Intake Outcomes:  Food and Nutrient Intake  Physical Signs/Symptoms Outcomes:  Weight, Skin     Discharge Planning:    No discharge needs at this time     Electronically signed by Graciela Paredes RD, CNSC, LD on 7/15/21 at 1:43 PM EDT    Contact: [phone]

## 2021-07-15 NOTE — DISCHARGE INSTR - COC
performed by Arnie Rojas MD at [address]    Fibroids    TOTAL KNEE ARTHROPLASTY Left 6/1/2021    LEFT TOTAL KNEE ARTHROPLASTY performed by Nat Moura MD at [address]       Immunization History:   Immunization History   Administered Date(s) Administered    Influenza Virus Vaccine 10/29/2015, 10/23/2018, 12/10/2019    Influenza, [place], IM, PF (6 mo and older Fluzone, Flulaval, Fluarix, and 3 yrs and older Afluria) 10/25/2016, 10/03/2017    Pneumococcal Conjugate 13-valent ([id number]) 11/28/2017    Pneumococcal Polysaccharide ([id number]) 12/11/2018    Tdap (Boostrix, Adacel) 01/17/2013    Zoster Live (Zostavax) 11/12/2013       Active Problems:  Patient Active Problem List   Diagnosis Code    Class 1 obesity due to excess calories with body mass index (BMI) of 31.0 to 31.9 in adult E66.09, Z68.31    Chronic bilateral low back pain with sciatica M54.40, G89.29    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    Major depressive disorder with single episode F32.9    Anxiety F41.9    Hypothyroidism E03.9    Family history of bicuspid aortic valve Z82.79    Osteopenia M85.80    GERD (gastroesophageal reflux disease) K21.9    Anal fissure K60.2    Psychophysiological insomnia F51.04    Anemia D64.9    Colon polyps K63.5    Osteoarthritis of left knee M17.12    Restless leg syndrome G25.81    Ganglion cyst of wrist, left M67.432    Neck pain, chronic M54.2, G89.29    Hypertriglyceridemia N22.3    Periumbilical mass E74.15    Coronary artery calcification I25.10, I25.84    Total knee replacement status, left Z96.652    Ileus, postoperative (HCC) K91.89, K56.7    Pyelonephritis N12    Hyponatremia E87.1    Severe malnutrition (HCC) N58    Complicated UTI (urinary tract infection) N39.0    E coli bacteremia R78.81, B96.20    High fever R50.9    History of depression Z86.59    Abdominal wall abscess L02.211     Abdominal wound (abcess) care: Local wound care with wet-to-dry dressings BID (decrease to daily dressing changes at home) and PRN for excess drainage. Tegaderm to protect skin, rolled gauze wet to dry packed in wound, dry 4x4s external to wound to absorb exudate, medipore to secure it all. Isolation/Infection:   Isolation            No Isolation          Patient Infection Status       None to display            Nurse Assessment:  Last Vital Signs: /64   Pulse 80   Temp 98.6 °F (37 °C) (Oral)   Resp 18   Ht 5' 2.5\" (1.588 m)   Wt 171 lb (77.6 kg)   SpO2 97%   BMI 30.78 kg/m²     Last documented pain score (0-10 scale): Pain Level: 0  Last Weight:   Wt Readings from Last 1 Encounters:   07/15/21 171 lb (77.6 kg)     Mental Status:  oriented, alert and coherent    IV Access:  - None    Nursing Mobility/ADLs:  Walking   Independent  Transfer  Independent  Bathing  Independent  Dressing  Independent  [address] Delivery   none    Wound Care Documentation and Therapy:        Elimination:  Continence:   · Bowel: Yes  · Bladder: Yes  Urinary Catheter: None   Colostomy/Ileostomy/Ileal Conduit: No       Date of Last BM: 7/16/21    Intake/Output Summary (Last 24 hours) at 7/15/2021 1221  Last data filed at 7/15/2021 0923  Gross per 24 hour   Intake 120 ml   Output --   Net 120 ml     No intake/output data recorded. Safety Concerns: At Risk for Falls    Impairments/Disabilities:      None    Nutrition Therapy:  Current Nutrition Therapy:   - Oral Diet:  General    Routes of Feeding: Oral  Liquids: No Restrictions  Daily Fluid Restriction: no  Last Modified Barium Swallow with Video (Video Swallowing Test): not done    Treatments at the Time of Hospital Discharge:   Respiratory Treatments:   Oxygen Therapy:  is not on home oxygen therapy.   Ventilator:    - No ventilator support    Rehab Therapies:   Weight Bearing Status/Restrictions: No weight bearing restirctions  Other Medical Equipment (for information only, NOT a DME order):  walker  Other Treatments:     Patient's personal belongings (please select all that are sent with patient):       RN SIGNATURE:  Electronically signed by Gabino Goodson RN on 7/16/21 at 2:25 PM EDT    CASE MANAGEMENT/SOCIAL WORK SECTION    Inpatient Status Date: 7/8/21    Readmission Risk Assessment Score:  Readmission Risk              Risk of Unplanned Readmission:  21           Discharging to Facility/ [address]  PHONE; [phone]  FAX: [phone]    / signature: Electronically signed by Zunilda Sotelo RN on 7/16/21 at 11:19 AM EDT    PHYSICIAN SECTION    Prognosis: Good    Condition at Discharge: Stable    Rehab Potential (if transferring to Rehab): Good    Recommended Labs or Other Treatments After Discharge: follow up with pcp in one week. Wet to dry dressing bid to abdominal wound. Follow up with Dr Ling Cameron in one week    Physician Certification: I certify the above information and transfer of Regi Vazquez  is necessary for the continuing treatment of the diagnosis listed and that she requires [address] for less 30 days.      Update Admission H&P: No change in H&P    PHYSICIAN SIGNATURE:  Electronically signed by Nuha Ch PA-C on 7/16/21 at 2:47 PM EDT

## 2021-07-15 NOTE — CARE COORDINATION
Discharge planning note:    Met with patient and her daughter at bedside and confirmed all demographics. She is agreeable to [place] [id number] services for wound care & IV antibiotics. Notified her of co-payments per [employer]. Patient will go home w/IV antibiotics & supplies through:    [facility]  Phone: [phone]  Fax: [facility] services will be provided by:    [address] [place] [address]  Phone: [phone]  Fax: [phone]      Case management will follow progress and assist with discharge planning as needed.     Bennie Aaron RN BSN  Case Management  [phone]

## 2021-07-15 NOTE — PLAN OF CARE
Problem: Falls - Risk of:  Goal: Will remain free from falls  Description: Will remain free from falls  Outcome: Ongoing     Problem: Falls - Risk of:  Goal: Absence of physical injury  Description: Absence of physical injury  Outcome: Ongoing     Problem: Pain:  Goal: Pain level will decrease  Description: Pain level will decrease  Outcome: Ongoing No

## 2021-07-15 NOTE — FLOWSHEET NOTE
Physical Therapy  Cancellation/No-show Note  Patient Name:  Jarad Graves  :  1950   Date:  7/15/2021  Cancelled visits to date: 0  No-shows to date: 1    Patient status for today's appointment patient:  []  Cancelled  []  Rescheduled appointment  [x]  No-show     Reason given by patient:  []  Patient ill  []  Conflicting appointment  []  No transportation    []  Conflict with work  []  No reason given  [x]  Other: per chart, pt is in hospital     Comments:      Phone call information:   []  Phone call made today to patient at _ time at number provided:      []  Patient answered, conversation as follows:    []  Patient did not answer, message left as follows:  [x]  Phone call not made today  []  Phone call not needed - pt contacted us to cancel and provided reason for cancellation.      Electronically signed by:  Eliza Sanchez, PT

## 2021-07-16 VITALS
WEIGHT: 172.4 LBS | OXYGEN SATURATION: 98 % | SYSTOLIC BLOOD PRESSURE: 106 MMHG | TEMPERATURE: 97.7 F | RESPIRATION RATE: 18 BRPM | HEART RATE: 74 BPM | BODY MASS INDEX: 30.55 KG/M2 | HEIGHT: 63 IN | DIASTOLIC BLOOD PRESSURE: 59 MMHG

## 2021-07-16 LAB
ANION GAP SERPL CALCULATED.3IONS-SCNC: 6 MMOL/L (ref 3–16)
ANISOCYTOSIS: ABNORMAL
BANDED NEUTROPHILS RELATIVE PERCENT: 2 % (ref 0–7)
BASOPHILS ABSOLUTE: 0 K/UL (ref 0–0.2)
BASOPHILS RELATIVE PERCENT: 0 %
BUN BLDV-MCNC: 4 MG/DL (ref 7–20)
CALCIUM SERPL-MCNC: 8 MG/DL (ref 8.3–10.6)
CHLORIDE BLD-SCNC: 102 MMOL/L (ref 99–110)
CO2: 29 MMOL/L (ref 21–32)
CREAT SERPL-MCNC: 0.7 MG/DL (ref 0.6–1.2)
EOSINOPHILS ABSOLUTE: 0 K/UL (ref 0–0.6)
EOSINOPHILS RELATIVE PERCENT: 0 %
GFR AFRICAN AMERICAN: >60
GFR NON-AFRICAN AMERICAN: >60
GLUCOSE BLD-MCNC: 94 MG/DL (ref 70–99)
GRAM STAIN RESULT: ABNORMAL
HCT VFR BLD CALC: 31 % (ref 36–48)
HEMOGLOBIN: 10.1 G/DL (ref 12–16)
LYMPHOCYTES ABSOLUTE: 0.8 K/UL (ref 1–5.1)
LYMPHOCYTES RELATIVE PERCENT: 11 %
MCH RBC QN AUTO: 29.6 PG (ref 26–34)
MCHC RBC AUTO-ENTMCNC: 32.7 G/DL (ref 31–36)
MCV RBC AUTO: 90.6 FL (ref 80–100)
MONOCYTES ABSOLUTE: 0.4 K/UL (ref 0–1.3)
MONOCYTES RELATIVE PERCENT: 5 %
NEUTROPHILS ABSOLUTE: 5.9 K/UL (ref 1.7–7.7)
NEUTROPHILS RELATIVE PERCENT: 82 %
ORGANISM: ABNORMAL
PDW BLD-RTO: 15.6 % (ref 12.4–15.4)
PLATELET # BLD: 478 K/UL (ref 135–450)
PLATELET SLIDE REVIEW: ABNORMAL
PMV BLD AUTO: 7.4 FL (ref 5–10.5)
POTASSIUM SERPL-SCNC: 4.2 MMOL/L (ref 3.5–5.1)
RBC # BLD: 3.42 M/UL (ref 4–5.2)
SLIDE REVIEW: ABNORMAL
SODIUM BLD-SCNC: 137 MMOL/L (ref 136–145)
WBC # BLD: 7 K/UL (ref 4–11)
WOUND/ABSCESS: ABNORMAL

## 2021-07-16 PROCEDURE — 99233 SBSQ HOSP IP/OBS HIGH 50: CPT | Performed by: INTERNAL MEDICINE

## 2021-07-16 PROCEDURE — 2580000003 HC RX 258: Performed by: PHYSICIAN ASSISTANT

## 2021-07-16 PROCEDURE — 6360000002 HC RX W HCPCS: Performed by: INTERNAL MEDICINE

## 2021-07-16 PROCEDURE — 6370000000 HC RX 637 (ALT 250 FOR IP): Performed by: INTERNAL MEDICINE

## 2021-07-16 PROCEDURE — APPNB30 APP NON BILLABLE TIME 0-30 MINS: Performed by: NURSE PRACTITIONER

## 2021-07-16 PROCEDURE — 2580000003 HC RX 258: Performed by: INTERNAL MEDICINE

## 2021-07-16 PROCEDURE — 80048 BASIC METABOLIC PNL TOTAL CA: CPT

## 2021-07-16 PROCEDURE — 36415 COLL VENOUS BLD VENIPUNCTURE: CPT

## 2021-07-16 PROCEDURE — APPSS15 APP SPLIT SHARED TIME 0-15 MINUTES: Performed by: NURSE PRACTITIONER

## 2021-07-16 PROCEDURE — 85025 COMPLETE CBC W/AUTO DIFF WBC: CPT

## 2021-07-16 PROCEDURE — 99024 POSTOP FOLLOW-UP VISIT: CPT | Performed by: SURGERY

## 2021-07-16 RX ORDER — METRONIDAZOLE 250 MG/1
500 TABLET ORAL EVERY 8 HOURS SCHEDULED
Status: DISCONTINUED | OUTPATIENT
Start: 2021-07-16 | End: 2021-07-16 | Stop reason: HOSPADM

## 2021-07-16 RX ORDER — CIPROFLOXACIN 500 MG/1
500 TABLET, FILM COATED ORAL EVERY 12 HOURS SCHEDULED
Qty: 20 TABLET | Refills: 0 | Status: SHIPPED | OUTPATIENT
Start: 2021-07-16 | End: 2021-07-26

## 2021-07-16 RX ORDER — ONDANSETRON 4 MG/1
4 TABLET, FILM COATED ORAL EVERY 8 HOURS PRN
Qty: 30 TABLET | Refills: 0 | Status: SHIPPED | OUTPATIENT
Start: 2021-07-16

## 2021-07-16 RX ORDER — METRONIDAZOLE 500 MG/1
500 TABLET ORAL EVERY 8 HOURS SCHEDULED
Qty: 30 TABLET | Refills: 0 | Status: SHIPPED | OUTPATIENT
Start: 2021-07-16 | End: 2021-07-26

## 2021-07-16 RX ORDER — CIPROFLOXACIN 500 MG/1
500 TABLET, FILM COATED ORAL EVERY 12 HOURS SCHEDULED
Status: DISCONTINUED | OUTPATIENT
Start: 2021-07-16 | End: 2021-07-16 | Stop reason: HOSPADM

## 2021-07-16 RX ADMIN — BUPROPION HYDROCHLORIDE 150 MG: 150 TABLET, EXTENDED RELEASE ORAL at 09:37

## 2021-07-16 RX ADMIN — ASPIRIN 81 MG: 81 TABLET, CHEWABLE ORAL at 09:37

## 2021-07-16 RX ADMIN — Medication 1 CAPSULE: at 09:37

## 2021-07-16 RX ADMIN — Medication 10 ML: at 09:38

## 2021-07-16 RX ADMIN — SODIUM CHLORIDE 3000 MG: 900 INJECTION INTRAVENOUS at 04:04

## 2021-07-16 RX ADMIN — ENOXAPARIN SODIUM 40 MG: 40 INJECTION SUBCUTANEOUS at 09:37

## 2021-07-16 RX ADMIN — LEVOTHYROXINE SODIUM 150 MCG: 0.15 TABLET ORAL at 05:50

## 2021-07-16 RX ADMIN — PANTOPRAZOLE SODIUM 40 MG: 40 TABLET, DELAYED RELEASE ORAL at 05:50

## 2021-07-16 RX ADMIN — LINEZOLID 600 MG: 600 INJECTION, SOLUTION INTRAVENOUS at 00:15

## 2021-07-16 RX ADMIN — METRONIDAZOLE 500 MG: 250 TABLET ORAL at 14:16

## 2021-07-16 RX ADMIN — CIPROFLOXACIN 500 MG: 500 TABLET, FILM COATED ORAL at 11:54

## 2021-07-16 RX ADMIN — Medication 10 ML: at 09:37

## 2021-07-16 ASSESSMENT — ENCOUNTER SYMPTOMS
RHINORRHEA: 0
APNEA: 0
CHOKING: 0
EYE DISCHARGE: 0
DIARRHEA: 0
COLOR CHANGE: 0
COUGH: 0
CHEST TIGHTNESS: 0
BLOOD IN STOOL: 0
STRIDOR: 0
TROUBLE SWALLOWING: 0
SHORTNESS OF BREATH: 0
ABDOMINAL PAIN: 0
EYE REDNESS: 0
PHOTOPHOBIA: 0
FACIAL SWELLING: 0
NAUSEA: 0

## 2021-07-16 ASSESSMENT — PAIN SCALES - GENERAL
PAINLEVEL_OUTOF10: 0

## 2021-07-16 NOTE — DISCHARGE SUMMARY
[facility]    Patient Demographics    Leonor Ferraro  Date of Birth. 1950  MRN. [id number]     Primary care provider. Travis Boucher MD  (Tel: [phone])    Admit date: 7/8/2021    Discharge date (blank if same as Note Date): Note Date: 7/16/2021     Reason for Hospitalization. Chief Complaint   Patient presents with    Abdominal Pain     with feeling bloated and N/V x 1 week but getting worse. Also having chills         Significant Findings. Principal Problem:    Acute pyelonephritis  Active Problems:    Class 1 obesity due to excess calories with body mass index (BMI) of 31.0 to 31.9 in adult    Anxiety    Hypothyroidism    GERD (gastroesophageal reflux disease)    Anemia    Hyponatremia    Severe malnutrition (HCC)    Complicated UTI (urinary tract infection)    E coli bacteremia    High fever    History of depression    Abdominal wall abscess    Encounter for medication counseling  Resolved Problems:    * No resolved hospital problems. *       Problems and results from this hospitalization that need follow up. 1. Abdominal abscess  2. UTI    Significant test results and incidental findings. 1. Blood, urine, and abdominal abscess cultures positive for ecoli. CT abd 7/8:     Mild urothelial thickening of the ureters diffusely.  Recommend clinical   correlation for ascending urinary tract infection. 2. Unchanged subcutaneous fluid collection of the anterior abdominal wall at   midline measuring 9.7 cm.           CT abd/pelvis 7/13  1. Persistent anterior abdominal wall fluid collection.  There is increased   wall thickening and infiltration of the adjacent subcutaneous fat indicative   of an abscess. 2. Heterogeneity of the left-sided nephrogram with focal enlargement of the   upper pole, consistent with the clinical history of UTI.  No focal abscess.    No significant hydronephrosis.  Continued imaging surveillance recommended. 3. Mildly fluid-filled distended small bowel loops with liquid stool in the   proximal colon, possibly a mild enterocolitis. Escherichia coli (1)              Antibiotic Interpretation WALESKA Status     ampicillin Resistant >=32 mcg/mL       ceFAZolin Sensitive <=4 mcg/mL         NOTE: Cefazolin should only be used for uncomplicated UTI         for E.coli or Klebsiella pneumoniae. cefepime Sensitive <=0.12 mcg/mL       cefTRIAXone Sensitive <=0.25 mcg/mL       ciprofloxacin Sensitive <=0.25 mcg/mL       ertapenem Sensitive <=0.12 mcg/mL       gentamicin Sensitive <=1 mcg/mL       levofloxacin Sensitive 1 mcg/mL       nitrofurantoin Sensitive <=16 mcg/mL       piperacillin-tazobactam Sensitive <=4 mcg/mL       trimethoprim-sulfamethoxazole Resistant >=320 mcg/mL             ECHO   Irregular rhythm   Left ventricular systolic function is normal with ejection fraction   estimated at 60-65 %.   No regional wall motion abnormalities are noted.   Normal left ventricular diastolic filling pressure.   There is mild concentric left ventricular hypertrophy.   Aortic valve appears sclerotic but opens adequately.   Mitral valve leaflets appear mildly thickened.   Mild tricuspid regurgitation.   Systolic pulmonary artery pressure (SPAP) is normal and estimated at 30 mmHg   (right atrial pressure 8 mmHg).     Previous echo done 3013 - EF 55%    Invasive procedures and treatments. 1. I and D of abdominal abscess at bedside by Dr Valery Lassiter 7/14/21    [facility] Course. Patient is a very pleasant 29-year-old female who presented to hospital with abdominal pain for 1 week. It was associated with fevers, malaise, nausea, and vomiting. In the emergency room CT scan was suggestive of an a sending urinary tract infection. UA was consistent with a UTI. Patient was admitted with sepsis secondary to UTI.   She was started on IV antibiotics and aggressively hydrated. She had persistent fevers. Infectious disease was consulted. She continued to have fevers despite adjustment in her antibiotics. She had a repeat CT scan revealing a persistent fluid collection of the anterior abdominal wall however this time there was  stranding of this fluid collection suggestive of an abscess. General surgery was consulted. They did an I&D at bedside. Her pyrexia resolved. All blood urine and abdominal fluid collection cultures were positive for E. coli. She is doing well at time of discharge and will go home on oral antibiotics. Consults. IP CONSULT TO DIETITIAN  IP CONSULT TO INFECTIOUS DISEASES  IP CONSULT TO GENERAL SURGERY    Physical examination on discharge day. BP (!) 106/59   Pulse 74   Temp 97.7 °F (36.5 °C) (Oral)   Resp 18   Ht 5' 2.5\" (1.588 m)   Wt 172 lb 6.4 oz (78.2 kg)   SpO2 98%   BMI 31.03 kg/m²   General appearance. Alert. Looks comfortable. HEENT. Sclera clear. Moist mucus membranes. Cardiovascular. Regular rate and rhythm, normal S1, S2. No murmur. Respiratory. Not using accessory muscles. Clear to auscultation bilaterally, no wheeze. Gastrointestinal. Abdomen soft, non-tender, not distended, normal bowel sounds  Neurology. Facial symmetry. No speech deficits. Moving all extremities equally. Extremities. No edema in lower extremities. Skin. Warm, dry, normal turgor, anterior abdominal wall wound, no surrounding cellulitis    Condition at time of discharge stable    Medication instructions provided to patient at discharge.      Medication List      START taking these medications    ciprofloxacin 500 MG tablet  Commonly known as: CIPRO  Take 1 tablet by mouth every 12 hours for 10 days     metroNIDAZOLE 500 MG tablet  Commonly known as: FLAGYL  Take 1 tablet by mouth every 8 hours for 10 days        CONTINUE taking these medications    ALPRAZolam 1 MG tablet  Commonly known as: XANAX  TAKE 1 TABLET BY MOUTH TWICE DAILY AS

## 2021-07-16 NOTE — PROGRESS NOTES
Infectious Diseases   Progress Note      Admission Date: 7/8/2021  Hospital Day: Hospital Day: 9   Attending: Amy Mujica MD  Date of service: 7/16/2021     Chief complaint/ Reason for consult:     · High fevers  · E. coli bacteremia  · Complicated E. coli UTI  · Anterior abdominal wall midline fluid collection, measuring 9.7 cm, does not appear infected on clinical exam  · History left total knee arthroplasty on 6/1/2021    Microbiology:        I have reviewed allavailable micro lab data and cultures    · Blood culture (2/2) - collected on 7/8/2021: E. Coli    Susceptibility    Escherichia coli (2)    Antibiotic Interpretation WALESKA Status    ampicillin Resistant >=32 mcg/mL     ceFAZolin Sensitive <=4 mcg/mL     cefepime Sensitive <=0.12 mcg/mL     cefTRIAXone Sensitive <=0.25 mcg/mL     ciprofloxacin Sensitive 0.5 mcg/mL     ertapenem Sensitive <=0.12 mcg/mL     gentamicin Sensitive <=1 mcg/mL     levofloxacin Sensitive 0.5 mcg/mL     piperacillin-tazobactam Sensitive <=4 mcg/mL     trimethoprim-sulfamethoxazole Resistant >=320 mcg/mL           · Blood culture - collectedon 7/10/2021: Negative  · Urine culture  - collected on 7/8/2021: Greater than 100,000 CFU per mL of E. Coli    Susceptibility    Escherichia coli (1)    Antibiotic Interpretation WALESKA Status    ampicillin Resistant >=32 mcg/mL     ceFAZolin Sensitive <=4 mcg/mL      NOTE: Cefazolin should only be used for uncomplicated UTI         for E.coli or Klebsiella pneumoniae.    cefepime Sensitive <=0.12 mcg/mL     cefTRIAXone Sensitive <=0.25 mcg/mL     ciprofloxacin Sensitive <=0.25 mcg/mL     ertapenem Sensitive <=0.12 mcg/mL     gentamicin Sensitive <=1 mcg/mL     levofloxacin Sensitive 1 mcg/mL     nitrofurantoin Sensitive <=16 mcg/mL     piperacillin-tazobactam Sensitive <=4 mcg/mL     trimethoprim-sulfamethoxazole Resistant >=320 mcg/mL     Lab and Collection      · Abdominal wound culture: collected on 7/14/21    Susceptibility    Escherichia coli (1)    Antibiotic Interpretation WALESKA Status    amoxicillin-clavulanate Sensitive <=8/4 mcg/mL     ampicillin Resistant >16 mcg/mL     ceFAZolin Sensitive <=2 mcg/mL     cefepime Sensitive <=2 mcg/mL     cefTRIAXone Sensitive <=1 mcg/mL     cefuroxime Sensitive <=4 mcg/mL     ciprofloxacin Sensitive <=1 mcg/mL     ertapenem Sensitive <=0.5 mcg/mL     gentamicin Sensitive <=4 mcg/mL     meropenem Sensitive <=1 mcg/mL     piperacillin-tazobactam Sensitive <=16 mcg/mL     trimethoprim-sulfamethoxazole Resistant >2/38 mcg/mL             Antibiotics and immunizations:       Current antibiotics: All antibiotics and their doses were reviewed by me    Recent Abx Admin                   ampicillin-sulbactam (UNASYN) 3000 mg ivpb minibag (mg) 3,000 mg New Bag 07/16/21 0404     3,000 mg New Bag 07/15/21 2008     3,000 mg New Bag  1307    linezolid (ZYVOX) IVPB 600 mg (mg) 600 mg New Bag 07/16/21 0015     600 mg New Bag 07/15/21 1508                  Immunization History: All immunization history was reviewed by me today. Immunization History   Administered Date(s) Administered    Influenza Virus Vaccine 10/29/2015, 10/23/2018, 12/10/2019    Influenza, Quadv, IM, PF (6 mo and older Fluzone, Flulaval, Fluarix, and 3 yrs and older Afluria) 10/25/2016, 10/03/2017    Pneumococcal Conjugate 13-valent ([id number]) 11/28/2017    Pneumococcal Polysaccharide ([id number]) 12/11/2018    Tdap (Boostrix, Adacel) 01/17/2013    Zoster Live (Zostavax) 11/12/2013       Known drug allergies: All allergies were reviewed and updated    Allergies   Allergen Reactions    Macrobid [Nitrofurantoin] Other (See Comments)     Fever, myalgias    Codeine Nausea Only       Social history:     Social History:  All social andepidemiologic history was reviewed and updated by me today as needed. · Tobacco use:   reports that she has never smoked.  She has never used smokeless tobacco.  · Alcohol use:   reports no history of alcohol use.  · Currently lives in: [facility]  ·  reports no history of drug use. COVID VACCINATION AND LAB RESULT RECORDS:     Internal Administration   First Dose      Second Dose           Last COVID Lab No results found for: SARS-COV-2, SARS-COV-2 RNA, SARS-COV-2, SARS-COV-2, SARS-COV-2 BY PCR, SARS-COV-2, SARS-COV-2, SARS-COV-2         Assessment:     The patient is a 78 y.o. old female who  has a past medical history of Anal fissure (7/16/2013), Anxiety, Colon polyps, Depression, Fibroid uterus, Hypothyroidism, Morbid obesity ([address] and Osteopenia. with following problems:    · High fevers-  resolved  · E. coli bacteremia-  improving  · Complicated E. coli UTI- improved  · Anterior abdominal wall midline fluid collection, measuring 9.7 cm --underwent bedside I&D on 7/14/2021  · History left total knee arthroplasty on 6/1/2021  · History of weight loss surgery and midline abdominal incisional hernia repair  · History of depression  · Hypothyroidism  · Osteopenia  · Obesity Class 1 due to excess calorie intake : Body mass index is 31.61 kg/m². Discussion:      She is afebrile. WBC count is 7000    S Cr is 0.7      Abdominal wall wound culture is growing E coli. It is cipro susceptible. Plan:     Diagnostic Workup:      · Continue to follow  fever curve, WBC count and blood cultures. · Continue to monitor blood counts, liver and renal function. Antimicrobials:    · Will stop iv linezolid today  · Will stop iv unasyn today  · Will order PO Cipro 500 mg every 12 hours  · Cannot rule out intra-abdominal source of infection  ·  Will order Po flagyl 500 mg every 8 hours  · Recommend oral ciprofloxacin and Flagyl for 10 days at discharge. · Take oral probiotic twice daily while on antibiotics  · I have reconciled her discharge antibiotics  · Continue close vitals monitoring. · Maintain good glycemic control. · Fall precautions. Aspiration precautions.   · Continue to watch for new fever or diarrhea. · DVT prophylaxis. · Discussed all above with patient and RN. · Discussed with hospitalist      Drug Monitoring:    · Continue monitoring for antibiotic toxicity as follows: CBC, CMP, QTc interval  · Continue to watch for following: new or worsening fever, new hypotension, hives, lip swelling and redness or purulence at vascular access sites. I/v access Management:    · Continue to monitor i.v access sites for erythema, induration, discharge or tenderness. · As always, continue efforts to minimize tubes/lines/drains as clinically appropriate to reduce chances of line associated infections. Patient education and counseling:        · The patient was educated in detail about the side-effects of various antibiotics and things to watch for like new rashes, lip swelling, severe reaction, worsening diarrhea, break through fever etc.  · Discussed patient's condition and what to expect. All of the patient's questions were addressed in a satisfactory manner and patient verbalized understanding all instructions. Fluoroquinolone related instructions:     Patient instructed to watch for low or high blood sugars, muscle pains and ankle tendon pain while on Ciprofloxacin or Levofloxacin. Patient was advised to keep a sugar candy at all times as all fluoroquinolones have the potential of causing hypoglycemia. If these symptoms develops, patient was instructed to stop the antibiotic and call my office at [phone]. Use sunscreen when going in bright sun while on Ciprofloxacin or Levofloxacin as these antibiotics can cause photosensitivity. Take 1 hour before or 2 hour after dairy, calcium, iron, magnesium, aluminum or zinc.      TIME SPENT TODAY:     - Spent over  36 minutes on visit (including interval history, physical exam, review of data including labs, cultures, imaging, development and implementation of treatment plan and coordination of complex care).  More than 50 percent of this includes face-to-face time spent with the patient for counseling and coordination of care. Thank you for involving me in the care of your patient. I will continue to follow. If you have anyadditional questions, please do not hesitate to contact me. Subjective: Interval history: Interval history was obtained from chart review and patient/ RN. She is afebrile. She is tolerating antibiotics okay. No diarrhea     REVIEW OF SYSTEMS:      Review of Systems   Constitutional: Negative for chills, diaphoresis and unexpected weight change. HENT: Negative for congestion, ear discharge, ear pain, facial swelling, hearing loss, rhinorrhea and trouble swallowing. Eyes: Negative for photophobia, discharge, redness and visual disturbance. Respiratory: Negative for apnea, cough, choking, chest tightness, shortness of breath and stridor. Cardiovascular: Negative for chest pain and palpitations. Gastrointestinal: Negative for abdominal pain, blood in stool, diarrhea and nausea. Endocrine: Negative for polydipsia, polyphagia and polyuria. Genitourinary: Negative for difficulty urinating, dysuria, frequency, hematuria, menstrual problem and vaginal discharge. Musculoskeletal: Negative for arthralgias, joint swelling, myalgias and neck stiffness. Skin: Negative for color change and rash. Allergic/Immunologic: Negative for immunocompromised state. Neurological: Negative for dizziness, seizures, speech difficulty, light-headedness and headaches. Hematological: Negative for adenopathy. Psychiatric/Behavioral: Negative for agitation, hallucinations and suicidal ideas. Past Medical History: All past medical history reviewed today.     Past Medical History:   Diagnosis Date    Anal fissure 7/16/2013    Anxiety     Colon polyps     Depression     Fibroid uterus     Hypothyroidism     Morbid obesity ([address]     Osteopenia        Past Surgical History: All past surgical history was reviewed today. Past Surgical History:   Procedure Laterality Date    ABDOMINAL HERNIA REPAIR  2008    APPENDECTOMY  Age 27    CATARACT REMOVAL WITH IMPLANT Right 10/23/2017    with vitrectomy    CATARACT REMOVAL WITH IMPLANT Left 02/26/2018    with vitrectomy    COLONOSCOPY N/A 10/10/2018    COLONOSCOPY POLYPECTOMY SNARE/COLD BIOPSY performed by Kamilah German MD at [address] Right 8/26/2019    RIGHT L4 AND L5 TRANSFORAMINAL EPIDURAL STEROID INJECTION WITH FLUOROSCOPY [facility]    HC INJECT OTHER PERPHRL NERV Right 10/21/2019    RIGHT PIRIFORMIS INJECTION WITH FLUOROSCOPY (81208) performed by Davie Hua MD at [facility], TOTAL ABDOMINAL      ovaries out also   [address] Right 1/7/2019    RIGHT L3 AND L4 LUMBAR TRANSFORAMINAL WITH FLUOROSCOPY performed by Davie Hua MD at [address] [place] [address] Right 5/15/2019    RIGHT L4 AND L5 TRANSFORAMINAL EPIDURAL STEROID INJECTION WITH FLUOROSCOPY performed by Davie Hua MD at [place] [address] ESOPHAGOGASTRODUODENOSCOPY TRANSORAL DIAGNOSTIC N/A 10/10/2018    EGD performed by Kamilah German MD at [address]    Fibroids    TOTAL KNEE ARTHROPLASTY Left 6/1/2021    LEFT TOTAL KNEE ARTHROPLASTY performed by Valeria Fernandez MD [facility]       Family History: All family history was reviewed today.         Problem Relation Age of Onset    Diabetes Father         Type II    Diabetes Sister         Type II    Diabetes Brother         Type II    Breast Cancer Sister 58    Lung Cancer Mother         Smoker    Heart Disease Sister         Bicuspid aortic valve x2       Objective:       PHYSICAL EXAM:      Vitals:   Vitals:    07/16/21 0013 07/16/21 0354 07/16/21 0418 07/16/21 0745   BP: 113/69 102/69  106/69   Pulse: 77 81  78   Resp: 16 16  18   Temp: 98.7 °F (37.1 °C) 98.2 °F (36.8 °C)  97.7 °F (36.5 °C)   TempSrc: Oral Oral  Oral   SpO2: 97% 95%  97%   Weight:   172 lb 6.4 oz (78.2 kg)    Height:           Physical Exam  Vitals and nursing note reviewed. Constitutional:       General: She is not in acute distress. Appearance: She is well-developed. She is not diaphoretic. HENT:      Head: Normocephalic. Right Ear: External ear normal.      Left Ear: External ear normal.      Nose: Nose normal.   Eyes:      General: No scleral icterus. Right eye: No discharge. Left eye: No discharge. Conjunctiva/sclera: Conjunctivae normal.      Pupils: Pupils are equal, round, and reactive to light. Cardiovascular:      Rate and Rhythm: Normal rate and regular rhythm. Heart sounds: No murmur heard. No friction rub. Pulmonary:      Effort: Pulmonary effort is normal.      Breath sounds: No stridor. No wheezing or rales. Chest:      Chest wall: No tenderness. Abdominal:      Palpations: Abdomen is soft. There is no mass. Tenderness: There is no abdominal tenderness. There is no guarding or rebound. Comments: Abdominal wall abscess I&D site improving   Musculoskeletal:         General: No tenderness. Cervical back: Normal range of motion and neck supple. Lymphadenopathy:      Cervical: No cervical adenopathy. Skin:     General: Skin is warm and dry. Findings: No erythema or rash. Neurological:      Mental Status: She is alert and oriented to person, place, and time. Motor: No abnormal muscle tone. Psychiatric:         Judgment: Judgment normal.                Lines: All vascular access sites are healthy with no local erythema, discharge or tenderness. Intake and output:    I/O last 3 completed shifts: In: 1080 [P.O.:1080]  Out: -     Lab Data:   All available labs and old records have been reviewed by me.     CBC:  Recent Labs     07/14/21  0459 07/15/21  0620 07/16/21  0628   WBC 11.0 8.9 7.0   RBC 3.33* 3.18* 3.42*   HGB 9.8* 9.6* 10.1*   HCT 30.5* 29.3* 31.0*    368 478*   MCV 91.7 92.3 90.6   MCH 29.3 30.1 29.6   MCHC 32.0 32.7 32.7   RDW 15.2 15.6* 15.6*   BANDSPCT  --   --  2        BMP:  Recent Labs     07/14/21  0459 07/15/21  0620 07/16/21  0628   * 136 137   K 3.2* 3.6 4.2    102 102   CO2 23 25 29   BUN 6* 6* 4*   CREATININE 0.9 0.8 0.7   CALCIUM 8.0* 7.9* 8.0*   GLUCOSE 98 124* 94        Hepatic Function Panel:   Lab Results   Component Value Date    ALKPHOS 95 07/08/2021    ALT 10 07/08/2021    AST 42 07/08/2021    PROT 6.5 07/08/2021    PROT 6.5 01/08/2013    BILITOT 0.4 07/08/2021    BILIDIR 0.13 06/11/2013    IBILI 0.2 06/11/2013    LABALBU 2.5 07/09/2021       CPK: No results found for: CKTOTAL  ESR: No results found for: SEDRATE  CRP: No results found for: CRP        Imaging: All pertinent images and reports for the current visit were reviewed by me during this visit. CT ABDOMEN PELVIS W IV CONTRAST Additional Contrast? None   Final Result   1. Persistent anterior abdominal wall fluid collection. There is increased   wall thickening and infiltration of the adjacent subcutaneous fat indicative   of an abscess. 2. Heterogeneity of the left-sided nephrogram with focal enlargement of the   upper pole, consistent with the clinical history of UTI. No focal abscess. No significant hydronephrosis. Continued imaging surveillance recommended. 3. Mildly fluid-filled distended small bowel loops with liquid stool in the   proximal colon, possibly a mild enterocolitis. CT ABDOMEN PELVIS W IV CONTRAST Additional Contrast? None   Final Result   1. Mild urothelial thickening of the ureters diffusely. Recommend clinical   correlation for ascending urinary tract infection. 2. Unchanged subcutaneous fluid collection of the anterior abdominal wall at   midline measuring 9.7 cm. Medications: All current and past medications were reviewed.      ampicillin-sulbactam  3,000 mg Intravenous Q8H    lactobacillus  1 capsule Oral BID WC    linezolid  600 mg Intravenous Q12H    lidocaine 1 % injection  5 mL Intradermal Once    sodium chloride flush  5-40 mL Intravenous 2 times per day    sodium chloride flush  5-40 mL Intravenous 2 times per day    enoxaparin  40 mg Subcutaneous Daily    aspirin  81 mg Oral Daily    buPROPion  150 mg Oral QAM    traZODone  150 mg Oral Nightly    gabapentin  300 mg Oral BID    pantoprazole  40 mg Oral QAM AC    sennosides-docusate sodium  2 tablet Oral Daily    levothyroxine  150 mcg Oral QAM AC        sodium chloride 25 mL (07/13/21 1602)    sodium chloride 25 mL (07/15/21 1302)       potassium chloride **OR** potassium alternative oral replacement **OR** potassium chloride, ALPRAZolam, sodium chloride flush, sodium chloride, perflutren lipid microspheres, sodium chloride flush, sodium chloride, ondansetron **OR** ondansetron, polyethylene glycol, acetaminophen **OR** acetaminophen, melatonin, LORazepam      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    Major depressive disorder with single episode F32.9    Anxiety F41.9    Hypothyroidism E03.9    Family history of bicuspid aortic valve Z82.79    Osteopenia M85.80    GERD (gastroesophageal reflux disease) K21.9    Anal fissure K60.2    Psychophysiological insomnia F51.04    Anemia D64.9    Colon polyps K63.5    Osteoarthritis of left knee M17.12    Restless leg syndrome G25.81    Ganglion cyst of wrist, left M67.432    Neck pain, chronic M54.2, G89.29    Hypertriglyceridemia U54.1    Periumbilical mass E28.54    Coronary artery calcification I25.10, I25.84    Total knee replacement status, left Z96.652    Ileus, postoperative (HCC) K91.89, K56.7   

## 2021-07-16 NOTE — PLAN OF CARE
Problem: Falls - Risk of:  Goal: Will remain free from falls  Description: Will remain free from falls  7/15/2021 2004 by Quin Jamison RN  Outcome: Ongoing  7/15/2021 1947 by Jerri Alas RN  Outcome: Ongoing  Goal: Absence of physical injury  Description: Absence of physical injury  7/15/2021 1947 by Jerri Alas RN  Outcome: Ongoing     Problem: Pain:  Goal: Pain level will decrease  Description: Pain level will decrease  7/15/2021 1947 by Jerri Alas RN  Outcome: Ongoing     Problem: Infection - Central Venous Catheter-Associated Bloodstream Infection:  Goal: Will show no infection signs and symptoms  Description: Will show no infection signs and symptoms  Outcome: Ongoing     Problem: Skin Integrity:  Goal: Demonstration of wound healing without infection will improve  Description: Demonstration of wound healing without infection will improve  Outcome: Ongoing

## 2021-07-16 NOTE — CARE COORDINATION
Discharge Note:    No IV antibiotics will be required at discharge. [facility] services for wound care arranged through:    [address]  PHONE; [phone]  FAX: [phone]    No further discharge planning needs.     Chon Jolly RN BSN  Case Management  [phone]

## 2021-07-16 NOTE — PROGRESS NOTES
Data- discharge order received, pt verbalized agreement to discharge, needs for [facility] with [facility]. for wound care, JOANN reviewed and signed by MD, to be completed by RN. Action- AVS prepared, discharge instructions prepared and given to pt, medication information packet given r/t NEW or CHANGED prescriptions, pt verbalized understanding further self-review. D/C instruction summary: Diet- general, Activity- as mayra, follow up with Primary Care Physician Merlene Ambrosio -[phone] appointment , immunizations reviewed, medications prescriptions to be filled [facility]. Inpatient surgical procedural reviewed: wound care. daughter instructed on wound care, teach back/hands on, wound care performed by daughter, supplies given to pt. . Contact information provided to above agencies used. Response- Case Management/ reported faxing completed JOANN and AVS to needed HHC/DME services stated above. Pt belongings gathered, IV removed, pt dressed . Disposition is home with HHC/DME as stated above, transported with daughter, taken to lobby via w/c with staff, no complications. 1. WEIGHT: Admit Weight: 170 lb (77.1 kg) (07/1950)        Today  Weight: 172 lb 6.4 oz (78.2 kg) (07/16/21 0418)       2.  O2 SAT.: SpO2: 98 % (07/16/21 1145)

## 2021-07-16 NOTE — PROGRESS NOTES
[facility]        Progress Note    Patient Name: Heath Samayoa  MRN: [id number]  YOB: 1950  Date of Evaluation: 2021    Chief Complaint: Abdominal wall abscess    Subjective:  No acute events overnight  Pain minimal, controlled  No issues with wound, continues to drain  Resting in bed at this time      Vital Signs:  Patient Vitals for the past 24 hrs:   BP Temp Temp src Pulse Resp SpO2 Weight   21 0745 106/69 97.7 °F (36.5 °C) Oral 78 18 97 % --   21 0418 -- -- -- -- -- -- 172 lb 6.4 oz (78.2 kg)   21 0354 102/69 98.2 °F (36.8 °C) Oral 81 16 95 % --   21 0013 113/69 98.7 °F (37.1 °C) Oral 77 16 97 % --   07/15/21 1947 122/72 97.9 °F (36.6 °C) Oral 66 17 97 % --   07/15/21 1645 116/66 97.6 °F (36.4 °C) Oral 89 18 97 % --   07/15/21 1245 115/64 97.9 °F (36.6 °C) Oral 97 18 96 % --      TEMPERATURE HISTORY 24H: Temp (24hrs), Av °F (36.7 °C), Min:97.6 °F (36.4 °C), Max:98.7 °F (37.1 °C)    BLOOD PRESSURE HISTORY: Systolic (20FXQ), CGE:575 , Min:97 , AEP:840    Diastolic (25VIP), WUT:01, Min:54, Max:72      Intake/Output:  I/O last 3 completed shifts: In: 1080 [P.O.:1080]  Out: -   I/O this shift:  In: 120 [P.O.:120]  Out: -   Drain/tube Output:       Physical Exam:  General: awake, alert, oriented to  person, place, time  Skin/Wound: open abdominal wound, large amount of thin brown drainage, remains indurated but erythema resolved, minimally tender    Labs:  CBC:    Recent Labs     21  0459 07/15/21  0620 21  0628   WBC 11.0 8.9 7.0   HGB 9.8* 9.6* 10.1*   HCT 30.5* 29.3* 31.0*    368 478*     BMP:    Recent Labs     21  0459 07/15/21  0620 21  0628   * 136 137   K 3.2* 3.6 4.2    102 102   CO2 23 25 29   BUN 6* 6* 4*   CREATININE 0.9 0.8 0.7   GLUCOSE 98 124* 94     Hepatic:  No results for input(s): AST, ALT, ALB, BILITOT, ALKPHOS in the last 72 hours.   Amylase:    Lab Results found.    Scheduled Meds:   ampicillin-sulbactam  3,000 mg Intravenous Q8H    lactobacillus  1 capsule Oral BID WC    linezolid  600 mg Intravenous Q12H    lidocaine 1 % injection  5 mL Intradermal Once    sodium chloride flush  5-40 mL Intravenous 2 times per day    sodium chloride flush  5-40 mL Intravenous 2 times per day    enoxaparin  40 mg Subcutaneous Daily    aspirin  81 mg Oral Daily    buPROPion  150 mg Oral QAM    traZODone  150 mg Oral Nightly    gabapentin  300 mg Oral BID    pantoprazole  40 mg Oral QAM AC    sennosides-docusate sodium  2 tablet Oral Daily    levothyroxine  150 mcg Oral QAM AC     Continuous Infusions:   sodium chloride 25 mL (07/13/21 1602)    sodium chloride 25 mL (07/15/21 1302)     PRN Meds:.potassium chloride **OR** potassium alternative oral replacement **OR** potassium chloride, ALPRAZolam, sodium chloride flush, sodium chloride, perflutren lipid microspheres, sodium chloride flush, sodium chloride, ondansetron **OR** ondansetron, polyethylene glycol, acetaminophen **OR** acetaminophen, melatonin, LORazepam      Assessment:  78 y.o. female admitted with   1. Pyelonephritis        Status-post incision and drainage of abscess on abdominal wall on 7/14/2021  Urinary tract infection  E. coli bacteremia      Plan:  1. Local wound care with wet-to-dry dressings BID (decrease to daily dressing changes at home) and PRN for excess drainage, cultures reveal E. coli  2. General diet as tolerated  3. Antibiotics per ID  4. Activity as tolerated  5. PRN analgesics and antiemetics--minimizing narcotics as tolerated  6. Management of medical comorbid etiologies per primary team and consulting services  7. Disposition: Okay for discharge home from a surgical perspective; follow up with Dr. Arabella Jones in 1-2 weeks    EDUCATION:  Educated patient on plan of care and disease process--all questions answered. Plans discussed with patient and nursing.   Reviewed and discussed with  Alcira Lilly      Signed:  Susy WakefieldOVIDIO - CNP  7/16/2021 10:03 AM     I have personally performed a face to face diagnostic evaluation on this patient. I have interviewed and examined the patient and I agree with the assessment above. In summary, my findings and plan are the following:   Ms. Jadyn John is a 78 y.o. female who presents with   Status-post incision and drainage of abscess on abdominal wall on 7/14/2021  Urinary tract infection  E. coli bacteremia     Plan:  1. Continue local wound care, continued drainage reflects abscess is open appropriately  2. Antibiotics per ID, cellulitis improving  3. Pain well controlled  4. Defer management of remainder of medical comorbidities to primary and consulting teams  5. Discharge planning, may discharge today from surgical standpoint and follow in office 1-2 weeks    Champ Guerrero MD, FACS  7/16/2021  10:19 AM

## 2021-07-19 ENCOUNTER — CARE COORDINATION (OUTPATIENT)
Dept: CASE MANAGEMENT | Age: 71
End: 2021-07-19

## 2021-07-19 ENCOUNTER — TELEPHONE (OUTPATIENT)
Dept: ORTHOPEDIC SURGERY | Age: 71
End: 2021-07-19

## 2021-07-19 DIAGNOSIS — L02.211 ABDOMINAL WALL ABSCESS: Primary | ICD-10-CM

## 2021-07-19 PROCEDURE — 1111F DSCHRG MED/CURRENT MED MERGE: CPT | Performed by: INTERNAL MEDICINE

## 2021-07-19 NOTE — TELEPHONE ENCOUNTER
Patient is wondering if she can be worked in today for her "[employer]f 26. She has been hospitalized twice since her sx. Please call.

## 2021-07-19 NOTE — PROGRESS NOTES
7.19.21   [address] [phone] was active with this pt prior to admit. [facility] forwarded this referral to [facility]. [facility] has referral notes/orders in system for resumption of care. Discharge planner notified.

## 2021-07-19 NOTE — CARE COORDINATION
Lisa 47 Transitions Initial Follow Up Call:  Patient reports that she is doing well, afebrile, daughter is doing abdominal wall abscess dressing changes. Discussed discharge instructions and reviewed medications, 1111F completed. CTN contacted [facility], spoke with \"Patrizia\", she reported that patient was currently active with [facility] and she transferred the case to [facility] on Saturday. CTN reached out to [facility] and confirm that LEONID order had been received. Patient is getting ready to call and schedule follow up appointments. CTN will continue with outreach follow up calls. Call within 2 business days of discharge: Yes    Patient: Ladarius Ruvalcaba Patient : 1950   MRN: [id number]  Reason for Admission: Abd pain, pyelonephritis  Discharge Date: 21 RARS: Readmission Risk Score: 22      Last Discharge [facility]       Complaint Diagnosis Description Type Department Provider    21 Abdominal Pain Pyelonephritis ED to Hosp-Admission (Discharged) (ADMITTED) Ran Watts MD; Rosemarie Benavides . .. Spoke with: Ladarius Valenzuelaa      Transitions of Care Initial Call    Was this an external facility discharge? No Discharge Facility:     Challenges to be reviewed by the provider   Additional needs identified to be addressed with provider: No  none             Method of communication with provider : none      Advance Care Planning:   Does patient have an Advance Directive: reviewed and current, reviewed and needs to be updated, not on file; education provided, not on file, patient declined education, decision maker updated and referral to internal ACP facilitator. Was this a readmission? No  Patient stated reason for admission: Abd pain, pyelonephritis  Patients top risk factors for readmission: medical condition-.    Care Transition Nurse (CTN) contacted the patient by telephone to perform post hospital discharge assessment.  Verified name and  with patient as identifiers. Provided introduction to self, and explanation of the CTN role. CTN reviewed discharge instructions, medical action plan and red flags with patient who verbalized understanding. Patient given an opportunity to ask questions and does not have any further questions or concerns at this time. Were discharge instructions available to patient? Yes. Reviewed appropriate site of care based on symptoms and resources available to patient including: PCP, Urgent care clinics, Home health and When to call 911. The patient agrees to contact the PCP office for questions related to their healthcare. Medication reconciliation was performed with patient, who verbalizes understanding of administration of home medications. Advised obtaining a 90-day supply of all daily and as-needed medications. Reviewed and educated patient on any new and changed medications related to discharge diagnosis. Was patient discharged with a pulse oximeter? No     CTN provided contact information. Plan for follow-up call in 3-5 days based on severity of symptoms and risk factors. Plan for next call: symptom management-., self management-. and follow up appointment-.           Non-face-to-face services provided:  Obtained and reviewed discharge summary and/or continuity of care documents    Care Transitions 24 Hour Call    Do you have any ongoing symptoms?: No  Do you have a copy of your discharge instructions?: Yes  Do you have all of your prescriptions and are they filled?: Yes  Have you been contacted by a "[employer] Avenue?: No  Have you scheduled your follow up appointment?: No  Were you discharged with any Home Care or Post Acute Services: Yes  Post Acute Services: [address] [facility] (Comment: OUTPT therapy now)  Do you feel like you have everything you need to keep you well at home?: Yes  Care Transitions Interventions         Follow Up  Future Appointments   Date Time Provider Latisha Husain   7/21/2021  3:00 PM Agustín Contreras Stephanie Lema, [address]    7/23/2021  2:30 PM Bravostas Garcia, PT [facility] PT [facility]   7/27/2021  2:30 PM Columbiastas Garcia, PT [facility] PT [facility]   7/29/2021  3:00 PM Barnhart [place], PT [facility] PT [facility]   8/3/2021  2:30 PM Bravo Garcia, PT [facility] PT [facility]   8/5/2021  3:00 PM Barnhart [place], PT [facility] PT [facility]   8/10/2021  2:30 PM Bravo Garcia, PT [facility] PT [facility]   8/12/2021  3:00 PM Barnhart [place], PT [facility] PT [facility]   8/24/2021  1:00 PM Alexandra Gonzales MD [id number] Belinda Steve RN

## 2021-07-19 NOTE — TELEPHONE ENCOUNTER
Called patient and LM offering appointment for Tues or Wed -- Left # for patient to call back and schedule

## 2021-07-21 ENCOUNTER — HOSPITAL ENCOUNTER (OUTPATIENT)
Dept: PHYSICAL THERAPY | Age: 71
Setting detail: THERAPIES SERIES
Discharge: HOME | End: 2021-07-21
Payer: COMMERCIAL

## 2021-07-21 PROCEDURE — 97110 THERAPEUTIC EXERCISES: CPT

## 2021-07-21 PROCEDURE — 97164 PT RE-EVAL EST PLAN CARE: CPT

## 2021-07-21 NOTE — PLAN OF CARE
[facility]     Physical Therapy Re-Certification Plan of Care    Dear Glenford Cabot MD,     We had the pleasure of treating the following patient for physical therapy services at [facility]. A summary of our findings can be found in the updated assessment below. This includes our plan of care. If you have any questions or concerns regarding these findings, please do not hesitate to contact me at the office phone number checked above. Thank you for the referral.     Physician Signature:________________________________Date:__________________  By signing above (or electronic signature), therapist's plan is approved by physician    ROM PROM AROM Overpressure Comment     L R L R L R     Flexion 120    115           Extension  0    lacking 5                                                     Strength L R Comment   Quad 3+       Hamstring  4-       Gastroc         Hip  flexion  4-       Hip abd  4-                                  Overall Response to Treatment:   []Patient is responding well to treatment and improvement is noted with regards  to goals   []Patient should continue to improve in reasonable time if they continue HEP   []Patient has plateaued and is no longer responding to skilled PT intervention    []Patient is getting worse and would benefit from return to referring MD   []Patient unable to adhere to initial POC   []Other: -    Assessment:  Increased time taken today for reassessment due to pt being in hospital for 8 days prior to today's session. Pt demonstrates good overall ROM with full ROM achieved passively with mild discomfort at end ranges. Strength continues to be biggest deficit due to prolonged time off from PT due to infection and hospitalization however continues to maintain good muscle engagements.  Pt will benefit from continuation of PT to work on progressing LE strength, activity tolerance and flexibility for progressions to LTG's. Date range of Visits:   Total Visits: 5    Recommendation:    [x]Continue PT 2x / wk for 6 weeks. []Hold PT, pending MD visit        Physical Therapy Daily Treatment Note  Date:  2021    Patient Name:  Earle Rodriguez    :  1950  MRN: [id number]  Restrictions/Precautions:    Medical/Treatment Diagnosis Information:  Diagnosis: M17.12 (ICD-10-CM) - Osteoarthritis of left knee, unspecified osteoarthritis type  s/p Left TKR  DOS: 21  Treatment Diagnosis: increased pain; decreased ROM; decreased strength; gait abnormality; increased swelling  Insurance/Certification information:  PT Insurance Information: Sandra Anguiano 8  Physician Information:  Referring Practitioner: Vivek Prince MD  Has the plan of care been signed (Y/N):        [x]  Yes-  []  No     Date of Patient follow up with Physician: 21      Is this a Progress Report:     []  Yes  [x]  No        If Yes:  Date Range for reporting period:  Beginning 21  Ending     Progress report will be due (10 Rx or 30 days whichever is less): 3/76/74      Recertification will be due (POC Duration  / 90 days whichever is less): 21    Visit # Insurance Allowable Auth Required   4 MN []  Yes [x]  No        Functional Scale:        Date assessed:          LEFS: raw score=35; dysfunction =56%    21     Latex Allergy:  [x]NO      []YES  Preferred Language for Healthcare:   [x]English       []other:    Pain level:  2-3/10     SUBJECTIVE: Pt reports after last visit she was having progressively worsening issues and ended up admitted to the hospital with a bacterial infection in her abdomen and was in the hospital for a total of 8 days without PT. Pt reports she was d/c'ed from the hospital last week and has been trying to do what she could at home with her HEP to progress her status but not sure she has been doing enough.  Her knee continues to do well and not having a lot of pain, feels difficult to get her knee straight and fully bent but has been working on getting them better. Has been doing a little more at home without her RW but still not sure if she should do away with her RW. OBJECTIVE:   Observation: 6/23: avoids bending/weighting leg during sit to stand. Tends to use RW like a  SW. Needs cues to bend knee during swing phase. Pt tends to manually assist LLE onto table, stepper, car. Test measurements:     7/7/ 21 left knee incision healing well and has been using Vit E to massage.  Gait mildly antalgic  Left 0-120 ERMI  7/2/21   Left knee ROM -5/ 0 after stretching  6/29: L knee 108 flexion PROM  6/23: AROM L knee (supine): QS lacks 4, SAQ lacks 7; knee flexion 90     RESTRICTIONS/PRECAUTIONS:  Left TKR, WBAT    Exercises/Interventions:   Exercise/Equipment  TE x  Resistance Sets/time Repetitions Other comments    6/23 added   BIKE   ERMI    Full revolutions 6/29   added 7/7   Stretching     Hamstring on step/ EOB toes  6/23   Lunge knee flexion stretch on step  6/23   Towel Pull     Inclined Calf     Hip Flexion     ITB     Groin     Quad     Bridge   Added 7/7          strengthening       SLR Supine     SLR Abduction  SL  clams   orange 6/23 could only do one set due to spasm in thigh   SLR Prone       SLR+ quad set  2 10           Quad sets            LAQ  2 10x L  7/7   SAQ  2 10 L    Calf raises  2 10 B 6/23   Mini squats   6/29   Hip abd in standing  1 10 R    Hip ext in standing  1 10 R                  ROM       Sheet Pulls       Hang Weights       Passive       Weight Shift       Ankle Pumps       ERMI   Resume npv                 NMR/ CKC              TRX squats       Standing 3 way SLR       1 leg stand              CC TKE  purple TB 2 10 Added 7/2    Balance    bridges     FSU 6\" up & up and over  Added 7/2    Added 7/7   Therapeutic Activity       Fwd step up       Fwd step down       Lat step up       Lat dip/heel tap       Total gym squats       Sit to stand                                   Manual interventions x  8'       Patellar glides Scar mobs  PROM STM                 Patient Education:  6/23: instructed pt to try to avoid manually helping her LLE into bed/car etc.  Since she is able to do a SLR without assistance, she should try to actively lift LLE. Walked with patient to work on flexing L knee during swing phase and pushing RW instead of picking it up like a     Home Exercise program  Access Code: [email or web address]  URL: [employer]/   Date: 06/16/2021  Prepared by: Barbara Rodriguez  Exercises  Long Sitting Calf Stretch with Strap - 2 x daily - 7 x weekly - 5 reps - 30 seconds hold  Seated Table Hamstring Stretch - 2 x daily - 7 x weekly - 5 reps - 30 seconds hold  Long Sitting Quad Set - 2 x daily - 7 x weekly - 10 reps - 10 seconds hold  Straight Leg Raise - 2 x daily - 7 x weekly - 3 sets - 10 reps  Sidelying Hip Abduction - 2 x daily - 7 x weekly - 3 sets - 10 reps  Supine Hip Adduction Isometric with Ball - 2 x daily - 7 x weekly - 10 reps - 10 sec hold  Sitting Heel Slide with Towel - 2 x daily - 7 x weekly - 10 reps - 10seconds hold  Seated Knee Flexion AAROM - 2 x daily - 7 x weekly - 10 reps - 10 sec hold    Updated on 7/21:   Access Code: [email or web address]  URL: [employer]/  Date: 07/21/2021  Prepared by: Gabriella Manley    Exercises  Active Straight Leg Raise with Quad Set - 2 x daily - 7 x weekly - 2 sets - 10 reps  Supine Knee Extension Strengthening - 2 x daily - 7 x weekly - 2 sets - 10 reps  Seated Long Arc Quad - 2 x daily - 7 x weekly - 2 sets - 10 reps  Standing March with Counter Support - 2 x daily - 7 x weekly - 2 sets - 10 reps        Therapeutic Exercise and NMR EXR  [x] (94571) Provided verbal/tactile cueing for activities related to strengthening, flexibility, endurance, ROM for improvements in LE, proximal hip, and core control with self care, mobility, lifting, ambulation.  [] (01755) Provided verbal/tactile cueing for activities related to improving balance, coordination, kinesthetic sense, posture, motor skill, proprioception  to assist with LE, proximal hip, and core control in self care, mobility, lifting, ambulation and eccentric single leg control. NMR and Therapeutic Activities:    [] (32763 or 58911) Provided verbal/tactile cueing for activities related to improving balance, coordination, kinesthetic sense, posture, motor skill, proprioception and motor activation to allow for proper function of core, proximal hip and LE with self care and ADLs  [] (16386) Gait Re-education- Provided training and instruction to the patient for proper LE, core and proximal hip recruitment and positioning and eccentric body weight control with ambulation re-education including up and down stairs     Home Exercise Program:    [] (43486) Reviewed/Progressed HEP activities related to strengthening, flexibility, endurance, ROM of core, proximal hip and LE for functional self-care, mobility, lifting and ambulation/stair navigation   [] (79869)Reviewed/Progressed HEP activities related to improving balance, coordination, kinesthetic sense, posture, motor skill, proprioception of core, proximal hip and LE for self care, mobility, lifting, and ambulation/stair navigation      Manual Treatments:  PROM / STM / Oscillations-Mobs:  G-I, II, III, IV (PA's, Inf., Post.)  [x] (03705) Provided manual therapy to mobilize LE, proximal hip and/or LS spine soft tissue/joints for the purpose of modulating pain, promoting relaxation,  increasing ROM, reducing/eliminating soft tissue swelling/inflammation/restriction, improving soft tissue extensibility and allowing for proper ROM for normal function with self care, mobility, lifting and ambulation.      Modalities: 7/7/21  10'  6/16/21 vasopneumatic compression x 15 min   6/23: pt to use her cold machine when she gets home    Charges:  Timed Code Treatment Minutes: 45   Total Treatment Minutes: 45       [] EVAL (LOW) 79497 (typically 20 minutes face-to-face)  [] EVAL (MOD) 08880 (typically 30 minutes face-to-face)  [] EVAL (HIGH) 19623 (typically 45 minutes face-to-face)  [x] RE-EVAL   [x] FR(97458) x 2   [] IONTO  [] NMR (46950) x     [] VASO   [] Manual (28211) x     [] Other:  [] TA x      [] Mech Traction (16879)  [] ES(attended) (07783)      [] ES (un) (82149):        GOALS:   Patient stated goal: walking. [] Progressing: [] Met: [] Not Met: [] Adjusted     Therapist goals for Patient:   Short Term Goals: To be achieved in: 2 weeks  1. Independent in HEP and progression per patient tolerance, in order to prevent re-injury. [] Progressing: [] Met: [] Not Met: [] Adjusted   2. Patient will have a decrease in pain to facilitate improvement in movement, function, and ADLs as indicated by Functional Deficits. [] Progressing: [] Met: [] Not Met: [] Adjusted     Long Term Goals: To be achieved in: 12 weeks  1. Disability index score of 20% or less for the LEFS to assist with reaching prior level of function. [] Progressing: [] Met: [] Not Met: [] Adjusted  2. Patient will demonstrate increased AROM to [facility] to allow for proper joint functioning as indicated by patients Functional Deficits. [] Progressing: [] Met: [] Not Met: [] Adjusted  3. Patient will demonstrate an increase in Strength to good proximal hip strength and control, within 5lb HHD in LE to allow for proper functional mobility as indicated by patients Functional Deficits. [] Progressing: [] Met: [] Not Met: [] Adjusted  4. Patient will return to ADL and household functional activities without increased symptoms or restriction. [] Progressing: [] Met: [] Not Met: [] Adjusted  5. Pt will be able to walk with mild symptoms and restrictions.     [] Progressing: [] Met: [] Not Met: [] Adjusted       Overall Progression Towards Functional goals/ Treatment Progress Update:  [] Patient is progressing as expected towards functional goals listed. [] Progression is slowed due to complexities/Impairments listed. [] Progression has been slowed due to co-morbidities. [x] Plan just implemented, too soon to assess goals progression <30days   [] Goals require adjustment due to lack of progress  [] Patient is not progressing as expected and requires additional follow up with physician  [] Other    Prognosis for POC: [x] Good [] Fair  [] Poor    Patient requires continued skilled intervention: [x] Yes  [] No    Assessment:  Increased time taken today for reassessment due to pt being in hospital for 8 days prior to today's session. Pt demonstrates good overall ROM with full ROM achieved passively with mild discomfort at end ranges. Strength continues to be biggest deficit due to prolonged time off from PT due to infection and hospitalization however continues to maintain good muscle engagements. Pt will benefit from continuation of PT to work on progressing LE strength, activity tolerance and flexibility for progressions to LTG's. Treatment/Activity Tolerance:  [x] Patient able to complete treatment  [] Patient limited by fatigue  [] Patient limited by pain     [] Patient limited by other medical complications  [] Other:         PLAN:  Patient advised to get a cane but not to use FT just yet, continue with walker  [x] Continue per plan of care [] Alter current plan (see comments above)  [] Plan of care initiated [] Hold pending MD visit [] Discharge      Electronically signed by:  Bertha Mcrae, PT DPT, OMT-C  Note: If patient does not return for scheduled/ recommended follow up visits, this note will serve as a discharge from care along with most recent update on progress.

## 2021-07-23 ENCOUNTER — HOSPITAL ENCOUNTER (OUTPATIENT)
Dept: PHYSICAL THERAPY | Age: 71
Setting detail: THERAPIES SERIES
Discharge: HOME | End: 2021-07-23
Payer: COMMERCIAL

## 2021-07-23 ENCOUNTER — CARE COORDINATION (OUTPATIENT)
Dept: CASE MANAGEMENT | Age: 71
End: 2021-07-23

## 2021-07-23 PROCEDURE — 97110 THERAPEUTIC EXERCISES: CPT

## 2021-07-23 PROCEDURE — 97530 THERAPEUTIC ACTIVITIES: CPT

## 2021-07-25 ASSESSMENT — ENCOUNTER SYMPTOMS
ABDOMINAL PAIN: 0
GASTROINTESTINAL NEGATIVE: 1
RESPIRATORY NEGATIVE: 1
EYES NEGATIVE: 1

## 2021-07-25 NOTE — PROGRESS NOTES
2021    TELEHEALTH EVALUATION -- Audio/Visual (During LYDIN-40 public health emergency)    HPI:    Earle Rodriguez (:  1950) has requested an audio/video evaluation for the following concern(s):    Patient is here to discuss osteoporosis and treatment. Gynecologic Exam  This is a recurrent problem. The current episode started more than 1 month ago. The problem occurs rarely. The problem has been unchanged. Pertinent negatives include no abdominal pain. Nothing aggravates the symptoms. She has tried nothing for the symptoms. The treatment provided no relief. Review of Systems   Constitutional: Negative. HENT: Negative. Eyes: Negative. Respiratory: Negative. Cardiovascular: Negative. Gastrointestinal: Negative. Negative for abdominal pain. Genitourinary: Negative. Musculoskeletal: Negative. Skin: Negative. Neurological: Negative. Psychiatric/Behavioral: Negative.       Date of Birth 1950  Past Medical History:   Diagnosis Date    Anal fissure 2013    Anxiety     Colon polyps     Depression     Fibroid uterus     Hypothyroidism     Morbid obesity ([address]     Osteopenia      Past Surgical History:   Procedure Laterality Date    ABDOMINAL HERNIA REPAIR      APPENDECTOMY  Age 27    CATARACT REMOVAL WITH IMPLANT Right 10/23/2017    with vitrectomy    CATARACT REMOVAL WITH IMPLANT Left 2018    with vitrectomy    COLONOSCOPY N/A 10/10/2018    COLONOSCOPY POLYPECTOMY SNARE/COLD BIOPSY performed by James Aaron MD at [address]  Right 2019    RIGHT L4 AND L5 TRANSFORAMINAL EPIDURAL STEROID INJECTION WITH FLUOROSCOPY performed by Fernanda Henao MD at [address], [facility]. INJECT OTHER PERPHRL NERV Right 10/21/2019    RIGHT PIRIFORMIS INJECTION WITH FLUOROSCOPY (70255) performed by Fernanda Henao MD at [facility], TOTAL ABDOMINAL      ovaries out also    LUMBAR SPINE SURGERY Right 2019    RIGHT L3 AND L4 LUMBAR TRANSFORAMINAL WITH FLUOROSCOPY performed by Venu Loja MD at [address] [place] [address] Right 5/15/2019    RIGHT L4 AND L5 TRANSFORAMINAL EPIDURAL STEROID INJECTION WITH FLUOROSCOPY performed by Venu Loja MD at [facility] [address] ESOPHAGOGASTRODUODENOSCOPY TRANSORAL DIAGNOSTIC N/A 10/10/2018    EGD performed by Kadeem Bonilla MD at [address]    Fibroids    TOTAL KNEE ARTHROPLASTY Left 2021    LEFT TOTAL KNEE ARTHROPLASTY performed by Rachel Diaz MD at [address]     OB History    Para Term  AB Living   1 1 1     1   SAB TAB Ectopic Molar Multiple Live Births                    # Outcome Date GA Lbr Arcenio/2nd Weight Sex Delivery Anes PTL Lv   1 Term      Vag-Spont        Social History     Socioeconomic History    Marital status:      Spouse name: Not on file    Number of children: Not on file    Years of education: Not on file    Highest education level: Not on file   Occupational History    Occupation: Computer work   Tobacco Use    Smoking status: Never Smoker    Smokeless tobacco: Never Used   Vaping Use    Vaping Use: Never used   Substance and Sexual Activity    Alcohol use: No    Drug use: No    Sexual activity: Never   Other Topics Concern    Not on file   Social History Narrative    Not on file     Social Determinants of Health     Financial Resource Strain: Low Risk     Difficulty of Paying Living Expenses: Not hard at all   Food Insecurity: No Food Insecurity    Worried About [facility] in the Last Year: Never true    Sy of Food in the Last Year: Never true   Transportation Needs: No Transportation Needs    Lack of Transportation (Medical): No    Lack of Transportation (Non-Medical):  No   Physical Activity:     Days of Exercise per Week:     Minutes of Exercise per Session:    Stress:     Feeling of Stress :    Social Connections:     Frequency of Communication with Friends and Family:     Frequency of Social Gatherings with Friends and Family:     Attends [employer] Services:     Active Member of Clubs or Organizations:     Attends Club or Organization Meetings:     Marital Status:    Intimate Partner Violence: Not At Risk    Fear of Current or Ex-Partner: No    Emotionally Abused: No    Physically Abused: No    Sexually Abused: No     Allergies   Allergen Reactions    Macrobid [Nitrofurantoin] Other (See Comments)     Fever, myalgias    Codeine Nausea Only     Outpatient Medications Marked as Taking for the 7/6/21 encounter (Virtual Visit) with Jenny Sunshine MD   Medication Sig Dispense Refill    levothyroxine (SYNTHROID) 150 MCG tablet TAKE 1 TABLET BY MOUTH EVERY DAY 90 tablet 1    ALPRAZolam (XANAX) 1 MG tablet TAKE 1 TABLET BY MOUTH TWICE DAILY AS NEEDED FOR ANXIETY 35 tablet 2    gabapentin (NEURONTIN) 300 MG capsule TAKE 1 CAPSULE BY MOUTH TWICE DAILY 60 capsule 5    [DISCONTINUED] polyethylene glycol (GLYCOLAX) 17 GM/SCOOP powder Take 17 g by mouth 2 times daily 510 g 0    aspirin 325 MG EC tablet Take 1 tablet by mouth daily 45 tablet 0    buPROPion (WELLBUTRIN XL) 150 MG extended release tablet Take 1 tablet by mouth every morning 90 tablet 1    traZODone (DESYREL) 50 MG tablet TAKE 3 TABLETS BY MOUTH EVERY NIGHT AT BEDTIME 270 tablet 1    Multiple Vitamin (MULTIVITAMIN PO) Take by mouth      omeprazole (PRILOSEC) 40 MG delayed release capsule TAKE 1 CAPSULE BY MOUTH EVERY MORNING BEFORE BREAKFAST 90 capsule 1    melatonin 5 MG TABS tablet Take 5 mg by mouth daily      Cyanocobalamin (VITAMIN B 12 PO) Take 500 mcg by mouth daily       Cholecalciferol (VITAMIN D) 1000 UNIT CAPS Take 2 capsules by mouth daily        Family History   Problem Relation Age of Onset    Diabetes Father         Type II    Diabetes Sister         Type II    Diabetes Brother         Type II    Breast Cancer Sister 58    Lung Cancer Mother         Smoker    Heart Disease Sister         Bicuspid aortic valve x2     There were no vitals taken for this visit. Prior to Visit Medications    Medication Sig Taking? Authorizing Provider   levothyroxine (SYNTHROID) 150 MCG tablet TAKE 1 TABLET BY MOUTH EVERY DAY Yes Arvind Godinez MD   ALPRAZolam (XANAX) 1 MG tablet TAKE 1 TABLET BY MOUTH TWICE DAILY AS NEEDED FOR ANXIETY Yes Arvidn Godinez MD   gabapentin (NEURONTIN) 300 MG capsule TAKE 1 CAPSULE BY MOUTH TWICE DAILY Yes Arvind Godinez MD   aspirin 325 MG EC tablet Take 1 tablet by mouth daily Yes Howard Wolff DO   buPROPion (WELLBUTRIN XL) 150 MG extended release tablet Take 1 tablet by mouth every morning Yes Arvind Godinez MD   traZODone (DESYREL) 50 MG tablet TAKE 3 TABLETS BY MOUTH EVERY NIGHT AT BEDTIME Yes Arvind Godinez MD   Multiple Vitamin (MULTIVITAMIN PO) Take by mouth Yes Historical Provider, MD   omeprazole (PRILOSEC) 40 MG delayed release capsule TAKE 1 CAPSULE BY MOUTH EVERY MORNING BEFORE BREAKFAST Yes Arvind Godinez MD   melatonin 5 MG TABS tablet Take 5 mg by mouth daily Yes Historical Provider, MD   Cyanocobalamin (VITAMIN B 12 PO) Take 500 mcg by mouth daily  Yes Historical Provider, MD   Cholecalciferol (VITAMIN D) 1000 UNIT CAPS Take 2 capsules by mouth daily  Yes Historical Provider, MD   ciprofloxacin (CIPRO) 500 MG tablet Take 1 tablet by mouth every 12 hours for 10 days  Huong Davidson MD   metroNIDAZOLE (FLAGYL) 500 MG tablet Take 1 tablet by mouth every 8 hours for 10 days  Huong Davidson MD   ondansetron (ZOFRAN) 4 MG tablet Take 1 tablet by mouth every 8 hours as needed for Nausea or Vomiting  Ren Marrero PA-C   senna (SENOKOT) 8.6 MG tablet Take 1 tablet by mouth daily   Historical Provider, MD       Social History     Tobacco Use    Smoking status: Never Smoker    Smokeless tobacco: Never Used   Vaping Use    Vaping Use: Never used   Substance Use Topics    Alcohol use:  No  Drug use: No            PHYSICAL EXAMINATION:  [ INSTRUCTIONS:  \"[x]\" Indicates a positive item  \"[]\" Indicates a negative item  -- DELETE ALL ITEMS NOT EXAMINED]  Vital Signs: (As obtained by patient/caregiver or practitioner observation)    Blood pressure-  Heart rate-    Respiratory rate-    Temperature-  Pulse oximetry-     Constitutional: [x] Appears well-developed and well-nourished [x] No apparent distress      [] Abnormal-   Mental status  [x] Alert and awake  [x] Oriented to person/place/time []Able to follow commands      Eyes:  EOM    [x]  Normal  [] Abnormal-  Sclera  [x]  Normal  [] Abnormal -         Discharge [x]  None visible  [] Abnormal -    HENT:   [x] Normocephalic, atraumatic. [] Abnormal   [x] Mouth/Throat: Mucous membranes are moist.     External Ears [x] Normal  [] Abnormal-     Neck: [x] No visualized mass     Pulmonary/Chest: [x] Respiratory effort normal.  [x] No visualized signs of difficulty breathing or respiratory distress        [] Abnormal-      Musculoskeletal:   [x] Normal gait with no signs of ataxia         [x] Normal range of motion of neck        [] Abnormal-       Neurological:        [x] No Facial Asymmetry (Cranial nerve 7 motor function) (limited exam to video visit)          [x] No gaze palsy        [] Abnormal-         Skin:        [x] No significant exanthematous lesions or discoloration noted on facial skin         [] Abnormal-            Psychiatric:       [x] Normal Affect [x] No Hallucinations        [] Abnormal-     Other pertinent observable physical exam findings-     ASSESSMENT/PLAN:  1. Osteoporosis-discussed with patient about treatment options. Feel might be candidate for Prolia but will discuss with  [address] [place] [address]. ? Possible reclast. Feel oral bisphosphonate would not be option give hx stomach issues.   Calcium, vitamin D, exercise   Greater than 50% of time spent on counseling    [facility]  [facility] was evaluated through a synchronous (real-time) audio-video encounter. The patient (or guardian if applicable) is aware that this is a billable service. Verbal consent to proceed has been obtained within the past 12 months. The visit was conducted pursuant to the emergency declaration under the [address] [place] [address], [address] [place] [address] authority and the [employer] Act. Patient identification was verified, and a caregiver was present when appropriate. The patient was located in a state where the provider was credentialed to provide care. Total time spent on this encounter: Not billed by time    --Raymond Caceres MD on 7/25/2021 at 10:12 AM    An electronic signature was used to authenticate this note.

## 2021-07-26 ENCOUNTER — OFFICE VISIT (OUTPATIENT)
Dept: SURGERY | Age: 71
End: 2021-07-26
Payer: COMMERCIAL

## 2021-07-26 ENCOUNTER — OFFICE VISIT (OUTPATIENT)
Dept: ORTHOPEDIC SURGERY | Age: 71
End: 2021-07-26

## 2021-07-26 VITALS — SYSTOLIC BLOOD PRESSURE: 114 MMHG | WEIGHT: 174 LBS | BODY MASS INDEX: 31.32 KG/M2 | DIASTOLIC BLOOD PRESSURE: 68 MMHG

## 2021-07-26 VITALS — WEIGHT: 174 LBS | BODY MASS INDEX: 30.83 KG/M2 | HEIGHT: 63 IN

## 2021-07-26 DIAGNOSIS — Z96.652 S/P TOTAL KNEE ARTHROPLASTY, LEFT: Primary | ICD-10-CM

## 2021-07-26 DIAGNOSIS — Z48.89 ENCOUNTER FOR POSTOPERATIVE CARE: Primary | ICD-10-CM

## 2021-07-26 PROCEDURE — 99024 POSTOP FOLLOW-UP VISIT: CPT | Performed by: ORTHOPAEDIC SURGERY

## 2021-07-26 PROCEDURE — 99213 OFFICE O/P EST LOW 20 MIN: CPT | Performed by: SURGERY

## 2021-07-26 NOTE — PATIENT INSTRUCTIONS
Continue local wound care with wet-to-dry dressing daily and as needed  No restrictions with regard to bathing or activity.   Avoid open water such as pools and hot tubs until wound has completely closed  Continue antibiotics per infectious disease recommendations  Follow general surgery as needed

## 2021-07-26 NOTE — PROGRESS NOTES
[facility]  SUBJECTIVE:    Isabel Peterson   1950   78 y.o. female presents for routine postoperative followup after bedside incision and drainage of abdominal wall abscess on January 14, 2021. Incisional pain minimal.  Drainage resolved. Erythema resolved.   No major complaints and presents primarily for a wound check    Past Medical History:   Diagnosis Date    Anal fissure 7/16/2013    Anxiety     Colon polyps     Depression     Fibroid uterus     Hypothyroidism     Morbid obesity ([address]     Osteopenia      Past Surgical History:   Procedure Laterality Date    ABDOMINAL HERNIA REPAIR  2008    APPENDECTOMY  Age 27    CATARACT REMOVAL WITH IMPLANT Right 10/23/2017    with vitrectomy    CATARACT REMOVAL WITH IMPLANT Left 02/26/2018    with vitrectomy    COLONOSCOPY N/A 10/10/2018    COLONOSCOPY POLYPECTOMY SNARE/COLD BIOPSY performed by Zina Rosenberg MD at [address] Right 8/26/2019    RIGHT L4 AND L5 TRANSFORAMINAL EPIDURAL STEROID INJECTION WITH FLUOROSCOPY performed by Brandon Lazaro MD at [facility]    HC INJECT OTHER PERPHRL NERV Right 10/21/2019    RIGHT PIRIFORMIS INJECTION WITH FLUOROSCOPY (49795) performed by Brandon Lazaro MD at [facility], TOTAL ABDOMINAL      ovaries out also   [place] [id number] Right 1/7/2019    RIGHT L3 AND L4 LUMBAR TRANSFORAMINAL WITH FLUOROSCOPY performed by Brandon Lazaro MD at [address] [place] [address] Right 5/15/2019    RIGHT L4 AND L5 TRANSFORAMINAL EPIDURAL STEROID INJECTION WITH FLUOROSCOPY performed by Brandon Lazaro MD at [facility] 177 ESOPHAGOGASTRODUODENOSCOPY TRANSORAL DIAGNOSTIC N/A 10/10/2018    EGD performed by Zina Rosenberg MD at [address]    Fibroids    TOTAL KNEE ARTHROPLASTY Left 6/1/2021    LEFT TOTAL KNEE ARTHROPLASTY performed by Daryl Smith MD at [address] OR     Social History     Socioeconomic History    Marital status:      Spouse name: Not on file    Number of children: Not on file    Years of education: Not on file    Highest education level: Not on file   Occupational History    Occupation: Computer work   Tobacco Use    Smoking status: Never Smoker    Smokeless tobacco: Never Used   Vaping Use    Vaping Use: Never used   Substance and Sexual Activity    Alcohol use: No    Drug use: No    Sexual activity: Never   Other Topics Concern    Not on file   Social History Narrative    Not on file     Social Determinants of Health     Financial Resource Strain: Low Risk     Difficulty of Paying Living Expenses: Not hard at all   Food Insecurity: No Food Insecurity    Worried About [address] [place] [address] Ob Hospitalist Group in the Last Year: Never true    Sy of Food in the Last Year: Never true   Transportation Needs: No Transportation Needs    Lack of Transportation (Medical): No    Lack of Transportation (Non-Medical):  No   Physical Activity:     Days of Exercise per Week:     Minutes of Exercise per Session:    Stress:     Feeling of Stress :    Social Connections:     Frequency of Communication with Friends and Family:     Frequency of Social Gatherings with Friends and Family:     Attends Baptism Services:     Active Member of Clubs or Organizations:     Attends Club or Organization Meetings:     Marital Status:    Intimate Partner Violence: Not At Risk    Fear of Current or Ex-Partner: No    Emotionally Abused: No    Physically Abused: No    Sexually Abused: No      Family History   Problem Relation Age of Onset    Diabetes Father         Type II    Diabetes Sister         Type II    Diabetes Brother         Type II    Breast Cancer Sister 58    Lung Cancer Mother         Smoker    Heart Disease Sister         Bicuspid aortic valve x2     Current Outpatient Medications   Medication Sig Dispense Refill    ciprofloxacin (CIPRO) 500 MG tablet Take 1 tablet by mouth every 12 hours for 10 days 20 tablet 0    metroNIDAZOLE (FLAGYL) 500 MG tablet Take 1 tablet by mouth every 8 hours for 10 days 30 tablet 0    ondansetron (ZOFRAN) 4 MG tablet Take 1 tablet by mouth every 8 hours as needed for Nausea or Vomiting 30 tablet 0    levothyroxine (SYNTHROID) 150 MCG tablet TAKE 1 TABLET BY MOUTH EVERY DAY 90 tablet 1    ALPRAZolam (XANAX) 1 MG tablet TAKE 1 TABLET BY MOUTH TWICE DAILY AS NEEDED FOR ANXIETY 35 tablet 2    gabapentin (NEURONTIN) 300 MG capsule TAKE 1 CAPSULE BY MOUTH TWICE DAILY 60 capsule 5    senna (SENOKOT) 8.6 MG tablet Take 1 tablet by mouth daily       buPROPion (WELLBUTRIN XL) 150 MG extended release tablet Take 1 tablet by mouth every morning 90 tablet 1    traZODone (DESYREL) 50 MG tablet TAKE 3 TABLETS BY MOUTH EVERY NIGHT AT BEDTIME 270 tablet 1    Multiple Vitamin (MULTIVITAMIN PO) Take by mouth      omeprazole (PRILOSEC) 40 MG delayed release capsule TAKE 1 CAPSULE BY MOUTH EVERY MORNING BEFORE BREAKFAST 90 capsule 1    melatonin 5 MG TABS tablet Take 5 mg by mouth daily      Cyanocobalamin (VITAMIN B 12 PO) Take 500 mcg by mouth daily       Cholecalciferol (VITAMIN D) 1000 UNIT CAPS Take 2 capsules by mouth daily       aspirin 325 MG EC tablet Take 1 tablet by mouth daily 45 tablet 0     No current facility-administered medications for this visit. Allergies   Allergen Reactions    Macrobid [Nitrofurantoin] Other (See Comments)     Fever, myalgias    Codeine Nausea Only        Review of Systems:  Review of systems performed and negative with the exception of the above findings    OBJECTIVE:  /68   Wt 174 lb (78.9 kg)   BMI 31.32 kg/m²      Physical Exam:  General appearance: alert, appears stated age, cooperative and no distress  Abdomen: Soft, nondistended, nontender, wound open with no drainage erythema or signs of continued infection    No results displayed because visit has over 200 results. Echo Complete    Result Date: 7/12/2021  Transthoracic Echocardiography Report (TTE)  Demographics   Patient Name      Rober Odonnell   Date of Study     07/12/2021           Gender             Female   Patient Number    [id number]           Date of Birth      1950   Visit Number      [id number]            Age                78 year(s)   Accession Number  [id number]           Room Number        8157   Corporate ID      [id number]             Robin Johnston KADEN   Ordering          Emilieisthuong,       Interpreting       Mara Camejo MD,  Physician         Amanda Carey CNP       Physician          [facility]  Procedure Type of Study   TTE procedure:ECHOCARDIOGRAM COMPLETE 2D WO COLOR DOPPLER. Procedure Date Date: 07/12/2021 Start: 10:19 AM Study Location: [facility] - Echo Lab Technical Quality: Adequate visualization Indications:CVA. Additional Indications:e coli bacteremia. Patient Status: Routine Height: 62 inches Weight: 175 pounds BSA: 1.81 m2 BMI: 32.01 kg/m2 BP: 148/73 mmHg  Conclusions   Summary  Irregular rhythm  Left ventricular systolic function is normal with ejection fraction  estimated at 60-65 %. No regional wall motion abnormalities are noted. Normal left ventricular diastolic filling pressure. There is mild concentric left ventricular hypertrophy. Aortic valve appears sclerotic but opens adequately. Mitral valve leaflets appear mildly thickened. Mild tricuspid regurgitation. Systolic pulmonary artery pressure (SPAP) is normal and estimated at 30 mmHg  (right atrial pressure 8 mmHg).    Previous echo done 3013 - EF 55%   Signature   ------------------------------------------------------------------  Electronically signed by Mara Camejo MD, [facility] (Interpreting  physician) on 07/12/2021 at 04:43 PM  ------------------------------------------------------------------   Findings   Left Ventricle Irregular rhythm  Left ventricular systolic function is normal with ejection fraction  estimated at 60-65 %. No regional wall motion abnormalities are noted. Normal left ventricular diastolic filling pressure. There is mild concentric left ventricular hypertrophy. Left ventricle size is normal.   Mitral Valve  Mitral valve leaflets appear mildly thickened. Trivial mitral regurgitation. Left Atrium  The left atrium is normal in size. Aortic Valve  Aortic valve appears sclerotic but opens adequately. No evidence of aortic valve regurgitation. Aorta  The aortic root is normal in size. Right Ventricle  The right ventricular size is normal.   Tricuspid Valve  Tricuspid valve is structurally normal.  Mild tricuspid regurgitation. Systolic pulmonary artery pressure (SPAP) is normal and estimated at 30 mmHg  (right atrial pressure 8 mmHg). Right Atrium  The right atrium is normal in size. Pulmonic Valve  The pulmonic valve is normal in structure and function. No evidence of pulmonic valve regurgitation. Pericardial Effusion  No pericardial effusion noted. Pleural Effusion  No pleural effusion noted. Miscellaneous  IVC not well visualized.   M-Mode/2D Measurements (cm)   LV Diastolic Dimension: 2.33 cm LV Systolic Dimension: 1.31 cm  LV Septum Diastolic: 7.39 cm  LV PW Diastolic: 4.45 cm        AO Root Dimension: 2.9 cm                                   LA Area: 18.1 cm2  LVOT: 1.9 cm                    LA volume/Index: 38.13 ml /21 ml/m2  Doppler Measurements   AV Peak Velocity: 224 cm/s     MV Peak E-Wave: 76.3 cm/s  AV Peak Gradient: 20.07 mmHg   MV Peak A-Wave: 74.6 cm/s  AV Mean Gradient: 10 mmHg      MV E/A Ratio: 1.02  LVOT Peak Velocity: 112 cm/s  AV Area (Continuity):1.96 cm2   TR Velocity:232 cm/s  TR Gradient:21.53 mmHg  Estimated RAP:8 mmHg  Estimated RVSP: 30 mmHg  E' Septal Velocity: 6.53 cm/s  E' Lateral Velocity: 15.9 cm/s  E/E' ratio: 8.3   Aortic Valve   Peak Velocity: 224 cm/s Mean Velocity: 149 cm/s  Peak Gradient: 20.07 mmHg   Mean Gradient: 10 mmHg  Area (continuity): 1.96 cm2  AV VTI: 45 cm  Aorta   Aortic Root: 2.9 cm     Aortic Arch: 2.9 cm  Ascending Aorta: 3.1 cm  LVOT Diameter: 1.9 cm      CT ABDOMEN PELVIS W IV CONTRAST Additional Contrast? None    Result Date: 7/14/2021  EXAMINATION: CT OF THE ABDOMEN AND PELVIS WITH CONTRAST 7/13/2021 5:52 pm TECHNIQUE: CT of the abdomen and pelvis was performed with the administration of intravenous contrast. Multiplanar reformatted images are provided for review. Dose modulation, iterative reconstruction, and/or weight based adjustment of the mA/kV was utilized to reduce the radiation dose to as low as reasonably achievable. COMPARISON: 07/08/2021 HISTORY: ORDERING SYSTEM PROVIDED HISTORY: persistent fever, UTI TECHNOLOGIST PROVIDED HISTORY: Reason for exam:->persistent fever, UTI Additional Contrast?->None Reason for Exam: persistent fever, UTI Acuity: Acute Type of Exam: Initial FINDINGS: Lower Chest: No acute infiltrate at the lung bases. Organs: The liver, spleen, pancreas and adrenal glands are unremarkable. Status post cholecystectomy with stable prominence of the extrahepatic biliary tree. Mild heterogeneity of the nephrogram on the left with focal enlargement in the anterior aspect of the upper pole, increased from 06/09/2021 and consistent with the clinical history of UTI. The finding is stable compared to 07/08/2021. No significant hydronephrosis or infiltration of the perinephric fat. No evidence of renal abscess. GI/Bowel: No pericolonic inflammatory changes. Liquid stool in the proximal colon. Mildly distended fluid-filled small bowel loops with no perienteric inflammatory changes. Postoperative changes involving the stomach suggesting previous gastric bypass. Pelvis: No pelvic mass or free pelvic fluid. The uterus is absent. Partial distention of the urinary bladder.  Peritoneum/Retroperitoneum: The abdominal aorta is normal in caliber with scattered atherosclerotic plaque. No retroperitoneal adenopathy or upper abdominal ascites. Bones/Soft Tissues: Fluid collection in the subcutaneous fat in the anterior abdominal wall measuring 6.4 x 10.3 x 11.4 cm. There is increased wall thickening and infiltration of the adjacent subcutaneous fat. Findings are indicative of an abscess. No gas is seen. There is infiltration throughout much of the remainder of the anterior subcutaneous fat within the abdominal wall and pannus. No other focal collection. No acute osseous findings. 1. Persistent anterior abdominal wall fluid collection. There is increased wall thickening and infiltration of the adjacent subcutaneous fat indicative of an abscess. 2. Heterogeneity of the left-sided nephrogram with focal enlargement of the upper pole, consistent with the clinical history of UTI. No focal abscess. No significant hydronephrosis. Continued imaging surveillance recommended. 3. Mildly fluid-filled distended small bowel loops with liquid stool in the proximal colon, possibly a mild enterocolitis. CT ABDOMEN PELVIS W IV CONTRAST Additional Contrast? None    Result Date: 7/8/2021  EXAMINATION: CT OF THE ABDOMEN AND PELVIS WITH CONTRAST 7/8/2021 8:33 pm TECHNIQUE: CT of the abdomen and pelvis was performed with the administration of intravenous contrast. Multiplanar reformatted images are provided for review. Dose modulation, iterative reconstruction, and/or weight based adjustment of the mA/kV was utilized to reduce the radiation dose to as low as reasonably achievable.  COMPARISON: 05/06/2021 HISTORY: ORDERING SYSTEM PROVIDED HISTORY: abd pain, n/v, weight loss, eval ventral hernia TECHNOLOGIST PROVIDED HISTORY: Reason for exam:->abd pain, n/v, weight loss, eval ventral hernia Additional Contrast?->None Decision Support Exception - unselect if not a suspected or confirmed emergency medical condition->Emergency Medical Condition (MA) Reason for Exam: abd pain, n/v, weight loss, eval ventral hernia Acuity: Acute Type of Exam: Initial Relevant Medical/Surgical History: Abdominal Pain (with feeling bloated and N/V x 1 week but getting worse. Also having chills) FINDINGS: Lower Chest: Moderate coronary artery calcification. Moderate aortic valvular calcification. Organs: Liver is normal in contour and enhancement. Gallbladder is surgically absent. Similar moderate extrahepatic and mild central intrahepatic biliary ductal dilatation. Pancreas is unremarkable. Adrenals are unremarkable. Spleen is normal in size. No renal calculi or hydronephrosis. Mild urothelial thickening of the ureters diffusely. Mild calcific atherosclerosis. GI/Bowel: Bowel is non-dilated without wall thickening. Appendix is not seen though there are no secondary signs of appendicitis. Pelvis: Unremarkable. Peritoneum/Retroperitoneum:No free fluid, free air, organized fluid collection or lymphadenopathy. Bones: Diffuse osteopenia with multilevel degenerative disc disease. Unchanged subcutaneous fluid collection of the anterior abdominal wall at midline measuring 9.7 cm.     1. Mild urothelial thickening of the ureters diffusely. Recommend clinical correlation for ascending urinary tract infection. 2. Unchanged subcutaneous fluid collection of the anterior abdominal wall at midline measuring 9.7 cm. Assessment:  bedside incision and drainage of abdominal wall abscess on January 14, 2021    Plan:  Continue local wound care with wet-to-dry dressing daily and as needed  No restrictions with regard to bathing or activity. Avoid open water such as pools and hot tubs until wound has completely closed  Continue antibiotics per infectious disease recommendations  Follow general surgery as needed    Champ Mckeon MD, FACS  7/26/2021  10:01 AM

## 2021-07-26 NOTE — PROGRESS NOTES
Patient returns today she is about 7 weeks status post left total knee arthroplasty. She said she got impacted and had to be admitted to the hospital for this and then developed urinary tract infection with E. coli and was in the hospital for 8 days for this she did get therapy during this time. She is in therapy now however and seems to doing pretty well. ROS: Pertinent items are noted in HPI. No notes on file    Past Medical History:  7/16/2013: Anal fissure  No date: Anxiety  No date: Colon polyps  No date: Depression  No date: Fibroid uterus  No date: Hypothyroidism  No date: Morbid obesity ([address]  No date: Osteopenia     Past Surgical History:  2008: ABDOMINAL HERNIA REPAIR  Age 27: APPENDECTOMY  10/23/2017: CATARACT REMOVAL WITH IMPLANT; Right      Comment:  with vitrectomy  02/26/2018: CATARACT REMOVAL WITH IMPLANT; Left      Comment:  with vitrectomy  10/10/2018: COLONOSCOPY; N/A      Comment:  COLONOSCOPY POLYPECTOMY SNARE/COLD BIOPSY performed by                Genevieve Redmond MD at [address] [place] [address]  8/26/2019: EPIDURAL STEROID INJECTION; Right      Comment:  RIGHT L4 AND L5 TRANSFORAMINAL EPIDURAL STEROID                INJECTION WITH FLUOROSCOPY performed by Michelle Cameron MD at [address] [place] [address],[address]: GASTRIC BYPASS SURGERY  10/21/2019: HC INJECT OTHER PERPHRL NERV; Right      Comment:  RIGHT PIRIFORMIS INJECTION WITH FLUOROSCOPY (205[address])                performed by Michelle Cameron MD at [address] [facility]  No date: HYSTERECTOMY, TOTAL ABDOMINAL      Comment:  ovaries out also  1/7/2019: Ran [place]; Right      Comment:  RIGHT L3 AND L4 LUMBAR TRANSFORAMINAL WITH FLUOROSCOPY                performed by Michelle Cameron MD at [address] [facility]  5/15/2019: Ran [place];  Right      Comment:  RIGHT L4 AND L5 TRANSFORAMINAL EPIDURAL STEROID                INJECTION WITH FLUOROSCOPY performed by Michelle Cameron MD at [address] [facility]  10/10/2018: [place] ESOPHAGOGASTRODUODENOSCOPY TRANSORAL DIAGNOSTIC; N/A      Comment:  EGD performed by Lenora Christianson MD at [address] [place] [address] [facility]: [facility]      Comment:  Fibroids  6/1/2021: TOTAL KNEE ARTHROPLASTY; Left      Comment:  LEFT TOTAL KNEE ARTHROPLASTY performed by Ravi Brambila MD at [facility] 39 of patient's family history indicates:  Problem: Diabetes      Relation: Father          Age of Onset: (Not Specified)          Comment: Type II  Problem: Diabetes      Relation: Sister          Age of Onset: (Not Specified)          Comment: Type II  Problem: Diabetes      Relation: Brother          Age of Onset: (Not Specified)          Comment: Type II  Problem: Breast Cancer      Relation: Sister          Age of Onset: 58  Problem: Lung Cancer      Relation: Mother          Age of Onset: (Not Specified)          Comment: Smoker  Problem: Heart Disease      Relation: Sister          Age of Onset: (Not Specified)          Comment: Bicuspid aortic valve x2      Social History    Socioeconomic History      Marital status:       Spouse name: None      Number of children: None      Years of education: None      Highest education level: None    Occupational History      Occupation: Computer work    Tobacco Use      Smoking status: Never Smoker      Smokeless tobacco: Never Used    Vaping Use      Vaping Use: Never used    Substance and Sexual Activity      Alcohol use: No      Drug use: No      Sexual activity: Never    Other Topics      Concerns:        None    Social History Narrative      None    Social Determinants of Health  Financial Resource Strain: Low Risk       Difficulty of Paying Living Expenses: Not hard at all  Food Insecurity: No Food Insecurity      Worried About [address] in the Last Year: Never true      Ran Out of Food in the Last Year: Never true  Transportation Needs: No Transportation Needs      Lack of Transportation (Medical):  No      Lack of Transportation (Non-Medical): No  Physical Activity:       Days of Exercise per Week:       Minutes of Exercise per Session:   Stress:       Feeling of Stress :   Social Connections:       Frequency of Communication with Friends and Family:       Frequency of Social Gatherings with Friends and Family:       Attends Sabianist Services:       Active Member of Clubs or Organizations:       Attends Club or Organization Meetings:       Marital Status:   Intimate Partner Violence: Not At Risk      Fear of Current or Ex-Partner: No      Emotionally Abused: No      Physically Abused:  No      Sexually Abused: No    Current Outpatient Medications:  ciprofloxacin (CIPRO) 500 MG tablet, Take 1 tablet by mouth every 12 hours for 10 days, Disp: 20 tablet, Rfl: 0  metroNIDAZOLE (FLAGYL) 500 MG tablet, Take 1 tablet by mouth every 8 hours for 10 days, Disp: 30 tablet, Rfl: 0  ondansetron (ZOFRAN) 4 MG tablet, Take 1 tablet by mouth every 8 hours as needed for Nausea or Vomiting, Disp: 30 tablet, Rfl: 0  levothyroxine (SYNTHROID) 150 MCG tablet, TAKE 1 TABLET BY MOUTH EVERY DAY, Disp: 90 tablet, Rfl: 1  ALPRAZolam (XANAX) 1 MG tablet, TAKE 1 TABLET BY MOUTH TWICE DAILY AS NEEDED FOR ANXIETY, Disp: 35 tablet, Rfl: 2  gabapentin (NEURONTIN) 300 MG capsule, TAKE 1 CAPSULE BY MOUTH TWICE DAILY, Disp: 60 capsule, Rfl: 5  senna (SENOKOT) 8.6 MG tablet, Take 1 tablet by mouth daily , Disp: , Rfl:   buPROPion (WELLBUTRIN XL) 150 MG extended release tablet, Take 1 tablet by mouth every morning, Disp: 90 tablet, Rfl: 1  traZODone (DESYREL) 50 MG tablet, TAKE 3 TABLETS BY MOUTH EVERY NIGHT AT BEDTIME, Disp: 270 tablet, Rfl: 1  Multiple Vitamin (MULTIVITAMIN PO), Take by mouth, Disp: , Rfl:   omeprazole (PRILOSEC) 40 MG delayed release capsule, TAKE 1 CAPSULE BY MOUTH EVERY MORNING BEFORE BREAKFAST, Disp: 90 capsule, Rfl: 1  melatonin 5 MG TABS tablet, Take 5 mg by mouth daily, Disp: , Rfl:   Cyanocobalamin (VITAMIN B 12 PO), Take 500 mcg by mouth daily , Disp: , Rfl: Cholecalciferol (VITAMIN D) 1000 UNIT CAPS, Take 2 capsules by mouth daily , Disp: , Rfl:   aspirin 325 MG EC tablet, Take 1 tablet by mouth daily, Disp: 45 tablet, Rfl: 0    No current facility-administered medications for this visit. -- Macrobid [Nitrofurantoin] -- Other (See Comments)    --  Fever, myalgias   -- Codeine -- Nausea Only    VITAL SIGNS:  Ht 5' 2.5\" (1.588 m)   Wt 174 lb (78.9 kg)   BMI 31.32 kg/m²   On examination today her incision is healing nicely. There is no warmth, erythema, or effusion. Range of motion today is just a couple degrees short of full extension to 105 degrees. She is appears to have good patellar tracking and good medial lateral stability. Is really no tenderness in medial or lateral retinaculum or medial or lateral joint line. Her calf is soft she has negative Homans. Impression: Doing well 7 weeks status post left total knee arthroplasty. Plan: We again stressed importance of therapy for her. She is slightly behind in range of motion but this is acceptable. Told her she needs to keep stretching to when she wakes up in the morning she only has to stretch or warm up near full range of motion. We will see her back in 6 weeks.

## 2021-07-27 ENCOUNTER — HOSPITAL ENCOUNTER (OUTPATIENT)
Dept: PHYSICAL THERAPY | Age: 71
Setting detail: THERAPIES SERIES
Discharge: HOME | End: 2021-07-27
Payer: COMMERCIAL

## 2021-07-27 PROCEDURE — 97110 THERAPEUTIC EXERCISES: CPT

## 2021-07-27 PROCEDURE — 97530 THERAPEUTIC ACTIVITIES: CPT

## 2021-07-27 NOTE — FLOWSHEET NOTE
[facility] [place]       Physical Therapy Daily Treatment Note  Date:  2021    Patient Name:  Jason Beatty    :  1950  MRN: [id number]  Restrictions/Precautions:    Medical/Treatment Diagnosis Information:  · Diagnosis: M17.12 (ICD-10-CM) - Osteoarthritis of left knee, unspecified osteoarthritis type  s/p Left TKR  DOS: 21  · Treatment Diagnosis: increased pain; decreased ROM; decreased strength; gait abnormality; increased swelling  Insurance/Certification information:  PT Insurance Information: Sandra Anguiano 8  Physician Information:  Referring Practitioner: Jennifer Coates MD  Has the plan of care been signed (Y/N):        [x]  Yes-  []  No     Date of Patient follow up with Physician: 21      Is this a Progress Report:     []  Yes  [x]  No        If Yes:  Date Range for reporting period:  Beginning 21  Ending     Progress report will be due (10 Rx or 30 days whichever is less): 92      Recertification will be due (POC Duration  / 90 days whichever is less): 21    Visit # Insurance Allowable Auth Required   6 MN []  Yes [x]  No        Functional Scale:        Date assessed:          LEFS: raw score=35; dysfunction =56%    21     Latex Allergy:  [x]NO      []YES  Preferred Language for Healthcare:   [x]English       []other:    Pain level:  0-1/10     SUBJECTIVE:  Pt reports she continues to do well, mild soreness after last visit but not bad. Had a visit with her MD yesterday and overall happy with progress considering hospitalization and encouraged to continue with PT.       OBJECTIVE:    Observation: : avoids bending/weighting leg during sit to stand. Tends to use RW like a  SW. Needs cues to bend knee during swing phase. Pt tends to manually assist LLE onto table, stepper, car.    Connor Daen    Test measurements:     : AROM knee flexion 112 degrees, 118 after heel slides     left knee incision healing well and has been using Vit E to massage. Gait mildly antalgic  Left 0-120 ERMI   7/2/21   Left knee ROM -5/ 0 after stretching   6/29: L knee 108 flexion PROM   6/23: AROM L knee (supine): QS lacks 4, SAQ lacks 7; knee flexion 90     RESTRICTIONS/PRECAUTIONS:  Left TKR, WBAT    Exercises/Interventions:   Exercise/Equipment  TE x  Resistance Sets/time Repetitions Other comments    6/23 added   BIKE   ERMI   4' Full revolutions 6/29   added 7/7   Stretching     EOB HSS  20\" 2x L 6/23   Lunge knee flexion stretch on step  6/23   Towel Pull     Inclined Calf  20\" 2x    Heel slides in supine  5\" holds 10                   Bridge   Added 7/7          Strengthening       SLR Supine     SLR Abduction  SL  clams   orange 6/23 could only do one set due to spasm in thigh   SLR Prone       SLR+ quad set  2 10           Quad sets            LAQ 2# 2 10x L  7/7   SAQ 2# 3 10 L    HS curls blue 3 10    Calf raises  2 10 B 6/23   Mini squats   2 10 6/29   Hip abd in standing  1 10 R/L    Hip ext in standing  1 10 R/L                  ROM       Sheet Pulls       Hang Weights       Passive       Weight Shift       Ankle Pumps       ERMI   Resume npv                 NMR/ CKC              TRX squats       Standing 3 way SLR       1 leg stand              CC TKE Added 7/2    Staggered stance    Slow march on airex Light UE A   bridges     FSU 6\" up & up and over  Added 7/2    Added 7/7   Therapeutic Activity       Fwd step up 4 inch  6 inch 1  1 10  10    Fwd step down       Lat step up 4 inch 2 10    Lat dip/heel tap       Total gym squats       Sit to stand                                   Manual interventions x  8'       Patellar glides Scar mobs  PROM STM                 Patient Education:  6/23: instructed pt to try to avoid manually helping her LLE into bed/car etc.  Since she is able to do a SLR without assistance, she should try to actively lift LLE.   Walked with patient to work on flexing L knee during swing phase and pushing RW instead of picking it up like a SW    Home Exercise program  Access Code: [email or web address]  URL: [employer]/   Date: 06/16/2021  Prepared by: Nesha Rough  Exercises  Long Sitting Calf Stretch with Strap - 2 x daily - 7 x weekly - 5 reps - 30 seconds hold  Seated Table Hamstring Stretch - 2 x daily - 7 x weekly - 5 reps - 30 seconds hold  Long Sitting Quad Set - 2 x daily - 7 x weekly - 10 reps - 10 seconds hold  Straight Leg Raise - 2 x daily - 7 x weekly - 3 sets - 10 reps  Sidelying Hip Abduction - 2 x daily - 7 x weekly - 3 sets - 10 reps  Supine Hip Adduction Isometric with Ball - 2 x daily - 7 x weekly - 10 reps - 10 sec hold  Sitting Heel Slide with Towel - 2 x daily - 7 x weekly - 10 reps - 10seconds hold  Seated Knee Flexion AAROM - 2 x daily - 7 x weekly - 10 reps - 10 sec hold    Updated on 7/21:   Access Code: [email or web address]  URL: [employer]/  Date: 07/21/2021  Prepared by: Kostas Monteiro    Exercises  Active Straight Leg Raise with Quad Set - 2 x daily - 7 x weekly - 2 sets - 10 reps  Supine Knee Extension Strengthening - 2 x daily - 7 x weekly - 2 sets - 10 reps  Seated Long Arc Quad - 2 x daily - 7 x weekly - 2 sets - 10 reps  Standing March with Counter Support - 2 x daily - 7 x weekly - 2 sets - 10 reps        Therapeutic Exercise and NMR EXR  [x] (10614) Provided verbal/tactile cueing for activities related to strengthening, flexibility, endurance, ROM for improvements in LE, proximal hip, and core control with self care, mobility, lifting, ambulation.  [] (40350) Provided verbal/tactile cueing for activities related to improving balance, coordination, kinesthetic sense, posture, motor skill, proprioception  to assist with LE, proximal hip, and core control in self care, mobility, lifting, ambulation and eccentric single leg control.      NMR and Therapeutic Activities:    [] (45776 or ) Provided verbal/tactile cueing for activities related to improving balance, coordination, kinesthetic sense, posture, motor skill, proprioception and motor activation to allow for proper function of core, proximal hip and LE with self care and ADLs  [] (84584) Gait Re-education- Provided training and instruction to the patient for proper LE, core and proximal hip recruitment and positioning and eccentric body weight control with ambulation re-education including up and down stairs     Home Exercise Program:    [] (41874) Reviewed/Progressed HEP activities related to strengthening, flexibility, endurance, ROM of core, proximal hip and LE for functional self-care, mobility, lifting and ambulation/stair navigation   [] (80681)Reviewed/Progressed HEP activities related to improving balance, coordination, kinesthetic sense, posture, motor skill, proprioception of core, proximal hip and LE for self care, mobility, lifting, and ambulation/stair navigation      Manual Treatments:  PROM / STM / Oscillations-Mobs:  G-I, II, III, IV (PA's, Inf., Post.)  [x] (51465) Provided manual therapy to mobilize LE, proximal hip and/or LS spine soft tissue/joints for the purpose of modulating pain, promoting relaxation,  increasing ROM, reducing/eliminating soft tissue swelling/inflammation/restriction, improving soft tissue extensibility and allowing for proper ROM for normal function with self care, mobility, lifting and ambulation.      Modalities: 7/7/21  10'  6/16/21 vasopneumatic compression x 15 min   6/23: pt to use her cold machine when she gets home    Charges:  Timed Code Treatment Minutes: 45   Total Treatment Minutes: 45       [] EVAL (LOW) 31745 (typically 20 minutes face-to-face)  [] EVAL (MOD) 36883 (typically 30 minutes face-to-face)  [] EVAL (HIGH) 58322 (typically 45 minutes face-to-face)  [] RE-EVAL   [x] CT(09464) x 2   [] IONTO  [] NMR (43887) x     [] VASO   [] Manual (68439) x     [] Other:  [x] TA x 1     [] Mech Traction (30055)  [] ES(attended) (10398)      [] ES (un) (18690): GOALS:   Patient stated goal: walking. [] Progressing: [] Met: [] Not Met: [] Adjusted     Therapist goals for Patient:   Short Term Goals: To be achieved in: 2 weeks  1. Independent in HEP and progression per patient tolerance, in order to prevent re-injury. [] Progressing: [] Met: [] Not Met: [] Adjusted   2. Patient will have a decrease in pain to facilitate improvement in movement, function, and ADLs as indicated by Functional Deficits. [] Progressing: [] Met: [] Not Met: [] Adjusted     Long Term Goals: To be achieved in: 12 weeks  1. Disability index score of 20% or less for the LEFS to assist with reaching prior level of function. [] Progressing: [] Met: [] Not Met: [] Adjusted  2. Patient will demonstrate [facility] to allow for proper joint functioning as indicated by patients Functional Deficits. [] Progressing: [] Met: [] Not Met: [] Adjusted  3. Patient will demonstrate an increase in Strength to good proximal hip strength and control, within 5lb HHD in LE to allow for proper functional mobility as indicated by patients Functional Deficits. [] Progressing: [] Met: [] Not Met: [] Adjusted  4. Patient will return to ADL and household functional activities without increased symptoms or restriction. [] Progressing: [] Met: [] Not Met: [] Adjusted  5. Pt will be able to walk with mild symptoms and restrictions. [] Progressing: [] Met: [] Not Met: [] Adjusted       Overall Progression Towards Functional goals/ Treatment Progress Update:  [] Patient is progressing as expected towards functional goals listed. [] Progression is slowed due to complexities/Impairments listed. [] Progression has been slowed due to co-morbidities.   [x] Plan just implemented, too soon to assess goals progression <30days   [] Goals require adjustment due to lack of progress  [] Patient is not progressing as expected and requires additional follow up with physician  [] Other    Prognosis for POC: [x] Good [] Fair  [] Poor    Patient requires continued skilled intervention: [x] Yes  [] No    Assessment:  Pt continues to do really well with PT interventions this date. Pt lacking full knee flexion ROM however with light stretching, pt very close to goal range of 120 degrees knee flexion. Much of therex interventions to address progressing muscle activation throughout progressed ranges with stretching as well as standing interventions to focus on tolerance of loading L LE which was more of a limiting factor due to lack of stability and tolerance for repetitive loading due to reported \"soreness\". Pt will benefit from further PT to maximize functional strength and control throughout full range. Treatment/Activity Tolerance:  [x] Patient able to complete treatment  [] Patient limited by fatigue  [] Patient limited by pain     [] Patient limited by other medical complications  [] Other:         PLAN:  Patient advised to get a cane but not to use FT just yet, continue with walker  [x] Continue per plan of care [] Alter current plan (see comments above)  [] Plan of care initiated [] Hold pending MD visit [] Discharge      Electronically signed by:  Simon Polanco PT DPT, OMT-C  Note: If patient does not return for scheduled/ recommended follow up visits, this note will serve as a discharge from care along with most recent update on progress.

## 2021-07-29 ENCOUNTER — HOSPITAL ENCOUNTER (OUTPATIENT)
Dept: PHYSICAL THERAPY | Age: 71
Setting detail: THERAPIES SERIES
Discharge: HOME | End: 2021-07-29
Payer: COMMERCIAL

## 2021-07-29 PROCEDURE — 97530 THERAPEUTIC ACTIVITIES: CPT

## 2021-07-29 PROCEDURE — 97110 THERAPEUTIC EXERCISES: CPT

## 2021-07-29 PROCEDURE — 97140 MANUAL THERAPY 1/> REGIONS: CPT

## 2021-07-29 NOTE — FLOWSHEET NOTE
[facility], [address]       Physical Therapy Daily Treatment Note  Date:  2021    Patient Name:  Marlen Sifuentes    :  1950  MRN: [id number]  Restrictions/Precautions:    Medical/Treatment Diagnosis Information:  · Diagnosis: M17.12 (ICD-10-CM) - Osteoarthritis of left knee, unspecified osteoarthritis type  s/p Left TKR  DOS: 21  · Treatment Diagnosis: increased pain; decreased ROM; decreased strength; gait abnormality; increased swelling  Insurance/Certification information:  PT Insurance Information: Sandra Anguiano 8  Physician Information:  Referring Practitioner: Norman Torres MD  Has the plan of care been signed (Y/N):        [x]  Yes-  []  No     Date of Patient follow up with Physician: 21      Is this a Progress Report:     []  Yes  [x]  No        If Yes:  Date Range for reporting period:  Beginning 21  Ending     Progress report will be due (10 Rx or 30 days whichever is less):       Recertification will be due (POC Duration  / 90 days whichever is less): 21    Visit # Insurance Allowable Auth Required   7 MN []  Yes [x]  No        Functional Scale:        Date assessed:          LEFS: raw score=35; dysfunction =56%    21     Latex Allergy:  [x]NO      []YES  Preferred Language for Healthcare:   [x]English       []other:    Pain level:  0-1/10     SUBJECTIVE:  Pt reports pain is low at the moment, ambulating slow with SPC. Pt states she has no discomfort in her stomach with her wound. OBJECTIVE:    Observation: : avoids bending/weighting leg during sit to stand. Tends to use RW like a  SW. Needs cues to bend knee during swing phase. Pt tends to manually assist LLE onto table, stepper, car.     Test measurements:      AROM 0-109 , PROM 0 -115   : AROM knee flexion 112 degrees, 118 after heel slides     left knee incision healing well and has been using Vit E to massage.  Gait mildly antalgic  Left 0-120 ERMI   7/2/21   Left knee ROM -5/ 0 after stretching   6/29: L knee 108 flexion PROM   6/23: AROM L knee (supine): QS lacks 4, SAQ lacks 7; knee flexion 90     RESTRICTIONS/PRECAUTIONS:  Left TKR, WBAT    Exercises/Interventions:   Exercise/Equipment  TE x  Resistance Sets/time Repetitions Other comments    6/23 added   BIKE   ERMI   4' Full revolutions 6/29   added 7/7   Stretching     EOB HSS  6/23, 7/29: held due to abd wound   Lunge knee flexion stretch on step  6/23   Towel Pull     Inclined Calf  20\" 2x    Heel slides in supine  5\" holds 10                   Bridge   Added 7/7          Strengthening       SLR Supine     SLR Abduction  SL  clams 1#  orange 2 10 L ea 6/23 could only do one set due to spasm in thigh   SLR Prone       SLR+ quad set 1# 2 10 7/29 : slight quad lag at end of reps          Quad sets            LAQ 3# 2 10x L  7/7   SAQ 3# 3 10 L    HS curls  Standing HSC Blue  2# 3  2 10  10    Calf raises  2 10 B 6/23   Mini squats   2 10 6/29   Hip abd in standing     Hip ext in standing                   ROM       Sheet Pulls       Hang Weights       Passive       Weight Shift       Ankle Pumps       ERMI                    NMR/ CKC              TRX squats       Standing 3 way SLR       1 leg stand              CC TKE Added 7/2    Staggered stance    Slow march on airex Light UE A   bridges     FSU 6\" up & up and over  Added 7/2    Added 7/7   Therapeutic Activity       Fwd step up   6 inch   2   10    Fwd step down       Lat step up 4 inch 2 10    Lat dip/heel tap       Total gym squats       Sit to stand                                   Manual interventions x         Patellar glides Gr 3  Sup-inf  Med/lat3'Scar mobs  PROM Supine with heel slide + OP4'Gr 3 to inc knee flex/ext 2'  STM                 Patient Education:  6/23: instructed pt to try to avoid manually helping her LLE into bed/car etc.  Since she is able to do a SLR without assistance, she should try to actively lift LLE. Walked with patient to work on flexing L knee during swing phase and pushing RW instead of picking it up like a SW    Home Exercise program  Access Code: [email or web address]  URL: [email or web address]   Date: 06/16/2021  Prepared by: Christina Vallejo  Exercises  Long Sitting Calf Stretch with Strap - 2 x daily - 7 x weekly - 5 reps - 30 seconds hold  Seated Table Hamstring Stretch - 2 x daily - 7 x weekly - 5 reps - 30 seconds hold  Long Sitting Quad Set - 2 x daily - 7 x weekly - 10 reps - 10 seconds hold  Straight Leg Raise - 2 x daily - 7 x weekly - 3 sets - 10 reps  Sidelying Hip Abduction - 2 x daily - 7 x weekly - 3 sets - 10 reps  Supine Hip Adduction Isometric with Ball - 2 x daily - 7 x weekly - 10 reps - 10 sec hold  Sitting Heel Slide with Towel - 2 x daily - 7 x weekly - 10 reps - 10seconds hold  Seated Knee Flexion AAROM - 2 x daily - 7 x weekly - 10 reps - 10 sec hold    Updated on 7/21:   Access Code: [email or web address]  URL: [email or web address]  Date: 07/21/2021  Prepared by: Mulugeta Guidry    Exercises  Active Straight Leg Raise with Quad Set - 2 x daily - 7 x weekly - 2 sets - 10 reps  Supine Knee Extension Strengthening - 2 x daily - 7 x weekly - 2 sets - 10 reps  Seated Long Arc Quad - 2 x daily - 7 x weekly - 2 sets - 10 reps  Standing March with Counter Support - 2 x daily - 7 x weekly - 2 sets - 10 reps        Therapeutic Exercise and NMR EXR  [x] (39951) Provided verbal/tactile cueing for activities related to strengthening, flexibility, endurance, ROM for improvements in LE, proximal hip, and core control with self care, mobility, lifting, ambulation.  [] (74187) Provided verbal/tactile cueing for activities related to improving balance, coordination, kinesthetic sense, posture, motor skill, proprioception  to assist with LE, proximal hip, and core control in self care, mobility, lifting, ambulation and eccentric single leg control.      NMR and Therapeutic Activities:    [] (56288 or ) Provided verbal/tactile cueing for activities related to improving balance, coordination, kinesthetic sense, posture, motor skill, proprioception and motor activation to allow for proper function of core, proximal hip and LE with self care and ADLs  [] (12168) Gait Re-education- Provided training and instruction to the patient for proper LE, core and proximal hip recruitment and positioning and eccentric body weight control with ambulation re-education including up and down stairs     Home Exercise Program:    [] (18850) Reviewed/Progressed HEP activities related to strengthening, flexibility, endurance, ROM of core, proximal hip and LE for functional self-care, mobility, lifting and ambulation/stair navigation   [] (43543)Reviewed/Progressed HEP activities related to improving balance, coordination, kinesthetic sense, posture, motor skill, proprioception of core, proximal hip and LE for self care, mobility, lifting, and ambulation/stair navigation      Manual Treatments:  PROM / STM / Oscillations-Mobs:  G-I, II, III, IV (PA's, Inf., Post.)  [x] (88103) Provided manual therapy to mobilize LE, proximal hip and/or LS spine soft tissue/joints for the purpose of modulating pain, promoting relaxation,  increasing ROM, reducing/eliminating soft tissue swelling/inflammation/restriction, improving soft tissue extensibility and allowing for proper ROM for normal function with self care, mobility, lifting and ambulation.      Modalities: 7/7/21  10'  6/16/21 vasopneumatic compression x 15 min   6/23: pt to use her cold machine when she gets home    Charges:  Timed Code Treatment Minutes: 45   Total Treatment Minutes: 45       [] EVAL (LOW) 73271 (typically 20 minutes face-to-face)  [] EVAL (MOD) 02628 (typically 30 minutes face-to-face)  [] EVAL (HIGH) 82699 (typically 45 minutes face-to-face)  [] RE-EVAL   [x] VN(38546) x 1   [] IONTO  [] NMR (34832) x     [] VASO   [x] Manual (41283) x 1    [] Other:  [x] TA x 1     [] [facility] Traction (19925)  [] ES(attended) (53443)      [] ES (un) (11271):        GOALS:   Patient stated goal: walking. [] Progressing: [] Met: [] Not Met: [] Adjusted     Therapist goals for Patient:   Short Term Goals: To be achieved in: 2 weeks  1. Independent in HEP and progression per patient tolerance, in order to prevent re-injury. [] Progressing: [] Met: [] Not Met: [] Adjusted   2. Patient will have a decrease in pain to facilitate improvement in movement, function, and ADLs as indicated by Functional Deficits. [] Progressing: [] Met: [] Not Met: [] Adjusted     Long Term Goals: To be achieved in: 12 weeks  1. Disability index score of 20% or less for the LEFS to assist with reaching prior level of function. [] Progressing: [] Met: [] Not Met: [] Adjusted  2. Patient will demonstrate increased AROM to [facility] to allow for proper joint functioning as indicated by patients Functional Deficits. [] Progressing: [] Met: [] Not Met: [] Adjusted  3. Patient will demonstrate an increase in Strength to good proximal hip strength and control, within 5lb HHD in LE to allow for proper functional mobility as indicated by patients Functional Deficits. [] Progressing: [] Met: [] Not Met: [] Adjusted  4. Patient will return to ADL and household functional activities without increased symptoms or restriction. [] Progressing: [] Met: [] Not Met: [] Adjusted  5. Pt will be able to walk with mild symptoms and restrictions. [] Progressing: [] Met: [] Not Met: [] Adjusted       Overall Progression Towards Functional goals/ Treatment Progress Update:  [] Patient is progressing as expected towards functional goals listed. [] Progression is slowed due to complexities/Impairments listed. [] Progression has been slowed due to co-morbidities.   [x] Plan just implemented, too soon to assess goals progression <30days   [] Goals require adjustment due to lack of progress  [] Patient is not progressing as expected and requires additional follow up with physician  [] Other    Prognosis for POC: [x] Good [] Fair  [] Poor    Patient requires continued skilled intervention: [x] Yes  [] No    Assessment:  Pt with good visit today, pt fatigues with exercises and with SLR demonstrated slight quad lag at end of 20 reps due to weakness. Updated measurements taken today above , pt able to get to 115 flexion passively. Pt was able to continuously improve amount of knee flexion as she did more heel slides during treatment and with manual. Pt will benefit from further PT to maximize functional strength and control throughout full range. Treatment/Activity Tolerance:  [x] Patient able to complete treatment  [] Patient limited by fatigue  [] Patient limited by pain     [] Patient limited by other medical complications  [] Other:         PLAN:  Patient advised to get a cane but not to use FT just yet, continue with walker  [x] Continue per plan of care [] Alter current plan (see comments above)  [] Plan of care initiated [] Hold pending MD visit [] Discharge      Electronically signed by:  Arturo Waller, PT DPT, OMT-C  Note: If patient does not return for scheduled/ recommended follow up visits, this note will serve as a discharge from care along with most recent update on progress.

## 2021-07-30 LAB
BILIRUBIN URINE: NEGATIVE
BLOOD, URINE: NEGATIVE
CLARITY: CLEAR
COLOR: YELLOW
COMMENT UA: ABNORMAL
EPITHELIAL CELLS, UA: 2 /HPF (ref 0–5)
GLUCOSE URINE: NEGATIVE MG/DL
HYALINE CASTS: 2 /LPF (ref 0–8)
KETONES, URINE: NEGATIVE MG/DL
LEUKOCYTE ESTERASE, URINE: ABNORMAL
MICROSCOPIC EXAMINATION: YES
NITRITE, URINE: NEGATIVE
PH UA: 6 (ref 5–8)
PROTEIN UA: NEGATIVE MG/DL
RBC UA: 1 /HPF (ref 0–4)
SPECIFIC GRAVITY UA: 1.01 (ref 1–1.03)
URINE CULTURE, ROUTINE: NORMAL
URINE TYPE: ABNORMAL
UROBILINOGEN, URINE: 0.2 E.U./DL
WBC UA: 2 /HPF (ref 0–5)

## 2021-08-02 ENCOUNTER — CARE COORDINATION (OUTPATIENT)
Dept: CASE MANAGEMENT | Age: 71
End: 2021-08-02

## 2021-08-03 ENCOUNTER — HOSPITAL ENCOUNTER (OUTPATIENT)
Dept: PHYSICAL THERAPY | Age: 71
Setting detail: THERAPIES SERIES
Discharge: HOME | End: 2021-08-03
Payer: COMMERCIAL

## 2021-08-03 PROCEDURE — 97112 NEUROMUSCULAR REEDUCATION: CPT

## 2021-08-03 PROCEDURE — 97530 THERAPEUTIC ACTIVITIES: CPT

## 2021-08-03 PROCEDURE — 97110 THERAPEUTIC EXERCISES: CPT

## 2021-08-03 NOTE — FLOWSHEET NOTE
[facility]       Physical Therapy Daily Treatment Note  Date:  8/3/2021    Patient Name:  North Barnes    :  1950  MRN: [id number]  Restrictions/Precautions:    Medical/Treatment Diagnosis Information:  · Diagnosis: M17.12 (ICD-10-CM) - Osteoarthritis of left knee, unspecified osteoarthritis type  s/p Left TKR  DOS: 21  · Treatment Diagnosis: increased pain; decreased ROM; decreased strength; gait abnormality; increased swelling  Insurance/Certification information:  PT Insurance Information: Sandra Anguiano 8  Physician Information:  Referring Practitioner: Frederick Thomas MD  Has the plan of care been signed (Y/N):        [x]  Yes-  []  No     Date of Patient follow up with Physician: 21      Is this a Progress Report:     []  Yes  [x]  No        If Yes:  Date Range for reporting period:  Beginning 21  Ending     Progress report will be due (10 Rx or 30 days whichever is less): 91      Recertification will be due (POC Duration  / 90 days whichever is less): 21    Visit # Insurance Allowable Auth Required   8 MN []  Yes [x]  No        Functional Scale:        Date assessed:          LEFS: raw score=35; dysfunction =56%    21     Latex Allergy:  [x]NO      []YES  Preferred Language for Healthcare:   [x]English       []other:    Pain level:  0-1/10     SUBJECTIVE:   Pt reports on Saturday morning she rolled over in bed and ended up falling out of bed onto all 4's. Pt reports her back is the sorest as far as discomfort but knee maybe a little bruised but not bad. Currently feeling about her \"normal\" just a little sore in her lower back. OBJECTIVE:    Observation: : avoids bending/weighting leg during sit to stand. Tends to use RW like a  SW. Needs cues to bend knee during swing phase. Pt tends to manually assist LLE onto table, stepper, car.        Test measurements:      AROM 0-109 , PROM 0 Patient Education:  6/23: instructed pt to try to avoid manually helping her LLE into bed/car etc.  Since she is able to do a SLR without assistance, she should try to actively lift LLE. Walked with patient to work on flexing L knee during swing phase and pushing RW instead of picking it up like a SW    Home Exercise program  Access Code: [email or web address]  URL: [employer]/   Date: 06/16/2021  Prepared by: Jarrod Espinosa  Exercises  Long Sitting Calf Stretch with Strap - 2 x daily - 7 x weekly - 5 reps - 30 seconds hold  Seated Table Hamstring Stretch - 2 x daily - 7 x weekly - 5 reps - 30 seconds hold  Long Sitting Quad Set - 2 x daily - 7 x weekly - 10 reps - 10 seconds hold  Straight Leg Raise - 2 x daily - 7 x weekly - 3 sets - 10 reps  Sidelying Hip Abduction - 2 x daily - 7 x weekly - 3 sets - 10 reps  Supine Hip Adduction Isometric with Ball - 2 x daily - 7 x weekly - 10 reps - 10 sec hold  Sitting Heel Slide with Towel - 2 x daily - 7 x weekly - 10 reps - 10seconds hold  Seated Knee Flexion AAROM - 2 x daily - 7 x weekly - 10 reps - 10 sec hold    Updated on 7/21:   Access Code: [email or web address]  URL: [employer]/  Date: 07/21/2021  Prepared by: Soco Cevallos    Exercises  Active Straight Leg Raise with Quad Set - 2 x daily - 7 x weekly - 2 sets - 10 reps  Supine Knee Extension Strengthening - 2 x daily - 7 x weekly - 2 sets - 10 reps  Seated Long Arc Quad - 2 x daily - 7 x weekly - 2 sets - 10 reps  Standing March with Counter Support - 2 x daily - 7 x weekly - 2 sets - 10 reps        Therapeutic Exercise and NMR EXR  [x] (70350) Provided verbal/tactile cueing for activities related to strengthening, flexibility, endurance, ROM for improvements in LE, proximal hip, and core control with self care, mobility, lifting, ambulation.  [] (34867) Provided verbal/tactile cueing for activities related to improving balance, coordination, kinesthetic sense, posture, motor skill, proprioception to assist with LE, proximal hip, and core control in self care, mobility, lifting, ambulation and eccentric single leg control. NMR and Therapeutic Activities:    [] (17533 or 89639) Provided verbal/tactile cueing for activities related to improving balance, coordination, kinesthetic sense, posture, motor skill, proprioception and motor activation to allow for proper function of core, proximal hip and LE with self care and ADLs  [] (02961) Gait Re-education- Provided training and instruction to the patient for proper LE, core and proximal hip recruitment and positioning and eccentric body weight control with ambulation re-education including up and down stairs     Home Exercise Program:    [] (00051) Reviewed/Progressed HEP activities related to strengthening, flexibility, endurance, ROM of core, proximal hip and LE for functional self-care, mobility, lifting and ambulation/stair navigation   [] (13183)Reviewed/Progressed HEP activities related to improving balance, coordination, kinesthetic sense, posture, motor skill, proprioception of core, proximal hip and LE for self care, mobility, lifting, and ambulation/stair navigation      Manual Treatments:  PROM / STM / Oscillations-Mobs:  G-I, II, III, IV (PA's, Inf., Post.)  [x] (63091) Provided manual therapy to mobilize LE, proximal hip and/or LS spine soft tissue/joints for the purpose of modulating pain, promoting relaxation,  increasing ROM, reducing/eliminating soft tissue swelling/inflammation/restriction, improving soft tissue extensibility and allowing for proper ROM for normal function with self care, mobility, lifting and ambulation.      Modalities: 7/7/21  10'  6/16/21 vasopneumatic compression x 15 min   6/23: pt to use her cold machine when she gets home    Charges:  Timed Code Treatment Minutes: 45   Total Treatment Minutes: 45       [] EVAL (LOW) 02875 (typically 20 minutes face-to-face)  [] EVAL (MOD) 73650 (typically 30 minutes face-to-face)  [] EVAL (HIGH) 22505 (typically 45 minutes face-to-face)  [] RE-EVAL   [x] HU(40959) x 1   [] IONTO  [x] NMR (10859) x  1   [] VASO   [] Manual (03042) x     [] Other:  [x] TA x 1     [] Mech Traction (36924)  [] ES(attended) (43603)      [] ES (un) (85893):        GOALS:   Patient stated goal: walking. [] Progressing: [] Met: [] Not Met: [] Adjusted     Therapist goals for Patient:   Short Term Goals: To be achieved in: 2 weeks  1. Independent in HEP and progression per patient tolerance, in order to prevent re-injury. [] Progressing: [] Met: [] Not Met: [] Adjusted   2. Patient will have a decrease in pain to facilitate improvement in movement, function, and ADLs as indicated by Functional Deficits. [] Progressing: [] Met: [] Not Met: [] Adjusted     Long Term Goals: To be achieved in: 12 weeks  1. Disability index score of 20% or less for the LEFS to assist with reaching prior level of function. [] Progressing: [] Met: [] Not Met: [] Adjusted  2. Patient will demonstrate increased AROM to [facility] to allow for proper joint functioning as indicated by patients Functional Deficits. [] Progressing: [] Met: [] Not Met: [] Adjusted  3. Patient will demonstrate an increase in Strength to good proximal hip strength and control, within 5lb HHD in LE to allow for proper functional mobility as indicated by patients Functional Deficits. [] Progressing: [] Met: [] Not Met: [] Adjusted  4. Patient will return to ADL and household functional activities without increased symptoms or restriction. [] Progressing: [] Met: [] Not Met: [] Adjusted  5. Pt will be able to walk with mild symptoms and restrictions. [] Progressing: [] Met: [] Not Met: [] Adjusted       Overall Progression Towards Functional goals/ Treatment Progress Update:  [] Patient is progressing as expected towards functional goals listed. [] Progression is slowed due to complexities/Impairments listed.   [] Progression has been slowed due to

## 2021-08-04 ENCOUNTER — CARE COORDINATION (OUTPATIENT)
Dept: CASE MANAGEMENT | Age: 71
End: 2021-08-04

## 2021-08-04 NOTE — CARE COORDINATION
[facility] Transitions Follow Up Call    2021    Patient: Denise Rios  Patient : 1950   MRN: [id number]  Reason for Admission: Abd pain, pyelonephritis  Discharge Date: 21 RARS: Readmission Risk Score: 25         Spoke with: Riddemetriotrell Rao Transitions Subsequent and Final Call    Subsequent and Final Calls  Do you have any ongoing symptoms?: No  Have your medications changed?: No  Do you have any questions related to your medications?: No  Do you currently have any active services?: Yes  Are you currently active with any services?: Home Health  Do you have any needs or concerns that I can assist you with?: No  Identified Barriers: None  Care Transitions Interventions  Other Interventions: Follow Up: Patient reports that she is doing very well, denies any questions and/or concerns at this time. She followed up with Dr. Cathy Mckeon last week and he has discharged her from his service, wound is healing very well. CTN will continue with outreach follow up calls.     Future Appointments   Date Time Provider Latisha Husain   2021  3:00 PM Paulina Melton, PT [facility] [place]    8/10/2021  2:30 PM 6 Eros Slaughter    2021  3:00 PM Paulina Melton, PT [facility] [place]    2021  1:45 PM Herminia Potash, PT [facility] [place]    2021  1:45 PM Herminia Potash, PT [facility] PT [facility]   2021  1:45 PM Herminia Potash, PT [facility] PT [facility]   2021  1:00 PM Madalyn Grady MD [facility] [place] [facility] [place]   2021  3:00 PM Herminia Potash, PT [facility] [place]    2021  1:45 PM Herminia Potash, PT [facility] [place]    2021  1:45 PM Herminia Potash, PT [facility] [place]   2021  2:15 PM Nat Moura MD [facility]       Basilio Stanton RN

## 2021-08-05 ENCOUNTER — HOSPITAL ENCOUNTER (OUTPATIENT)
Dept: PHYSICAL THERAPY | Age: 71
Setting detail: THERAPIES SERIES
Discharge: HOME | End: 2021-08-05
Payer: COMMERCIAL

## 2021-08-10 ENCOUNTER — HOSPITAL ENCOUNTER (OUTPATIENT)
Dept: PHYSICAL THERAPY | Age: 71
Setting detail: THERAPIES SERIES
Discharge: HOME | End: 2021-08-10
Payer: COMMERCIAL

## 2021-08-10 PROCEDURE — 97110 THERAPEUTIC EXERCISES: CPT

## 2021-08-10 PROCEDURE — 97112 NEUROMUSCULAR REEDUCATION: CPT

## 2021-08-10 PROCEDURE — 97530 THERAPEUTIC ACTIVITIES: CPT
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
## 2021-08-11 ENCOUNTER — CARE COORDINATION (OUTPATIENT)
Dept: CASE MANAGEMENT | Age: 71
End: 2021-08-11

## 2021-08-11 NOTE — CARE COORDINATION
[facility] Transitions Follow Up Call    2021    Patient: Gregoria Linares  Patient : 1950   MRN: [id number]  Reason for Admission: Abd pain, pyelonephritis  Discharge Date: 21 RARS: Readmission Risk Score: 25         Spoke with: Rhonda 1 Transitions Subsequent and Final Call    Subsequent and Final Calls  Do you have any ongoing symptoms?: No  Have your medications changed?: No  Do you have any questions related to your medications?: No  Do you currently have any active services?: No  Are you currently active with any services?: Home Health  Do you have any needs or concerns that I can assist you with?: No  Identified Barriers: None  Care Transitions Interventions  Other Interventions: Follow Up: Final outreach call:  Patient reports that she is doing very well, denies any questions or concerns at this time. She reports that therapy is going very well. Patient has been discharged from Dr[facility] Inocente Means [facility], no further outreach calls are indicated at this time.   Future Appointments   Date Time Provider Latisha Husain   2021  3:00 PM Chon Krause, PT [facility] [place]    2021  1:45 PM Barbara Barrios, PT [facility] [place]    2021  1:45 PM Barbara Barrios, PT [facility]   2021  1:45 PM Barbara Barrios, PT [facility]   2021  1:00 PM Guillermo Lerma MD [facility] [place] [facility] [place]   2021  3:00 PM Barbara Barrios, PT [facility] [place]    2021  1:45 PM Barbara Barrios, PT [facility] [place]    2021  1:45 PM Barbara Barrios, PT [facility] [place]    2021  2:15 PM Antione Dahl MD [facility]       Scarlett Cason RN

## 2021-08-12 ENCOUNTER — HOSPITAL ENCOUNTER (OUTPATIENT)
Dept: PHYSICAL THERAPY | Age: 71
Setting detail: THERAPIES SERIES
Discharge: HOME | End: 2021-08-12
Payer: COMMERCIAL

## 2021-08-12 PROCEDURE — 97112 NEUROMUSCULAR REEDUCATION: CPT

## 2021-08-12 PROCEDURE — 97530 THERAPEUTIC ACTIVITIES: CPT

## 2021-08-12 PROCEDURE — 97110 THERAPEUTIC EXERCISES: CPT

## 2021-08-12 NOTE — FLOWSHEET NOTE
[facility]       Physical Therapy Daily Treatment Note  Date:  2021    Patient Name:  Rio Ramírez    :  1950  MRN: [id number]  Restrictions/Precautions:    Medical/Treatment Diagnosis Information:  · Diagnosis: M17.12 (ICD-10-CM) - Osteoarthritis of left knee, unspecified osteoarthritis type  s/p Left TKR  DOS: 21  · Treatment Diagnosis: increased pain; decreased ROM; decreased strength; gait abnormality; increased swelling  Insurance/Certification information:  PT Insurance Information: Sandra Anguiano 8  Physician Information:  Referring Practitioner: Lieutenant Anita HELTON  Has the plan of care been signed (Y/N):        [x]  Yes-  []  No     Date of Patient follow up with Physician:       Is this a Progress Report:     []  Yes  [x]  No        If Yes:  Date Range for reporting period:  Beginning 21  Ending     Progress report will be due (10 Rx or 30 days whichever is less):       Recertification will be due (POC Duration  / 90 days whichever is less): 21    Visit # Insurance Allowable Auth Required   10 MN []  Yes [x]  No        Functional Scale:        Date assessed:          LEFS: raw score=35; dysfunction =56%    21     Latex Allergy:  [x]NO      []YES  Preferred Language for Healthcare:   [x]English       []other:    Pain level:  0-1/10     SUBJECTIVE:   Pt reports low pain, states she was alittle sore after last visit. States she also slept funny last night and back is bothering her. OBJECTIVE:    Observation: : avoids bending/weighting leg during sit to stand. Tends to use RW like a  SW. Needs cues to bend knee during swing phase. Pt tends to manually assist LLE onto table, stepper, car.        Test measurements:     8/10: AROM 0-118 degrees with encouragement    AROM 0-109 , PROM 0 -115   : AROM knee flexion 112 degrees, 118 after heel slides     left knee incision healing well and has been using Vit E to massage.  Gait mildly antalgic  Left 0-120 ERMI   7/2/21   Left knee ROM -5/ 0 after stretching   6/29: L knee 108 flexion PROM   6/23: AROM L knee (supine): QS lacks 4, SAQ lacks 7; knee flexion 90     RESTRICTIONS/PRECAUTIONS:  Left TKR, WBAT    Exercises/Interventions:   Exercise/Equipment  TE x  Resistance Sets/time Repetitions Other comments    6/23 added   BIKE     4' Full revolutions 6/29   added 7/7; 8/10 held busy   Stretching     EOB HSS  6/23, 7/29: held due to abd wound   Lunge knee flexion stretch on step  6/23   Standing HSS on step  20\" holds 2    Inclined Calf  Heel raise  20\"  2 2x  10    Heel slides in supine                    Bridge   Added 7/7          Strengthening       SLR Supine     SLR Abduction  SL  clams 6/23 could only do one set due to spasm in thigh   SLR Prone       SLR+ quad set 1# 7/29 : slight quad lag at end of reps          Quad sets            LAQ 3#  7/7   SAQ 3#    HS curls  Standing HSC    Calf raises  6/23   Mini squats   6/29   Hip abd in standing     Hip ext in standing     Leg press  DL  SL   80#  35#   2  1   10  10    Quantum :  Knee ext  HSC   10#  15#   2  2   10  10                                ROM       Sheet Pulls       Hang Weights       Passive       Weight Shift       Ankle Pumps       ERMI                    NMR/ CKC              TRX squats  1 15    Standing 3 way SLR on airex  1 15 ea           SLB  10'' 3 fingertips   CC TKE  Blue TB 5\" hold 20 Added 7/2    Tandem stance airex 20\" holds x2 each foot position       Light UE A   Forward step up to SLS airex 5\" hold 5 L     FSU 6\" up & up and over  Added 7/2    Added 7/7   Therapeutic Activity       Fwd step up   6 inch   2   10    Fwd step down    Lat step up    Lat dip/heel tap 4 inch 1 15 L    Total gym squats       Sit to stand       Resisted walking - Retro with sport cord 1 loop                         Manual interventions x         Patellar glides Scar mobs PROM STM                 Patient Education:  6/23: instructed pt to try to avoid manually helping her LLE into bed/car etc.  Since she is able to do a SLR without assistance, she should try to actively lift LLE. Walked with patient to work on flexing L knee during swing phase and pushing RW instead of picking it up like a SW    Home Exercise program  Access Code: [email or web address]  URL: [employer]/   Date: 06/16/2021  Prepared by: Seven Gerardo  Exercises  Long Sitting Calf Stretch with Strap - 2 x daily - 7 x weekly - 5 reps - 30 seconds hold  Seated Table Hamstring Stretch - 2 x daily - 7 x weekly - 5 reps - 30 seconds hold  Long Sitting Quad Set - 2 x daily - 7 x weekly - 10 reps - 10 seconds hold  Straight Leg Raise - 2 x daily - 7 x weekly - 3 sets - 10 reps  Sidelying Hip Abduction - 2 x daily - 7 x weekly - 3 sets - 10 reps  Supine Hip Adduction Isometric with Ball - 2 x daily - 7 x weekly - 10 reps - 10 sec hold  Sitting Heel Slide with Towel - 2 x daily - 7 x weekly - 10 reps - 10seconds hold  Seated Knee Flexion AAROM - 2 x daily - 7 x weekly - 10 reps - 10 sec hold    Updated on 7/21:   Access Code: [email or web address]  URL: [employer]/  Date: 07/21/2021  Prepared by: Walt Quintanilla    Exercises  Active Straight Leg Raise with Quad Set - 2 x daily - 7 x weekly - 2 sets - 10 reps  Supine Knee Extension Strengthening - 2 x daily - 7 x weekly - 2 sets - 10 reps  Seated Long Arc Quad - 2 x daily - 7 x weekly - 2 sets - 10 reps  Standing March with Counter Support - 2 x daily - 7 x weekly - 2 sets - 10 reps        Therapeutic Exercise and NMR EXR  [x] (79546) Provided verbal/tactile cueing for activities related to strengthening, flexibility, endurance, ROM for improvements in LE, proximal hip, and core control with self care, mobility, lifting, ambulation.  [] (78080) Provided verbal/tactile cueing for activities related to improving balance, coordination, kinesthetic sense, posture, motor skill, proprioception  to assist with LE, proximal hip, and core control in self care, mobility, lifting, ambulation and eccentric single leg control. NMR and Therapeutic Activities:    [] (44642 or 72597) Provided verbal/tactile cueing for activities related to improving balance, coordination, kinesthetic sense, posture, motor skill, proprioception and motor activation to allow for proper function of core, proximal hip and LE with self care and ADLs  [] (43216) Gait Re-education- Provided training and instruction to the patient for proper LE, core and proximal hip recruitment and positioning and eccentric body weight control with ambulation re-education including up and down stairs     Home Exercise Program:    [] (38650) Reviewed/Progressed HEP activities related to strengthening, flexibility, endurance, ROM of core, proximal hip and LE for functional self-care, mobility, lifting and ambulation/stair navigation   [] (85994)Reviewed/Progressed HEP activities related to improving balance, coordination, kinesthetic sense, posture, motor skill, proprioception of core, proximal hip and LE for self care, mobility, lifting, and ambulation/stair navigation      Manual Treatments:  PROM / STM / Oscillations-Mobs:  G-I, II, III, IV (PA's, Inf., Post.)  [x] (82756) Provided manual therapy to mobilize LE, proximal hip and/or LS spine soft tissue/joints for the purpose of modulating pain, promoting relaxation,  increasing ROM, reducing/eliminating soft tissue swelling/inflammation/restriction, improving soft tissue extensibility and allowing for proper ROM for normal function with self care, mobility, lifting and ambulation.      Modalities: 7/7/21  10'  6/16/21 vasopneumatic compression x 15 min   6/23: pt to use her cold machine when she gets home    Charges:  Timed Code Treatment Minutes: 40   Total Treatment Minutes: 40       [] EVAL (LOW) 43536 (typically 20 minutes face-to-face)  [] EVAL (MOD) 47717 (typically 30 minutes face-to-face)  [] EVAL (HIGH) 58793 (typically 45 minutes face-to-face)  [] RE-EVAL   [x] NO(36706) x 1   [] IONTO  [x] NMR (39959) x  1   [] VASO   [] Manual (58803) x     [] Other:  [x] TA x 1     [] Mech Traction (70224)  [] ES(attended) (98510)      [] ES (un) (52586):        GOALS:   Patient stated goal: walking. [] Progressing: [] Met: [] Not Met: [] Adjusted     Therapist goals for Patient:   Short Term Goals: To be achieved in: 2 weeks  1. Independent in HEP and progression per patient tolerance, in order to prevent re-injury. [] Progressing: [] Met: [] Not Met: [] Adjusted   2. Patient will have a decrease in pain to facilitate improvement in movement, function, and ADLs as indicated by Functional Deficits. [] Progressing: [] Met: [] Not Met: [] Adjusted     Long Term Goals: To be achieved in: 12 weeks  1. Disability index score of 20% or less for the LEFS to assist with reaching prior level of function. [] Progressing: [] Met: [] Not Met: [] Adjusted  2. Patient will demonstrate increased AROM to [facility] to allow for proper joint functioning as indicated by patients Functional Deficits. [] Progressing: [] Met: [] Not Met: [] Adjusted  3. Patient will demonstrate an increase in Strength to good proximal hip strength and control, within 5lb HHD in LE to allow for proper functional mobility as indicated by patients Functional Deficits. [] Progressing: [] Met: [] Not Met: [] Adjusted  4. Patient will return to ADL and household functional activities without increased symptoms or restriction. [] Progressing: [] Met: [] Not Met: [] Adjusted  5. Pt will be able to walk with mild symptoms and restrictions. [] Progressing: [] Met: [] Not Met: [] Adjusted       Overall Progression Towards Functional goals/ Treatment Progress Update:  [x] Patient is progressing as expected towards functional goals listed. [] Progression is slowed due to complexities/Impairments listed.   [] Progression has been slowed due to co-morbidities. [] Plan just implemented, too soon to assess goals progression <30days   [] Goals require adjustment due to lack of progress  [] Patient is not progressing as expected and requires additional follow up with physician  [] Other    Prognosis for POC: [x] Good [] Fair  [] Poor    Patient requires continued skilled intervention: [x] Yes  [] No    Assessment:  Pt with great visit today, progressions added in bold with continued emphasis on quad/ hip strength and functional abilities. Pt needing cues for proper squat technique with TRX squats. Pt stated post visit that her back felt better due to the movement. Pt will benefit from continuation of PT to maximize quad engagement as well as control for LTG achievement. Treatment/Activity Tolerance:  [x] Patient able to complete treatment  [] Patient limited by fatigue  [] Patient limited by pain     [] Patient limited by other medical complications  [] Other:         PLAN:  Patient advised to get a cane but not to use FT just yet, continue with walker  [x] Continue per plan of care [] Alter current plan (see comments above)  [] Plan of care initiated [] Hold pending MD visit [] Discharge      Electronically signed by:  Analy Bartholomew PT DPT, OMT-C  Note: If patient does not return for scheduled/ recommended follow up visits, this note will serve as a discharge from care along with most recent update on progress.

## 2021-08-17 ENCOUNTER — HOSPITAL ENCOUNTER (OUTPATIENT)
Dept: PHYSICAL THERAPY | Age: 71
Setting detail: THERAPIES SERIES
Discharge: HOME | End: 2021-08-17
Payer: COMMERCIAL

## 2021-08-17 PROCEDURE — 97530 THERAPEUTIC ACTIVITIES: CPT

## 2021-08-17 PROCEDURE — 97110 THERAPEUTIC EXERCISES: CPT

## 2021-08-17 PROCEDURE — 97112 NEUROMUSCULAR REEDUCATION: CPT

## 2021-08-17 NOTE — FLOWSHEET NOTE
[facility], [address]       Physical Therapy Daily Treatment Note  Date:  2021    Patient Name:  Isabel Peterson    :  1950  MRN: [id number]  Restrictions/Precautions:    Medical/Treatment Diagnosis Information:  · Diagnosis: M17.12 (ICD-10-CM) - Osteoarthritis of left knee, unspecified osteoarthritis type  s/p Left TKR  DOS: 21  · Treatment Diagnosis: increased pain; decreased ROM; decreased strength; gait abnormality; increased swelling  Insurance/Certification information:  PT Insurance Information: Sandra Anguiano 8  Physician Information:  Referring Practitioner: Maurie Severe MD  Has the plan of care been signed (Y/N):        [x]  Yes-  []  No     Date of Patient follow up with Physician:       Is this a Progress Report:     []  Yes  [x]  No        If Yes:  Date Range for reporting period:  Beginning 21  Ending     Progress report will be due (10 Rx or 30 days whichever is less): 45      Recertification will be due (POC Duration  / 90 days whichever is less): 21    Visit # Insurance Allowable Auth Required   11 MN []  Yes [x]  No        Functional Scale:        Date assessed:          LEFS: raw score=35; dysfunction =56%    21     Latex Allergy:  [x]NO      []YES  Preferred Language for Healthcare:   [x]English       []other:    Pain level:  0-1/10     SUBJECTIVE:   Pt reports she continues to do well overall, feels as though things are progressing and getting stronger. Still does get tired and has occasionally jabs of pain in her knee from time to time but not consistently. OBJECTIVE:    Observation: : avoids bending/weighting leg during sit to stand. Tends to use RW like a  SW. Needs cues to bend knee during swing phase. Pt tends to manually assist LLE onto table, stepper, car.        Test measurements:     8/10: AROM 0-118 degrees with encouragement    AROM 0-109 , PROM 0 -115   : AROM knee flexion 112 degrees, 118 after heel slides    7/7/ 21 left knee incision healing well and has been using Vit E to massage.  Gait mildly antalgic  Left 0-120 ERMI   7/2/21   Left knee ROM -5/ 0 after stretching   6/29: L knee 108 flexion PROM   6/23: AROM L knee (supine): QS lacks 4, SAQ lacks 7; knee flexion 90     RESTRICTIONS/PRECAUTIONS:  Left TKR, WBAT    Exercises/Interventions:   Exercise/Equipment  TE x  Resistance Sets/time Repetitions Other comments    6/23 added   BIKE     4' Full revolutions 6/29   added 7/7; 8/10 held busy   Stretching     EOB HSS  6/23, 7/29: held due to abd wound   Lunge knee flexion stretch on step  6/23   Standing HSS on step  20\" holds 2    Inclined Calf  Heel raise  20\"  2 2x  10    Heel slides in supine                    Bridge   Added 7/7          Strengthening       SLR Supine     SLR Abduction  SL  clams 6/23 could only do one set due to spasm in thigh   SLR Prone       SLR+ quad set 1# 7/29 : slight quad lag at end of reps          Quad sets            LAQ 3#  7/7   SAQ 3#    HS curls  Standing HSC    Calf raises  6/23   Mini squats   6/29   Hip abd in standing     Hip ext in standing     Leg press  DL  SL   80#  35#   3  1   10  10    Quantum :  Knee ext  HSC   10#  20#   2  2   10  10                                ROM       Sheet Pulls       Hang Weights       Passive       Weight Shift       Ankle Pumps       ERMI                    NMR/ CKC              TRX squats  1 15    TRX mini squat and hold with R LE 3 point touch  1 5 R    Standing 3 way SLR on airex  1 15 ea Held 8/17 due to pain          SLB  10'' 3 fingertips   CC TKE Added 7/2    Tandem stance       Light UE A   Forward step up to SLS     FSU 6\" up & up and over  Added 7/2    Added 7/7   Therapeutic Activity       Fwd step up   6 inch   2   10    Fwd step down 4 inch 1 10 L    Lat step up    Lat dip/heel tap 4 inch 1 15 L    Total gym squats       Sit to stand       Resisted walking - Retro with sport cord 1 loop                         Manual interventions x         Patellar glides Scar mobs PROM STM                 Patient Education:  6/23: instructed pt to try to avoid manually helping her LLE into bed/car etc.  Since she is able to do a SLR without assistance, she should try to actively lift LLE. Walked with patient to work on flexing L knee during swing phase and pushing RW instead of picking it up like a SW    Home Exercise program  Access Code: [email or web address]  URL: [employer]/   Date: 06/16/2021  Prepared by: Barbara Rodriguez  Exercises  Long Sitting Calf Stretch with Strap - 2 x daily - 7 x weekly - 5 reps - 30 seconds hold  Seated Table Hamstring Stretch - 2 x daily - 7 x weekly - 5 reps - 30 seconds hold  Long Sitting Quad Set - 2 x daily - 7 x weekly - 10 reps - 10 seconds hold  Straight Leg Raise - 2 x daily - 7 x weekly - 3 sets - 10 reps  Sidelying Hip Abduction - 2 x daily - 7 x weekly - 3 sets - 10 reps  Supine Hip Adduction Isometric with Ball - 2 x daily - 7 x weekly - 10 reps - 10 sec hold  Sitting Heel Slide with Towel - 2 x daily - 7 x weekly - 10 reps - 10seconds hold  Seated Knee Flexion AAROM - 2 x daily - 7 x weekly - 10 reps - 10 sec hold    Updated on 7/21:   Access Code: [email or web address]  URL: [employer]/  Date: 07/21/2021  Prepared by: Gabriella Manley    Exercises  Active Straight Leg Raise with Quad Set - 2 x daily - 7 x weekly - 2 sets - 10 reps  Supine Knee Extension Strengthening - 2 x daily - 7 x weekly - 2 sets - 10 reps  Seated Long Arc Quad - 2 x daily - 7 x weekly - 2 sets - 10 reps  Standing March with Counter Support - 2 x daily - 7 x weekly - 2 sets - 10 reps        Therapeutic Exercise and NMR EXR  [x] (40329) Provided verbal/tactile cueing for activities related to strengthening, flexibility, endurance, ROM for improvements in LE, proximal hip, and core control with self care, mobility, lifting, ambulation.  [] (18035) Provided verbal/tactile cueing for activities related to improving balance, coordination, kinesthetic sense, posture, motor skill, proprioception  to assist with LE, proximal hip, and core control in self care, mobility, lifting, ambulation and eccentric single leg control. NMR and Therapeutic Activities:    [] (63245 or 19923) Provided verbal/tactile cueing for activities related to improving balance, coordination, kinesthetic sense, posture, motor skill, proprioception and motor activation to allow for proper function of core, proximal hip and LE with self care and ADLs  [] (14648) Gait Re-education- Provided training and instruction to the patient for proper LE, core and proximal hip recruitment and positioning and eccentric body weight control with ambulation re-education including up and down stairs     Home Exercise Program:    [] (30999) Reviewed/Progressed HEP activities related to strengthening, flexibility, endurance, ROM of core, proximal hip and LE for functional self-care, mobility, lifting and ambulation/stair navigation   [] (95168)Reviewed/Progressed HEP activities related to improving balance, coordination, kinesthetic sense, posture, motor skill, proprioception of core, proximal hip and LE for self care, mobility, lifting, and ambulation/stair navigation      Manual Treatments:  PROM / STM / Oscillations-Mobs:  G-I, II, III, IV (PA's, Inf., Post.)  [x] (25117) Provided manual therapy to mobilize LE, proximal hip and/or LS spine soft tissue/joints for the purpose of modulating pain, promoting relaxation,  increasing ROM, reducing/eliminating soft tissue swelling/inflammation/restriction, improving soft tissue extensibility and allowing for proper ROM for normal function with self care, mobility, lifting and ambulation. Modalities:   8/17: CP to L knee post session x 10 mins in long sitting post session.    7/7/21  10'  6/16/21 vasopneumatic compression x 15 min   6/23: pt to use her cold machine when she gets

## 2021-08-19 ENCOUNTER — HOSPITAL ENCOUNTER (OUTPATIENT)
Dept: PHYSICAL THERAPY | Age: 71
Setting detail: THERAPIES SERIES
Discharge: HOME | End: 2021-08-19
Payer: COMMERCIAL

## 2021-08-19 PROCEDURE — 97110 THERAPEUTIC EXERCISES: CPT

## 2021-08-19 PROCEDURE — 97112 NEUROMUSCULAR REEDUCATION: CPT

## 2021-08-19 PROCEDURE — 97530 THERAPEUTIC ACTIVITIES: CPT

## 2021-08-19 NOTE — FLOWSHEET NOTE
[facility]       Physical Therapy Daily Treatment Note  Date:  2021    Patient Name:  Ladraius Ruvalcaba    :  1950  MRN: [id number]  Restrictions/Precautions:    Medical/Treatment Diagnosis Information:  · Diagnosis: M17.12 (ICD-10-CM) - Osteoarthritis of left knee, unspecified osteoarthritis type  s/p Left TKR  DOS: 21  · Treatment Diagnosis: increased pain; decreased ROM; decreased strength; gait abnormality; increased swelling  Insurance/Certification information:  PT Insurance Information: Sandra Anguiano 8  Physician Information:  Referring Practitioner: Marcy Dominguez MD  Has the plan of care been signed (Y/N):        [x]  Yes-  []  No     Date of Patient follow up with Physician:       Is this a Progress Report:     []  Yes  [x]  No        If Yes:  Date Range for reporting period:  Beginning 21  Ending     Progress report will be due (10 Rx or 30 days whichever is less):       Recertification will be due (POC Duration  / 90 days whichever is less): 21    Visit # Insurance Allowable Auth Required   12 MN []  Yes [x]  No        Functional Scale:        Date assessed:          LEFS: raw score=35; dysfunction =56%    21     Latex Allergy:  [x]NO      []YES  Preferred Language for Healthcare:   [x]English       []other:    Pain level:  2-3/10     SUBJECTIVE:  Pt reports having increased pain and stiffness in her knee today however her lower back is contributing to a lot of pain today and is pretty sore. Saw DC yesterday and had an adjustment and feels she is more sore from it. OBJECTIVE:    Observation: : avoids bending/weighting leg during sit to stand. Tends to use RW like a  SW. Needs cues to bend knee during swing phase. Pt tends to manually assist LLE onto table, stepper, car.        Test measurements:     8/10: AROM 0-118 degrees with encouragement    AROM 0-109 , PROM 0 -115   : AROM knee flexion 112 degrees, 118 after heel slides    7/7/ 21 left knee incision healing well and has been using Vit E to massage.  Gait mildly antalgic  Left 0-120 ERMI   7/2/21   Left knee ROM -5/ 0 after stretching   6/29: L knee 108 flexion PROM   6/23: AROM L knee (supine): QS lacks 4, SAQ lacks 7; knee flexion 90     RESTRICTIONS/PRECAUTIONS:  Left TKR, WBAT    Exercises/Interventions:   Exercise/Equipment  TE x  Resistance Sets/time Repetitions Other comments    6/23 added   BIKE     4' Full revolutions 6/29   added 7/7; 8/10 held busy   Stretching     EOB HSS  6/23, 7/29: held due to abd wound   Lunge knee flexion stretch on step  6/23   Standing HSS on step  20\" holds 2    Inclined Calf  Heel raise  20\"  2 2x  10    Heel slides in supine                    Bridge   Added 7/7          Strengthening       SLR Supine     SLR Abduction  SL  clams 6/23 could only do one set due to spasm in thigh   SLR Prone       SLR+ quad set 1# 7/29 : slight quad lag at end of reps          Quad sets            LAQ 3#  7/7   SAQ 3#    HS curls  Standing HSC    Calf raises  6/23   Mini squats   6/29   Hip abd in standing     Hip ext in standing     Leg press  DL  SL   70#  30#   3  2   10  10    Quantum :  Knee ext  HSC   10#  20#   2  2   10  10                                ROM       Sheet Pulls       Hang Weights       Passive       Weight Shift       Ankle Pumps       ERMI                    NMR/ CKC              TRX squats  2 10    TRX mini squat and hold with R LE 3 point touch  1 5 R    Standing 3 way SLR on airex  1 15 ea Held 8/17 due to pain          SLB  10'' 3 fingertips   CC TKE Added 7/2    Tandem stance       Light UE A   Forward step up to SLS     FSU 6\" up & up and over  Added 7/2    Added 7/7   Therapeutic Activity       Fwd step up 4 inch   1   15      Fwd step down    Lat step up 4 inch 1 15    Lat dip/heel tap    Total gym squats       Sit to stand       Resisted walking - Retro with sport cord  Lateral 1 loop 4 steps x5                         Manual interventions x         Patellar glides Scar mobs PROM STM                 Patient Education:  6/23: instructed pt to try to avoid manually helping her LLE into bed/car etc.  Since she is able to do a SLR without assistance, she should try to actively lift LLE. Walked with patient to work on flexing L knee during swing phase and pushing RW instead of picking it up like a SW    Home Exercise program  Access Code: [email or web address]  URL: [employer]/   Date: 06/16/2021  Prepared by: Richie Castro  Exercises  Long Sitting Calf Stretch with Strap - 2 x daily - 7 x weekly - 5 reps - 30 seconds hold  Seated Table Hamstring Stretch - 2 x daily - 7 x weekly - 5 reps - 30 seconds hold  Long Sitting Quad Set - 2 x daily - 7 x weekly - 10 reps - 10 seconds hold  Straight Leg Raise - 2 x daily - 7 x weekly - 3 sets - 10 reps  Sidelying Hip Abduction - 2 x daily - 7 x weekly - 3 sets - 10 reps  Supine Hip Adduction Isometric with Ball - 2 x daily - 7 x weekly - 10 reps - 10 sec hold  Sitting Heel Slide with Towel - 2 x daily - 7 x weekly - 10 reps - 10seconds hold  Seated Knee Flexion AAROM - 2 x daily - 7 x weekly - 10 reps - 10 sec hold    Updated on 7/21:   Access Code: [email or web address]  URL: [employer]/  Date: 07/21/2021  Prepared by: Barbara Barrios    Exercises  Active Straight Leg Raise with Quad Set - 2 x daily - 7 x weekly - 2 sets - 10 reps  Supine Knee Extension Strengthening - 2 x daily - 7 x weekly - 2 sets - 10 reps  Seated Long Arc Quad - 2 x daily - 7 x weekly - 2 sets - 10 reps  Standing March with Counter Support - 2 x daily - 7 x weekly - 2 sets - 10 reps        Therapeutic Exercise and NMR EXR  [x] (86686) Provided verbal/tactile cueing for activities related to strengthening, flexibility, endurance, ROM for improvements in LE, proximal hip, and core control with self care, mobility, lifting, ambulation.  [] (32150) Provided verbal/tactile cueing for activities related to improving balance, coordination, kinesthetic sense, posture, motor skill, proprioception  to assist with LE, proximal hip, and core control in self care, mobility, lifting, ambulation and eccentric single leg control. NMR and Therapeutic Activities:    [] (36819 or 60534) Provided verbal/tactile cueing for activities related to improving balance, coordination, kinesthetic sense, posture, motor skill, proprioception and motor activation to allow for proper function of core, proximal hip and LE with self care and ADLs  [] (68175) Gait Re-education- Provided training and instruction to the patient for proper LE, core and proximal hip recruitment and positioning and eccentric body weight control with ambulation re-education including up and down stairs     Home Exercise Program:    [] (09416) Reviewed/Progressed HEP activities related to strengthening, flexibility, endurance, ROM of core, proximal hip and LE for functional self-care, mobility, lifting and ambulation/stair navigation   [] (25586)Reviewed/Progressed HEP activities related to improving balance, coordination, kinesthetic sense, posture, motor skill, proprioception of core, proximal hip and LE for self care, mobility, lifting, and ambulation/stair navigation      Manual Treatments:  PROM / STM / Oscillations-Mobs:  G-I, II, III, IV (PA's, Inf., Post.)  [x] (23571) Provided manual therapy to mobilize LE, proximal hip and/or LS spine soft tissue/joints for the purpose of modulating pain, promoting relaxation,  increasing ROM, reducing/eliminating soft tissue swelling/inflammation/restriction, improving soft tissue extensibility and allowing for proper ROM for normal function with self care, mobility, lifting and ambulation. Modalities:   8/19, 8/17: CP to L knee post session x 10 mins in long sitting post session.    7/7/21  10'  6/16/21 vasopneumatic compression x 15 min   6/23: pt to use her cold machine when she gets home    Charges:  Timed Code Treatment Minutes: 40   Total Treatment Minutes: 50       [] EVAL (LOW) 03237 (typically 20 minutes face-to-face)  [] EVAL (MOD) 76200 (typically 30 minutes face-to-face)  [] EVAL (HIGH) 61864 (typically 45 minutes face-to-face)  [] RE-EVAL   [x] XR(92563) x 1   [] IONTO  [x] NMR (62845) x  1   [] VASO   [] Manual (70630) x     [] Other:  [x] TA x 1     [] Mech Traction (62043)  [] ES(attended) (46990)      [] ES (un) (87828):        GOALS:   Patient stated goal: walking. [] Progressing: [] Met: [] Not Met: [] Adjusted     Therapist goals for Patient:   Short Term Goals: To be achieved in: 2 weeks  1. Independent in HEP and progression per patient tolerance, in order to prevent re-injury. [] Progressing: [] Met: [] Not Met: [] Adjusted   2. Patient will have a decrease in pain to facilitate improvement in movement, function, and ADLs as indicated by Functional Deficits. [] Progressing: [] Met: [] Not Met: [] Adjusted     Long Term Goals: To be achieved in: 12 weeks  1. Disability index score of 20% or less for the LEFS to assist with reaching prior level of function. [] Progressing: [] Met: [] Not Met: [] Adjusted  2. Patient will demonstrate increased AROM to [facility] to allow for proper joint functioning as indicated by patients Functional Deficits. [] Progressing: [] Met: [] Not Met: [] Adjusted  3. Patient will demonstrate an increase in Strength to good proximal hip strength and control, within 5lb HHD in LE to allow for proper functional mobility as indicated by patients Functional Deficits. [] Progressing: [] Met: [] Not Met: [] Adjusted  4. Patient will return to ADL and household functional activities without increased symptoms or restriction. [] Progressing: [] Met: [] Not Met: [] Adjusted  5. Pt will be able to walk with mild symptoms and restrictions.     [] Progressing: [] Met: [] Not Met: [] Adjusted       Overall Progression Towards Functional goals/ Treatment Progress Update:  [x] Patient is progressing as expected towards functional goals listed. [] Progression is slowed due to complexities/Impairments listed. [] Progression has been slowed due to co-morbidities. [] Plan just implemented, too soon to assess goals progression <30days   [] Goals require adjustment due to lack of progress  [] Patient is not progressing as expected and requires additional follow up with physician  [] Other    Prognosis for POC: [x] Good [] Fair  [] Poor    Patient requires continued skilled intervention: [x] Yes  [] No    Assessment:  Regressions today with almost all interventions due to increased L knee and low back pain with modifications noted per log. Pt was able to tolerate modified session with manageable symptoms with interventions and today low back pain was more of limiting factor compared to L knee. Will f/u next week and continue/resume strength/stabiltiy activity as able     Treatment/Activity Tolerance:  [x] Patient able to complete treatment  [] Patient limited by fatigue  [] Patient limited by pain     [] Patient limited by other medical complications  [] Other:         PLAN:  Patient advised to get a cane but not to use FT just yet, continue with walker  [x] Continue per plan of care [] Alter current plan (see comments above)  [] Plan of care initiated [] Hold pending MD visit [] Discharge      Electronically signed by:  Tracy Dwyer PT DPT, OMT-C  Note: If patient does not return for scheduled/ recommended follow up visits, this note will serve as a discharge from care along with most recent update on progress.

## 2021-08-22 PROBLEM — K56.7 ILEUS, POSTOPERATIVE (HCC): Status: RESOLVED | Noted: 2021-06-10 | Resolved: 2021-08-22

## 2021-08-22 PROBLEM — E43 SEVERE MALNUTRITION (HCC): Status: RESOLVED | Noted: 2021-07-09 | Resolved: 2021-08-22

## 2021-08-22 PROBLEM — E87.1 HYPONATREMIA: Status: RESOLVED | Noted: 2021-07-09 | Resolved: 2021-08-22

## 2021-08-22 PROBLEM — R19.05 PERIUMBILICAL MASS: Status: RESOLVED | Noted: 2021-04-23 | Resolved: 2021-08-22

## 2021-08-22 PROBLEM — N10 ACUTE PYELONEPHRITIS: Status: RESOLVED | Noted: 2021-07-08 | Resolved: 2021-08-22

## 2021-08-22 PROBLEM — K91.89 ILEUS, POSTOPERATIVE (HCC): Status: RESOLVED | Noted: 2021-06-10 | Resolved: 2021-08-22

## 2021-08-23 ENCOUNTER — HOSPITAL ENCOUNTER (OUTPATIENT)
Dept: PHYSICAL THERAPY | Age: 71
Setting detail: THERAPIES SERIES
Discharge: HOME | End: 2021-08-23
Payer: COMMERCIAL

## 2021-08-23 PROCEDURE — 97110 THERAPEUTIC EXERCISES: CPT

## 2021-08-23 PROCEDURE — 97530 THERAPEUTIC ACTIVITIES: CPT

## 2021-08-23 PROCEDURE — 97112 NEUROMUSCULAR REEDUCATION: CPT

## 2021-08-23 NOTE — FLOWSHEET NOTE
[facility]       Physical Therapy Daily Treatment Note  Date:  2021    Patient Name:  Sumit Redding    :  1950  MRN: [id number]  Restrictions/Precautions:    Medical/Treatment Diagnosis Information:  · Diagnosis: M17.12 (ICD-10-CM) - Osteoarthritis of left knee, unspecified osteoarthritis type  s/p Left TKR  DOS: 21  · Treatment Diagnosis: increased pain; decreased ROM; decreased strength; gait abnormality; increased swelling  Insurance/Certification information:  PT Insurance Information: Sandra Anguiano 8  Physician Information:  Referring Practitioner: Krish Kovacs MD  Has the plan of care been signed (Y/N):        [x]  Yes-  []  No     Date of Patient follow up with Physician:       Is this a Progress Report:     []  Yes  [x]  No        If Yes:  Date Range for reporting period:  Beginning 21  Ending     Progress report will be due (10 Rx or 30 days whichever is less):       Recertification will be due (POC Duration  / 90 days whichever is less): 21    Visit # Insurance Allowable Auth Required   15 MN []  Yes [x]  No        Functional Scale:        Date assessed:          LEFS: raw score=35; dysfunction =56%    21     Latex Allergy:  [x]NO      []YES  Preferred Language for Healthcare:   [x]English       []other:    Pain level:  3-4/10     SUBJECTIVE:   Pt reports she is not doing as good as she would like to be. Low back continues to be an issue, saw her DC again since last visit and she was referred to her MD for further consultation due to persisting pain. Pt reports her knee continues to be stiff and sore but not sure if it is due to her back or vice versa. OBJECTIVE:    Observation: : avoids bending/weighting leg during sit to stand. Tends to use RW like a  SW. Needs cues to bend knee during swing phase. Pt tends to manually assist LLE onto table, stepper, car.        Test measurements:  8/10: AROM 0-118 degrees with encouragement   7/29 AROM 0-109 , PROM 0 -115   7/27: AROM knee flexion 112 degrees, 118 after heel slides    7/7/ 21 left knee incision healing well and has been using Vit E to massage.  Gait mildly antalgic  Left 0-120 ERMI   7/2/21   Left knee ROM -5/ 0 after stretching   6/29: L knee 108 flexion PROM   6/23: AROM L knee (supine): QS lacks 4, SAQ lacks 7; knee flexion 90     RESTRICTIONS/PRECAUTIONS:  Left TKR, WBAT    Exercises/Interventions:   Exercise/Equipment  TE x  Resistance Sets/time Repetitions Other comments    6/23 added   BIKE     4' Full revolutions 6/29   added 7/7; 8/10 held busy   Stretching     EOB HSS  6/23, 7/29: held due to abd wound   Lunge knee flexion stretch on step  6/23   Standing HSS on step  20\" holds 2    Inclined Calf  Heel raise  20\"  2 2x  10    Heel slides in supine                    Bridge   Added 7/7          Strengthening       SLR Supine     SLR Abduction  SL  clams 6/23 could only do one set due to spasm in thigh   SLR Prone       SLR+ quad set 1# 7/29 : slight quad lag at end of reps          Quad sets            LAQ 3#  7/7   SAQ 3#    HS curls  Standing HSC    Calf raises  6/23   Mini squats   6/29   Hip abd in standing     Hip ext in standing     Leg press  DL  SL   70#  30#   3  2   10  10    Quantum :  Knee ext  HSC   10#  20#   2  2   10  10                                ROM       Sheet Pulls       Hang Weights       Passive       Weight Shift       Ankle Pumps       ERMI                    NMR/ CKC              TRX squats  2 10    TRX mini squat and hold with R LE 3 point touch  1 5 R    Standing 3 way SLR on airex  Held 8/17 due to pain          SLB  10'' 3 fingertips   CC TKE Added 7/2    Tandem stance       Light UE A   Forward step up to SLS     FSU 6\" up & up and over  Added 7/2    Added 7/7   Therapeutic Activity       Fwd step up 4 inch   1   15      Fwd step down    Lat step up 4 inch 1 15    Lat dip/heel tap 4 inch 1 10 L    Total gym squats       Sit to stand       Resisted walking - Retro with sport cord   Loop with harness 10' x5                         Manual interventions x         Patellar glides Scar mobs PROM STM                 Patient Education:  6/23: instructed pt to try to avoid manually helping her LLE into bed/car etc.  Since she is able to do a SLR without assistance, she should try to actively lift LLE. Walked with patient to work on flexing L knee during swing phase and pushing RW instead of picking it up like a     Home Exercise program  Access Code: [email or web address]  URL: [employer]/   Date: 06/16/2021  Prepared by: Maura Almanza  Exercises  Long Sitting Calf Stretch with Strap - 2 x daily - 7 x weekly - 5 reps - 30 seconds hold  Seated Table Hamstring Stretch - 2 x daily - 7 x weekly - 5 reps - 30 seconds hold  Long Sitting Quad Set - 2 x daily - 7 x weekly - 10 reps - 10 seconds hold  Straight Leg Raise - 2 x daily - 7 x weekly - 3 sets - 10 reps  Sidelying Hip Abduction - 2 x daily - 7 x weekly - 3 sets - 10 reps  Supine Hip Adduction Isometric with Ball - 2 x daily - 7 x weekly - 10 reps - 10 sec hold  Sitting Heel Slide with Towel - 2 x daily - 7 x weekly - 10 reps - 10seconds hold  Seated Knee Flexion AAROM - 2 x daily - 7 x weekly - 10 reps - 10 sec hold    Updated on 7/21:   Access Code: [email or web address]  URL: [employer]/  Date: 07/21/2021  Prepared by: Minnie Thacker    Exercises  Active Straight Leg Raise with Quad Set - 2 x daily - 7 x weekly - 2 sets - 10 reps  Supine Knee Extension Strengthening - 2 x daily - 7 x weekly - 2 sets - 10 reps  Seated Long Arc Quad - 2 x daily - 7 x weekly - 2 sets - 10 reps  Standing March with Counter Support - 2 x daily - 7 x weekly - 2 sets - 10 reps        Therapeutic Exercise and NMR EXR  [x] (94007) Provided verbal/tactile cueing for activities related to strengthening, flexibility, endurance, ROM for improvements in LE, proximal hip, and 10'  6/16/21 vasopneumatic compression x 15 min   6/23: pt to use her cold machine when she gets home    Charges:  Timed Code Treatment Minutes: 42   Total Treatment Minutes: 42       [] EVAL (LOW) 44520 (typically 20 minutes face-to-face)  [] EVAL (MOD) 46352 (typically 30 minutes face-to-face)  [] EVAL (HIGH) 23788 (typically 45 minutes face-to-face)  [] RE-EVAL   [x] DN(72230) x 1   [] IONTO  [x] NMR (49221) x  1   [] VASO   [] Manual (42843) x     [] Other:  [x] TA x 1     [] Mech Traction (62709)  [] ES(attended) (02250)      [] ES (un) (43526):        GOALS:   Patient stated goal: walking. [] Progressing: [] Met: [] Not Met: [] Adjusted     Therapist goals for Patient:   Short Term Goals: To be achieved in: 2 weeks  1. Independent in HEP and progression per patient tolerance, in order to prevent re-injury. [] Progressing: [] Met: [] Not Met: [] Adjusted   2. Patient will have a decrease in pain to facilitate improvement in movement, function, and ADLs as indicated by Functional Deficits. [] Progressing: [] Met: [] Not Met: [] Adjusted     Long Term Goals: To be achieved in: 12 weeks  1. Disability index score of 20% or less for the LEFS to assist with reaching prior level of function. [] Progressing: [] Met: [] Not Met: [] Adjusted  2. Patient will demonstrate increased AROM to [facility] to allow for proper joint functioning as indicated by patients Functional Deficits. [] Progressing: [] Met: [] Not Met: [] Adjusted  3. Patient will demonstrate an increase in Strength to good proximal hip strength and control, within 5lb HHD in LE to allow for proper functional mobility as indicated by patients Functional Deficits. [] Progressing: [] Met: [] Not Met: [] Adjusted  4. Patient will return to ADL and household functional activities without increased symptoms or restriction. [] Progressing: [] Met: [] Not Met: [] Adjusted  5. Pt will be able to walk with mild symptoms and restrictions.     [] Progressing: [] Met: [] Not Met: [] Adjusted       Overall Progression Towards Functional goals/ Treatment Progress Update:  [x] Patient is progressing as expected towards functional goals listed. [] Progression is slowed due to complexities/Impairments listed. [] Progression has been slowed due to co-morbidities. [] Plan just implemented, too soon to assess goals progression <30days   [] Goals require adjustment due to lack of progress  [] Patient is not progressing as expected and requires additional follow up with physician  [] Other    Prognosis for POC: [x] Good [] Fair  [] Poor    Patient requires continued skilled intervention: [x] Yes  [] No    Assessment:  Pt able to tolerate session well today, mild reports of increased knee soreness with increased quad emphasized intervention however no limiting symptoms and overall good tolerance with low back considerations. Pt continues to be limited on progressions due to low back and knee soreness/pain and will continue to benefit from PT to work on progressing slowly to avoid excessive overloading. Treatment/Activity Tolerance:  [x] Patient able to complete treatment  [] Patient limited by fatigue  [] Patient limited by pain     [] Patient limited by other medical complications  [] Other:         PLAN:  Patient advised to get a cane but not to use FT just yet, continue with walker  [x] Continue per plan of care [] Alter current plan (see comments above)  [] Plan of care initiated [] Hold pending MD visit [] Discharge      Electronically signed by:  Janel Tilley PT DPT, OMT-C  Note: If patient does not return for scheduled/ recommended follow up visits, this note will serve as a discharge from care along with most recent update on progress.

## 2021-08-23 NOTE — PROGRESS NOTES
Date of Visit:  2021    CC: Amauri Lazcano (: 1950) is a 78 y.o. female, established patient, here for evaluation/re-evaluation of the following medical concerns:    ASSESSMENT/PLAN:  Chronic bilateral low back pain with bilateral sciatica  Assessment & Plan:  Worse, suspect due to gait issues related to recent knee replacement and immobility secondary to recent abdominal surgery. Will refer to PT for further evaluation and treatment since chiropractic has not been helpful during this exacerbation. Continue current dose of gabapentin and prn Tylenol. Orders:  -     [facility] [place]  Neck pain, chronic  Assessment & Plan:  Stable. Continue gabapentin and home exercises. Psychophysiological insomnia  Assessment & Plan:  Doing well on current dose of trazodone and prn melatonin- continue. Major depressive disorder with single episode, in partial remission (HCC)  Assessment & Plan:  Stable on lower dose of Wellbutrin XL with improvement in tremor- continue. Anxiety  Assessment & Plan:  See plan for depression. Continue lowest effective dose of Xanax. Age-related osteoporosis without current pathological fracture  Assessment & Plan: At high risk for hip fracture per recent dexa, warranting treatment. Oral bisphosphonates contraindicated due to GI issues, so discussed risks and benefits of Reclast and Prolia. She will check with insurance company on out of pocket costs for each option. Gastroesophageal reflux disease, unspecified whether esophagitis present  Assessment & Plan:  Well-controlled on current dose of Omeprazole- continue. Class 1 obesity due to excess calories with serious comorbidity and body mass index (BMI) of 31.0 to 31.9 in adult  Assessment & Plan:  Improved. Patient was asked about her current diet and exercise habits, and personalized advice was provided regarding recommended lifestyle changes.   Patient's comorbid health conditions associated with elevated BMI were discussed, as well as the likely benefits of weight loss. Based upon patient's motivation to change her behavior, the following plan was agreed upon to work toward a weight loss goal of 30 pounds: well-balanced diet, following Weight Watcher's principles, she will increase her activity level as knee and back allow. Educational materials for  weight loss were provided. Patient will follow-up in 3 month(s) with PCP. Provider spent 15 minutes counseling patient. Body mass index 31.0-31.9, adult  -     Obesity Counseling, 15 Minutes    Return in about 3 months (around 11/24/2021) for AWV- Video. Patient-Reported Vitals 8/24/2021   Patient-Reported Weight 160lb   Patient-Reported Height 5'2\"   Patient-Reported Systolic 034   Patient-Reported Diastolic 57   Patient-Reported Pulse 76   Patient-Reported Temperature -        Wt Readings from Last 3 Encounters:   07/26/21 174 lb (78.9 kg)   07/26/21 174 lb (78.9 kg)   07/16/21 172 lb 6.4 oz (78.2 kg)     BP Readings from Last 3 Encounters:   07/26/21 114/68   07/16/21 (!) 106/59   06/10/21 128/66     Estimated body mass index is 31.32 kg/m² as calculated from the following:    Height as of 7/26/21: 5' 2.5\" (1.588 m). Weight as of 7/26/21: 174 lb (78.9 kg). HPI  Chronic Joint Pain:  Patient presents for follow-up of pain in back and neck- back pain worse over the past 2-3 weeks, no change in neck pain. S/p left TKR 3/2/74- no complications.  On average, pain is perceived as moderate-severe.  Current treatment: gabapentin 600 mg qhs, Tylenol prn.  Medication side effects: none. Diagnostic testing: MRI cervical and lumbar spine 12/18 per Dr. Jessica Zavala- Multiple injections  provided only temporary relief. Seeing chiropractor for neck and back pain- no improvement with current episode.     Obesity:  Eating a healthy, well-balanced diet- has lost about 10 pounds over the past 3 months.   Exercise- Doing home PT exercises.     Insomnia:  Current treatment: prescription sleep aid-  trazodone 100 mg qhs, melatonin 5 mg qhs prn, which has been effective.  No adverse effects.  Restless legs symptoms well-controlled.     Mood Disorder:  Patient presents for follow-up of depression and anxiety disorder. Current complaints include: excessive worry- a little worse, difficulty concentrating, impaired memory- waxes and wanes.  No tearfulness, depressed mood, feelings of worthlessness/guilt, anhedonia, panic attacks, feelings of hopelessness, irritability or suicidal ideation.  Symptoms/signs of jojo: none. Current treatment includes: Wellbutrin- 150 mg qam, takes Xanax about once in the evening for anxiety and occasionally during the day- slightly more often than baseline.  Medication side effects: tremor of hands. GERD:  Currently treated with proton pump inhibitor: omeprazole 40 mg, which has been effective. She has been using this therapy daily. Patient denies dysphagia, cough, hoarseness, chest pain, unintentional weight loss, N/V, bloating, early satiety or change in bowel habits. ROS  As documented above    Physical Exam  Constitutional:       General: She is not in acute distress. Appearance: She is well-developed. HENT:      Head: Normocephalic and atraumatic. Mouth/Throat:      Lips: No lesions. Neck:      Comments: No visible mass  Pulmonary:      Effort: Pulmonary effort is normal. No respiratory distress. Skin:     Comments: No rash, erythema or other discoloration on facial skin and exposed areas of neck and upper extremites    Neurological:      Mental Status: She is alert. Comments: No facial asymmetry (cranial nerve 7 motor function) or gaze palsy   Psychiatric:         Attention and Perception: Attention normal.         Mood and Affect: Mood normal.         Speech: Speech normal.         Behavior: Behavior normal.         Thought Content:  Thought content normal.         Cognition and Memory: Cognition normal. Marlen Sifuentes is a 78 y.o. female being evaluated by a Virtual Visit (video visit) encounter to address concerns as mentioned above. A caregiver was present when appropriate. Due to this being a TeleHealth encounter (During XYVOW-99 public health emergency), evaluation of the following organ systems was limited: Vitals/Constitutional/EENT/Resp/CV/GI//MS/Neuro/Skin/Heme-Lymph-Imm. Pursuant to the emergency declaration under the [address] [place] [address] and the [employer] and [employer] Act, this Virtual Visit was conducted with patient's (and/or legal guardian's) consent, to reduce the patient's risk of exposure to COVID-19 and provide necessary medical care. The patient (and/or legal guardian) has also been advised to contact this office for worsening conditions or problems, and seek emergency medical treatment and/or call 911 if deemed necessary. Patient identification was verified at the start of the visit: Yes    Services were provided through a video synchronous discussion virtually to substitute for in-person clinic visit. Patient and provider were located at their individual homes. --Jennifer Mathis MD on 8/24/2021 at 1:36 PM    An electronic signature was used to authenticate this note.

## 2021-08-24 ENCOUNTER — VIRTUAL VISIT (OUTPATIENT)
Dept: INTERNAL MEDICINE CLINIC | Age: 71
End: 2021-08-24
Payer: COMMERCIAL

## 2021-08-24 DIAGNOSIS — E66.09 CLASS 1 OBESITY DUE TO EXCESS CALORIES WITH SERIOUS COMORBIDITY AND BODY MASS INDEX (BMI) OF 31.0 TO 31.9 IN ADULT: ICD-10-CM

## 2021-08-24 DIAGNOSIS — M54.41 CHRONIC BILATERAL LOW BACK PAIN WITH BILATERAL SCIATICA: Primary | ICD-10-CM

## 2021-08-24 DIAGNOSIS — F51.04 PSYCHOPHYSIOLOGICAL INSOMNIA: ICD-10-CM

## 2021-08-24 DIAGNOSIS — G89.29 NECK PAIN, CHRONIC: ICD-10-CM

## 2021-08-24 DIAGNOSIS — M54.2 NECK PAIN, CHRONIC: ICD-10-CM

## 2021-08-24 DIAGNOSIS — K21.9 GASTROESOPHAGEAL REFLUX DISEASE, UNSPECIFIED WHETHER ESOPHAGITIS PRESENT: ICD-10-CM

## 2021-08-24 DIAGNOSIS — F41.9 ANXIETY: ICD-10-CM

## 2021-08-24 DIAGNOSIS — M81.0 AGE-RELATED OSTEOPOROSIS WITHOUT CURRENT PATHOLOGICAL FRACTURE: ICD-10-CM

## 2021-08-24 DIAGNOSIS — G89.29 CHRONIC BILATERAL LOW BACK PAIN WITH BILATERAL SCIATICA: Primary | ICD-10-CM

## 2021-08-24 DIAGNOSIS — F32.4 MAJOR DEPRESSIVE DISORDER WITH SINGLE EPISODE, IN PARTIAL REMISSION (HCC): ICD-10-CM

## 2021-08-24 DIAGNOSIS — M54.42 CHRONIC BILATERAL LOW BACK PAIN WITH BILATERAL SCIATICA: Primary | ICD-10-CM

## 2021-08-24 PROCEDURE — G0447 BEHAVIOR COUNSEL OBESITY 15M: HCPCS | Performed by: INTERNAL MEDICINE

## 2021-08-24 PROCEDURE — 99214 OFFICE O/P EST MOD 30 MIN: CPT | Performed by: INTERNAL MEDICINE

## 2021-08-24 RX ORDER — ASPIRIN 81 MG/1
325 TABLET ORAL DAILY
COMMUNITY
Start: 2021-08-24 | End: 2022-10-27

## 2021-08-24 NOTE — ASSESSMENT & PLAN NOTE
Worse, suspect due to gait issues related to recent knee replacement and immobility secondary to recent abdominal surgery. Will refer to PT for further evaluation and treatment since chiropractic has not been helpful during this exacerbation. Continue current dose of gabapentin and prn Tylenol.

## 2021-08-24 NOTE — ASSESSMENT & PLAN NOTE
At high risk for hip fracture per recent dexa, warranting treatment. Oral bisphosphonates contraindicated due to GI issues, so discussed risks and benefits of Reclast and Prolia. She will check with insurance company on out of pocket costs for each option.

## 2021-08-24 NOTE — ASSESSMENT & PLAN NOTE
Improved. Patient was asked about her current diet and exercise habits, and personalized advice was provided regarding recommended lifestyle changes. Patient's comorbid health conditions associated with elevated BMI were discussed, as well as the likely benefits of weight loss. Based upon patient's motivation to change her behavior, the following plan was agreed upon to work toward a weight loss goal of 30 pounds: well-balanced diet, following Weight Watcher's principles, she will increase her activity level as knee and back allow. Educational materials for  weight loss were provided. Patient will follow-up in 3 month(s) with PCP. Provider spent 15 minutes counseling patient.

## 2021-08-25 ENCOUNTER — HOSPITAL ENCOUNTER (OUTPATIENT)
Dept: PHYSICAL THERAPY | Age: 71
Setting detail: THERAPIES SERIES
Discharge: HOME | End: 2021-08-25
Payer: COMMERCIAL

## 2021-08-25 PROCEDURE — 97110 THERAPEUTIC EXERCISES: CPT

## 2021-08-25 PROCEDURE — 97530 THERAPEUTIC ACTIVITIES: CPT

## 2021-08-25 PROCEDURE — 97112 NEUROMUSCULAR REEDUCATION: CPT

## 2021-08-25 NOTE — FLOWSHEET NOTE
degrees, 118 after heel slides    7/7/ 21 left knee incision healing well and has been using Vit E to massage.  Gait mildly antalgic  Left 0-120 ERMI   7/2/21   Left knee ROM -5/ 0 after stretching   6/29: L knee 108 flexion PROM   6/23: AROM L knee (supine): QS lacks 4, SAQ lacks 7; knee flexion 90     RESTRICTIONS/PRECAUTIONS:  Left TKR, WBAT    Exercises/Interventions:   Exercise/Equipment  TE x  Resistance Sets/time Repetitions Other comments    6/23 added   BIKE     4' Full revolutions 6/29   added 7/7; 8/10 held busy   Stretching     EOB HSS  6/23, 7/29: held due to abd wound   Lunge knee flexion stretch on step  6/23   Standing HSS on step  20\" holds 2    Inclined Calf  Heel raise  20\"  2 2x  10    Heel slides in supine                    Bridge   Added 7/7          Strengthening       SLR Supine     SLR Abduction  SL  clams 6/23 could only do one set due to spasm in thigh   SLR Prone       SLR+ quad set 7/29 : slight quad lag at end of reps          Quad sets            LAQ 3#  7/7   SAQ 3#    HS curls  Standing HSC    Calf raises  6/23   Mini squats   6/29   Hip abd in standing     Hip ext in standing     Leg press  DL  SL   70#  30#   3  3   10  10    Quantum :  Knee ext  HSC   15#  25#   3  3   10  10                                ROM       Sheet Pulls       Hang Weights       Passive       Weight Shift       Ankle Pumps       ERMI                    NMR/ CKC              TRX squats  2 10    Light finger tips on bar mini squat and hold with R LE 3 point touch  2 5 R    Standing 3 way SLR on airex  Held 8/17 due to pain          SLB airex 5'' 5 Fingertips on R UE   CC TKE Added 7/2    Tandem stance       Light UE A   Forward step up to SLS     FSU 6\" up & up and over  Added 7/2    Added 7/7   Therapeutic Activity       Fwd step up   8 inch    1   10    Fwd step down    Lat step up 4 inch 1 15    Lat dip/heel tap 4 inch 1 10 L    Total gym squats       Sit to stand       Resisted walking - Retro/forward with sport cord   Loop with harness  x2 sport cords 10' x5 each                         Manual interventions x         Patellar glides Scar mobs PROM STM                 Patient Education:  6/23: instructed pt to try to avoid manually helping her LLE into bed/car etc.  Since she is able to do a SLR without assistance, she should try to actively lift LLE. Walked with patient to work on flexing L knee during swing phase and pushing RW instead of picking it up like a     Home Exercise program  Access Code: [email or web address]  URL: [email or web address]   Date: 06/16/2021  Prepared by: Elvia Castro  Exercises  Long Sitting Calf Stretch with Strap - 2 x daily - 7 x weekly - 5 reps - 30 seconds hold  Seated Table Hamstring Stretch - 2 x daily - 7 x weekly - 5 reps - 30 seconds hold  Long Sitting Quad Set - 2 x daily - 7 x weekly - 10 reps - 10 seconds hold  Straight Leg Raise - 2 x daily - 7 x weekly - 3 sets - 10 reps  Sidelying Hip Abduction - 2 x daily - 7 x weekly - 3 sets - 10 reps  Supine Hip Adduction Isometric with Ball - 2 x daily - 7 x weekly - 10 reps - 10 sec hold  Sitting Heel Slide with Towel - 2 x daily - 7 x weekly - 10 reps - 10seconds hold  Seated Knee Flexion AAROM - 2 x daily - 7 x weekly - 10 reps - 10 sec hold    Updated on 7/21:   Access Code: [email or web address]  URL: [email or web address]  Date: 07/21/2021  Prepared by: Marky Gabriel    Exercises  Active Straight Leg Raise with Quad Set - 2 x daily - 7 x weekly - 2 sets - 10 reps  Supine Knee Extension Strengthening - 2 x daily - 7 x weekly - 2 sets - 10 reps  Seated Long Arc Quad - 2 x daily - 7 x weekly - 2 sets - 10 reps  Standing March with Counter Support - 2 x daily - 7 x weekly - 2 sets - 10 reps        Therapeutic Exercise and NMR EXR  [x] (21402) Provided verbal/tactile cueing for activities related to strengthening, flexibility, endurance, ROM for improvements in LE, proximal hip, and core control with self care, mobility, lifting, ambulation.  [] (45753) Provided verbal/tactile cueing for activities related to improving balance, coordination, kinesthetic sense, posture, motor skill, proprioception  to assist with LE, proximal hip, and core control in self care, mobility, lifting, ambulation and eccentric single leg control. NMR and Therapeutic Activities:    [] (50399 or 30572) Provided verbal/tactile cueing for activities related to improving balance, coordination, kinesthetic sense, posture, motor skill, proprioception and motor activation to allow for proper function of core, proximal hip and LE with self care and ADLs  [] (38929) Gait Re-education- Provided training and instruction to the patient for proper LE, core and proximal hip recruitment and positioning and eccentric body weight control with ambulation re-education including up and down stairs     Home Exercise Program:    [] (10915) Reviewed/Progressed HEP activities related to strengthening, flexibility, endurance, ROM of core, proximal hip and LE for functional self-care, mobility, lifting and ambulation/stair navigation   [] (83068)Reviewed/Progressed HEP activities related to improving balance, coordination, kinesthetic sense, posture, motor skill, proprioception of core, proximal hip and LE for self care, mobility, lifting, and ambulation/stair navigation      Manual Treatments:  PROM / STM / Oscillations-Mobs:  G-I, II, III, IV (PA's, Inf., Post.)  [x] (78541) Provided manual therapy to mobilize LE, proximal hip and/or LS spine soft tissue/joints for the purpose of modulating pain, promoting relaxation,  increasing ROM, reducing/eliminating soft tissue swelling/inflammation/restriction, improving soft tissue extensibility and allowing for proper ROM for normal function with self care, mobility, lifting and ambulation. Modalities:   8/19, 8/17: CP to L knee post session x 10 mins in long sitting post session.    7/7/21  10'  6/16/21 vasopneumatic compression x 15 min Overall Progression Towards Functional goals/ Treatment Progress Update:  [x] Patient is progressing as expected towards functional goals listed. [] Progression is slowed due to complexities/Impairments listed. [] Progression has been slowed due to co-morbidities. [] Plan just implemented, too soon to assess goals progression <30days   [] Goals require adjustment due to lack of progress  [] Patient is not progressing as expected and requires additional follow up with physician  [] Other    Prognosis for POC: [x] Good [] Fair  [] Poor    Patient requires continued skilled intervention: [x] Yes  [] No    Assessment:  Pt continues to do really well with PT POC, pt continues to progress well with strength and activity tolerance with ability to progress interventions as noted per log with no issues with pain however pt does fatigue quickly. Pt continues to have issues with eccentric quad loading with decreased technique and tolerance with lateral heel taps and forward step downs with no reports of pain only significant fatigue and lack of power to complete tasks. Pt will benefit from further PT to address these issues to maximize functional utilization of LE. Treatment/Activity Tolerance:  [x] Patient able to complete treatment  [] Patient limited by fatigue  [] Patient limited by pain     [] Patient limited by other medical complications  [] Other:         PLAN:  Patient advised to get a cane but not to use FT just yet, continue with walker  [x] Continue per plan of care [] Alter current plan (see comments above)  [] Plan of care initiated [] Hold pending MD visit [] Discharge      Electronically signed by:  Alan Humphreys, PT DPT, OMT-C  Note: If patient does not return for scheduled/ recommended follow up visits, this note will serve as a discharge from care along with most recent update on progress.

## 2021-08-27 ENCOUNTER — HOSPITAL ENCOUNTER (OUTPATIENT)
Dept: PHYSICAL THERAPY | Age: 71
Setting detail: THERAPIES SERIES
Discharge: HOME | End: 2021-08-27
Payer: COMMERCIAL

## 2021-08-27 PROCEDURE — 97110 THERAPEUTIC EXERCISES: CPT

## 2021-08-27 PROCEDURE — 97161 PT EVAL LOW COMPLEX 20 MIN: CPT

## 2021-08-27 NOTE — PLAN OF CARE
[address] [address], [address] [place] [address] Drive  Phone: [phone]   Fax: [phone]     Physical Therapy Certification    Dear Referring Practitioner: Zach Lorenzo MD,    We had the pleasure of evaluating the following patient for physical therapy services at [address] [place] [address]. A summary of our findings can be found in the initial assessment below. This includes our plan of care. If you have any questions or concerns regarding these findings, please do not hesitate to contact me at the office phone number checked above. Thank you for the referral.       Physician Signature:_______________________________Date:__________________  By signing above (or electronic signature), therapists plan is approved by physician      Patient: Cat Head   : 1950   MRN: [id number]  Referring Physician: Referring Practitioner: Zach Lorenzo MD      Evaluation Date: 2021      Medical Diagnosis Information:  Diagnosis: M54.42, M54.41, G89.29 (ICD-10-CM) - Chronic bilateral low back pain with bilateral sciatica   Treatment Diagnosis: Decreased lumbar ROM, decreased LE and hip strength, flexiblity, neuromuscular control of LE as well as abdominal and lumbar musculature interfering with normalized lumbar mechanics and muscle activation. Insurance information: PT Insurance Information: [employer]     Precautions/ Contra-indications: -  Latex Allergy:  [x]NO      []YES  Preferred Language for Healthcare:   [x]English       []Other:    C-SSRS Triggered by Intake questionnaire (Past 2 wk assessment ):   [x] No, Questionnaire did not trigger screening.   [] Yes, Patient intake triggered C-SSRS Screening     [] Completed, no further action required.    [] Completed, PCP notified via Epic    SUBJECTIVE: Patient stated complaint: Pt reports she has been having on/off low back pain for the past several calf (S1) x     medial calcaneus (S2) x         Reflexes Normal Abnormal Comments   S1-2 Seated achilles 2+ B     S1-2 Prone knee bend      L3-4 Patellar tendon   Unable to elicit    Clonus x     Babinski          ROM  Comments   Lumbar Flex WFL    Lumbar Ext 50%      ROM LEFT RIGHT Comments   Lumbar Side Bend 46 52 cm    Lumbar Rotation Mild limitation WNL    Hip Flexion WNL WNL    Hip Abd WNL WNL    Hip ER WNL WNL    Hip IR WNL WNL    Hip Extension WNL WNL    Knee Ext      Knee Flex      Hamstring Flex WFL WFL    Piriformis Mild restriction Moderate restriction                    Joint mobility: Lumbar spine L 3-5   []Normal    [x]Hypo   []Hyper      Strength / Myotomes LEFT RIGHT Comments   Multifidus 4- 4-    Transverse Ab 3+ 3+    Hip Flexors (L1-2) 4 4-    Hip Abductors 4- 4-    Hip Extensors 4- 3+    Hip Internal Rotators 4 4    Hip External Rotators 4 4-    Quads (L2-4) 4 4+    Hamstrings  4 4+    Ankle Plantarflexion (S1-2)      Ankle Dorsiflexion (L4-5)      Ankle Inversion      Ankle Eversion (S1-2)      Great Toe Extension (L5)          Neural dynamic tension testing Normal Abnormal Comments   Slump Test  - Degree of knee flexion:  x     SLR  x     0-30      30-70      Femoral nerve (L2-4)          Orthopedic Special Tests: -   Normal Abnormal N/A Comments   Toe walk   x      Heel Walk x      Fwd Bend-aberrant or innominate mvmt) x      Trendelenburg  x  Worse on R   Kemps/Quadrant       Stork  x  Worse on R   VAISHNAVI/Severo       Hip scour       SLR x      Crossed SLR x      Supine to sit       Hip thrust x      SI distraction/compression       PA/Spring x      Prone Instability test       Prone knee bend       Sacral Spring/thrust                  [x] Patient history, allergies, meds reviewed. Medical chart reviewed. See intake form. Review Of Systems (ROS):  [x]Performed Review of systems (Integumentary, CardioPulmonary, Neurological) by intake and observation.  Intake form has been scanned into medical record. Patient has been instructed to contact their primary care physician regarding ROS issues if not already being addressed at this time.       Co-morbidities/Complexities (which will affect course of rehabilitation): -  []None        []Hx of COVID   Arthritic conditions   []Rheumatoid arthritis (M05.9)  [x]Osteoarthritis (M19.91)  []Gout   Cardiovascular conditions   []Hypertension (I10)  []Hyperlipidemia (E78.5)  []Angina pectoris (I20)  []Atherosclerosis (I70)  []Pacemaker  []Hx of CABG/stent/  cardiac surgeries   Musculoskeletal conditions   [x]Disc pathology   []Congenital spine pathologies   [x]Osteoporosis (M81.8)  []Osteopenia (M85.8)  []Scoliosis       Endocrine conditions   []Hypothyroid (E03.9)  []Hyperthyroid Gastrointestinal conditions   []Constipation (R19.19)   Metabolic conditions   []Morbid obesity (E66.01)  []Diabetes type 1(E10.65) or 2 (E11.65)   []Neuropathy (G60.9)     Cardio/Pulmonary conditions   []Asthma (J45)  []Coughing   []COPD (J44.9)  []CHF  []A-fib   Psychological Disorders  []Anxiety (F41.9)  []Depression (F32.9)   []Other:   Developmental Disorders  []Autism (F84.0)  []CP (G80)  []Down Syndrome (Q90.9)  []Developmental delay     Neurological conditions  []Prior Stroke (I69.30)  []Parkinson's (G20)  []Encephalopathy (G93.40)  []MS (G35)  []Post-polio (G14)  []SCI  []TBI  []ALS Other conditions  []Fibromyalgia (M79.7)  []Vertigo  []Syncope  []Kidney Failure  []Cancer      []currently undergoing                treatment  []Pregnancy  []Incontinence   Prior surgeries  []involved limb  []previous spinal surgery  [] section birth  []hysterectomy  []bowel / bladder surgery  []other relevant surgeries   []Other:              Barriers to/and or personal factors that will affect rehab potential:              [x]Age  []Sex    []Smoker              []Motivation/Lack of Motivation                        [x]Co-Morbidities              []Cognitive Function, education/learning barriers              []Environmental, home barriers              []profession/work barriers  [x]past PT/medical experience  []other:  Justification: chronic issues with recurring LBP however hx of complete resolving of symptoms potentially with positive rehab potential.     Falls Risk Assessment (30 days): -  [x] Falls Risk assessed and no intervention required. [] Falls Risk assessed and Patient requires intervention due to being higher risk   TUG score (>12s at risk):     [] Falls education provided, including         ASSESSMENT: Pt is a 79 y/o female presenting to PT with decreased lumbar ROM, decreased abdominal, lumbar and LE strength, stability, muscle engagement and control as well as hypomobility of lower lumbar segments all contributing to increase in pain in lower lumbar spine with pain limiting ADL's, IADL's, recreational activity. Pt will benefit from PT to work on addressing these issues to improve functional mobility, strength and control to return to PLOF.      Functional Impairments:     [x]Noted lumbar/proximal hip hypomobility   []Noted lumbosacral and/or generalized hypermobility   []Decreased Lumbosacral/hip/LE functional ROM   [x]Decreased core/proximal hip strength and neuromuscular control    [x]Decreased LE functional strength    []Abnormal reflexes/sensation/myotomal/dermatomal deficits  [x]Reduced balance/proprioceptive control    []other:      Functional Activity Limitations (from functional questionnaire and intake)   [x]Reduced ability to tolerate prolonged functional positions   [x]Reduced ability or difficulty with changes of positions or transfers between positions   [x]Reduced ability to maintain good posture and demonstrate good body mechanics with sitting, bending, and lifting   [x]Reduced ability to sleep   [x] Reduced ability or tolerance with driving and/or computer work   [x]Reduced ability to perform lifting, reaching, carrying tasks   [x]Reduced ability to squat   [x]Reduced ability to forward bend   [x]Reduced ability to ambulate prolonged functional periods/distances/surfaces   []Reduced ability to ascend/descend stairs   []other:       Participation Restrictions   [x]Reduced participation in self care activities   [x]Reduced participation in home management activities   []Reduced participation in work activities   [x]Reduced participation in social activities. []Reduced participation in sport/recreational activities. Classification:   [x]Signs/symptoms consistent with Lumbar instability/stabilization subgroup. [x]Signs/symptoms consistent with Lumbar mobilization/manipulation subgroup, myotomes and dermatomes intact. Meets manipulation criteria. []Signs/symptoms consistent with Lumbar direction specific/centralization subgroup   []Signs/symptoms consistent with Lumbar traction subgroup     []Signs/symptoms consistent with lumbar facet dysfunction   []Signs/symptoms consistent with lumbar stenosis type dysfunction   []Signs/symptoms consistent with nerve root involvement including myotome & dermatome dysfunction   []Signs/symptoms consistent with post-surgical status including: decreased ROM, strength and function.    []signs/symptoms consistent with pathology which may benefit from Dry needling     []other:      Prognosis/Rehab Potential:      []Excellent   [x]Good    []Fair   []Poor    Tolerance of evaluation/treatment:    []Excellent   [x]Good    []Fair   []Poor     Physical Therapy Evaluation Complexity Justification  [x] A history of present problem with:  [] no personal factors and/or comorbidities that impact the plan of care;  [x]1-2 personal factors and/or comorbidities that impact the plan of care  []3 personal factors and/or comorbidities that impact the plan of care  [x] An examination of body systems using standardized tests and measures addressing any of the following: body structures and functions (impairments), activity limitations, and/or participation restrictions;:  [x] a total of 1-2 or more elements   [] a total of 3 or more elements   [] a total of 4 or more elements   [x] A clinical presentation with:  [x] stable and/or uncomplicated characteristics   [] evolving clinical presentation with changing characteristics  [] unstable and unpredictable characteristics;   [x] Clinical decision making of [x] low-, [] moderate, [] high complexity using standardized patient assessment instrument and/or measurable assessment of functional outcome. [x] EVAL (LOW) 58943 (typically 15 minutes face-to-face)  [] EVAL (MOD) 48994 (typically 30 minutes face-to-face)  [] EVAL (HIGH) 00826 (typically 45 minutes face-to-face)  [] RE-EVAL     PLAN: Begin PT focusing on: proximal hip mobilizations, LB mobs, LB core activation, proximal hip activation, and HEP    Frequency/Duration:  2 days per week for 4 Weeks:  Interventions:  [x]  Therapeutic exercise including: strength training, ROM, for LE, Glutes and core   [x]  NMR activation and proprioception for glutes , LE and Core   [x]  Manual therapy as indicated for Hip complex, LE and spine to include: Dry Needling/IASTM, STM, PROM, Gr I-IV mobilizations, manipulation. [x]  Modalities as needed that may include: thermal agents, E-stim, Biofeedback, US, iontophoresis as indicated  [x]  Patient education on joint protection, postural re-education, activity modification, progression of HEP. HEP instruction: Written HEP instructions provided and reviewed     GOALS:  Patient stated goal: decrease pain to return to all ADL's including cooking and brushing her teeth without issues. [] Progressing: [] Met: [] Not Met: [] Adjusted    Therapist goals for Patient:   Short Term Goals: To be achieved in: 2 weeks  1. Independent in HEP and progression per patient tolerance, in order to prevent re-injury. [] Progressing: [] Met: [] Not Met: [] Adjusted  2.  Patient will have a decrease in pain to facilitate improvement in movement, function, and ADLs as indicated by Functional Deficits. [] Progressing: [] Met: [] Not Met: [] Adjusted    Long Term Goals: To be achieved in: 4 weeks  1. Disability index score of 20% or less for the MIRIAM to assist with reaching prior level of function. [] Progressing: [] Met: [] Not Met: [] Adjusted  2. Patient will demonstrate increased AROM to WNL, good LS mobility, good hip ROM to allow for proper joint functioning as indicated by patients Functional Deficits. [] Progressing: [] Met: [] Not Met: [] Adjusted  3. Patient will demonstrate an increase in Strength to good proximal hip and core activation to allow for proper functional mobility as indicated by patients Functional Deficits. [] Progressing: [] Met: [] Not Met: [] Adjusted  4. Patient will return to functional activities including standing for ADL's without increased symptoms or restriction. [] Progressing: [] Met: [] Not Met: [] Adjusted  5. Patient will be able to return to community navigation with no restrictions of low back pain.      [] Progressing: [] Met: [] Not Met: [] Adjusted     Electronically signed by:        Miguel Angel Young, PT DPT, OMT-C

## 2021-08-27 NOTE — FLOWSHEET NOTE
Manual Intervention (78158)       Prone PA       GISTM/STM       Lumbar Manip       SI Manip       Hip belt mobs       Hip LA distraction                              Modalities:     Pt. Education:  -pt educated on diagnosis, prognosis and expectations for rehab  -all pt questions were answered    Home Exercise Program:  Access Code: [email or web address]  URL: [email or web address]  Date: 08/27/2021  Prepared by: Jrodin Coates    Exercises  Supine Lower Trunk Rotation - 2 x daily - 7 x weekly - 4 reps - 10 hold  Supine Piriformis Stretch with Foot on Ground - 2 x daily - 7 x weekly - 4 reps - 20 hold  Supine Posterior Pelvic Tilt - 2 x daily - 7 x weekly - 20 reps      Therapeutic Exercise and NMR EXR  [x] (20786) Provided verbal/tactile cueing for activities related to strengthening, flexibility, endurance, ROM  for improvements in proximal hip and core control with self care, mobility, lifting and ambulation.  [] (89913) Provided verbal/tactile cueing for activities related to improving balance, coordination, kinesthetic sense, posture, motor skill, proprioception  to assist with core control in self care, mobility, lifting, and ambulation.   [] (91585) Therapist is in constant attendance of 2 or more patients providing skilled therapy interventions, but not providing any significant amount of measurable one-on-one time to either patient, for improvements in LE, proximal hip, and core control in self care, mobility, lifting, ambulation and eccentric single leg control.      Therapeutic Activities:    [] (06730 or 26012) Provided verbal/tactile cueing for activities related to improving balance, coordination, kinesthetic sense, posture, motor skill, proprioception and motor activation to allow for proper function  with self care and ADLs  [] (31564) Provided training and instruction to the patient for proper core and proximal hip recruitment and positioning with ambulation re-education     Home Exercise Adjusted     Long Term Goals: To be achieved in: 4 weeks  1. Disability index score of 20% or less for the MIRIAM to assist with reaching prior level of function. []? Progressing: []? Met: []? Not Met: []? Adjusted  2. Patient will demonstrate increased AROM to WNL, good LS mobility, good hip ROM to allow for proper joint functioning as indicated by patients Functional Deficits. []? Progressing: []? Met: []? Not Met: []? Adjusted  3. Patient will demonstrate an increase in Strength to good proximal hip and core activation to allow for proper functional mobility as indicated by patients Functional Deficits. []? Progressing: []? Met: []? Not Met: []? Adjusted  4. Patient will return to functional activities including standing for ADL's without increased symptoms or restriction. []? Progressing: []? Met: []? Not Met: []? Adjusted  5. Patient will be able to return to community navigation with no restrictions of low back pain. []? Progressing: []? Met: []? Not Met: []? Adjusted       Overall Progression Towards Functional goals/ Treatment Progress Update:  [] Patient is progressing as expected towards functional goals listed. [] Progression is slowed due to complexities/Impairments listed. [] Progression has been slowed due to co-morbidities.   [x] Plan just implemented, too soon to assess goals progression <30days   [] Goals require adjustment due to lack of progress  [] Patient is not progressing as expected and requires additional follow up with physician  [] Other    Persisting Functional Limitations/Impairments:  []Sitting []Standing   []Walking []Squatting/bending    []Stairs []ADL's    []Transfers []Reaching  []Housework []Job related tasks  []Driving []Sports/Recreation   []Sleeping []Other:    ASSESSMENT:  See eval    Treatment/Activity Tolerance:  [x] Patient able to complete tx  [] Patient limited by fatique  [] Patient limited by pain  [] Patient limited by other medical complications  [] Other: Prognosis: [x] Good [] Fair  [] Poor    Patient Requires Follow-up: [x] Yes  [] No    PLAN: See eval. PT 2x / week for 4 weeks. [] Continue per plan of care [] Alter current plan (see comments)  [x] Plan of care initiated [] Hold pending MD visit [] Discharge    Electronically signed by: Cuca Underwood PT PT, DPT      Note: If patient does not return for scheduled/ recommended follow up visits, this note will serve as a discharge from care along with most recent update on progress.

## 2021-08-31 ENCOUNTER — HOSPITAL ENCOUNTER (OUTPATIENT)
Dept: PHYSICAL THERAPY | Age: 71
Setting detail: THERAPIES SERIES
Discharge: HOME | End: 2021-08-31
Payer: COMMERCIAL

## 2021-08-31 PROCEDURE — G0283 ELEC STIM OTHER THAN WOUND: HCPCS

## 2021-08-31 PROCEDURE — 97112 NEUROMUSCULAR REEDUCATION: CPT

## 2021-08-31 PROCEDURE — 97140 MANUAL THERAPY 1/> REGIONS: CPT

## 2021-08-31 NOTE — FLOWSHEET NOTE
[address] [facility] Physical Therapy  Phone: [phone]   Fax: [phone]    Physical Therapy Treatment Note/ Progress Report:     Date:  2021    Patient Name:  Sondra Tavarez    :  1950  MRN: [id number]  Restrictions/Precautions:    Medical/Treatment Diagnosis Information:  Diagnosis: M54.42, M54.41, G89.29 (ICD-10-CM) - Chronic bilateral low back pain with bilateral sciatica  Treatment Diagnosis: Decreased lumbar ROM, decreased LE and hip strength, flexiblity, neuromuscular control of LE as well as abdominal and lumbar musculature interfering with normalized lumbar mechanics and muscle activation. Insurance/Certification information:  PT Insurance Information: Sandra Anguiano 8  Physician Information:  Referring Practitioner: Marito Vieira MD  Plan of care signed (Y/N): []  Yes [x]  No    Date of Patient follow up with Physician:      Progress Report: []  Yes  [x]  No     Date Range for reporting period:  Beginnin/27  Ending: -    Progress report due (10 Rx/or 30 days whichever is less): visit #10 or 3/34 (date)     Recertification due (POC duration/ or 90 days whichever is less): visit #8 or  (date)     Visit # Insurance Allowable Auth required? Date Range   1 PMN $40 copay []  Yes  [x]  No -       Units approved Units used Date Range   - - -     Latex Allergy:  [x]NO      []YES  Preferred Language for Healthcare:   [x]English       []other:    Functional Scale:        Date assessed:  MIRIAM: raw score = 20 ; dysfunction = 40%        Pain level:  4/10     SUBJECTIVE:  Pt reports she has been doing about the same from her evaluation until now. Pain continues to be fairly constant and worse with movement in both extension and flexion and feels like her back is going to \"get stuck\" if she pushes things too far.      OBJECTIVE:    Observation:    Test measurements:      RESTRICTIONS/PRECAUTIONS: L TKA    Exercises/Interventions:     Therapeutic Exercise (06694) Resistance / level Sets / Seconds Reps Notes / Cues                                                                  Therapeutic Activities (58847)                                          Neuromuscular Re-ed (12525)       Biofeed back cuff:   -PVT  -PVT with BKFO  -PVT with march 2 5 each  Starting at 20 (red) no more than blue   Seated on SB:   -side glides  -circles (CW/CCW)  -march  -Alt UE raises   1 10 each                          Manual Intervention (86108)       Prone lumbar PA mobilizations central and B PA Grade 2-3 with respirations 3-4 pulses 4 passes     L sidelying MET to address extension restriction on R   5 mins                                                       Modalities:   8/31: IFC with MHP in supine with bolster under B LE x 10 mins post session with button     Pt. Education:  -pt educated on diagnosis, prognosis and expectations for rehab  -all pt questions were answered    Home Exercise Program:  Access Code: [email or web address]  URL: [email or web address]  Date: 08/27/2021  Prepared by: Caresse Sicard    Exercises  Supine Lower Trunk Rotation - 2 x daily - 7 x weekly - 4 reps - 10 hold  Supine Piriformis Stretch with Foot on Ground - 2 x daily - 7 x weekly - 4 reps - 20 hold  Supine Posterior Pelvic Tilt - 2 x daily - 7 x weekly - 20 reps      Therapeutic Exercise and NMR EXR  [x] (85823) Provided verbal/tactile cueing for activities related to strengthening, flexibility, endurance, ROM  for improvements in proximal hip and core control with self care, mobility, lifting and ambulation.  [] (04181) Provided verbal/tactile cueing for activities related to improving balance, coordination, kinesthetic sense, posture, motor skill, proprioception  to assist with core control in self care, mobility, lifting, and ambulation.   [] (20492) Therapist is in constant attendance of 2 or more patients providing skilled therapy interventions, but not providing any significant amount of measurable one-on-one time to either patient, for improvements in LE, proximal hip, and core control in self care, mobility, lifting, ambulation and eccentric single leg control. Therapeutic Activities:    [] (13181 or 68918) Provided verbal/tactile cueing for activities related to improving balance, coordination, kinesthetic sense, posture, motor skill, proprioception and motor activation to allow for proper function  with self care and ADLs  [] (30753) Provided training and instruction to the patient for proper core and proximal hip recruitment and positioning with ambulation re-education     Home Exercise Program:    [x] (73668) Reviewed/Progressed HEP activities related to strengthening, flexibility, endurance, ROM of core, proximal hip and LE for functional self-care, mobility, lifting and ambulation   [] (22359) Reviewed/Progressed HEP activities related to improving balance, coordination, kinesthetic sense, posture, motor skill, proprioception of core, proximal hip and LE for self care, mobility, lifting, and ambulation      Manual Treatments:  PROM / STM / Oscillations-Mobs:  G-I, II, III, IV (PA's, Inf., Post.)  [] (82926) Provided manual therapy to mobilize proximal hip and LS spine soft tissue/joints for the purpose of modulating pain, promoting relaxation,  increasing ROM, reducing/eliminating soft tissue swelling/inflammation/restriction, improving soft tissue extensibility and allowing for proper ROM for normal function with self care, mobility, lifting and ambulation.        Charges:  Timed Code Treatment Minutes: 43   Total Treatment Minutes: 53       [] EVAL - LOW (63541)   [] EVAL - MOD (63897)  [] EVAL - HIGH (29129)  [] RE-EVAL (18581)  [] ZI(97209) x      [] Ionto  [x] NMR (97333) x  2    [] Vaso  [x] Manual (79977) x 1     [] Ultrasound  [] TA x       [] Mech Traction (28163)  [] GaitTraining x     [x] ES (un) (89638):   [] Home Management Training [] ES(attended) (85685)             [] Aquatics    [] Group  [] Other    GOALS:  Patient stated goal: decrease pain to return to all ADL's including cooking and brushing her teeth without issues. []? Progressing: []? Met: []? Not Met: []? Adjusted     Therapist goals for Patient:   Short Term Goals: To be achieved in: 2 weeks  1. Independent in HEP and progression per patient tolerance, in order to prevent re-injury. []? Progressing: []? Met: []? Not Met: []? Adjusted  2. Patient will have a decrease in pain to facilitate improvement in movement, function, and ADLs as indicated by Functional Deficits. []? Progressing: []? Met: []? Not Met: []? Adjusted     Long Term Goals: To be achieved in: 4 weeks  1. Disability index score of 20% or less for the MIRIAM to assist with reaching prior level of function. []? Progressing: []? Met: []? Not Met: []? Adjusted  2. Patient will demonstrate increased AROM to WNL, good LS mobility, good hip ROM to allow for proper joint functioning as indicated by patients Functional Deficits. []? Progressing: []? Met: []? Not Met: []? Adjusted  3. Patient will demonstrate an increase in Strength to good proximal hip and core activation to allow for proper functional mobility as indicated by patients Functional Deficits. []? Progressing: []? Met: []? Not Met: []? Adjusted  4. Patient will return to functional activities including standing for ADL's without increased symptoms or restriction. []? Progressing: []? Met: []? Not Met: []? Adjusted  5. Patient will be able to return to community navigation with no restrictions of low back pain. []? Progressing: []? Met: []? Not Met: []? Adjusted       Overall Progression Towards Functional goals/ Treatment Progress Update:  [] Patient is progressing as expected towards functional goals listed. [] Progression is slowed due to complexities/Impairments listed. [] Progression has been slowed due to co-morbidities.   [x] Plan just implemented, too soon to assess goals progression <30days   [] Goals require adjustment due to lack of progress  [] Patient is not progressing as expected and requires additional follow up with physician  [] Other    Persisting Functional Limitations/Impairments:  []Sitting []Standing   []Walking []Squatting/bending    []Stairs []ADL's    []Transfers []Reaching  []Housework []Job related tasks  []Driving []Sports/Recreation   []Sleeping []Other:    ASSESSMENT:  Fair toleration with interventions today, significant difficulty with transitional movements due to pain and reports of back feeling it was \"pinching\" and was going to \"get stuck\" if she moved too fast. Pt needing biofeedback unit for hooklying interventions as pt continues with difficulty with spine mobility and muscle activation in which pt did really well with response and by end of session was being much better at engaging in specific and narrow, reproducible range. Treatment/Activity Tolerance:  [x] Patient able to complete tx  [] Patient limited by fatique  [] Patient limited by pain  [] Patient limited by other medical complications  [] Other:     Prognosis: [x] Good [] Fair  [] Poor    Patient Requires Follow-up: [x] Yes  [] No    PLAN: See eval. PT 2x / week for 4 weeks. [x] Continue per plan of care [] Alter current plan (see comments)  [] Plan of care initiated [] Hold pending MD visit [] Discharge    Electronically signed by: Toribio Navarro, PT PT, DPT      Note: If patient does not return for scheduled/ recommended follow up visits, this note will serve as a discharge from care along with most recent update on progress.

## 2021-09-01 ENCOUNTER — HOSPITAL ENCOUNTER (OUTPATIENT)
Dept: PHYSICAL THERAPY | Age: 71
Setting detail: THERAPIES SERIES
Discharge: HOME | End: 2021-09-01
Payer: COMMERCIAL

## 2021-09-01 PROCEDURE — 97110 THERAPEUTIC EXERCISES: CPT

## 2021-09-01 PROCEDURE — 97530 THERAPEUTIC ACTIVITIES: CPT

## 2021-09-01 PROCEDURE — 97112 NEUROMUSCULAR REEDUCATION: CPT

## 2021-09-01 NOTE — FLOWSHEET NOTE
[facility]       Physical Therapy Daily Treatment Note  Date:  2021    Patient Name:  Sondra Tavarez    :  1950  MRN: [id number]  Restrictions/Precautions:    Medical/Treatment Diagnosis Information:  · Diagnosis: M17.12 (ICD-10-CM) - Osteoarthritis of left knee, unspecified osteoarthritis type  s/p Left TKR  DOS: 21  · Treatment Diagnosis: increased pain; decreased ROM; decreased strength; gait abnormality; increased swelling  Insurance/Certification information:  PT Insurance Information: Sandra Anguiano 8  Physician Information:  Referring Practitioner: Annie Schwab MD  Has the plan of care been signed (Y/N):        [x]  Yes-  []  No     Date of Patient follow up with Physician:       Is this a Progress Report:     []  Yes  [x]  No        If Yes:  Date Range for reporting period:  Beginning 21  Ending     Progress report will be due (10 Rx or 30 days whichever is less):       Recertification will be due (POC Duration  / 90 days whichever is less): 21    Visit # Insurance Allowable Auth Required   15 [place] []  Yes [x]  No        Functional Scale:        Date assessed:          LEFS: raw score=35; dysfunction =56%    21     Latex Allergy:  [x]NO      []YES  Preferred Language for Healthcare:   [x]English       []other:    Pain level:  0/10     SUBJECTIVE:   Pt reports her knee continues to do ok, progressing with strength and activity tolerance. Still does have intermittent pain in the back of her knee with bending it fully and occasionally in the front if doing too much, but overall feeling better. OBJECTIVE:    Observation: : avoids bending/weighting leg during sit to stand. Tends to use RW like a  SW. Needs cues to bend knee during swing phase. Pt tends to manually assist LLE onto table, stepper, car.        Test measurements:     8/10: AROM 0-118 degrees with encouragement    AROM 0-109 , PROM 0 -115   7/27: AROM knee flexion 112 degrees, 118 after heel slides    7/7/ 21 left knee incision healing well and has been using Vit E to massage.  Gait mildly antalgic  Left 0-120 ERMI   7/2/21   Left knee ROM -5/ 0 after stretching   6/29: L knee 108 flexion PROM   6/23: AROM L knee (supine): QS lacks 4, SAQ lacks 7; knee flexion 90     RESTRICTIONS/PRECAUTIONS:  Left TKR, WBAT    Exercises/Interventions:   Exercise/Equipment  TE x  Resistance Sets/time Repetitions Other comments    6/23 added   BIKE     4' Full revolutions 6/29   added 7/7; 8/10 held busy   Stretching     EOB HSS  6/23, 7/29: held due to abd wound   Lunge knee flexion stretch on step  6/23   Standing HSS on step  20\" holds 2    Inclined Calf  Heel raise  20\"  2 2x  10    Heel slides in supine                    Bridge   Added 7/7          Strengthening       SLR Supine     SLR Abduction  SL  clams 6/23 could only do one set due to spasm in thigh   SLR Prone       SLR+ quad set 7/29 : slight quad lag at end of reps          Quad sets            LAQ 3#  7/7   SAQ 3#    HS curls  Standing HSC    Calf raises  6/23   Mini squats   6/29   Hip abd in standing     Hip ext in standing     Leg press  DL  SL    Quantum :  Knee ext  HSC   15#  20#   3  3   10  10    Knee ext eccentric lowering 10# 1 10                  Total gym squats  2 10    Total gym SL squat on L  2 10    Total gym mini squat and hold with 3 point touch R moving  1 10                          ROM       Sheet Pulls       Hang Weights       Passive       Weight Shift       Ankle Pumps       ERMI                    NMR/ CKC              TRX squats  2 10    Light finger tips on bar mini squat and hold with R LE 3 point touch  2 5 R    Standing 3 way SLR on airex  Held 8/17 due to pain          SLB airex 5'' 5 Fingertips on R UE   CC TKE Added 7/2    Tandem stance       Light UE A   Forward step up to SLS     FSU 6\" up & up and over  Added 7/2    Added 7/7   Therapeutic Activity Fwd step up   8 inch    1   10    Fwd step down    Lat step up 4 inch 1 15    Lat dip/heel tap 4 inch 1 10 L    Total gym squats       Sit to stand       Resisted walking - Retro/forward with sport cord   Loop with harness  x2 sport cords    Side stepping with band No resistance  Lime at knees 15' 1  1                  Manual interventions x         Patellar glides Scar mobs PROM STM                 Patient Education:  6/23: instructed pt to try to avoid manually helping her LLE into bed/car etc.  Since she is able to do a SLR without assistance, she should try to actively lift LLE. Walked with patient to work on flexing L knee during swing phase and pushing RW instead of picking it up like a     Home Exercise program  Access Code: [email or web address]  URL: [employer]/   Date: 06/16/2021  Prepared by: Lizett Vincent  Exercises  Long Sitting Calf Stretch with Strap - 2 x daily - 7 x weekly - 5 reps - 30 seconds hold  Seated Table Hamstring Stretch - 2 x daily - 7 x weekly - 5 reps - 30 seconds hold  Long Sitting Quad Set - 2 x daily - 7 x weekly - 10 reps - 10 seconds hold  Straight Leg Raise - 2 x daily - 7 x weekly - 3 sets - 10 reps  Sidelying Hip Abduction - 2 x daily - 7 x weekly - 3 sets - 10 reps  Supine Hip Adduction Isometric with Ball - 2 x daily - 7 x weekly - 10 reps - 10 sec hold  Sitting Heel Slide with Towel - 2 x daily - 7 x weekly - 10 reps - 10seconds hold  Seated Knee Flexion AAROM - 2 x daily - 7 x weekly - 10 reps - 10 sec hold    Updated on 7/21:   Access Code: [email or web address]  URL: [employer]/  Date: 07/21/2021  Prepared by: Miguel Angel Young    Exercises  Active Straight Leg Raise with Quad Set - 2 x daily - 7 x weekly - 2 sets - 10 reps  Supine Knee Extension Strengthening - 2 x daily - 7 x weekly - 2 sets - 10 reps  Seated Long Arc Quad - 2 x daily - 7 x weekly - 2 sets - 10 reps  Standing March with Counter Support - 2 x daily - 7 x weekly - 2 sets - 10 reps        Therapeutic Exercise and NMR EXR  [x] (03192) Provided verbal/tactile cueing for activities related to strengthening, flexibility, endurance, ROM for improvements in LE, proximal hip, and core control with self care, mobility, lifting, ambulation.  [] (30342) Provided verbal/tactile cueing for activities related to improving balance, coordination, kinesthetic sense, posture, motor skill, proprioception  to assist with LE, proximal hip, and core control in self care, mobility, lifting, ambulation and eccentric single leg control.      NMR and Therapeutic Activities:    [] (05757 or 21281) Provided verbal/tactile cueing for activities related to improving balance, coordination, kinesthetic sense, posture, motor skill, proprioception and motor activation to allow for proper function of core, proximal hip and LE with self care and ADLs  [] (55936) Gait Re-education- Provided training and instruction to the patient for proper LE, core and proximal hip recruitment and positioning and eccentric body weight control with ambulation re-education including up and down stairs     Home Exercise Program:    [] (90733) Reviewed/Progressed HEP activities related to strengthening, flexibility, endurance, ROM of core, proximal hip and LE for functional self-care, mobility, lifting and ambulation/stair navigation   [] (06173)Reviewed/Progressed HEP activities related to improving balance, coordination, kinesthetic sense, posture, motor skill, proprioception of core, proximal hip and LE for self care, mobility, lifting, and ambulation/stair navigation      Manual Treatments:  PROM / STM / Oscillations-Mobs:  G-I, II, III, IV (PA's, Inf., Post.)  [x] (33420) Provided manual therapy to mobilize LE, proximal hip and/or LS spine soft tissue/joints for the purpose of modulating pain, promoting relaxation,  increasing ROM, reducing/eliminating soft tissue swelling/inflammation/restriction, improving soft tissue extensibility and allowing for proper ROM for normal function with self care, mobility, lifting and ambulation. Modalities:   8/19, 8/17: CP to L knee post session x 10 mins in long sitting post session. 7/7/21  10'  6/16/21 vasopneumatic compression x 15 min   6/23: pt to use her cold machine when she gets home    Charges:  Timed Code Treatment Minutes: 42   Total Treatment Minutes: 42       [] EVAL (LOW) 32543 (typically 20 minutes face-to-face)  [] EVAL (MOD) 84490 (typically 30 minutes face-to-face)  [] EVAL (HIGH) 19262 (typically 45 minutes face-to-face)  [] RE-EVAL   [x] HF(48403) x 1   [] IONTO  [x] NMR (27795) x  1   [] VASO   [] Manual (76864) x     [] Other:  [x] TA x 1     [] Mech Traction (24212)  [] ES(attended) (89454)      [] ES (un) (40089):        GOALS:   Patient stated goal: walking. [] Progressing: [] Met: [] Not Met: [] Adjusted     Therapist goals for Patient:   Short Term Goals: To be achieved in: 2 weeks  1. Independent in HEP and progression per patient tolerance, in order to prevent re-injury. [] Progressing: [] Met: [] Not Met: [] Adjusted   2. Patient will have a decrease in pain to facilitate improvement in movement, function, and ADLs as indicated by Functional Deficits. [] Progressing: [] Met: [] Not Met: [] Adjusted     Long Term Goals: To be achieved in: 12 weeks  1. Disability index score of 20% or less for the LEFS to assist with reaching prior level of function. [] Progressing: [] Met: [] Not Met: [] Adjusted  2. Patient will demonstrate increased AROM to [facility] to allow for proper joint functioning as indicated by patients Functional Deficits. [] Progressing: [] Met: [] Not Met: [] Adjusted  3. Patient will demonstrate an increase in Strength to good proximal hip strength and control, within 5lb HHD in LE to allow for proper functional mobility as indicated by patients Functional Deficits. [] Progressing: [] Met: [] Not Met: [] Adjusted  4.  Patient will return to ADL and

## 2021-09-02 ENCOUNTER — HOSPITAL ENCOUNTER (OUTPATIENT)
Dept: PHYSICAL THERAPY | Age: 71
Setting detail: THERAPIES SERIES
Discharge: HOME | End: 2021-09-02
Payer: COMMERCIAL

## 2021-09-02 PROCEDURE — 97140 MANUAL THERAPY 1/> REGIONS: CPT

## 2021-09-02 PROCEDURE — G0283 ELEC STIM OTHER THAN WOUND: HCPCS

## 2021-09-02 PROCEDURE — 97112 NEUROMUSCULAR REEDUCATION: CPT

## 2021-09-02 NOTE — FLOWSHEET NOTE
[address] [facility]  Phone: [phone]   Fax: [phone]    Physical Therapy Treatment Note/ Progress Report:     Date:  2021    Patient Name:  James Mcdaniel    :  1950  MRN: [id number]  Restrictions/Precautions:    Medical/Treatment Diagnosis Information:  Diagnosis: M54.42, M54.41, G89.29 (ICD-10-CM) - Chronic bilateral low back pain with bilateral sciatica  Treatment Diagnosis: Decreased lumbar ROM, decreased LE and hip strength, flexiblity, neuromuscular control of LE as well as abdominal and lumbar musculature interfering with normalized lumbar mechanics and muscle activation. Insurance/Certification information:  PT Insurance Information: Sandra Anguiano 8  Physician Information:  Referring Practitioner: Merlene Ambrosio MD  Plan of care signed (Y/N): []  Yes [x]  No    Date of Patient follow up with Physician:      Progress Report: []  Yes  [x]  No     Date Range for reporting period:  Beginnin/27  Ending: -    Progress report due (10 Rx/or 30 days whichever is less): visit #10 or  (date)     Recertification due (POC duration/ or 90 days whichever is less): visit # or  (date)     Visit # Insurance Allowable Auth required? Date Range   3 PMN $40 copay []  Yes  [x]  No -       Units approved Units used Date Range   - - -     Latex Allergy:  [x]NO      []YES  Preferred Language for Healthcare:   [x]English       []other:    Functional Scale:        Date assessed:  MIRIAM: raw score = 20 ; dysfunction = 40%        Pain level:  4/10     SUBJECTIVE:  Pt reports continues to do about the same, if she is still and quite having a little pain but not a ton, movement of any kind causing most issues and really does not matter what she is doing it all hurts. Pain continues to be more focused on R low back but also hurts a lot on the L as well.       OBJECTIVE:    Observation:    Test measurements:      RESTRICTIONS/PRECAUTIONS: L TKA    Exercises/Interventions: Therapeutic Exercise (63899) Resistance / level Sets / Seconds Reps Notes / Cues                                                                  Therapeutic Activities (96641)                                          Neuromuscular Re-ed (90933)       Biofeed back cuff:   -PVT  -PVT with BKFO  -PVT with march 2 5 each  Starting at 20 (red) no more than blue   Seated on SB:   -side glides  -circles (CW/CCW)  -  1 10 each     Bridge with glute squeeze  1 8                  Manual Intervention (74218)       Prone lumbar PA mobilizations central and B PA    L sidelying MET to address extension restriction on R    LAD on R in closed pack position Grade 3-4  4 mins    Ball/belt traction  30\" holds 8                                         Modalities:   9/2, 8/31: IFC with MHP in supine with bolster under B LE x 10 mins post session with button     Pt. Education:  -pt educated on diagnosis, prognosis and expectations for rehab  -all pt questions were answered    Home Exercise Program:  Access Code: [email or web address]  URL: [email or web address]  Date: 08/27/2021  Prepared by: Marky Gabriel    Exercises  Supine Lower Trunk Rotation - 2 x daily - 7 x weekly - 4 reps - 10 hold  Supine Piriformis Stretch with Foot on Ground - 2 x daily - 7 x weekly - 4 reps - 20 hold  Supine Posterior Pelvic Tilt - 2 x daily - 7 x weekly - 20 reps      Therapeutic Exercise and NMR EXR  [x] (47009) Provided verbal/tactile cueing for activities related to strengthening, flexibility, endurance, ROM  for improvements in proximal hip and core control with self care, mobility, lifting and ambulation.  [] (18595) Provided verbal/tactile cueing for activities related to improving balance, coordination, kinesthetic sense, posture, motor skill, proprioception  to assist with core control in self care, mobility, lifting, and ambulation.   [] (56843) Therapist is in constant attendance of 2 or more patients providing skilled therapy interventions, but not providing any significant amount of measurable one-on-one time to either patient, for improvements in LE, proximal hip, and core control in self care, mobility, lifting, ambulation and eccentric single leg control. Therapeutic Activities:    [] (90033 or 47683) Provided verbal/tactile cueing for activities related to improving balance, coordination, kinesthetic sense, posture, motor skill, proprioception and motor activation to allow for proper function  with self care and ADLs  [] (46096) Provided training and instruction to the patient for proper core and proximal hip recruitment and positioning with ambulation re-education     Home Exercise Program:    [x] (20075) Reviewed/Progressed HEP activities related to strengthening, flexibility, endurance, ROM of core, proximal hip and LE for functional self-care, mobility, lifting and ambulation   [] (91522) Reviewed/Progressed HEP activities related to improving balance, coordination, kinesthetic sense, posture, motor skill, proprioception of core, proximal hip and LE for self care, mobility, lifting, and ambulation      Manual Treatments:  PROM / STM / Oscillations-Mobs:  G-I, II, III, IV (PA's, Inf., Post.)  [] (79984) Provided manual therapy to mobilize proximal hip and LS spine soft tissue/joints for the purpose of modulating pain, promoting relaxation,  increasing ROM, reducing/eliminating soft tissue swelling/inflammation/restriction, improving soft tissue extensibility and allowing for proper ROM for normal function with self care, mobility, lifting and ambulation.        Charges:  Timed Code Treatment Minutes: 45   Total Treatment Minutes: 55       [] EVAL - LOW (55126)   [] EVAL - MOD (91139)  [] EVAL - HIGH (52596)  [] RE-EVAL (09163)  [] DY(49970) x      [] Ionto  [x] NMR (05687) x  2    [] Vaso  [x] Manual (21040) x 1     [] Ultrasound  [] TA x       [] Mech Traction (69127)  [] GaitTraining x     [x] ES (un) (09583):   [] Home Management Training [] ES(attended) (32459)             [] Aquatics    [] Group  [] Other    GOALS:  Patient stated goal: decrease pain to return to all ADL's including cooking and brushing her teeth without issues. []? Progressing: []? Met: []? Not Met: []? Adjusted     Therapist goals for Patient:   Short Term Goals: To be achieved in: 2 weeks  1. Independent in HEP and progression per patient tolerance, in order to prevent re-injury. []? Progressing: []? Met: []? Not Met: []? Adjusted  2. Patient will have a decrease in pain to facilitate improvement in movement, function, and ADLs as indicated by Functional Deficits. []? Progressing: []? Met: []? Not Met: []? Adjusted     Long Term Goals: To be achieved in: 4 weeks  1. Disability index score of 20% or less for the MIRIAM to assist with reaching prior level of function. []? Progressing: []? Met: []? Not Met: []? Adjusted  2. Patient will demonstrate increased AROM to WNL, good LS mobility, good hip ROM to allow for proper joint functioning as indicated by patients Functional Deficits. []? Progressing: []? Met: []? Not Met: []? Adjusted  3. Patient will demonstrate an increase in Strength to good proximal hip and core activation to allow for proper functional mobility as indicated by patients Functional Deficits. []? Progressing: []? Met: []? Not Met: []? Adjusted  4. Patient will return to functional activities including standing for ADL's without increased symptoms or restriction. []? Progressing: []? Met: []? Not Met: []? Adjusted  5. Patient will be able to return to community navigation with no restrictions of low back pain. []? Progressing: []? Met: []? Not Met: []? Adjusted       Overall Progression Towards Functional goals/ Treatment Progress Update:  [] Patient is progressing as expected towards functional goals listed. [] Progression is slowed due to complexities/Impairments listed. [] Progression has been slowed due to co-morbidities.   [x] Plan just implemented, too soon to assess goals progression <30days   [] Goals require adjustment due to lack of progress  [] Patient is not progressing as expected and requires additional follow up with physician  [] Other    Persisting Functional Limitations/Impairments:  []Sitting []Standing   []Walking []Squatting/bending    []Stairs []ADL's    []Transfers []Reaching  []Housework []Job related tasks  []Driving []Sports/Recreation   []Sleeping []Other:    ASSESSMENT:  Pt with really good response today with long axis traction and ball/belt traction with significant pain reduction and improved mobility. Strength activity as well tolerated without issues other than fatigue and general apprehension with movement but no pain. Pt improved today with efficiency and consistency with abdominal activation and positioning with biofeedback unit. Will continue to benefit from Pt to work on progressing abdominal strength activity tolerance and control     Treatment/Activity Tolerance:  [x] Patient able to complete tx  [] Patient limited by fatique  [] Patient limited by pain  [] Patient limited by other medical complications  [] Other:     Prognosis: [x] Good [] Fair  [] Poor    Patient Requires Follow-up: [x] Yes  [] No    PLAN: See eval. PT 2x / week for 4 weeks. [x] Continue per plan of care [] Alter current plan (see comments)  [] Plan of care initiated [] Hold pending MD visit [] Discharge    Electronically signed by: Prerna Martinez PT PT, DPT      Note: If patient does not return for scheduled/ recommended follow up visits, this note will serve as a discharge from care along with most recent update on progress.

## 2021-09-07 RX ORDER — OMEPRAZOLE 40 MG/1
CAPSULE, DELAYED RELEASE ORAL
Qty: 30 CAPSULE | Refills: 5 | Status: SHIPPED | OUTPATIENT
Start: 2021-09-07 | End: 2022-03-08

## 2021-09-09 ENCOUNTER — HOSPITAL ENCOUNTER (OUTPATIENT)
Dept: PHYSICAL THERAPY | Age: 71
Setting detail: THERAPIES SERIES
Discharge: HOME | End: 2021-09-09
Payer: COMMERCIAL

## 2021-09-09 DIAGNOSIS — F51.04 PSYCHOPHYSIOLOGICAL INSOMNIA: ICD-10-CM

## 2021-09-09 PROCEDURE — 97112 NEUROMUSCULAR REEDUCATION: CPT

## 2021-09-09 PROCEDURE — 97140 MANUAL THERAPY 1/> REGIONS: CPT

## 2021-09-09 RX ORDER — TRAZODONE HYDROCHLORIDE 50 MG/1
TABLET ORAL
Qty: 270 TABLET | Refills: 1 | Status: SHIPPED | OUTPATIENT
Start: 2021-09-09 | End: 2022-02-24

## 2021-09-09 RX ORDER — OMEPRAZOLE 40 MG/1
CAPSULE, DELAYED RELEASE ORAL
Qty: 90 CAPSULE | Refills: 0 | OUTPATIENT
Start: 2021-09-09

## 2021-09-09 NOTE — FLOWSHEET NOTE
[address] [facility]  Phone: [phone]   Fax: [phone]    Physical Therapy Treatment Note/ Progress Report:     Date:  2021    Patient Name:  Robin Bruce    :  1950  MRN: [id number]  Restrictions/Precautions:    Medical/Treatment Diagnosis Information:  Diagnosis: M54.42, M54.41, G89.29 (ICD-10-CM) - Chronic bilateral low back pain with bilateral sciatica  Treatment Diagnosis: Decreased lumbar ROM, decreased LE and hip strength, flexiblity, neuromuscular control of LE as well as abdominal and lumbar musculature interfering with normalized lumbar mechanics and muscle activation. Insurance/Certification information:  PT Insurance Information: Sandra Anguiano 8  Physician Information:  Referring Practitioner: Tomy Glasgow MD  Plan of care signed (Y/N): []  Yes [x]  No    Date of Patient follow up with Physician:      Progress Report: []  Yes  [x]  No     Date Range for reporting period:  Beginnin/27  Ending: -    Progress report due (10 Rx/or 30 days whichever is less): visit #10 or  (date)     Recertification due (POC duration/ or 90 days whichever is less): visit #8 or  (date)     Visit # Insurance Allowable Auth required? Date Range   4 PMN $40 copay []  Yes  [x]  No -       Units approved Units used Date Range   - - -     Latex Allergy:  [x]NO      []YES  Preferred Language for Healthcare:   [x]English       []other:    Functional Scale:        Date assessed:  MIRIAM: raw score = 20 ; dysfunction = 40%        Pain level:  210     SUBJECTIVE:  Pt reports since last visit she has been doing much better, pain is more infrequent and less intense when she does experience the pain. Still at night after a long day has difficulty with pain in sitting and standing as well as with N/T down L LE into foot but usually only lasts for 3-5 minutes and then is gone.      OBJECTIVE:    Observation:    Test measurements:      RESTRICTIONS/PRECAUTIONS: L TKA    Exercises/Interventions:     Therapeutic Exercise (17868) Resistance / level Sets / Seconds Reps Notes / Cues                                                                  Therapeutic Activities (90671)       Side stepping with band on knees lime 10' 1 length                                 Neuromuscular Re-ed (87410)       Biofeed back cuff:   -PVT  -PVT with BKFO  -PVT with march  2 5 each  Starting at 20 (red) no more than blue   Seated on SB:   -side glides  -circles (CW/CCW)  -march  -Alt knee ext  -Alt UE raises   1 10 each     Bridge with glute squeeze  2 5    Suitcase carry with KB in R hand 10# 25' x1    Anti rotation press lime 1 10 R/L    B shoulder ext and hold with march Lime 1 10                                Manual Intervention (65781)       Prone lumbar PA mobilizations central and B PA    L sidelying MET to address extension restriction on R    LAD on R in closed pack position Grade 3-4  4 mins    Ball/belt traction  30\" holds 8                                         Modalities:   9/2, 8/31: IFC with MHP in supine with bolster under B LE x 10 mins post session with button     Pt. Education:  -pt educated on diagnosis, prognosis and expectations for rehab  -all pt questions were answered    Home Exercise Program:  Access Code: [email or web address]  URL: [email or web address]  Date: 08/27/2021  Prepared by: Tracy Dwyer    Exercises  Supine Lower Trunk Rotation - 2 x daily - 7 x weekly - 4 reps - 10 hold  Supine Piriformis Stretch with Foot on Ground - 2 x daily - 7 x weekly - 4 reps - 20 hold  Supine Posterior Pelvic Tilt - 2 x daily - 7 x weekly - 20 reps      Therapeutic Exercise and NMR EXR  [x] (51857) Provided verbal/tactile cueing for activities related to strengthening, flexibility, endurance, ROM  for improvements in proximal hip and core control with self care, mobility, lifting and ambulation.  [] (52765) Provided verbal/tactile cueing for activities related to improving balance, coordination, kinesthetic sense, posture, motor skill, proprioception  to assist with core control in self care, mobility, lifting, and ambulation.   [] (97486) Therapist is in constant attendance of 2 or more patients providing skilled therapy interventions, but not providing any significant amount of measurable one-on-one time to either patient, for improvements in LE, proximal hip, and core control in self care, mobility, lifting, ambulation and eccentric single leg control. Therapeutic Activities:    [] (67093 or 63107) Provided verbal/tactile cueing for activities related to improving balance, coordination, kinesthetic sense, posture, motor skill, proprioception and motor activation to allow for proper function  with self care and ADLs  [] (55894) Provided training and instruction to the patient for proper core and proximal hip recruitment and positioning with ambulation re-education     Home Exercise Program:    [x] (85966) Reviewed/Progressed HEP activities related to strengthening, flexibility, endurance, ROM of core, proximal hip and LE for functional self-care, mobility, lifting and ambulation   [] (85406) Reviewed/Progressed HEP activities related to improving balance, coordination, kinesthetic sense, posture, motor skill, proprioception of core, proximal hip and LE for self care, mobility, lifting, and ambulation      Manual Treatments:  PROM / STM / Oscillations-Mobs:  G-I, II, III, IV (PA's, Inf., Post.)  [] (27059) Provided manual therapy to mobilize proximal hip and LS spine soft tissue/joints for the purpose of modulating pain, promoting relaxation,  increasing ROM, reducing/eliminating soft tissue swelling/inflammation/restriction, improving soft tissue extensibility and allowing for proper ROM for normal function with self care, mobility, lifting and ambulation.        Charges:  Timed Code Treatment Minutes: 41   Total Treatment Minutes: 41       [] EVAL - LOW (88736)   [] EVAL - MOD (77737)  [] EVAL - HIGH (63642)  [] RE-EVAL (28977)  [] GY(59882) x      [] Ionto  [x] NMR (50160) x  2    [] Vaso  [x] Manual (53386) x 1     [] Ultrasound  [] TA x       [] Mech Traction (18691)  [] GaitTraining x     [] ES (un) (85339):   [] Home Management Training [] ES(attended) (87462)             [] Aquatics    [] Group  [] Other    GOALS:  Patient stated goal: decrease pain to return to all ADL's including cooking and brushing her teeth without issues. []? Progressing: []? Met: []? Not Met: []? Adjusted     Therapist goals for Patient:   Short Term Goals: To be achieved in: 2 weeks  1. Independent in HEP and progression per patient tolerance, in order to prevent re-injury. []? Progressing: []? Met: []? Not Met: []? Adjusted  2. Patient will have a decrease in pain to facilitate improvement in movement, function, and ADLs as indicated by Functional Deficits. []? Progressing: []? Met: []? Not Met: []? Adjusted     Long Term Goals: To be achieved in: 4 weeks  1. Disability index score of 20% or less for the MIRIAM to assist with reaching prior level of function. []? Progressing: []? Met: []? Not Met: []? Adjusted  2. Patient will demonstrate increased AROM to WNL, good LS mobility, good hip ROM to allow for proper joint functioning as indicated by patients Functional Deficits. []? Progressing: []? Met: []? Not Met: []? Adjusted  3. Patient will demonstrate an increase in Strength to good proximal hip and core activation to allow for proper functional mobility as indicated by patients Functional Deficits. []? Progressing: []? Met: []? Not Met: []? Adjusted  4. Patient will return to functional activities including standing for ADL's without increased symptoms or restriction. []? Progressing: []? Met: []? Not Met: []? Adjusted  5. Patient will be able to return to community navigation with no restrictions of low back pain. []? Progressing: []? Met: []? Not Met: []?  Adjusted       Overall Progression Towards Functional goals/ Treatment Progress Update:  [] Patient is progressing as expected towards functional goals listed. [] Progression is slowed due to complexities/Impairments listed. [] Progression has been slowed due to co-morbidities. [x] Plan just implemented, too soon to assess goals progression <30days   [] Goals require adjustment due to lack of progress  [] Patient is not progressing as expected and requires additional follow up with physician  [] Other    Persisting Functional Limitations/Impairments:  []Sitting []Standing   []Walking []Squatting/bending    []Stairs []ADL's    []Transfers []Reaching  []Housework []Job related tasks  []Driving []Sports/Recreation   []Sleeping []Other:    ASSESSMENT:  Pt with really good response after last visits manual techniques with decrease in severity and frequency of pain and improve movement with good carryover into today's session. Pt was able to tolerate progressions with strength and stability activity as noted per log, pt does continue to be limited more on L posterior chain and hip engagement but slowly improving with interventions and decrease in pain. Treatment/Activity Tolerance:  [x] Patient able to complete tx  [] Patient limited by fatique  [] Patient limited by pain  [] Patient limited by other medical complications  [] Other:     Prognosis: [x] Good [] Fair  [] Poor    Patient Requires Follow-up: [x] Yes  [] No    PLAN: See eval. PT 2x / week for 4 weeks. [x] Continue per plan of care [] Alter current plan (see comments)  [] Plan of care initiated [] Hold pending MD visit [] Discharge    Electronically signed by: Walt Quintanilla, PT, DPT      Note: If patient does not return for scheduled/ recommended follow up visits, this note will serve as a discharge from care along with most recent update on progress.

## 2021-09-09 NOTE — TELEPHONE ENCOUNTER
Last appointment: 8/24/2021  Next appointment: 11/23/2021  Last refill:  Trazadone 270 with 1 02/26/2021 Subjective   Ari Us Desir.  4. [facility]    Raquel Paige LPN    Scribed for Dr. Gurpreet Verduzco by Raquel Paige 

## 2021-09-13 ENCOUNTER — OFFICE VISIT (OUTPATIENT)
Dept: ORTHOPEDIC SURGERY | Age: 71
End: 2021-09-13
Payer: COMMERCIAL

## 2021-09-13 VITALS — BODY MASS INDEX: 30.83 KG/M2 | HEIGHT: 63 IN | WEIGHT: 174 LBS

## 2021-09-13 DIAGNOSIS — Z96.652 S/P TOTAL KNEE ARTHROPLASTY, LEFT: Primary | ICD-10-CM

## 2021-09-13 PROCEDURE — 99212 OFFICE O/P EST SF 10 MIN: CPT | Performed by: ORTHOPAEDIC SURGERY

## 2021-09-13 NOTE — PROGRESS NOTES
Patient is about 3-month status post left total knee arthroplasty. She has been discharged from therapy. She feels she is doing well. ROS: Pertinent items are noted in HPI. No notes on file    Past Medical History:  No date: Abdominal wall abscess  7/8/2021: Acute pyelonephritis  No date: Age related osteoporosis  7/16/2013: Anal fissure  No date: Anxiety  No date: Colon polyps  No date: Depression  No date: E coli bacteremia  No date: Fibroid uterus  No date: Hypothyroidism  No date: Morbid obesity ([address]  1/26/2016: Osteoarthritis of left knee  No date: Osteopenia     Past Surgical History:  2008: ABDOMINAL HERNIA REPAIR  Age 27: APPENDECTOMY  10/23/2017: CATARACT REMOVAL WITH IMPLANT; Right      Comment:  with vitrectomy  02/26/2018: CATARACT REMOVAL WITH IMPLANT; Left      Comment:  with vitrectomy  10/10/2018: COLONOSCOPY; N/A      Comment:  COLONOSCOPY POLYPECTOMY SNARE/COLD BIOPSY performed by                Izzy Hathaway MD at [address] [place] [address]  8/26/2019: EPIDURAL STEROID INJECTION; Right      Comment:  RIGHT L4 AND L5 TRANSFORAMINAL EPIDURAL STEROID                INJECTION WITH FLUOROSCOPY performed by Farzad Issa MD at [address] [place] [address],[address]: GASTRIC BYPASS SURGERY  10/21/2019: HC INJECT OTHER PERPHRL NERV; Right      Comment:  RIGHT PIRIFORMIS INJECTION WITH FLUOROSCOPY (42619)                performed by Farzad Issa MD at [facility]  No date: HYSTERECTOMY, TOTAL ABDOMINAL      Comment:  ovaries out also  1/7/2019: [facility]; Right      Comment:  RIGHT L3 AND L4 LUMBAR TRANSFORAMINAL WITH FLUOROSCOPY                performed by Farzad Issa MD at [facility]  5/15/2019: [address];  Right      Comment:  RIGHT L4 AND L5 TRANSFORAMINAL EPIDURAL STEROID                INJECTION WITH FLUOROSCOPY performed by Farzad Issa MD at [facility]  10/10/2018: SC ESOPHAGOGASTRODUODENOSCOPY TRANSORAL DIAGNOSTIC; N/A      Comment:  EGD performed by Jase Lovett Comments)    --  Fever, myalgias   -- Codeine -- Nausea Only    VITAL SIGNS:  Ht 5' 2.5\" (1.588 m)   Wt 174 lb (78.9 kg)   BMI 31.32 kg/m²   On examination today the incision is very nicely healed. There is no warmth, erythema, or effusion. She has full extension but only flexes about 105 degrees. She has really no medial or lateral joint line tenderness. There is no medial or lateral retinacular tenderness. She appears to have good patellar tracking and good medial lateral stability. Impression doing well 3 months status post left total knee arthroplasty. Plan: I told her that I would like to at least 120 degrees out of this. Just with a fairly simple overpressure where she goes beyond 105 degrees so I think it is there if she continues to stretch. Told her she should continue to stretch until when she wakes up in the morning she does not the stretcher warm up to get her full motion. We will see her back in 6 weeks.

## 2021-09-14 ENCOUNTER — HOSPITAL ENCOUNTER (OUTPATIENT)
Dept: PHYSICAL THERAPY | Age: 71
Setting detail: THERAPIES SERIES
Discharge: HOME | End: 2021-09-14
Payer: COMMERCIAL

## 2021-09-14 PROCEDURE — 97140 MANUAL THERAPY 1/> REGIONS: CPT

## 2021-09-14 PROCEDURE — 97112 NEUROMUSCULAR REEDUCATION: CPT

## 2021-09-14 NOTE — FLOWSHEET NOTE
[address] Physical [facility]  Phone: [phone]   Fax: [phone]    Physical Therapy Treatment Note/ Progress Report:     Date:  2021    Patient Name:  Ulises Mccarty    :  1950  MRN: [id number]  Restrictions/Precautions:    Medical/Treatment Diagnosis Information:  Diagnosis: M54.42, M54.41, G89.29 (ICD-10-CM) - Chronic bilateral low back pain with bilateral sciatica  Treatment Diagnosis: Decreased lumbar ROM, decreased LE and hip strength, flexiblity, neuromuscular control of LE as well as abdominal and lumbar musculature interfering with normalized lumbar mechanics and muscle activation. Insurance/Certification information:  PT Insurance Information: Sandra Anguiano 8  Physician Information:  Referring Practitioner: Alessio Garcia MD  Plan of care signed (Y/N): []  Yes [x]  No    Date of Patient follow up with Physician:      Progress Report: []  Yes  [x]  No     Date Range for reporting period:  Beginnin/27  Ending: -    Progress report due (10 Rx/or 30 days whichever is less): visit #10 or  (date)     Recertification due (POC duration/ or 90 days whichever is less): visit #8 or  (date)     Visit # Insurance Allowable Auth required? Date Range   5 PMN $40 copay []  Yes  [x]  No -       Units approved Units used Date Range   - - -     Latex Allergy:  [x]NO      []YES  Preferred Language for Healthcare:   [x]English       []other:    Functional Scale:        Date assessed:  MIRIAM: raw score = 20 ; dysfunction = 40%        Pain level:  2/10     SUBJECTIVE: The patient saw MD who recommended follow up in about 6 weeks. She reports she still had pain with hip/knee flexion with overpressure at the office. She was instructed to perform her HEP in the morning when she wakes to alleviate tension/tightness. She denies having N&T down into foot, but rather, to L buttocks.     OBJECTIVE:    Observation:  - able to contract tvAbd with several activities this date without pain   Test measurements:      RESTRICTIONS/PRECAUTIONS: L TKA    Exercises/Interventions:     Therapeutic Exercise (72714) Resistance / level Sets / Seconds Reps Notes / Cues                                                                  Therapeutic Activities (37670)       Side stepping with band on knees lime 10' 1 length                                 Neuromuscular Re-ed (68126)       Biofeed back cuff:   -PVT  -PVT with BKFO  -PVT with march  2 5 each  Starting at 20 (red) no more than blue, except for BKFO (to 30); patient able to fully PPT from red to 60 w/ max effort   Seated on SB:   -side glides  -circles (CW/CCW)  -A/P pelvic tilts  -alt marches  -Alt knee ext  -Alt UE raises   1 10 each  Grey ball; added A/P pelvic tilts   Bridge with glute squeeze  1 / 3'' 10 9/14 - improved form with addition of tvAbd tightening   Suitcase carry with KB in R hand 10# 25' x1    Anti rotation press lime 1 / 3'' 10 R/L 9/14 - added hold   B shoulder ext and hold with march Lime 1 10 9/14 - NP this date                               Manual Intervention (41386)       Prone lumbar PA mobilizations central and B PA    L sidelying MET to address extension restriction on R    LAD on R in closed pack position Grade 3-4 8'' H 10x     Ball/belt traction  20\" holds 8'                                         Modalities:   9/2, 8/31: IFC with MHP in supine with bolster under B LE x 10 mins post session with button     Pt. Education:  -pt educated on diagnosis, prognosis and expectations for rehab  -all pt questions were answered    Home Exercise Program:  Access Code: [email or web address]  URL: [email or web address]  Date: 08/27/2021  Prepared by: Gabriella Manley    Exercises  Supine Lower Trunk Rotation - 2 x daily - 7 x weekly - 4 reps - 10 hold  Supine Piriformis Stretch with Foot on Ground - 2 x daily - 7 x weekly - 4 reps - 20 hold  Supine Posterior Pelvic Tilt - 2 x daily - 7 x weekly - 20 reps      Therapeutic Exercise and NMR EXR  [x] (08931) Provided verbal/tactile cueing for activities related to strengthening, flexibility, endurance, ROM  for improvements in proximal hip and core control with self care, mobility, lifting and ambulation. [x] (79186) Provided verbal/tactile cueing for activities related to improving balance, coordination, kinesthetic sense, posture, motor skill, proprioception  to assist with core control in self care, mobility, lifting, and ambulation.   [] (52811) Therapist is in constant attendance of 2 or more patients providing skilled therapy interventions, but not providing any significant amount of measurable one-on-one time to either patient, for improvements in LE, proximal hip, and core control in self care, mobility, lifting, ambulation and eccentric single leg control.      Therapeutic Activities:    [x] (79646 or 31906) Provided verbal/tactile cueing for activities related to improving balance, coordination, kinesthetic sense, posture, motor skill, proprioception and motor activation to allow for proper function  with self care and ADLs  [] (56267) Provided training and instruction to the patient for proper core and proximal hip recruitment and positioning with ambulation re-education     Home Exercise Program:    [] (76600) Reviewed/Progressed HEP activities related to strengthening, flexibility, endurance, ROM of core, proximal hip and LE for functional self-care, mobility, lifting and ambulation   [] (99404) Reviewed/Progressed HEP activities related to improving balance, coordination, kinesthetic sense, posture, motor skill, proprioception of core, proximal hip and LE for self care, mobility, lifting, and ambulation      Manual Treatments:  PROM / STM / Oscillations-Mobs:  G-I, II, III, IV (PA's, Inf., Post.)  [x] (51744) Provided manual therapy to mobilize proximal hip and LS spine soft tissue/joints for the purpose of modulating pain, promoting relaxation,  increasing ROM, reducing/eliminating soft tissue swelling/inflammation/restriction, improving soft tissue extensibility and allowing for proper ROM for normal function with self care, mobility, lifting and ambulation. Charges:  Timed Code Treatment Minutes: 50   Total Treatment Minutes: 50     [] EVAL - LOW (93954)   [] EVAL - MOD (01675)  [] EVAL - HIGH (96911)  [] RE-EVAL (52230)  [] HF(99982) x      [] Ionto  [x] NMR (50128) x  2    [] Vaso  [x] Manual (03422) x 1     [] Ultrasound  [] TA x       [] Mech Traction (54896)  [] GaitTraining x     [] ES (un) (69770):   [] Home Management Training [] ES(attended) (46252)             [] Aquatics    [] Group  [] Other    GOALS:  Patient stated goal: decrease pain to return to all ADL's including cooking and brushing her teeth without issues. [x]? Progressing: []? Met: []? Not Met: []? Adjusted     Therapist goals for Patient:   Short Term Goals: To be achieved in: 2 weeks  1. Independent in HEP and progression per patient tolerance, in order to prevent re-injury. [x]? Progressing: []? Met: []? Not Met: []? Adjusted  2. Patient will have a decrease in pain to facilitate improvement in movement, function, and ADLs as indicated by Functional Deficits. [x]? Progressing: []? Met: []? Not Met: []? Adjusted     Long Term Goals: To be achieved in: 4 weeks  1. Disability index score of 20% or less for the MIRIAM to assist with reaching prior level of function. [x]? Progressing: []? Met: []? Not Met: []? Adjusted  2. Patient will demonstrate increased AROM to WNL, good LS mobility, good hip ROM to allow for proper joint functioning as indicated by patients Functional Deficits. [x]? Progressing: []? Met: []? Not Met: []? Adjusted  3. Patient will demonstrate an increase in Strength to good proximal hip and core activation to allow for proper functional mobility as indicated by patients Functional Deficits. [x]? Progressing: []? Met: []? Not Met: []? Adjusted  4.  Patient will return to functional activities including standing for ADL's without increased symptoms or restriction. [x]? Progressing: []? Met: []? Not Met: []? Adjusted  5. Patient will be able to return to community navigation with no restrictions of low back pain. [x]? Progressing: []? Met: []? Not Met: []? Adjusted       Overall Progression Towards Functional goals/ Treatment Progress Update:  [] Patient is progressing as expected towards functional goals listed. [] Progression is slowed due to complexities/Impairments listed. [] Progression has been slowed due to co-morbidities. [x] Plan just implemented, too soon to assess goals progression <30days   [] Goals require adjustment due to lack of progress  [] Patient is not progressing as expected and requires additional follow up with physician  [] Other    Persisting Functional Limitations/Impairments:  []Sitting []Standing   []Walking []Squatting/bending    []Stairs []ADL's    []Transfers []Reaching  []Housework []Job related tasks  []Driving []Sports/Recreation   []Sleeping []Other:    ASSESSMENT:  Pt with really good response after last visits manual techniques with decrease in severity and frequency of pain and improve movement with good carryover into today's session. The patient was able to contract tvAbd, perform PPTs and activate gluteals with improved control, speed and form. The patient's exercises were slightly progressed to add layer of muscle activation, and did so without symptoms. The patient was most challenged by KB carry due to imbalance of muscle strength and core control. Treatment/Activity Tolerance:  [x] Patient able to complete tx  [] Patient limited by fatique  [] Patient limited by pain  [] Patient limited by other medical complications  [] Other:     Prognosis: [x] Good [] Fair  [] Poor    Patient Requires Follow-up: [x] Yes  [] No    PLAN: See eval. PT 2x / week for 4 weeks.    [x] Continue per plan of care [] Alter current plan (see comments)  [] Plan of care initiated [] Hold pending MD visit [] Discharge    Electronically signed by: Brooks Rose PT, FEDET, JASVIRC      Note: If patient does not return for scheduled/ recommended follow up visits, this note will serve as a discharge from care along with most recent update on progress.

## 2021-09-16 ENCOUNTER — HOSPITAL ENCOUNTER (OUTPATIENT)
Dept: PHYSICAL THERAPY | Age: 71
Setting detail: THERAPIES SERIES
Discharge: HOME | End: 2021-09-16
Payer: COMMERCIAL

## 2021-09-16 PROCEDURE — 97140 MANUAL THERAPY 1/> REGIONS: CPT

## 2021-09-16 PROCEDURE — 97112 NEUROMUSCULAR REEDUCATION: CPT

## 2021-09-16 NOTE — FLOWSHEET NOTE
[address] [facility]  Phone: [phone]   Fax: [phone]    Physical Therapy Treatment Note/ Progress Report:     Date:  2021    Patient Name:  Leela Nathan    :  1950  MRN: [id number]  Restrictions/Precautions:    Medical/Treatment Diagnosis Information:  Diagnosis: M54.42, M54.41, G89.29 (ICD-10-CM) - Chronic bilateral low back pain with bilateral sciatica  Treatment Diagnosis: Decreased lumbar ROM, decreased LE and hip strength, flexiblity, neuromuscular control of LE as well as abdominal and lumbar musculature interfering with normalized lumbar mechanics and muscle activation. Insurance/Certification information:  PT Insurance Information: Sandra Anguiano 8  Physician Information:  Referring Practitioner: Lana Bird MD  Plan of care signed (Y/N): []  Yes [x]  No    Date of Patient follow up with Physician:      Progress Report: []  Yes  [x]  No     Date Range for reporting period:  Beginnin/27  Ending: -    Progress report due (10 Rx/or 30 days whichever is less): visit #10 or  (date)     Recertification due (POC duration/ or 90 days whichever is less): visit #8 or  (date)     Visit # Insurance Allowable Auth required? Date Range   6 PMN $40 copay []  Yes  [x]  No -       Units approved Units used Date Range   - - -     Latex Allergy:  [x]NO      []YES  Preferred Language for Healthcare:   [x]English       []other:    Functional Scale:        Date assessed:  MIRIAM: raw score = 20 ; dysfunction = 40%        Pain level:  2/10     SUBJECTIVE: The patient states she had a \"good day\" yesterday. Today she has pain across her lower back and L buttock.     OBJECTIVE:    Observation:  - able to contract tvAbd with several activities this date without pain   Test measurements:      RESTRICTIONS/PRECAUTIONS: L TKA    Exercises/Interventions:     Therapeutic Exercise (39670) Resistance / level Sets / Seconds Reps Notes / Cues   Quad stretch off EOB 20\" 2 HEP                                                           Therapeutic Activities (61321)       Side stepping with band on knees lime 10' 1 length                                 Neuromuscular Re-ed (13244)       Biofeed back cuff:   -PVT  -PVT with BKFO  -PVT with march 2 5 each  Starting at 20 (red) no more than blue, except for BKFO (to 30); patient able to fully PPT from red to 60 w/ max effort   Seated on SB:   -side glides  -circles (CW/CCW)  -A/P pelvic tilts  -alt marches  -Alt knee ext  -Alt UE raises   1 10 each  Grey ball; added A/P pelvic tilts   Bridge with glute squeeze  1 / 3'' 10 9/14 - improved form with addition of tvAbd tightening   Suitcase carry with KB in R hand 10# 25' x1    Anti rotation press lime 2 / 3'' 10 R/L 9/14    B shoulder ext and hold with march Lime 1 10    Standing ALternating GH ext  Lime 1 20 9/16   B Hookline clamshell Lime 2 10 9/16; added to HEP                 Manual Intervention (83414)       Prone lumbar PA mobilizations central and B PA    L sidelying MET to address extension restriction on R MET to correct [id number], L4FRSL, LOL sacral torsions, L SI posterior innominate SI, R SI anterior innominate S   LAD onL in closed pack position Grade 3-4 8'' H 10x     Ball/belt traction  20\" holds 8'                                         Modalities:   9/2, 8/31: IFC with MHP in supine with bolster under B LE x 10 mins post session with button     Pt. Education:  -pt educated on diagnosis, prognosis and expectations for rehab  -all pt questions were answered    Home Exercise Program:  Access Code: [email or web address]  URL: [email or web address]  Date: 09/16/2021  [facility]    Exercises  Supine Quadriceps Stretch with Strap on Table - 1 x daily - 7 x weekly - 3 sets - 10 reps  Hooklying Clamshell with Resistance - 1 x daily - 7 x weekly - 3 sets - 10 reps  Access Code: [email or web address]  URL: [email or web address]  Date: 08/27/2021  Prepared by: Martha Mcleod Rangel    Exercises  Supine Lower Trunk Rotation - 2 x daily - 7 x weekly - 4 reps - 10 hold  Supine Piriformis Stretch with Foot on Ground - 2 x daily - 7 x weekly - 4 reps - 20 hold  Supine Posterior Pelvic Tilt - 2 x daily - 7 x weekly - 20 reps      Therapeutic Exercise and NMR EXR  [x] (23719) Provided verbal/tactile cueing for activities related to strengthening, flexibility, endurance, ROM  for improvements in proximal hip and core control with self care, mobility, lifting and ambulation. [x] (00297) Provided verbal/tactile cueing for activities related to improving balance, coordination, kinesthetic sense, posture, motor skill, proprioception  to assist with core control in self care, mobility, lifting, and ambulation.   [] (27328) Therapist is in constant attendance of 2 or more patients providing skilled therapy interventions, but not providing any significant amount of measurable one-on-one time to either patient, for improvements in LE, proximal hip, and core control in self care, mobility, lifting, ambulation and eccentric single leg control.      Therapeutic Activities:    [x] (89064 or 86197) Provided verbal/tactile cueing for activities related to improving balance, coordination, kinesthetic sense, posture, motor skill, proprioception and motor activation to allow for proper function  with self care and ADLs  [] (45086) Provided training and instruction to the patient for proper core and proximal hip recruitment and positioning with ambulation re-education     Home Exercise Program:    [] (91384) Reviewed/Progressed HEP activities related to strengthening, flexibility, endurance, ROM of core, proximal hip and LE for functional self-care, mobility, lifting and ambulation   [] (95915) Reviewed/Progressed HEP activities related to improving balance, coordination, kinesthetic sense, posture, motor skill, proprioception of core, proximal hip and LE for self care, mobility, lifting, and ambulation Manual Treatments:  PROM / STM / Oscillations-Mobs:  G-I, II, III, IV (PA's, Inf., Post.)  [x] (43675) Provided manual therapy to mobilize proximal hip and LS spine soft tissue/joints for the purpose of modulating pain, promoting relaxation,  increasing ROM, reducing/eliminating soft tissue swelling/inflammation/restriction, improving soft tissue extensibility and allowing for proper ROM for normal function with self care, mobility, lifting and ambulation. Charges:  Timed Code Treatment Minutes: 45   Total Treatment Minutes: 45     [] EVAL - LOW (20676)   [] EVAL - MOD (52450)  [] EVAL - HIGH (85033)  [] RE-EVAL (86380)  [] TN(15421) x      [] Ionto  [x] NMR (16842) x  2    [] Vaso  [x] Manual (10999) x 1     [] Ultrasound  [] TA x       [] Mech Traction (94788)  [] GaitTraining x     [] ES (un) (55461):   [] Home Management Training [] ES(attended) (62164)             [] Aquatics    [] Group  [] Other    GOALS:  Patient stated goal: decrease pain to return to all ADL's including cooking and brushing her teeth without issues. [x]? Progressing: []? Met: []? Not Met: []? Adjusted     Therapist goals for Patient:   Short Term Goals: To be achieved in: 2 weeks  1. Independent in HEP and progression per patient tolerance, in order to prevent re-injury. [x]? Progressing: []? Met: []? Not Met: []? Adjusted  2. Patient will have a decrease in pain to facilitate improvement in movement, function, and ADLs as indicated by Functional Deficits. [x]? Progressing: []? Met: []? Not Met: []? Adjusted     Long Term Goals: To be achieved in: 4 weeks  1. Disability index score of 20% or less for the MIRIAM to assist with reaching prior level of function. [x]? Progressing: []? Met: []? Not Met: []? Adjusted  2. Patient will demonstrate increased AROM to WNL, good LS mobility, good hip ROM to allow for proper joint functioning as indicated by patients Functional Deficits. [x]? Progressing: []? Met: []?  Not Met: []? Adjusted  3. Patient will demonstrate an increase in Strength to good proximal hip and core activation to allow for proper functional mobility as indicated by patients Functional Deficits. [x]? Progressing: []? Met: []? Not Met: []? Adjusted  4. Patient will return to functional activities including standing for ADL's without increased symptoms or restriction. [x]? Progressing: []? Met: []? Not Met: []? Adjusted  5. Patient will be able to return to community navigation with no restrictions of low back pain. [x]? Progressing: []? Met: []? Not Met: []? Adjusted       Overall Progression Towards Functional goals/ Treatment Progress Update:  [] Patient is progressing as expected towards functional goals listed. [] Progression is slowed due to complexities/Impairments listed. [] Progression has been slowed due to co-morbidities. [x] Plan just implemented, too soon to assess goals progression <30days   [] Goals require adjustment due to lack of progress  [] Patient is not progressing as expected and requires additional follow up with physician  [] Other    Persisting Functional Limitations/Impairments:  []Sitting []Standing   []Walking []Squatting/bending    []Stairs []ADL's    []Transfers []Reaching  []Housework []Job related tasks  []Driving []Sports/Recreation   []Sleeping []Other:    ASSESSMENT:  Pt with really good response after last visits manual techniques with decrease in severity and frequency of pain and improve movement with good carryover into today's session. Patient presented with significant L innominate upslip and LSI posterior innominate SI which improved after manual PT with MET. The patient  Was challenged with coactivation of B Hip AB/ER in hookline. . The patient's exercises were slightly progressed to add layer of muscle activation, and did so without symptoms. The patient was most challenged by KB carry due to imbalance of muscle strength and core control.     Treatment/Activity Tolerance:  [x] Patient able to complete tx  [] Patient limited by fatique  [] Patient limited by pain  [] Patient limited by other medical complications  [] Other:     Prognosis: [x] Good [] Fair  [] Poor    Patient Requires Follow-up: [x] Yes  [] No    PLAN: See eval. PT 2x / week for 4 weeks. [x] Continue per plan of care [] Alter current plan (see comments)  [] Plan of care initiated [] Hold pending MD visit [] Discharge    Electronically signed by: Georgia Littlejohn, PT, DPT, OMT-C      Note: If patient does not return for scheduled/ recommended follow up visits, this note will serve as a discharge from care along with most recent update on progress.

## 2021-09-21 ENCOUNTER — HOSPITAL ENCOUNTER (OUTPATIENT)
Dept: PHYSICAL THERAPY | Age: 71
Setting detail: THERAPIES SERIES
Discharge: HOME | End: 2021-09-21
Payer: COMMERCIAL

## 2021-09-21 DIAGNOSIS — F51.04 PSYCHOPHYSIOLOGICAL INSOMNIA: ICD-10-CM

## 2021-09-21 DIAGNOSIS — F41.9 ANXIETY: ICD-10-CM

## 2021-09-21 PROCEDURE — 97112 NEUROMUSCULAR REEDUCATION: CPT

## 2021-09-21 PROCEDURE — 97110 THERAPEUTIC EXERCISES: CPT

## 2021-09-21 RX ORDER — ALPRAZOLAM 1 MG/1
TABLET ORAL
Qty: 35 TABLET | Refills: 2 | Status: SHIPPED | OUTPATIENT
Start: 2021-09-21 | End: 2021-12-17

## 2021-09-21 NOTE — FLOWSHEET NOTE
[address] [facility]  Phone: [phone]   Fax: [phone]    Physical Therapy Treatment Note/ Progress Report:     Date:  2021    Patient Name:  Nereyda Guillen    :  1950  MRN: [id number]  Restrictions/Precautions:    Medical/Treatment Diagnosis Information:  Diagnosis: M54.42, M54.41, G89.29 (ICD-10-CM) - Chronic bilateral low back pain with bilateral sciatica  Treatment Diagnosis: Decreased lumbar ROM, decreased LE and hip strength, flexiblity, neuromuscular control of LE as well as abdominal and lumbar musculature interfering with normalized lumbar mechanics and muscle activation. Insurance/Certification information:  PT Insurance Information: Sandra Anguiano 8  Physician Information:  Referring Practitioner: Tessy Oquendo MD  Plan of care signed (Y/N): []  Yes [x]  No    Date of Patient follow up with Physician:      Progress Report: []  Yes  [x]  No     Date Range for reporting period:  Beginnin/27  Ending: -    Progress report due (10 Rx/or 30 days whichever is less): visit #10 or  (date)     Recertification due (POC duration/ or 90 days whichever is less): visit #8 or  (date)     Visit # Insurance Allowable Auth required? Date Range   7 PMN $40 copay []  Yes  [x]  No -       Units approved Units used Date Range   - - -     Latex Allergy:  [x]NO      []YES  Preferred Language for Healthcare:   [x]English       []other:    Functional Scale:        Date assessed:  MIRIAM: raw score = 20 ; dysfunction = 40%        Pain level:  0/10     SUBJECTIVE: Pt reports she has been doing well and continues to note progress with less pain frequency and consistency as well as intensity when she does experience her pain. Pt reports today thus far having no issues and doing well.        OBJECTIVE:    Observation:  - able to contract tvAbd with several activities this date without pain   Test measurements:      RESTRICTIONS/PRECAUTIONS: L TKA    Exercises/Interventions:     Therapeutic Exercise (88066) Resistance / level Sets / Seconds Reps Notes / Cues   Quad stretch off EOB  20\" 2 HEP                                                           Therapeutic Activities (92138)       Side stepping with band on knees lime 15' 2 length     Goblet squats 7.5# KB 2 10 To hi/low 2 inches above chair height                        Neuromuscular Re-ed (17745)       Biofeed back cuff:   -PVT  -PVT with BKFO  -PVT with march  1 15 each  Starting at 20 (red) no more than blue, except for BKFO (to 30); patient able to fully PPT from red to 60 w/ max effort   Ab brace with low ab progression  2 5    Ab brace with low ab hold and toe taps on table  2 5    Bridge with glute squeeze  2 / 3'' 10 9/14 - improved form with addition of tvAbd tightening   Suitcase carry with KB in R hand 10# 25' x2           Anti rotation press purple 2 / 3'' 10 R/L 9/14                      Manual Intervention (06142)       Prone lumbar PA mobilizations central and B PA    L sidelying MET to address extension restriction on R MET to correct [id number]/ERSL, L4FRSL, LOL sacral torsions, L SI posterior innominate SI, R SI anterior innominate S   LAD onL in closed pack position    Ball/belt traction                                         Modalities:   9/2, 8/31: IFC with MHP in supine with bolster under B LE x 10 mins post session with button     Pt. Education:  -pt educated on diagnosis, prognosis and expectations for rehab  -all pt questions were answered    Home Exercise Program:  Access Code: [email or web address]  URL: [email or web address]  Date: 09/16/2021  [facility]    Exercises  Supine Quadriceps Stretch with Strap on Table - 1 x daily - 7 x weekly - 3 sets - 10 reps  Hooklying Clamshell with Resistance - 1 x daily - 7 x weekly - 3 sets - 10 reps  Access Code: [email or web address]  URL: [email or web address]  Date: 08/27/2021  Prepared by: Herminia Coleash    Exercises  Supine Lower Trunk Rotation - 2 x daily - 7 x weekly - 4 reps - 10 hold  Supine Piriformis Stretch with Foot on Ground - 2 x daily - 7 x weekly - 4 reps - 20 hold  Supine Posterior Pelvic Tilt - 2 x daily - 7 x weekly - 20 reps      Therapeutic Exercise and NMR EXR  [x] (34900) Provided verbal/tactile cueing for activities related to strengthening, flexibility, endurance, ROM  for improvements in proximal hip and core control with self care, mobility, lifting and ambulation. [x] (62975) Provided verbal/tactile cueing for activities related to improving balance, coordination, kinesthetic sense, posture, motor skill, proprioception  to assist with core control in self care, mobility, lifting, and ambulation.   [] (12202) Therapist is in constant attendance of 2 or more patients providing skilled therapy interventions, but not providing any significant amount of measurable one-on-one time to either patient, for improvements in LE, proximal hip, and core control in self care, mobility, lifting, ambulation and eccentric single leg control.      Therapeutic Activities:    [x] (29151 or 15089) Provided verbal/tactile cueing for activities related to improving balance, coordination, kinesthetic sense, posture, motor skill, proprioception and motor activation to allow for proper function  with self care and ADLs  [] (54134) Provided training and instruction to the patient for proper core and proximal hip recruitment and positioning with ambulation re-education     Home Exercise Program:    [] (60139) Reviewed/Progressed HEP activities related to strengthening, flexibility, endurance, ROM of core, proximal hip and LE for functional self-care, mobility, lifting and ambulation   [] (68455) Reviewed/Progressed HEP activities related to improving balance, coordination, kinesthetic sense, posture, motor skill, proprioception of core, proximal hip and LE for self care, mobility, lifting, and ambulation      Manual Treatments:  PROM / STM / Oscillations-Mobs:  G-I, II, III, IV (PA's, Inf., Post.)  [x] (51525) Provided manual therapy to mobilize proximal hip and LS spine soft tissue/joints for the purpose of modulating pain, promoting relaxation,  increasing ROM, reducing/eliminating soft tissue swelling/inflammation/restriction, improving soft tissue extensibility and allowing for proper ROM for normal function with self care, mobility, lifting and ambulation. Charges:  Timed Code Treatment Minutes: 45   Total Treatment Minutes: 45     [] EVAL - LOW (56983)   [] EVAL - MOD (44893)  [] EVAL - HIGH (16731)  [] RE-EVAL (35240)  [x] RU(24622) x   1   [] Ionto  [x] NMR (70412) x  2    [] Vaso  [] Manual (05434) x 1     [] Ultrasound  [] TA x       [] Mech Traction (98828)  [] GaitTraining x     [] ES (un) (57497):   [] Home Management Training [] ES(attended) (65198)             [] Aquatics    [] Group  [] Other    GOALS:  Patient stated goal: decrease pain to return to all ADL's including cooking and brushing her teeth without issues. [x]? Progressing: []? Met: []? Not Met: []? Adjusted     Therapist goals for Patient:   Short Term Goals: To be achieved in: 2 weeks  1. Independent in HEP and progression per patient tolerance, in order to prevent re-injury. [x]? Progressing: []? Met: []? Not Met: []? Adjusted  2. Patient will have a decrease in pain to facilitate improvement in movement, function, and ADLs as indicated by Functional Deficits. [x]? Progressing: []? Met: []? Not Met: []? Adjusted     Long Term Goals: To be achieved in: 4 weeks  1. Disability index score of 20% or less for the MIRIAM to assist with reaching prior level of function. [x]? Progressing: []? Met: []? Not Met: []? Adjusted  2. Patient will demonstrate increased AROM to WNL, good LS mobility, good hip ROM to allow for proper joint functioning as indicated by patients Functional Deficits. [x]? Progressing: []? Met: []? Not Met: []? Adjusted  3.  Patient will demonstrate an increase in Strength to good proximal hip and core activation to allow for proper functional mobility as indicated by patients Functional Deficits. [x]? Progressing: []? Met: []? Not Met: []? Adjusted  4. Patient will return to functional activities including standing for ADL's without increased symptoms or restriction. [x]? Progressing: []? Met: []? Not Met: []? Adjusted  5. Patient will be able to return to community navigation with no restrictions of low back pain. [x]? Progressing: []? Met: []? Not Met: []? Adjusted       Overall Progression Towards Functional goals/ Treatment Progress Update:  [] Patient is progressing as expected towards functional goals listed. [] Progression is slowed due to complexities/Impairments listed. [] Progression has been slowed due to co-morbidities. [x] Plan just implemented, too soon to assess goals progression <30days   [] Goals require adjustment due to lack of progress  [] Patient is not progressing as expected and requires additional follow up with physician  [] Other    Persisting Functional Limitations/Impairments:  []Sitting []Standing   []Walking []Squatting/bending    []Stairs []ADL's    []Transfers []Reaching  []Housework []Job related tasks  []Driving []Sports/Recreation   []Sleeping []Other:    ASSESSMENT:  Pt continues to progress well with current program. Due to nature of carry over with less pain, more mobility and less limitations, much of today's session was based on progressing strength, stability and activation all of which were tolerated really well without issues. Pt did struggle to a degree with stair activity due to lack of full functional strength in L LE due to recent TKA however did not experience any pain. Discussion on continuation of knee strengthening with HEP as pt recently joint gym and plans to work on leg strength. Will continue to work on developing abdominal strength and control for LTG achievements.      Treatment/Activity Tolerance:  [x] Patient able to complete tx  [] Patient limited by fatique  [] Patient limited by pain  [] Patient limited by other medical complications  [] Other:     Prognosis: [x] Good [] Fair  [] Poor    Patient Requires Follow-up: [x] Yes  [] No    PLAN: See eval. PT 2x / week for 4 weeks. [x] Continue per plan of care [] Alter current plan (see comments)  [] Plan of care initiated [] Hold pending MD visit [] Discharge    Electronically signed by: Erich Fraga, PT, DPT, OMT-C      Note: If patient does not return for scheduled/ recommended follow up visits, this note will serve as a discharge from care along with most recent update on progress.

## 2021-09-23 ENCOUNTER — HOSPITAL ENCOUNTER (OUTPATIENT)
Dept: PHYSICAL THERAPY | Age: 71
Setting detail: THERAPIES SERIES
Discharge: HOME | End: 2021-09-23
Payer: COMMERCIAL

## 2021-09-23 PROCEDURE — 97110 THERAPEUTIC EXERCISES: CPT

## 2021-09-23 PROCEDURE — 97112 NEUROMUSCULAR REEDUCATION: CPT

## 2021-09-23 NOTE — FLOWSHEET NOTE
[address] [facility] Physical Therapy  Phone: [phone]   Fax: [phone]    Physical Therapy Treatment Note/ Progress Report:     Date:  2021    Patient Name:  [place] Barnes    :  1950  MRN: [id number]  Restrictions/Precautions:    Medical/Treatment Diagnosis Information:  Diagnosis: M54.42, M54.41, G89.29 (ICD-10-CM) - Chronic bilateral low back pain with bilateral sciatica  Treatment Diagnosis: Decreased lumbar ROM, decreased LE and hip strength, flexiblity, neuromuscular control of LE as well as abdominal and lumbar musculature interfering with normalized lumbar mechanics and muscle activation. Insurance/Certification information:  PT Insurance Information: Sandra Anguiano 8  Physician Information:  Referring Practitioner: Liz Parrish MD  Plan of care signed (Y/N): [x]  Yes []  No    Date of Patient follow up with Physician:      Progress Report: []  Yes  [x]  No     Date Range for reporting period:  Beginnin/27  Ending: -    Progress report due (10 Rx/or 30 days whichever is less): visit #10 or  (date)     Recertification due (POC duration/ or 90 days whichever is less): visit #8 or  (date)     Visit # Insurance Allowable Auth required? Date Range   8 PMN $40 copay []  Yes  [x]  No -       Units approved Units used Date Range   - - -     Latex Allergy:  [x]NO      []YES  Preferred Language for Healthcare:   [x]English       []other:    Functional Scale:        Date assessed:  MIRIAM: raw score = 20 ; dysfunction = 40%        Pain level:  0/10     SUBJECTIVE: Pt reports that she has continued to do really well since last visit. Pt does report that with her activity at home she is able to go up/down stairs with reciprocal pattern with light UE A for balance but more control and stability utilizing L LE.  Pt does report that the other day she was on her feet shopping with her daughter for hours as well standing and cooking after she did experience \"twinges\" in her lower back but did not get any worse into the prior \"catches\". Still very aware of low back position and posture with bending over due to wanting to avoid any feeling of pain, currently does not have it, just afraid of it.        OBJECTIVE:    Observation: 9/14 - able to contract tvAbd with several activities this date without pain   Test measurements:      RESTRICTIONS/PRECAUTIONS: L TKA    Exercises/Interventions:     Therapeutic Exercise (15583) Resistance / level Sets / Seconds Reps Notes / Cues   Quad stretch off EOB  20\" 2 HEP                                                           Therapeutic Activities (88317)       Side stepping with band on knees lime 15' 2 length     Goblet squats 7.5# KB 2 10 To hi/low 2 inches above chair height   Forward step up with KB 5# / 6 inch step 1 10 L up   Lateral step up 6 inch 1 10 L up; attempted with KB however significant fatigue and LOB noted          Neuromuscular Re-ed (35445)         -PVT  -PVT with BKFO  -PVT with march  1 15 each  Starting at 20 (red) no more than blue, except for BKFO (to 30); patient able to fully PPT from red to 60 w/ max effort   Ab brace with low ab progression  2 10    Ab brace with low ab hold and toe taps on table  2 10    Bridge with glute squeeze  2 / 3'' 10 9/14 - improved form with addition of tvAbd tightening   Suitcase carry with KB in R hand           Anti rotation press purple 2 / 3'' 10 R/L 9/14        Hakan curl with white PVC  1 5 Cues for technique, unstacking and restacking spine   Standing, leaning over hi/low with knee flexion to hip ext  1 7 R/L Cues for technique, level hips, stable spine, emphasis on hip ext                                      Manual Intervention (44417)       Prone lumbar PA mobilizations central and B PA    L sidelying MET to address extension restriction on R MET to correct [id number], L4FRSL, LOL sacral torsions, L SI posterior innominate SI, R SI anterior innominate S   LAD onL in closed pack position    Ball/belt traction                                         Modalities:   9/2, 8/31: IFC with MHP in supine with bolster under B LE x 10 mins post session with button     Pt. Education:  -pt educated on diagnosis, prognosis and expectations for rehab  -all pt questions were answered    Home Exercise Program:  Access Code: [email or web address]  URL: [email or web address]  Date: 09/16/2021  Prepared by: [facility]    Exercises  Supine Quadriceps Stretch with Strap on Table - 1 x daily - 7 x weekly - 3 sets - 10 reps  Hooklying Clamshell with Resistance - 1 x daily - 7 x weekly - 3 sets - 10 reps  Access Code: [email or web address]  URL: [email or web address]  Date: 08/27/2021  Prepared by: Alan Humphreys    Exercises  Supine Lower Trunk Rotation - 2 x daily - 7 x weekly - 4 reps - 10 hold  Supine Piriformis Stretch with Foot on Ground - 2 x daily - 7 x weekly - 4 reps - 20 hold  Supine Posterior Pelvic Tilt - 2 x daily - 7 x weekly - 20 reps      Therapeutic Exercise and NMR EXR  [x] (55340) Provided verbal/tactile cueing for activities related to strengthening, flexibility, endurance, ROM  for improvements in proximal hip and core control with self care, mobility, lifting and ambulation. [x] (76638) Provided verbal/tactile cueing for activities related to improving balance, coordination, kinesthetic sense, posture, motor skill, proprioception  to assist with core control in self care, mobility, lifting, and ambulation.   [] (38945) Therapist is in constant attendance of 2 or more patients providing skilled therapy interventions, but not providing any significant amount of measurable one-on-one time to either patient, for improvements in LE, proximal hip, and core control in self care, mobility, lifting, ambulation and eccentric single leg control.      Therapeutic Activities:    [x] (37459 or 36060) Provided verbal/tactile cueing for activities related to improving balance, coordination, kinesthetic sense, posture, motor skill, proprioception and motor activation to allow for proper function  with self care and ADLs  [] (80547) Provided training and instruction to the patient for proper core and proximal hip recruitment and positioning with ambulation re-education     Home Exercise Program:    [] (78177) Reviewed/Progressed HEP activities related to strengthening, flexibility, endurance, ROM of core, proximal hip and LE for functional self-care, mobility, lifting and ambulation   [] (19480) Reviewed/Progressed HEP activities related to improving balance, coordination, kinesthetic sense, posture, motor skill, proprioception of core, proximal hip and LE for self care, mobility, lifting, and ambulation      Manual Treatments:  PROM / STM / Oscillations-Mobs:  G-I, II, III, IV (PA's, Inf., Post.)  [x] (95095) Provided manual therapy to mobilize proximal hip and LS spine soft tissue/joints for the purpose of modulating pain, promoting relaxation,  increasing ROM, reducing/eliminating soft tissue swelling/inflammation/restriction, improving soft tissue extensibility and allowing for proper ROM for normal function with self care, mobility, lifting and ambulation. Charges:  Timed Code Treatment Minutes: 45   Total Treatment Minutes: 45     [] EVAL - LOW (74749)   [] EVAL - MOD (59894)  [] EVAL - HIGH (70190)  [] RE-EVAL (80548)  [x] ZN(30921) x   1   [] Ionto  [x] NMR (86543) x  2    [] Vaso  [] Manual (23952) x 1     [] Ultrasound  [] TA x       [] Mech Traction (72309)  [] GaitTraining x     [] ES (un) (67943):   [] Home Management Training [] ES(attended) (92605)             [] Aquatics    [] Group  [] Other    GOALS:  Patient stated goal: decrease pain to return to all ADL's including cooking and brushing her teeth without issues. [x]? Progressing: []? Met: []? Not Met: []? Adjusted     Therapist goals for Patient:   Short Term Goals: To be achieved in: 2 weeks  1.  Independent in HEP and progression per patient tolerance, in order to prevent re-injury. [x]? Progressing: []? Met: []? Not Met: []? Adjusted  2. Patient will have a decrease in pain to facilitate improvement in movement, function, and ADLs as indicated by Functional Deficits. [x]? Progressing: []? Met: []? Not Met: []? Adjusted     Long Term Goals: To be achieved in: 4 weeks  1. Disability index score of 20% or less for the MIRIAM to assist with reaching prior level of function. [x]? Progressing: []? Met: []? Not Met: []? Adjusted  2. Patient will demonstrate increased AROM to WNL, good LS mobility, good hip ROM to allow for proper joint functioning as indicated by patients Functional Deficits. [x]? Progressing: []? Met: []? Not Met: []? Adjusted  3. Patient will demonstrate an increase in Strength to good proximal hip and core activation to allow for proper functional mobility as indicated by patients Functional Deficits. [x]? Progressing: []? Met: []? Not Met: []? Adjusted  4. Patient will return to functional activities including standing for ADL's without increased symptoms or restriction. [x]? Progressing: []? Met: []? Not Met: []? Adjusted  5. Patient will be able to return to community navigation with no restrictions of low back pain. [x]? Progressing: []? Met: []? Not Met: []? Adjusted       Overall Progression Towards Functional goals/ Treatment Progress Update:  [] Patient is progressing as expected towards functional goals listed. [] Progression is slowed due to complexities/Impairments listed. [] Progression has been slowed due to co-morbidities.   [x] Plan just implemented, too soon to assess goals progression <30days   [] Goals require adjustment due to lack of progress  [] Patient is not progressing as expected and requires additional follow up with physician  [] Other    Persisting Functional Limitations/Impairments:  []Sitting []Standing   []Walking []Squatting/bending    []Stairs []ADL's    []Transfers []Reaching  []Housework []Job related tasks  []Driving []Sports/Recreation   []Sleeping []Other:    ASSESSMENT:  Introduction of interventions today with Hakan curl as well as bent over plinth with hip flexion to extension incorporating more loaded interventions to work on spine movement without pain in which pt did tolerate well, few cues needed for technique however pt apprehensive with both interventions due to fear/concern of pain/catching. Pt was able to progress interventions per bolded sections with no issues with low back, one instance of instability with PT intervention needed for balance with stepping intervention. Will continue to benefit from PT to work on progressing LE/abdominal strength, activity tolerance and control. Treatment/Activity Tolerance:  [x] Patient able to complete tx  [] Patient limited by fatique  [] Patient limited by pain  [] Patient limited by other medical complications  [] Other:     Prognosis: [x] Good [] Fair  [] Poor    Patient Requires Follow-up: [x] Yes  [] No    PLAN: See eval. PT 2x / week for 4 weeks. [x] Continue per plan of care [] Alter current plan (see comments)  [] Plan of care initiated [] Hold pending MD visit [] Discharge    Electronically signed by: Miriam Sandovla, PT, DPT, OMT-C      Note: If patient does not return for scheduled/ recommended follow up visits, this note will serve as a discharge from care along with most recent update on progress.

## 2021-09-28 ENCOUNTER — HOSPITAL ENCOUNTER (OUTPATIENT)
Dept: PHYSICAL THERAPY | Age: 71
Setting detail: THERAPIES SERIES
Discharge: HOME | End: 2021-09-28
Payer: COMMERCIAL

## 2021-09-28 PROCEDURE — 97112 NEUROMUSCULAR REEDUCATION: CPT

## 2021-09-28 PROCEDURE — 97530 THERAPEUTIC ACTIVITIES: CPT

## 2021-09-28 PROCEDURE — 97110 THERAPEUTIC EXERCISES: CPT

## 2021-09-28 NOTE — PLAN OF CARE
[address] [facility] Physical Therapy  Phone: [phone]   Fax: [phone]  Physical Therapy Re-Certification Plan of Care    Dear Raysa Cruz MD,    We had the pleasure of treating the following patient for physical therapy services at [facility]. A summary of our findings can be found in the updated assessment below. This includes our plan of care. If you have any questions or concerns regarding these findings, please do not hesitate to contact me at the office phone number checked above. Thank you for the referral.     Physician Signature:________________________________Date:__________________  By signing above (or electronic signature), therapist's plan is approved by physician      Functional Outcome:   MIRIAM: raw score = 6; dysfucntion = 12%    ROM   Comments   Lumbar Flex WFL     Lumbar Ext WFL  Mild pain at end range       ROM LEFT RIGHT Comments   Lumbar Side Bend 43 43     Lumbar Rotation WNL WNL     Hip Flexion WNL WNL     Hip Abd WNL WNL     Hip ER WNL WNL     Hip IR WNL WNL     Hip Extension WNL WNL     Knee Ext         Knee Flex         Hamstring Flex WFL WFL     Piriformis Mild restriction Mild restriction                                Strength / Myotomes LEFT RIGHT Comments   Multifidus 4- 4-     Transverse Ab 3+ 3+     Hip Flexors (L1-2) 4 4+     Hip Abductors 4- 4     Hip Extensors 4 4-     Hip Internal Rotators 4 4+     Hip External Rotators 4 4     Quads (L2-4) 4 4+     Hamstrings  4 4+     Ankle Plantarflexion (S1-2)         Ankle Dorsiflexion (L4-5)         Ankle Inversion         Ankle Eversion (S1-2)         Great Toe Extension (L5)            ASSESSMENT:  Pt continues to tolerate sessions really well, reassessment provided today in which ROM compared to prior assessment has significantly improved and deficits noted from initial assessment have been resolved without issues.  Pt does demonstrate good improvements with abdominal strength, muscle activation as well as LE strength and coordination. At this point pt has met all goals and electing to continue with independent HEP progressions in gym as she feels comfortable at this point moving towards HEP. Will f/u next visit for solidification of HEP and then trial d/c to HEP over the next 4 weeks with f/u in PT prn. Overall Response to Treatment:   [x]Patient is responding well to treatment and improvement is noted with regards  to goals   []Patient should continue to improve in reasonable time if they continue HEP   []Patient has plateaued and is no longer responding to skilled PT intervention    []Patient is getting worse and would benefit from return to referring MD   []Patient unable to adhere to initial POC   []Other: -    Date range of Visits:  -   Total Visits: 9    Recommendation:    []Continue PT -x / wk for - weeks. [x]Hold PT, trial d/c to HEP and f/u prn over next 4 weeks        Physical Therapy Treatment Note/ Progress Report:     Date:  2021    Patient Name:  Denise Rios    :  1950  MRN: [id number]  Restrictions/Precautions:    Medical/Treatment Diagnosis Information:  Diagnosis: M54.42, M54.41, G89.29 (ICD-10-CM) - Chronic bilateral low back pain with bilateral sciatica  Treatment Diagnosis: Decreased lumbar ROM, decreased LE and hip strength, flexiblity, neuromuscular control of LE as well as abdominal and lumbar musculature interfering with normalized lumbar mechanics and muscle activation.   Insurance/Certification information:  PT Insurance Information: Sandra Anguiano 8  Physician Information:  Referring Practitioner: Madalyn Grady MD  Plan of care signed (Y/N): [x]  Yes []  No    Date of Patient follow up with Physician:      Progress Report: []  Yes  [x]  No     Date Range for reporting period:  Beginnin/27  Ending: -    Progress report due (10 Rx/or 30 days whichever is less): visit #10 or  (date) Recertification due (POC duration/ or 90 days whichever is less): visit #8 or 9/24 (date)     Visit # Insurance Allowable Auth required? Date Range   9 PMN $40 copay []  Yes  [x]  No -       Units approved Units used Date Range   - - -     Latex Allergy:  [x]NO      []YES  Preferred Language for Healthcare:   [x]English       []other:    Functional Scale:        Date assessed:  MIRIAM: raw score = 20 ; dysfunction = 40%    8/27  MIRIAM: raw score = 6; dysfucntion = 12%    9/28    Pain level:  0/10     SUBJECTIVE: Pt reports that after last visit she did really good and had no issues over the weekend. This morning she did wake up because her low back was bothering her but since this morning her back now is a little stiff but not painful.        OBJECTIVE:    Observation: 9/14 - able to contract tvAbd with several activities this date without pain   Test measurements:      RESTRICTIONS/PRECAUTIONS: L TKA    Exercises/Interventions:     Therapeutic Exercise (93581) Resistance / level Sets / Seconds Reps Notes / Cues   Quad stretch off EOB  20\" 2 HEP          Warm up move:   -LTR  -SKTC   10\" holds x3 each                                              Therapeutic Activities (02443)       Side stepping with band on knees lime 15' 2 length     Goblet squats 7.5# KB 2 10 To hi/low 2 inches above chair height   Forward step up with KB 5# / 6 inch step 1 10 L up   Lateral step up 6 inch 1 10 L up; attempted with KB however significant fatigue and LOB noted          Neuromuscular Re-ed (72383)         -PVT  -PVT with BKFO  -PVT with march  1 15 each  Starting at 20 (red) no more than blue, except for BKFO (to 30); patient able to fully PPT from red to 60 w/ max effort   Ab brace with low ab progression     Ab brace with low ab hold and toe taps on table     Bridge with glute squeeze  9/14 - improved form with addition of tvAbd tightening   Suitcase carry with KB in R hand           Anti rotation press purple 2 / 3'' 10 R/L 9/14 Therapist is in constant attendance of 2 or more patients providing skilled therapy interventions, but not providing any significant amount of measurable one-on-one time to either patient, for improvements in LE, proximal hip, and core control in self care, mobility, lifting, ambulation and eccentric single leg control. Therapeutic Activities:    [x] (99560 or 36199) Provided verbal/tactile cueing for activities related to improving balance, coordination, kinesthetic sense, posture, motor skill, proprioception and motor activation to allow for proper function  with self care and ADLs  [] (77555) Provided training and instruction to the patient for proper core and proximal hip recruitment and positioning with ambulation re-education     Home Exercise Program:    [] (65568) Reviewed/Progressed HEP activities related to strengthening, flexibility, endurance, ROM of core, proximal hip and LE for functional self-care, mobility, lifting and ambulation   [] (54461) Reviewed/Progressed HEP activities related to improving balance, coordination, kinesthetic sense, posture, motor skill, proprioception of core, proximal hip and LE for self care, mobility, lifting, and ambulation      Manual Treatments:  PROM / STM / Oscillations-Mobs:  G-I, II, III, IV (PA's, Inf., Post.)  [x] (14309) Provided manual therapy to mobilize proximal hip and LS spine soft tissue/joints for the purpose of modulating pain, promoting relaxation,  increasing ROM, reducing/eliminating soft tissue swelling/inflammation/restriction, improving soft tissue extensibility and allowing for proper ROM for normal function with self care, mobility, lifting and ambulation.      Charges:  Timed Code Treatment Minutes: 45   Total Treatment Minutes: 45     [] EVAL - LOW (99417)   [] EVAL - MOD (99232)  [] EVAL - HIGH (19513)  [] RE-EVAL (08898)  [x] SJ(18893) x   1   [] Ionto  [x] NMR (77770) x  2    [] Vaso  [] Manual (06883) x 1     [] Ultrasound  [] TA x [] [facility] Traction (54343)  [] GaitTraining x     [] ES (un) (92432):   [] Home Management Training [] ES(attended) (49043)             [] Aquatics    [] Group  [] Other    GOALS:  Patient stated goal: decrease pain to return to all ADL's including cooking and brushing her teeth without issues. [x]? Progressing: []? Met: []? Not Met: []? Adjusted     Therapist goals for Patient:   Short Term Goals: To be achieved in: 2 weeks  1. Independent in HEP and progression per patient tolerance, in order to prevent re-injury. [x]? Progressing: []? Met: []? Not Met: []? Adjusted  2. Patient will have a decrease in pain to facilitate improvement in movement, function, and ADLs as indicated by Functional Deficits. [x]? Progressing: []? Met: []? Not Met: []? Adjusted     Long Term Goals: To be achieved in: 4 weeks  1. Disability index score of 20% or less for the MIRIAM to assist with reaching prior level of function. [x]? Progressing: []? Met: []? Not Met: []? Adjusted  2. Patient will demonstrate increased AROM to WNL, good LS mobility, good hip ROM to allow for proper joint functioning as indicated by patients Functional Deficits. []? Progressing: [x]? Met: []? Not Met: []? Adjusted  3. Patient will demonstrate an increase in Strength to good proximal hip and core activation to allow for proper functional mobility as indicated by patients Functional Deficits. []? Progressing: [x]? Met: []? Not Met: []? Adjusted  4. Patient will return to functional activities including standing for ADL's without increased symptoms or restriction. []? Progressing: [x]? Met: []? Not Met: []? Adjusted  5. Patient will be able to return to community navigation with no restrictions of low back pain. []? Progressing: [x]? Met: []? Not Met: []? Adjusted       Overall Progression Towards Functional goals/ Treatment Progress Update:  [x] Patient is progressing as expected towards functional goals listed.     [] Progression is slowed due to complexities/Impairments listed. [] Progression has been slowed due to co-morbidities. [] Plan just implemented, too soon to assess goals progression <30days   [] Goals require adjustment due to lack of progress  [] Patient is not progressing as expected and requires additional follow up with physician  [] Other    Persisting Functional Limitations/Impairments:  []Sitting []Standing   []Walking []Squatting/bending    []Stairs []ADL's    []Transfers []Reaching  []Housework []Job related tasks  []Driving []Sports/Recreation   []Sleeping []Other:    ASSESSMENT:  Pt continues to tolerate sessions really well, reassessment provided today in which ROM compared to prior assessment has significantly improved and deficits noted from initial assessment have been resolved without issues. Pt does demonstrate good improvements with abdominal strength, muscle activation as well as LE strength and coordination. At this point pt has met all goals and electing to continue with independent HEP progressions in gym as she feels comfortable at this point moving towards HEP. Will f/u next visit for solidification of HEP and then trial d/c to HEP over the next 4 weeks with f/u in PT prn. Treatment/Activity Tolerance:  [x] Patient able to complete tx  [] Patient limited by fatique  [] Patient limited by pain  [] Patient limited by other medical complications  [] Other:     Prognosis: [x] Good [] Fair  [] Poor    Patient Requires Follow-up: [x] Yes  [] No    PLAN: See eval. PT 2x / week for 4 weeks. [x] Continue per plan of care [] Alter current plan (see comments)  [] Plan of care initiated [] Hold pending MD visit [] Discharge    Electronically signed by: Pankaj Schmidt, PT, DPT, OMT-C      Note: If patient does not return for scheduled/ recommended follow up visits, this note will serve as a discharge from care along with most recent update on progress.

## 2021-09-30 ENCOUNTER — HOSPITAL ENCOUNTER (OUTPATIENT)
Dept: PHYSICAL THERAPY | Age: 71
Setting detail: THERAPIES SERIES
Discharge: HOME | End: 2021-09-30
Payer: COMMERCIAL

## 2021-09-30 PROCEDURE — 97112 NEUROMUSCULAR REEDUCATION: CPT

## 2021-09-30 PROCEDURE — 97110 THERAPEUTIC EXERCISES: CPT

## 2021-09-30 RX ORDER — BETAMETHASONE DIPROPIONATE 0.5 MG/G
OINTMENT TOPICAL 2 TIMES DAILY
Qty: 30 G | Refills: 3 | Status: SHIPPED | OUTPATIENT
Start: 2021-09-30 | End: 2021-10-30

## 2021-09-30 NOTE — DISCHARGE SUMMARY
[facility]  Phone: [phone]   Fax: [phone]    Physical Therapy Treatment Note/ Progress Report:     Date:  2021    Patient Name:  Jenness Collet    :  1950  MRN: [id number]  Restrictions/Precautions:    Medical/Treatment Diagnosis Information:  Diagnosis: M54.42, M54.41, G89.29 (ICD-10-CM) - Chronic bilateral low back pain with bilateral sciatica  Treatment Diagnosis: Decreased lumbar ROM, decreased LE and hip strength, flexiblity, neuromuscular control of LE as well as abdominal and lumbar musculature interfering with normalized lumbar mechanics and muscle activation. Insurance/Certification information:  PT Insurance Information: Sandra Anguiano 8  Physician Information:  Referring Practitioner: Sin Bravo MD  Plan of care signed (Y/N): [x]  Yes []  No    Date of Patient follow up with Physician:      Progress Report: []  Yes  [x]  No     Date Range for reporting period:  Beginnin/27  Ending: -    Progress report due (10 Rx/or 30 days whichever is less): visit #10 or  (date)     Recertification due (POC duration/ or 90 days whichever is less): visit #8 or  (date)     Visit # Insurance Allowable Auth required? Date Range   10 PMN $40 copay []  Yes  [x]  No -       Units approved Units used Date Range   - - -     Latex Allergy:  [x]NO      []YES  Preferred Language for Healthcare:   [x]English       []other:    Functional Scale:        Date assessed:  MIRIAM: raw score = 20 ; dysfunction = 40%      MIRIAM: raw score = 6; dysfucntion = 12%        Pain level:  0/10     SUBJECTIVE: Pt reports she continues to do well, overall no issues or concerns since last visit and ready for d/c to HEP today. Starting at [facility] next week and has an appointment with a  their to review all of the machines and set up a plan.        OBJECTIVE:    Observation:  - able to contract tvAbd with several activities this date without pain   Test measurements:      RESTRICTIONS/PRECAUTIONS: L TKA    Exercises/Interventions:     Therapeutic Exercise (01904) Resistance / level Sets / Seconds Reps Notes / Cues   Quad stretch off EOB  20\" 2 HEP          Warm up move:   -LTR  -SKTC   10\" holds x3 each                                              Therapeutic Activities (75713)       Side stepping with band on knees lime 15' 2 length     Goblet squats 7.5# KB 2 10 To hi/low 2 inches above chair height   Forward step up with KB 5# / 6 inch step 1 10 L up   Lateral step up 6 inch 1 10 L up; attempted with KB however significant fatigue and LOB noted   Retro walking with sport cord 1 lopp 4 steps 5 reps    Neuromuscular Re-ed (28499)         -PVT  -PVT with BKFO  -PVT with march  1 15 each  Starting at 20 (red) no more than blue, except for BKFO (to 30); patient able to fully PPT from red to 60 w/ max effort   Ab brace with low ab progression  2 10    Ab brace with low ab hold and toe taps on table     Bridge with march 110   Bridge with glute squeeze  2109/14 - improved form with addition of tvAbd tightening   Suitcase carry with KB in R hand           Anti rotation press purple 2 / 3'' 10 R/L 9/14        Hakan curl with white PVC  Standing, leaning over hi/low with knee flexion to hip ext  1 10 R/L Cues for technique, level hips, stable spine, emphasis on hip ext                                      Manual Intervention (94823)       Prone lumbar PA mobilizations central and B PA    L sidelying MET to address extension restriction on R MET to correct [id number]/ERSL, L4FRSL, LOL sacral torsions, L SI posterior innominate SI, R SI anterior innominate S   LAD onL in closed pack position    Ball/belt traction                                         Modalities:   9/2, 8/31: IFC with MHP in supine with bolster under B LE x 10 mins post session with button     Pt.  Education:  -pt educated on diagnosis, prognosis and expectations for rehab  -all pt questions were answered    Home Exercise Program:  Access Code: [email or web address]  URL: [email or web address]  Date: 09/16/2021  Prepared by: [facility]    Exercises  Supine Quadriceps Stretch with Strap on Table - 1 x daily - 7 x weekly - 3 sets - 10 reps  Hooklying Clamshell with Resistance - 1 x daily - 7 x weekly - 3 sets - 10 reps  Access Code: [email or web address]  URL: [email or web address]  Date: 08/27/2021  Prepared by: Nuris Pole    Exercises  Supine Lower Trunk Rotation - 2 x daily - 7 x weekly - 4 reps - 10 hold  Supine Piriformis Stretch with Foot on Ground - 2 x daily - 7 x weekly - 4 reps - 20 hold  Supine Posterior Pelvic Tilt - 2 x daily - 7 x weekly - 20 reps      Therapeutic Exercise and NMR EXR  [x] (79211) Provided verbal/tactile cueing for activities related to strengthening, flexibility, endurance, ROM  for improvements in proximal hip and core control with self care, mobility, lifting and ambulation. [x] (49377) Provided verbal/tactile cueing for activities related to improving balance, coordination, kinesthetic sense, posture, motor skill, proprioception  to assist with core control in self care, mobility, lifting, and ambulation.   [] (14689) Therapist is in constant attendance of 2 or more patients providing skilled therapy interventions, but not providing any significant amount of measurable one-on-one time to either patient, for improvements in LE, proximal hip, and core control in self care, mobility, lifting, ambulation and eccentric single leg control.      Therapeutic Activities:    [x] (04713 or 03172) Provided verbal/tactile cueing for activities related to improving balance, coordination, kinesthetic sense, posture, motor skill, proprioception and motor activation to allow for proper function  with self care and ADLs  [] (97005) Provided training and instruction to the patient for proper core and proximal hip recruitment and positioning with ambulation re-education     Home Exercise Program:    [] (00766) Reviewed/Progressed HEP activities related to strengthening, flexibility, endurance, ROM of core, proximal hip and LE for functional self-care, mobility, lifting and ambulation   [] (91995) Reviewed/Progressed HEP activities related to improving balance, coordination, kinesthetic sense, posture, motor skill, proprioception of core, proximal hip and LE for self care, mobility, lifting, and ambulation      Manual Treatments:  PROM / STM / Oscillations-Mobs:  G-I, II, III, IV (PA's, Inf., Post.)  [x] (67230) Provided manual therapy to mobilize proximal hip and LS spine soft tissue/joints for the purpose of modulating pain, promoting relaxation,  increasing ROM, reducing/eliminating soft tissue swelling/inflammation/restriction, improving soft tissue extensibility and allowing for proper ROM for normal function with self care, mobility, lifting and ambulation. Charges:  Timed Code Treatment Minutes: 40   Total Treatment Minutes: 40     [] EVAL - LOW (73355)   [] EVAL - MOD (35607)  [] EVAL - HIGH (84607)  [] RE-EVAL (78304)  [x] PS(45492) x   2   [] Ionto  [x] NMR (49174) x  1    [] Vaso  [] Manual (87121) x 1     [] Ultrasound  [] TA x       [] Mech Traction (60557)  [] GaitTraining x     [] ES (un) (97061):   [] Home Management Training [] ES(attended) (31478)             [] Aquatics    [] Group  [] Other    GOALS:  Patient stated goal: decrease pain to return to all ADL's including cooking and brushing her teeth without issues. [x]? Progressing: []? Met: []? Not Met: []? Adjusted     Therapist goals for Patient:   Short Term Goals: To be achieved in: 2 weeks  1. Independent in HEP and progression per patient tolerance, in order to prevent re-injury. [x]? Progressing: []? Met: []? Not Met: []? Adjusted  2. Patient will have a decrease in pain to facilitate improvement in movement, function, and ADLs as indicated by Functional Deficits. [x]? Progressing: []? Met: []? Not Met: []?  Adjusted     Long Term and will f/u with PT prn. Treatment/Activity Tolerance:  [x] Patient able to complete tx  [] Patient limited by fatique  [] Patient limited by pain  [] Patient limited by other medical complications  [] Other:     Prognosis: [x] Good [] Fair  [] Poor    Patient Requires Follow-up: [x] Yes  [] No    PLAN: See eval. PT 2x / week for 4 weeks. [x] Continue per plan of care [] Alter current plan (see comments)  [] Plan of care initiated [] Hold pending MD visit [] Discharge    Electronically signed by: Richie Bright, PT, DPT, OMT-C      Note: If patient does not return for scheduled/ recommended follow up visits, this note will serve as a discharge from care along with most recent update on progress.

## 2021-10-14 ENCOUNTER — TELEPHONE (OUTPATIENT)
Dept: ORTHOPEDIC SURGERY | Age: 71
End: 2021-10-14

## 2021-10-14 NOTE — LETTER
[address]  [address] [facility] [address], [address] [facility]  [phone]  Fax  [phone]    Yanira Domínguez MD      10/14/21      Re: Kasi Espinosa  : 1950    To Whom It May Concern:    Kasi Espinosa has been under my care for an ongoing worsening orthopaedic condition. Due to the nature of the condition, surgery was recommended and the patient had surgery of a total knee replacement. As per revised medical guidelines, it is my recommendation that the patient does NOT need prophylactic antibiotic therapy prior to any routine dental procedures. This includes but is not limited to routine cleanings, extractions, fillings, and/or cosmetic procedures. If, however, there is a known infection and/or abscess of any area of a tooth or the mouth then antibiotic should be ordered by the attending dentist.  The recommendations are as follows. If patient is NOT allergic to penicillin: 2 grams of amoxicillin, cephalexin or cephradine (orally) OR 2 grams of ampicillin or 1 gram of cefazolin (intra-muscularly or intravenously) 1 hour before the procedure. If patient IS allergic to penicillin[de-identified] 600 milligrams of clindamycin (orally or intravenously) 1 hour before the procedure. Should you have specific questions or need further information, please feel free to contact my office directly. If you require medical documentation, please include a HIPPA compliant release form with your request.    Thank you for your assistance and understanding in this matter.     Sincerely,          Yanira Domínguez MD

## 2021-10-25 ENCOUNTER — OFFICE VISIT (OUTPATIENT)
Dept: ORTHOPEDIC SURGERY | Age: 71
End: 2021-10-25
Payer: COMMERCIAL

## 2021-10-25 VITALS — WEIGHT: 165 LBS | BODY MASS INDEX: 29.23 KG/M2 | HEIGHT: 63 IN

## 2021-10-25 DIAGNOSIS — Z96.652 S/P TOTAL KNEE ARTHROPLASTY, LEFT: Primary | ICD-10-CM

## 2021-10-25 PROCEDURE — 99212 OFFICE O/P EST SF 10 MIN: CPT | Performed by: ORTHOPAEDIC SURGERY

## 2021-10-25 NOTE — PROGRESS NOTES
Patient returns issues about 5 months status post left total knee arthroplasty. She is generally doing well. She says she does feel little apprehensive going downstairs. She thinks she does have to work on her strength still. She really has joined a gym and anything to do any progressive resistance exercises. ROS: Pertinent items are noted in HPI. No notes on file    Past Medical History:  No date: Abdominal wall abscess  7/8/2021: Acute pyelonephritis  No date: Age related osteoporosis  7/16/2013: Anal fissure  No date: Anxiety  No date: Colon polyps  No date: Depression  No date: E coli bacteremia  No date: Fibroid uterus  No date: Hypothyroidism  No date: Morbid obesity ([address]  1/26/2016: Osteoarthritis of left knee  No date: Osteopenia     Past Surgical History:  2008: ABDOMINAL HERNIA REPAIR  Age 27: APPENDECTOMY  10/23/2017: CATARACT REMOVAL WITH IMPLANT; Right      Comment:  with vitrectomy  02/26/2018: CATARACT REMOVAL WITH IMPLANT; Left      Comment:  with vitrectomy  10/10/2018: COLONOSCOPY; N/A      Comment:  COLONOSCOPY POLYPECTOMY SNARE/COLD BIOPSY performed by                Gerry Coleman MD at [address] [place] [address]  8/26/2019: EPIDURAL STEROID INJECTION; Right      Comment:  RIGHT L4 AND L5 TRANSFORAMINAL EPIDURAL STEROID                INJECTION WITH FLUOROSCOPY performed by Rasta Bravo MD at [address] [place] [address],[facility] Floor: GASTRIC BYPASS SURGERY  10/21/2019: HC INJECT OTHER PERPHRL NERV; Right      Comment:  RIGHT PIRIFORMIS INJECTION WITH FLUOROSCOPY (99070)                performed by Rasta Bravo MD at [address] [facility]  No date: HYSTERECTOMY, TOTAL ABDOMINAL      Comment:  ovaries out also  1/7/2019: Ran [place]oyos; Right      Comment:  RIGHT L3 AND L4 LUMBAR TRANSFORAMINAL WITH FLUOROSCOPY                performed by Rasta Bravo MD at [address] [facility]  5/15/2019: Ran [place]os;  Right      Comment:  RIGHT L4 AND L5 TRANSFORAMINAL EPIDURAL STEROID                INJECTION WITH FLUOROSCOPY performed by Emelia Martinez MD at [address]  10/10/2018: CT ESOPHAGOGASTRODUODENOSCOPY TRANSORAL DIAGNOSTIC; N/A      Comment:  EGD performed by Peewee Posadas MD at [address] [facility] [address]: [facility] AND [facility]      Comment:  Fibroids  6/1/2021: TOTAL KNEE ARTHROPLASTY; Left      Comment:  LEFT TOTAL KNEE ARTHROPLASTY performed by Mariam Bhandari MD at [place] [id number] of patient's family history indicates:  Problem: Diabetes      Relation: Father          Age of Onset: (Not Specified)          Comment: Type II  Problem: Diabetes      Relation: Sister          Age of Onset: (Not Specified)          Comment: Type II  Problem: Diabetes      Relation: Brother          Age of Onset: (Not Specified)          Comment: Type II  Problem: Breast Cancer      Relation: Sister          Age of Onset: 58  Problem: Lung Cancer      Relation: Mother          Age of Onset: (Not Specified)          Comment: Smoker  Problem: Heart Disease      Relation: Sister          Age of Onset: (Not Specified)          Comment: Bicuspid aortic valve x2      Social History    Socioeconomic History      Marital status:        Spouse name: None      Number of children: None      Years of education: None      Highest education level: None    Occupational History      Occupation: Computer work    Tobacco Use      Smoking status: Never Smoker      Smokeless tobacco: Never Used    Vaping Use      Vaping Use: Never used    Substance and Sexual Activity      Alcohol use: No      Drug use: No      Sexual activity: Never    Other Topics      Concerns:        None    Social History Narrative      None    Social Determinants of Health  Financial Resource Strain: Low Risk       Difficulty of Paying Living Expenses: Not hard at all  Food Insecurity: No Food Insecurity      Worried About [address] in the Last Year: Never true      Ran Out of Food in the Last Year: Never true  Transportation Needs: No Transportation Needs      Lack of Transportation (Medical): No      Lack of Transportation (Non-Medical): No  Physical Activity:       Days of Exercise per Week:       Minutes of Exercise per Session:   Stress:       Feeling of Stress :   Social Connections:       Frequency of Communication with Friends and Family:       Frequency of Social Gatherings with Friends and Family:       Attends Rastafarian Services:       Active Member of Clubs or Organizations:       Attends Club or Organization Meetings:       Marital Status:   Intimate Partner Violence: Not At Risk      Fear of Current or Ex-Partner: No      Emotionally Abused: No      Physically Abused: No      Sexually Abused: No    Current Outpatient Medications:  augmented betamethasone dipropionate (DIPROLENE) 0.05 % ointment, Apply topically 2 times daily Apply topically 2 times daily. , Disp: 30 g, Rfl: 3  ALPRAZolam (XANAX) 1 MG tablet, TAKE 1 TABLET BY MOUTH TWICE DAILY AS NEEDED FOR ANXIETY, Disp: 35 tablet, Rfl: 2  traZODone (DESYREL) 50 MG tablet, TAKE 3 TABLETS BY MOUTH EVERY NIGHT AT BEDTIME, Disp: 270 tablet, Rfl: 1  omeprazole (PRILOSEC) 40 MG delayed release capsule, TAKE 1 CAPSULE BY MOUTH EVERY MORNING BEFORE BREAKFAST, Disp: 30 capsule, Rfl: 5  ondansetron (ZOFRAN) 4 MG tablet, Take 1 tablet by mouth every 8 hours as needed for Nausea or Vomiting, Disp: 30 tablet, Rfl: 0  levothyroxine (SYNTHROID) 150 MCG tablet, TAKE 1 TABLET BY MOUTH EVERY DAY, Disp: 90 tablet, Rfl: 1  gabapentin (NEURONTIN) 300 MG capsule, TAKE 1 CAPSULE BY MOUTH TWICE DAILY, Disp: 60 capsule, Rfl: 5  senna (SENOKOT) 8.6 MG tablet, Take 1 tablet by mouth daily , Disp: , Rfl:   buPROPion (WELLBUTRIN XL) 150 MG extended release tablet, Take 1 tablet by mouth every morning, Disp: 90 tablet, Rfl: 1  Multiple Vitamin (MULTIVITAMIN PO), Take by mouth, Disp: , Rfl:   melatonin 5 MG TABS tablet, Take 5 mg by mouth daily, Disp: , Rfl:   Cyanocobalamin (VITAMIN B 12 PO), Take 500 mcg by mouth daily , Disp: , Rfl:   Cholecalciferol (VITAMIN D) 1000 UNIT CAPS, Take 2 capsules by mouth daily , Disp: , Rfl:   aspirin 81 MG EC tablet, Take 4 tablets by mouth daily, Disp: , Rfl:     No current facility-administered medications for this visit. -- Macrobid [Nitrofurantoin] -- Other (See Comments)    --  Fever, myalgias   -- Codeine -- Nausea Only    VITAL SIGNS:  Ht 5' 2.5\" (1.588 m)   Wt 165 lb (74.8 kg)   BMI 29.70 kg/m²   On examination today her incision is very nicely healed. There is no warmth erythema or effusion. She has 0 to about 118 degrees range of motion. She has no retinacular or joint line tenderness. She has good medial lateral stability. Patella appears to track nicely down the midline. She does make a very good quadriceps contraction on the side. Impression generally doing well 5 months status post left total knee arthroplasty. We discussed that she needs to get in and do leg press or other quadriceps exercises eventually at or above body weight using both legs. We will see her back in 6 weeks.

## 2021-11-23 ENCOUNTER — TELEMEDICINE (OUTPATIENT)
Dept: INTERNAL MEDICINE CLINIC | Age: 71
End: 2021-11-23
Payer: COMMERCIAL

## 2021-11-23 DIAGNOSIS — E03.9 ACQUIRED HYPOTHYROIDISM: Chronic | ICD-10-CM

## 2021-11-23 DIAGNOSIS — F32.4 MAJOR DEPRESSIVE DISORDER WITH SINGLE EPISODE, IN PARTIAL REMISSION (HCC): ICD-10-CM

## 2021-11-23 DIAGNOSIS — M81.0 AGE-RELATED OSTEOPOROSIS WITHOUT CURRENT PATHOLOGICAL FRACTURE: ICD-10-CM

## 2021-11-23 DIAGNOSIS — F41.9 ANXIETY: ICD-10-CM

## 2021-11-23 DIAGNOSIS — F51.04 PSYCHOPHYSIOLOGICAL INSOMNIA: ICD-10-CM

## 2021-11-23 DIAGNOSIS — K91.2 POST-RESECTION MALABSORPTION: Chronic | ICD-10-CM

## 2021-11-23 DIAGNOSIS — Z00.00 ROUTINE GENERAL MEDICAL EXAMINATION AT A HEALTH CARE FACILITY: Primary | ICD-10-CM

## 2021-11-23 PROCEDURE — G0439 PPPS, SUBSEQ VISIT: HCPCS | Performed by: INTERNAL MEDICINE

## 2021-11-23 PROCEDURE — 99213 OFFICE O/P EST LOW 20 MIN: CPT | Performed by: INTERNAL MEDICINE

## 2021-11-23 ASSESSMENT — PATIENT HEALTH QUESTIONNAIRE - PHQ9
SUM OF ALL RESPONSES TO PHQ QUESTIONS 1-9: 2
2. FEELING DOWN, DEPRESSED OR HOPELESS: 1
SUM OF ALL RESPONSES TO PHQ QUESTIONS 1-9: 2
SUM OF ALL RESPONSES TO PHQ9 QUESTIONS 1 & 2: 2
1. LITTLE INTEREST OR PLEASURE IN DOING THINGS: 1
SUM OF ALL RESPONSES TO PHQ QUESTIONS 1-9: 2

## 2021-11-23 ASSESSMENT — LIFESTYLE VARIABLES: HOW OFTEN DO YOU HAVE A DRINK CONTAINING ALCOHOL: 0

## 2021-11-23 NOTE — PROGRESS NOTES
Medicare Annual Wellness Visit  Name: Mckenzie Dose Date: 2021   MRN: [id number] Sex: Female   Age: 73 y.o. Ethnicity: Non- / Non    : 1950 Race: White (non-)      Fermín Lofton is here for Medicare AWV    Screenings for behavioral, psychosocial and functional/safety risks, and cognitive dysfunction are all negative except as indicated below. These results, as well as other patient data from the [address] form, are documented in Flowsheets linked to this Encounter. Allergies   Allergen Reactions    Macrobid [Nitrofurantoin] Other (See Comments)     Fever, myalgias    Codeine Nausea Only         Prior to Visit Medications    Medication Sig Taking?  Authorizing Provider   ALPRAZolam (XANAX) 1 MG tablet TAKE 1 TABLET BY MOUTH TWICE DAILY AS NEEDED FOR ANXIETY Yes Jose Juan Alba MD   traZODone (DESYREL) 50 MG tablet TAKE 3 TABLETS BY MOUTH EVERY NIGHT AT BEDTIME Yes Jose Juan Alba MD   omeprazole (PRILOSEC) 40 MG delayed release capsule TAKE 1 CAPSULE BY MOUTH EVERY MORNING BEFORE BREAKFAST Yes Jose Juan Alba MD   aspirin 81 MG EC tablet Take 4 tablets by mouth daily Yes Jose Juan Alba MD   ondansetron (ZOFRAN) 4 MG tablet Take 1 tablet by mouth every 8 hours as needed for Nausea or Vomiting Yes Tiny Starks PA-C   levothyroxine (SYNTHROID) 150 MCG tablet TAKE 1 TABLET BY MOUTH EVERY DAY Yes Jose Juan Alba MD   gabapentin (NEURONTIN) 300 MG capsule TAKE 1 CAPSULE BY MOUTH TWICE DAILY Yes Jose Juan Alba MD   senna (SENOKOT) 8.6 MG tablet Take 1 tablet by mouth daily  Yes Historical Provider, MD   buPROPion (WELLBUTRIN XL) 150 MG extended release tablet Take 1 tablet by mouth every morning Yes Jose Juan Alba MD   Multiple Vitamin (MULTIVITAMIN PO) Take by mouth Yes Historical Provider, MD   melatonin 5 MG TABS tablet Take 5 mg by mouth daily Yes Historical Provider, MD   Cyanocobalamin (VITAMIN B 12 PO) Take 500 mcg by mouth daily  Yes Historical Provider, MD   Cholecalciferol (VITAMIN D) 1000 UNIT CAPS Take 2 capsules by mouth daily  Yes Historical Provider, MD         Past Medical History:   Diagnosis Date    Abdominal wall abscess     Acute pyelonephritis 7/8/2021    Age related osteoporosis     Anal fissure 7/16/2013    Anxiety     Colon polyps     Depression     E coli bacteremia     Fibroid uterus     Hypothyroidism     Morbid obesity ([address]     Osteoarthritis of left knee 1/26/2016    Osteopenia        Past Surgical History:   Procedure Laterality Date    ABDOMINAL HERNIA REPAIR  2008    APPENDECTOMY  Age 27    CATARACT REMOVAL WITH IMPLANT Right 10/23/2017    with vitrectomy    CATARACT REMOVAL WITH IMPLANT Left 02/26/2018    with vitrectomy    COLONOSCOPY N/A 10/10/2018    COLONOSCOPY POLYPECTOMY SNARE/COLD BIOPSY performed by Meg Sofia MD at [address] Right 8/26/2019    RIGHT L4 AND L5 TRANSFORAMINAL EPIDURAL STEROID INJECTION WITH FLUOROSCOPY performed by Joana Carlson MD at [facility]    HC INJECT OTHER PERPHRL NERV Right 10/21/2019    RIGHT PIRIFORMIS INJECTION WITH FLUOROSCOPY (96029) performed by Joana Carlson MD at [facility], TOTAL ABDOMINAL      ovaries out also   [place] [address] Right 1/7/2019    RIGHT L3 AND L4 LUMBAR TRANSFORAMINAL WITH FLUOROSCOPY performed by Joana Carlson MD at [facility] Right 5/15/2019    RIGHT L4 AND L5 TRANSFORAMINAL EPIDURAL STEROID INJECTION WITH FLUOROSCOPY performed by Joana Carlson MD at [facility] ESOPHAGOGASTRODUODENOSCOPY TRANSORAL DIAGNOSTIC N/A 10/10/2018    EGD performed by Meg Sofia MD at [address]    Fibroids    TOTAL KNEE ARTHROPLASTY Left 6/1/2021    LEFT TOTAL KNEE ARTHROPLASTY performed by Gertrudis Chapin MD at [facility] OR         Family History   Problem Relation Age of Onset    Diabetes Father         Type II    Diabetes Sister         Type II    Diabetes Brother         Type II    Breast Cancer Sister 58    Lung Cancer Mother         Smoker    Heart Disease Sister         Bicuspid aortic valve x2       CareTeam (Including outside providers/suppliers regularly involved in providing care):   Patient Care Team:  Franc Durham MD as PCP - General (Internal Medicine)  Franc Durham MD as PCP - REHABILITATION [facility]  Clarita Cross DPM as Consulting Physician (Podiatry)  Andrea Cordova MD as Surgeon (General Surgery)  Gerry Coleman MD as Consulting Physician (Gastroenterology)  Aroldo Darden MD as Obstetrician (Obstetrics & Gynecology)    [facility] Readings from Last 3 Encounters:   10/25/21 165 lb (74.8 kg)   09/13/21 174 lb (78.9 kg)   07/26/21 174 lb (78.9 kg)     Patient-Reported Vitals 11/23/2021   Patient-Reported Weight 166lb   Patient-Reported Height 5'2\"   Patient-Reported Systolic 722   Patient-Reported Diastolic 62   Patient-Reported Pulse 70   Patient-Reported Temperature -      There is no height or weight on file to calculate BMI. Based upon direct observation of the patient, evaluation of cognition reveals recent and remote memory intact. Physical Exam  Constitutional:       General: She is not in acute distress. Appearance: She is well-developed. HENT:      Head: Normocephalic and atraumatic. Mouth/Throat:      Lips: No lesions. Neck:      Comments: No visible mass  Pulmonary:      Effort: Pulmonary effort is normal. No respiratory distress. Skin:     Comments: No rash, erythema or other discoloration on facial skin and exposed areas of neck and upper extremites    Neurological:      Mental Status: She is alert.       Comments: No facial asymmetry (cranial nerve 7 motor function) or gaze palsy   Psychiatric:         Attention and Perception: Attention normal.         Mood and Affect: Mood normal.         Speech: Speech normal.         Behavior: Behavior normal.         Thought Content: Thought content normal.         Cognition and Memory: Cognition normal.     Patient's complete Health Risk Assessment and screening values have been reviewed and are found in Flowsheets. The following problems were reviewed today and where indicated follow up appointments were made and/or referrals ordered. Positive Risk Factor Screenings with Interventions:            General Health and ACP:  General  In general, how would you say your health is?: Very Good  In the past 7 days, have you experienced any of the following?  New or Increased Pain, New or Increased Fatigue, Loneliness, Social Isolation, Stress or Anger?: (!) Anger  Do you get the social and emotional support that you need?: Yes  Do you have a Living Will?: (!) No (information sent)  Advance Directives     Power of  Living Will ACP-Advance Directive ACP-Power of     Not on File Not on File Not on File Not on File      General Health Risk Interventions:  · Anger: feeling a little more irritable with anniversary of 's death- declines any intervention   · Referred to Advanced Care Specialist for Living Will     Hearing/Vision:  No exam data present  Hearing/Vision  Do you or your family notice any trouble with your hearing that hasn't been managed with hearing aids?: No  Do you have difficulty driving, watching TV, or doing any of your daily activities because of your eyesight?: No  Have you had an eye exam within the past year?: (!) No  Hearing/Vision Interventions:  · Vision concerns:  patient encouraged to make appointment with his/her eye specialist      Personalized Preventive Plan   Current Health Maintenance Status  Immunization History   Administered Date(s) Administered    Influenza Virus Vaccine 10/29/2015, 10/23/2018, 12/10/2019    Influenza, Quadv, IM, PF (6 mo and older Fluzone, Flulaval, Fluarix, and 3 yrs and older Afluria) 10/25/2016, 10/03/2017    Pneumococcal Conjugate 13-valent ([id number]) 11/28/2017    Pneumococcal Polysaccharide ([id number]) 12/11/2018    Tdap (Boostrix, Adacel) 01/17/2013    Zoster Live (Zostavax) 11/12/2013        Health Maintenance   Topic Date Due    COVID-19 Vaccine (1) Never done    Shingles Vaccine (2 of 3) 01/07/2014    Flu vaccine (1) 09/01/2021    Annual Wellness Visit (AWV)  10/21/2021    Breast cancer screen  06/21/2022    TSH testing  07/09/2022    DTaP/Tdap/Td vaccine (2 - Td or Tdap) 01/17/2023    DEXA (modify frequency per FRAX score)  06/21/2023    Colon cancer screen colonoscopy  10/10/2023    Lipid screen  01/04/2026    Pneumococcal 65+ years Vaccine  Completed    Hepatitis C screen  Completed    Hepatitis A vaccine  Aged Out    Hepatitis B vaccine  Aged Out    Hib vaccine  Aged Out    Meningococcal (ACWY) vaccine  Aged Out     Recommendations for [employer] Due: see orders and patient instructions/AVS.  . Recommended screening schedule for the next 5-10 years is provided to the patient in written form: see Patient Instructions/AVS.    Josephine Brooks was seen today for medicare awv. Routine general medical examination at a health care facility  -     Ambulatory Referral to ACP Clinical Specialist  -     She will schedule flu shot and COVID booster ASAP  -     Shingrix after flu and COVID vaccines complete  Age-related osteoporosis without current pathological fracture  Assessment & Plan: Will order first Reclast dose as soon as possible. Continue vitamin D supplement at current dose, high calcium diet and weight-bearing exercise. Acquired hypothyroidism  Assessment & Plan:  Clinically euthyroid, but will adjust levothyroxine dose if indicated by lab results. Orders:  -     TSH without Reflex; Future  Post-resection malabsorption  Assessment & Plan:  Taking MVI, vitamin D supplement, B12 supplement consistently. Will adjust doses if indicated by lab results.    Orders:  -     Comprehensive Metabolic Panel, Fasting; Future  -     CBC Auto Differential; Future  -     Ferritin; Future  -     Iron and TIBC; Future  -     Vitamin D 25 Hydroxy; Future  -     Vitamin B12; Future  Psychophysiological insomnia  Assessment & Plan:  Having difficulty falling asleep over the past month- started with new job working 5-11 pm.  Durward Favor about 3-4 hours to fall asleep after taking the trazodone. Avoids napping, no stimulants, but has to eat before bedtime. Will try taking melatonin 5 mg qhs with the trazodone to help with sleep initiation. Anxiety  Assessment & Plan:  Recent exacerbation related to holidays and anniversary of 's death. She would like to work with her  on these issues, but will call if symptoms worsen. Continue current dose of Wellbutrin XL and lowest effective dose of Xanax. Major depressive disorder with single episode, in partial remission [facility])  Assessment & Plan:  See plan for anxiety. Return in about 3 months (around 2/23/2022) for 30 MIN F/U- Video. Sindhu Gauthier is a 73 y.o. female being evaluated by a Virtual Visit (video and audio) encounter to address concerns as mentioned above. A caregiver was present when appropriate. Due to this being a TeleHealth encounter (During [facility] public health emergency), evaluation of the following organ systems was limited: Vitals/Constitutional/EENT/Resp/CV/GI//MS/Neuro/Skin/Heme-Lymph-Imm. Pursuant to the emergency declaration under the [facility], [address] [place] [address] and the [employer] Act, this Virtual Visit was conducted with patient's (and/or legal guardian's) consent, to reduce the patient's risk of exposure to COVID-19 and provide necessary medical care. The patient (and/or legal guardian) has also been advised to contact this office for worsening conditions or problems, and seek emergency medical treatment and/or call 911 if deemed necessary.

## 2021-11-23 NOTE — ASSESSMENT & PLAN NOTE
Having difficulty falling asleep over the past month- started with new job working 5-11 pm.  Mima Charles about 3-4 hours to fall asleep after taking the trazodone. Avoids napping, no stimulants, but has to eat before bedtime. Will try taking melatonin 5 mg qhs with the trazodone to help with sleep initiation.

## 2021-11-23 NOTE — PATIENT INSTRUCTIONS
Personalized Preventive Plan for [facility] Sat - 11/23/2021  Medicare offers a range of preventive health benefits. Some of the tests and screenings are paid in full while other may be subject to a deductible, co-insurance, and/or copay. Some of these benefits include a comprehensive review of your medical history including lifestyle, illnesses that may run in your family, and various assessments and screenings as appropriate. After reviewing your medical record and screening and assessments performed today your provider may have ordered immunizations, labs, imaging, and/or referrals for you. A list of these orders (if applicable) as well as your Preventive Care list are included within your After Visit Summary for your review. Other Preventive Recommendations:    · A preventive eye exam performed by an eye specialist is recommended every 1-2 years to screen for glaucoma; cataracts, macular degeneration, and other eye disorders. · A preventive dental visit is recommended every 6 months. · Try to get at least 150 minutes of exercise per week or 10,000 steps per day on a pedometer . · Order or download the FREE \"Exercise & Physical Activity: Your Everyday Guide\" from [employer]. Call 1-915.463.5393 or search [employer] online. · You need 2987-9999 mg of calcium and 1124-6390 IU of vitamin D per day. It is possible to meet your calcium requirement with diet alone, but a vitamin D supplement is usually necessary to meet this goal.  · When exposed to the sun, use a sunscreen that protects against both UVA and UVB radiation with an SPF of 30 or greater. Reapply every 2 to 3 hours or after sweating, drying off with a towel, or swimming. · Always wear a seat belt when traveling in a car. Always wear a helmet when riding a bicycle or motorcycle.

## 2021-11-23 NOTE — ASSESSMENT & PLAN NOTE
Will order first Reclast dose as soon as possible. Continue vitamin D supplement at current dose, high calcium diet and weight-bearing exercise.

## 2021-11-23 NOTE — ASSESSMENT & PLAN NOTE
Recent exacerbation related to holidays and anniversary of 's death. She would like to work with her  on these issues, but will call if symptoms worsen. Continue current dose of Wellbutrin XL and lowest effective dose of Xanax.

## 2021-11-24 ENCOUNTER — CLINICAL DOCUMENTATION (OUTPATIENT)
Dept: SPIRITUAL SERVICES | Age: 71
End: 2021-11-24

## 2021-11-24 NOTE — ACP (ADVANCE CARE PLANNING)
Advance Care Planning   Ambulatory ACP Specialist Patient Outreach    Date:  11/24/2021  ACP Specialist:  Belkys Self    Outreach call to patient in follow-up to ACP Specialist referral from: Rika Rock MD    [x] PCP  [] Provider   [] Ambulatory Care Management [] Other for Reason:    [x] Advance Directive Assistance  [] Code Status Discussion  [] Complete Portable DNR Order  [] Discuss Goals of Care  [] Complete POST/MOST  [] Early ACP Decision-Making  [] Other    Date Referral Received: 11/23/2021    Today's Outreach:  [x] First   [] Second  [] Third                               Third outreach made by []  phone  [] email []   [facility]     Intervention:  [] Spoke with Patient  [x] Left VM requesting return call      Outcome: ACP Specialist left a voicemail with the Outpatient [facility] [address] phone number. A  will try to reach this patient next week. Next Step:   [] ACP scheduled conversation  [x] Outreach again in one week               [] Email / Mail ACP Info Sheets  [] Email / Mail Advance Directive            [] Close Referral. Routing closure to referring provider/staff and to ACP Specialist .      Thank you for this referral.

## 2021-12-01 ENCOUNTER — CLINICAL DOCUMENTATION (OUTPATIENT)
Dept: SPIRITUAL SERVICES | Age: 71
End: 2021-12-01

## 2021-12-01 NOTE — PROGRESS NOTES
Advance Care Planning   Ambulatory ACP Specialist Patient Outreach    Date:  12/1/2021  ACP Specialist:  Garry Talavera    Outreach call to patient in follow-up to ACP Specialist referral from: Richa Parson MD    [x] PCP  [] Provider   [] Ambulatory Care Management [] Other for Reason:    [x] Advance Directive Assistance  [] Code Status Discussion  [] Complete Portable DNR Order  [] Discuss Goals of Care  [] Complete POST/MOST  [] Early ACP Decision-Making  [] Other    Date Referral Received:11/23/21    Today's Outreach:  [] First   [x] Second  [] Third                               Third outreach made by [x]  phone  [] email []   [facility] [employer]     Intervention:  [x] Spoke with Patient  [] Left VM requesting return call      Outcome:Pt stated she has a copy of Advance Directives forms, but wants to read over them first. Will call [facility] when ready to make appointment to go over them. Pt open to follow up in 1 month if we do not hear back from her. Next Step:   [] ACP scheduled conversation  [x] Outreach again in one month            [] Email / Mail ACP Info Sheets  [] Email / Mail Advance Directive            [] Close Referral. Routing closure to referring provider/staff and to ACP Specialist .      Thank you for this referral.

## 2021-12-06 ENCOUNTER — HOSPITAL ENCOUNTER (OUTPATIENT)
Age: 71
Discharge: HOME | End: 2021-12-06
Payer: COMMERCIAL

## 2021-12-06 ENCOUNTER — OFFICE VISIT (OUTPATIENT)
Dept: ORTHOPEDIC SURGERY | Age: 71
End: 2021-12-06
Payer: COMMERCIAL

## 2021-12-06 VITALS — WEIGHT: 165 LBS | BODY MASS INDEX: 29.23 KG/M2 | HEIGHT: 63 IN

## 2021-12-06 DIAGNOSIS — E03.9 ACQUIRED HYPOTHYROIDISM: Chronic | ICD-10-CM

## 2021-12-06 DIAGNOSIS — Z96.652 S/P TOTAL KNEE ARTHROPLASTY, LEFT: Primary | ICD-10-CM

## 2021-12-06 DIAGNOSIS — K91.2 POST-RESECTION MALABSORPTION: Chronic | ICD-10-CM

## 2021-12-06 LAB
A/G RATIO: 1.2 (ref 1.1–2.2)
ALBUMIN SERPL-MCNC: 3.7 G/DL (ref 3.4–5)
ALP BLD-CCNC: 86 U/L (ref 40–129)
ALT SERPL-CCNC: 10 U/L (ref 10–40)
ANION GAP SERPL CALCULATED.3IONS-SCNC: 16 MMOL/L (ref 3–16)
AST SERPL-CCNC: 20 U/L (ref 15–37)
BASOPHILS ABSOLUTE: 0 K/UL (ref 0–0.2)
BASOPHILS RELATIVE PERCENT: 0.6 %
BILIRUB SERPL-MCNC: <0.2 MG/DL (ref 0–1)
BUN BLDV-MCNC: 21 MG/DL (ref 7–20)
CALCIUM SERPL-MCNC: 8.7 MG/DL (ref 8.3–10.6)
CHLORIDE BLD-SCNC: 108 MMOL/L (ref 99–110)
CO2: 20 MMOL/L (ref 21–32)
CREAT SERPL-MCNC: 1 MG/DL (ref 0.6–1.2)
EOSINOPHILS ABSOLUTE: 0.1 K/UL (ref 0–0.6)
EOSINOPHILS RELATIVE PERCENT: 2.6 %
FERRITIN: 19.1 NG/ML (ref 15–150)
GFR AFRICAN AMERICAN: >60
GFR NON-AFRICAN AMERICAN: 55
GLUCOSE FASTING: 90 MG/DL (ref 70–99)
HCT VFR BLD CALC: 37.4 % (ref 36–48)
HEMOGLOBIN: 12.1 G/DL (ref 12–16)
IRON SATURATION: 19 % (ref 15–50)
IRON: 61 UG/DL (ref 37–145)
LYMPHOCYTES ABSOLUTE: 1 K/UL (ref 1–5.1)
LYMPHOCYTES RELATIVE PERCENT: 19.5 %
MCH RBC QN AUTO: 31 PG (ref 26–34)
MCHC RBC AUTO-ENTMCNC: 32.4 G/DL (ref 31–36)
MCV RBC AUTO: 95.8 FL (ref 80–100)
MONOCYTES ABSOLUTE: 0.5 K/UL (ref 0–1.3)
MONOCYTES RELATIVE PERCENT: 9.8 %
NEUTROPHILS ABSOLUTE: 3.6 K/UL (ref 1.7–7.7)
NEUTROPHILS RELATIVE PERCENT: 67.5 %
PDW BLD-RTO: 14 % (ref 12.4–15.4)
PLATELET # BLD: 239 K/UL (ref 135–450)
PMV BLD AUTO: 8.6 FL (ref 5–10.5)
POTASSIUM SERPL-SCNC: 4.7 MMOL/L (ref 3.5–5.1)
RBC # BLD: 3.9 M/UL (ref 4–5.2)
SODIUM BLD-SCNC: 144 MMOL/L (ref 136–145)
TOTAL IRON BINDING CAPACITY: 324 UG/DL (ref 260–445)
TOTAL PROTEIN: 6.9 G/DL (ref 6.4–8.2)
TSH SERPL DL<=0.05 MIU/L-ACNC: 1.3 UIU/ML (ref 0.27–4.2)
VITAMIN B-12: 1467 PG/ML (ref 211–911)
VITAMIN D 25-HYDROXY: 38.6 NG/ML
WBC # BLD: 5.4 K/UL (ref 4–11)

## 2021-12-06 PROCEDURE — 36415 COLL VENOUS BLD VENIPUNCTURE: CPT

## 2021-12-06 PROCEDURE — 83540 ASSAY OF IRON: CPT

## 2021-12-06 PROCEDURE — 82607 VITAMIN B-12: CPT

## 2021-12-06 PROCEDURE — 82306 VITAMIN D 25 HYDROXY: CPT

## 2021-12-06 PROCEDURE — 83550 IRON BINDING TEST: CPT

## 2021-12-06 PROCEDURE — 82728 ASSAY OF FERRITIN: CPT

## 2021-12-06 PROCEDURE — 99212 OFFICE O/P EST SF 10 MIN: CPT | Performed by: ORTHOPAEDIC SURGERY

## 2021-12-06 PROCEDURE — 84443 ASSAY THYROID STIM HORMONE: CPT

## 2021-12-06 PROCEDURE — 80053 COMPREHEN METABOLIC PANEL: CPT

## 2021-12-06 PROCEDURE — 85025 COMPLETE CBC W/AUTO DIFF WBC: CPT

## 2021-12-06 NOTE — PROGRESS NOTES
INJECTION WITH FLUOROSCOPY performed by Tish Patel MD at [address]  10/10/2018: [place] ESOPHAGOGASTRODUODENOSCOPY TRANSORAL DIAGNOSTIC; N/A      Comment:  EGD performed by Cesia Gaines MD at [address]: [facility]      Comment:  Fibroids  6/1/2021: TOTAL KNEE ARTHROPLASTY; Left      Comment:  LEFT TOTAL KNEE ARTHROPLASTY performed by Whit Jackson MD at [place] [address] of patient's family history indicates:  Problem: Diabetes      Relation: Father          Age of Onset: (Not Specified)          Comment: Type II  Problem: Diabetes      Relation: Sister          Age of Onset: (Not Specified)          Comment: Type II  Problem: Diabetes      Relation: Brother          Age of Onset: (Not Specified)          Comment: Type II  Problem: Breast Cancer      Relation: Sister          Age of Onset: 58  Problem: Lung Cancer      Relation: Mother          Age of Onset: (Not Specified)          Comment: Smoker  Problem: Heart Disease      Relation: Sister          Age of Onset: (Not Specified)          Comment: Bicuspid aortic valve x2      Social History    Socioeconomic History      Marital status:        Spouse name: None      Number of children: None      Years of education: None      Highest education level: None    Occupational History      Occupation: Computer work    Tobacco Use      Smoking status: Never Smoker      Smokeless tobacco: Never Used    Vaping Use      Vaping Use: Never used    Substance and Sexual Activity      Alcohol use: No      Drug use: No      Sexual activity: Never    Other Topics      Concerns:        None    Social History Narrative      None    Social Determinants of Health  Financial Resource Strain: Low Risk       Difficulty of Paying Living Expenses: Not hard at all  Food Insecurity: No Food Insecurity      Worried About [employer] [employer] in the Last Year: Never true      Ran Out of Food in the Last Year: Never true  Transportation Needs: No Transportation Needs      Lack of Transportation (Medical): No      Lack of Transportation (Non-Medical): No  Physical Activity:       Days of Exercise per Week: Not on file      Minutes of Exercise per Session: Not on file  Stress:       Feeling of Stress : Not on file  Social Connections:       Frequency of Communication with Friends and Family: Not on file      Frequency of Social Gatherings with Friends and Family: Not on file      Attends Rastafari Services: Not on file      Active Member of Clubs or Organizations: Not on file      Attends Club or Organization Meetings: Not on file      Marital Status: Not on file  Intimate Partner Violence: Not At Risk      Fear of Current or Ex-Partner: No      Emotionally Abused: No      Physically Abused:  No      Sexually Abused: No  Housing Stability: Unknown      Unable to Pay for Housing in the Last Year: No      Number of Places Lived in the Last Year: Not on file      Unstable Housing in the Last Year: No    Current Outpatient Medications:  ALPRAZolam (XANAX) 1 MG tablet, TAKE 1 TABLET BY MOUTH TWICE DAILY AS NEEDED FOR ANXIETY, Disp: 35 tablet, Rfl: 2  traZODone (DESYREL) 50 MG tablet, TAKE 3 TABLETS BY MOUTH EVERY NIGHT AT BEDTIME, Disp: 270 tablet, Rfl: 1  omeprazole (PRILOSEC) 40 MG delayed release capsule, TAKE 1 CAPSULE BY MOUTH EVERY MORNING BEFORE BREAKFAST, Disp: 30 capsule, Rfl: 5  ondansetron (ZOFRAN) 4 MG tablet, Take 1 tablet by mouth every 8 hours as needed for Nausea or Vomiting, Disp: 30 tablet, Rfl: 0  levothyroxine (SYNTHROID) 150 MCG tablet, TAKE 1 TABLET BY MOUTH EVERY DAY, Disp: 90 tablet, Rfl: 1  gabapentin (NEURONTIN) 300 MG capsule, TAKE 1 CAPSULE BY MOUTH TWICE DAILY, Disp: 60 capsule, Rfl: 5  senna (SENOKOT) 8.6 MG tablet, Take 1 tablet by mouth daily , Disp: , Rfl:   buPROPion (WELLBUTRIN XL) 150 MG extended release tablet, Take 1 tablet by mouth every morning, Disp: 90 tablet, Rfl: 1  Multiple Vitamin (MULTIVITAMIN PO), Take by mouth, Disp: , Rfl:   melatonin 5 MG TABS tablet, Take 5 mg by mouth daily, Disp: , Rfl:   Cyanocobalamin (VITAMIN B 12 PO), Take 500 mcg by mouth daily , Disp: , Rfl:   Cholecalciferol (VITAMIN D) 1000 UNIT CAPS, Take 2 capsules by mouth daily , Disp: , Rfl:   aspirin 81 MG EC tablet, Take 4 tablets by mouth daily, Disp: , Rfl:     No current facility-administered medications for this visit. -- Macrobid [Nitrofurantoin] -- Other (See Comments)    --  Fever, myalgias   -- Codeine -- Nausea Only    VITAL SIGNS:  Ht 5' 2.5\" (1.588 m)   Wt 165 lb (74.8 kg)   BMI 29.70 kg/m²   On examination today she has 0 to about 115 degrees range of motion. There is no warmth, erythema, or effusion. There is no retinacular tenderness and no lateral joint line tenderness. She has a little bit of tenderness anteromedial joint line but she has good medial lateral stability and good patellar tracking. She still does have a quadriceps atrophy compared to her opposite side. There is no swelling of the left lower extremity. Impression: Doing well status post left total knee arthroplasty. Plan: We encourage her to continue some quadriceps strengthening on the side.

## 2021-12-07 RX ORDER — SODIUM CHLORIDE 0.9 % (FLUSH) 0.9 %
5-40 SYRINGE (ML) INJECTION ONCE
Status: CANCELLED | OUTPATIENT
Start: 2021-12-07 | End: 2021-12-07

## 2021-12-07 RX ORDER — ZOLEDRONIC ACID 5 MG/100ML
5 INJECTION, SOLUTION INTRAVENOUS ONCE
Status: CANCELLED | OUTPATIENT
Start: 2021-12-07 | End: 2021-12-07

## 2021-12-07 RX ORDER — SODIUM CHLORIDE 9 MG/ML
INJECTION, SOLUTION INTRAVENOUS ONCE
Status: CANCELLED | OUTPATIENT
Start: 2021-12-07 | End: 2021-12-07

## 2021-12-08 RX ORDER — BUPROPION HYDROCHLORIDE 150 MG/1
150 TABLET ORAL EVERY MORNING
Qty: 90 TABLET | Refills: 1 | Status: SHIPPED | OUTPATIENT
Start: 2021-12-08 | End: 2022-01-05 | Stop reason: SDUPTHER

## 2021-12-08 RX ORDER — LEVOTHYROXINE SODIUM 0.15 MG/1
TABLET ORAL
Qty: 90 TABLET | Refills: 1 | Status: SHIPPED | OUTPATIENT
Start: 2021-12-08 | End: 2022-05-04 | Stop reason: DRUGHIGH

## 2021-12-17 DIAGNOSIS — F51.04 PSYCHOPHYSIOLOGICAL INSOMNIA: ICD-10-CM

## 2021-12-17 DIAGNOSIS — F41.9 ANXIETY: ICD-10-CM

## 2021-12-17 RX ORDER — ALPRAZOLAM 1 MG/1
TABLET ORAL
Qty: 35 TABLET | Refills: 1 | Status: SHIPPED | OUTPATIENT
Start: 2021-12-17 | End: 2022-02-11

## 2022-01-04 ENCOUNTER — PATIENT MESSAGE (OUTPATIENT)
Dept: INTERNAL MEDICINE CLINIC | Age: 72
End: 2022-01-04

## 2022-01-04 ENCOUNTER — CLINICAL DOCUMENTATION (OUTPATIENT)
Dept: SPIRITUAL SERVICES | Age: 72
End: 2022-01-04

## 2022-01-04 NOTE — ACP (ADVANCE CARE PLANNING)
Advance Care Planning   Advance Care Planning Note  Ambulatory Spiritual Care Services    Date:  1/4/2022    Received request from [facility] Provider. Consultation conversation participants:   Patient who understands ACP conversation     Goals of ACP Conversation:  Discuss advance care planning documents  Facilitate a discussion related to patient's goals of care as they align with the patient's values and beliefs. Health Care Decision Makers:      Primary Decision Maker: Danny Cantu Child - [phone]  Secondary Decision Maker: Soha Plasencia - Brother - [phone]  Supplemental Decision Maker: Agustín Bravo - Sister - [phone]   Summary:  Verified Healthcare Decision Maker  Updated Healthcare Decision [facility]    Advance Care Planning Documents (Patient Wishes)  Currently on file:   None    Assessment:   Pt was very pleasant and stated she was working on the advance directives at home. She had no questions about the forms at this time. I told her to be sure to have 2 witnesses or a notary to watch her sign the documents. I encouraged her to either upload the completed forms in [employer] or bring them to Dr Kassi Torres office to be scanned into her chart. The pt updated her decision makers with me and expressed her care wishes as recorded below. She seemed to fully understand the questions and was very appreciative of the call. Interventions:  Provided education on documents for clarity and greater understanding    Care Preferences Communicated:     Hospitalization:  If the patient's health worsens and it becomes clear that the chance of recovery is unlikely,     the patient prefers comfort-focused treatment without hospitalization. Ventilation:   If the patient, in their present state of health, suddenly became very ill and unable to breathe on their own,     the patient would desire the use of a ventilator (breathing machine).     If their health worsens and it becomes clear that the change of recovery is unlikely,     the patient would NOT desire the use of a ventilator (breathing machine). Resuscitation:  In the event the patient's heart stopped as a result of an underlying serious health condition, the patient communicates a preference for      resuscitative attempts (CPR). Outcomes:  ACP Discussion: Completed  Will close this consult. Pt has our contact info if she needs further assistance. Patient / Healthcare Decision Maker Instructions:  Return a copy of Advance Directive Form(s) to medical office. Upload completed ACP document(s) in their [employer] ACP page.     Electronically signed by SUDEEP [address] on 1/4/2022 at 2:56 PM.

## 2022-01-05 ENCOUNTER — HOSPITAL ENCOUNTER (OUTPATIENT)
Dept: ONCOLOGY | Age: 72
Setting detail: INFUSION SERIES
Discharge: HOME | End: 2022-01-05
Payer: COMMERCIAL

## 2022-01-05 VITALS
RESPIRATION RATE: 16 BRPM | HEART RATE: 61 BPM | SYSTOLIC BLOOD PRESSURE: 128 MMHG | TEMPERATURE: 97.3 F | DIASTOLIC BLOOD PRESSURE: 65 MMHG

## 2022-01-05 DIAGNOSIS — M81.0 AGE RELATED OSTEOPOROSIS, UNSPECIFIED PATHOLOGICAL FRACTURE PRESENCE: Primary | ICD-10-CM

## 2022-01-05 PROCEDURE — 2580000003 HC RX 258: Performed by: INTERNAL MEDICINE

## 2022-01-05 PROCEDURE — 99211 OFF/OP EST MAY X REQ PHY/QHP: CPT

## 2022-01-05 PROCEDURE — 96365 THER/PROPH/DIAG IV INF INIT: CPT

## 2022-01-05 PROCEDURE — 6360000002 HC RX W HCPCS: Performed by: INTERNAL MEDICINE

## 2022-01-05 RX ORDER — SODIUM CHLORIDE 9 MG/ML
INJECTION, SOLUTION INTRAVENOUS ONCE
Status: CANCELLED | OUTPATIENT
Start: 2022-01-05 | End: 2022-01-05

## 2022-01-05 RX ORDER — ZOLEDRONIC ACID 5 MG/100ML
5 INJECTION, SOLUTION INTRAVENOUS ONCE
Status: COMPLETED | OUTPATIENT
Start: 2022-01-05 | End: 2022-01-05

## 2022-01-05 RX ORDER — ZOLEDRONIC ACID 5 MG/100ML
5 INJECTION, SOLUTION INTRAVENOUS ONCE
Status: CANCELLED | OUTPATIENT
Start: 2022-01-05 | End: 2022-01-05

## 2022-01-05 RX ORDER — SODIUM CHLORIDE 9 MG/ML
INJECTION, SOLUTION INTRAVENOUS ONCE
Status: COMPLETED | OUTPATIENT
Start: 2022-01-05 | End: 2022-01-05

## 2022-01-05 RX ORDER — SODIUM CHLORIDE 0.9 % (FLUSH) 0.9 %
5-40 SYRINGE (ML) INJECTION ONCE
Status: CANCELLED | OUTPATIENT
Start: 2022-01-05 | End: 2022-01-05

## 2022-01-05 RX ORDER — BUPROPION HYDROCHLORIDE 150 MG/1
150 TABLET ORAL EVERY MORNING
Qty: 90 TABLET | Refills: 1 | Status: SHIPPED | OUTPATIENT
Start: 2022-01-05 | End: 2022-05-03 | Stop reason: DRUGHIGH

## 2022-01-05 RX ORDER — SODIUM CHLORIDE 0.9 % (FLUSH) 0.9 %
5-40 SYRINGE (ML) INJECTION ONCE
Status: COMPLETED | OUTPATIENT
Start: 2022-01-05 | End: 2022-01-05

## 2022-01-05 RX ADMIN — ZOLEDRONIC ACID 5 MG: 0.05 INJECTION, SOLUTION INTRAVENOUS at 11:49

## 2022-01-05 RX ADMIN — Medication 10 ML: at 11:49

## 2022-01-05 RX ADMIN — SODIUM CHLORIDE: 9 INJECTION, SOLUTION INTRAVENOUS at 11:48

## 2022-01-05 NOTE — TELEPHONE ENCOUNTER
From: José Miguel Haque  To: Dr. Rick Vanessa  Sent: 1/4/2022 5:29 PM EST  Subject: Refill     Dr Lili Gutierrez I would like to start getting a 3 month supply Gabapentin 300mg capsules. My prescription is up for a refill.    Thank you

## 2022-01-05 NOTE — TELEPHONE ENCOUNTER
Last appointment: 11/23/2021  Next appointment: 2/24/2022  Last refill: 90 with 1 12/08/2021  Patient would like a 90 day supply

## 2022-01-06 RX ORDER — GABAPENTIN 300 MG/1
CAPSULE ORAL
Qty: 60 CAPSULE | Refills: 5 | Status: SHIPPED | OUTPATIENT
Start: 2022-01-06 | End: 2022-02-24 | Stop reason: SDUPTHER

## 2022-02-11 DIAGNOSIS — F51.04 PSYCHOPHYSIOLOGICAL INSOMNIA: ICD-10-CM

## 2022-02-11 DIAGNOSIS — F41.9 ANXIETY: ICD-10-CM

## 2022-02-11 RX ORDER — ALPRAZOLAM 1 MG/1
TABLET ORAL
Qty: 35 TABLET | Refills: 0 | Status: SHIPPED | OUTPATIENT
Start: 2022-02-11 | End: 2022-03-11

## 2022-02-23 NOTE — PROGRESS NOTES
Date of Visit:  2022    CC: Yue Bradshaw (: 1950) is a 73 y.o. female, established patient, here for evaluation/re-evaluation of the following medical concerns:    ASSESSMENT/PLAN:  Chronic bilateral low back pain with bilateral sciatica  Assessment & Plan:  Persistently worse over the past 3 months, so will refer to [facility]. Continue current doses of gabapentin and prn Tylenol for now. Orders:  -      [facility]  Neck pain, chronic  Assessment & Plan:  Stable, but can also be addressed at [address] [place] [address]. Orders:  -      [facility]  Psychophysiological insomnia  Assessment & Plan:  No longer responding to high dose trazodone and melatonin. Will try switching trazodone to Remeron 15 mg qd. Principles of good sleep hygiene discussed. Major depressive disorder with single episode, in partial remission [facility])  Assessment & Plan:  Worse, may be associated with recent sleep disturbance, so may benefit from addition of Remeron. Continue current dose of Wellbutrin XL- higher doses of this drug and multiple SSRIs have caused increased tremor in the past.  Formal counseling declined, but she feels comfortable talking to her  about external stressors. Anxiety  Assessment & Plan:  Stable. See plan for depression. Continue lowest effective dose of Xanax. Age-related osteoporosis without current pathological fracture  Assessment & Plan: Tolerated first dose of Reclast- repeat . Continue calcium and vitamin D supplements and weight-bearing exercise. Return in about 3 months (around 2022) for 30 MIN F/U.      Patient-Reported Vitals 2022   Patient-Reported Weight 165lb   Patient-Reported Height 5'2\"   Patient-Reported Systolic 833   Patient-Reported Diastolic 57   Patient-Reported Pulse 55   Patient-Reported Temperature -        Wt Readings from Last 3 Encounters:   21 165 lb (74.8 kg)   10/25/21 165 lb (74.8 kg)   21 174 lb (78.9 kg)     BP Readings from Last 3 Encounters:   01/05/22 128/65   07/26/21 114/68   07/16/21 (!) 106/59     Estimated body mass index is 29.7 kg/m² as calculated from the following:    Height as of 12/6/21: 5' 2.5\" (1.588 m). Weight as of 12/6/21: 165 lb (74.8 kg). HPI  Chronic Joint Pain:  Patient presents for follow-up of pain in back and neck- back pain worse over the past 3 months, no change in neck pain. S/p left TKR 5/8/62- no complications.  On average, pain is perceived as moderate-severe.  Current treatment: gabapentin 600 mg qhs, Tylenol prn.  Medication side effects: none. Diagnostic testing: MRI cervical and lumbar spine 12/18 per Dr. Antonio White- Multiple injections  provided only temporary relief.       Insomnia:  Current treatment: prescription sleep aid-  trazodone 150 mg qhs, melatonin 5 mg qhs prn, which has become much less effective- up until 3-4 am many nights.  No adverse effects.  Restless legs symptoms well-controlled.     Mood Disorder:  Patient presents for follow-up of depression and anxiety disorder. Current complaints include: excessive worry- a little worse, difficulty concentrating, impaired memory- waxes and wanes.  Has noticed increased anhedonia over the past 3 months. No tearfulness, depressed mood, feelings of worthlessness/guilt, panic attacks, feelings of hopelessness, irritability or suicidal ideation.  Symptoms/signs of jojo: none. Current treatment includes: Wellbutrin- 150 mg qam, takes Xanax about once in the evening for anxiety and occasionally during the day- infrequent.  Medication side effects: tremor of hands. Osteoporosis:  Received first Reclast injection 1/5/22- no adverse effects. Taking calcium and vitamin D consistently. Walking regularly. ROS  As documented above    Physical Exam  Constitutional:       General: She is not in acute distress. Appearance: She is well-developed. HENT:      Head: Normocephalic and atraumatic. Mouth/Throat:      Lips: No lesions. Neck:      Comments: No visible mass  Pulmonary:      Effort: Pulmonary effort is normal. No respiratory distress. Skin:     Comments: No rash, erythema or other discoloration on facial skin and exposed areas of neck and upper extremites    Neurological:      Mental Status: She is alert. Comments: No facial asymmetry (cranial nerve 7 motor function) or gaze palsy   Psychiatric:         Attention and Perception: Attention normal.         Mood and Affect: Mood normal.         Speech: Speech normal.         Behavior: Behavior normal.         Thought Content: Thought content normal.         Cognition and Memory: Cognition normal.           Penny Elmer, was evaluated through a synchronous (real-time) audio-video encounter. The patient (or guardian if applicable) is aware that this is a billable service, which includes applicable co-pays. This Virtual Visit was conducted with patient's (and/or legal guardian's) consent. The visit was conducted pursuant to the emergency declaration under the [facility], [address] [place] [address] authority and the [employer] General Act. Patient identification was verified, and a caregiver was present when appropriate. The patient was located at home in a state where the provider was licensed to provide care. --Nicolas Barrios MD on 2/24/2022 at 1:28 PM    An electronic signature was used to authenticate this note.

## 2022-02-24 ENCOUNTER — TELEMEDICINE (OUTPATIENT)
Dept: INTERNAL MEDICINE CLINIC | Age: 72
End: 2022-02-24
Payer: COMMERCIAL

## 2022-02-24 DIAGNOSIS — M54.2 NECK PAIN, CHRONIC: ICD-10-CM

## 2022-02-24 DIAGNOSIS — F41.9 ANXIETY: ICD-10-CM

## 2022-02-24 DIAGNOSIS — M81.0 AGE-RELATED OSTEOPOROSIS WITHOUT CURRENT PATHOLOGICAL FRACTURE: ICD-10-CM

## 2022-02-24 DIAGNOSIS — F32.4 MAJOR DEPRESSIVE DISORDER WITH SINGLE EPISODE, IN PARTIAL REMISSION (HCC): ICD-10-CM

## 2022-02-24 DIAGNOSIS — G89.29 CHRONIC BILATERAL LOW BACK PAIN WITH BILATERAL SCIATICA: Primary | ICD-10-CM

## 2022-02-24 DIAGNOSIS — G89.29 NECK PAIN, CHRONIC: ICD-10-CM

## 2022-02-24 DIAGNOSIS — M54.42 CHRONIC BILATERAL LOW BACK PAIN WITH BILATERAL SCIATICA: Primary | ICD-10-CM

## 2022-02-24 DIAGNOSIS — F51.04 PSYCHOPHYSIOLOGICAL INSOMNIA: ICD-10-CM

## 2022-02-24 DIAGNOSIS — M54.41 CHRONIC BILATERAL LOW BACK PAIN WITH BILATERAL SCIATICA: Primary | ICD-10-CM

## 2022-02-24 PROCEDURE — 99214 OFFICE O/P EST MOD 30 MIN: CPT | Performed by: INTERNAL MEDICINE

## 2022-02-24 RX ORDER — PHENOL 1.4 %
1 AEROSOL, SPRAY (ML) MUCOUS MEMBRANE DAILY
COMMUNITY

## 2022-02-24 RX ORDER — MIRTAZAPINE 15 MG/1
15 TABLET, FILM COATED ORAL NIGHTLY
Qty: 30 TABLET | Refills: 5 | Status: SHIPPED | OUTPATIENT
Start: 2022-02-24 | End: 2022-04-07 | Stop reason: SINTOL

## 2022-02-24 RX ORDER — GABAPENTIN 300 MG/1
CAPSULE ORAL
Qty: 180 CAPSULE | Refills: 1 | Status: SHIPPED | OUTPATIENT
Start: 2022-02-24 | End: 2022-09-14

## 2022-02-24 NOTE — ASSESSMENT & PLAN NOTE
Persistently worse over the past 3 months, so will refer to [facility]. Continue current doses of gabapentin and prn Tylenol for now.

## 2022-02-24 NOTE — ASSESSMENT & PLAN NOTE
Tolerated first dose of Reclast- repeat 1/23. Continue calcium and vitamin D supplements and weight-bearing exercise.

## 2022-02-24 NOTE — ASSESSMENT & PLAN NOTE
No longer responding to high dose trazodone and melatonin. Will try switching trazodone to Remeron 15 mg qd. Principles of good sleep hygiene discussed.

## 2022-02-24 NOTE — ASSESSMENT & PLAN NOTE
Worse, may be associated with recent sleep disturbance, so may benefit from addition of Remeron. Continue current dose of Wellbutrin XL- higher doses of this drug and multiple SSRIs have caused increased tremor in the past.  Formal counseling declined, but she feels comfortable talking to her  about external stressors.

## 2022-03-08 RX ORDER — OMEPRAZOLE 40 MG/1
CAPSULE, DELAYED RELEASE ORAL
Qty: 30 CAPSULE | Refills: 5 | Status: SHIPPED | OUTPATIENT
Start: 2022-03-08 | End: 2022-09-05

## 2022-03-10 DIAGNOSIS — F41.9 ANXIETY: ICD-10-CM

## 2022-03-10 DIAGNOSIS — F51.04 PSYCHOPHYSIOLOGICAL INSOMNIA: ICD-10-CM

## 2022-03-11 RX ORDER — BUPROPION HYDROCHLORIDE 300 MG/1
TABLET ORAL
Qty: 90 TABLET | Refills: 1 | Status: SHIPPED | OUTPATIENT
Start: 2022-03-11 | End: 2022-09-09

## 2022-03-11 RX ORDER — ALPRAZOLAM 1 MG/1
TABLET ORAL
Qty: 35 TABLET | Refills: 1 | Status: SHIPPED | OUTPATIENT
Start: 2022-03-11 | End: 2022-05-11

## 2022-03-14 ENCOUNTER — E-VISIT (OUTPATIENT)
Dept: INTERNAL MEDICINE CLINIC | Age: 72
End: 2022-03-14
Payer: COMMERCIAL

## 2022-03-14 DIAGNOSIS — J06.9 VIRAL URI: Primary | ICD-10-CM

## 2022-03-14 PROCEDURE — 99421 OL DIG E/M SVC 5-10 MIN: CPT | Performed by: INTERNAL MEDICINE

## 2022-03-14 ASSESSMENT — LIFESTYLE VARIABLES: SMOKING_STATUS: NO, I'VE NEVER SMOKED

## 2022-03-14 NOTE — PROGRESS NOTES
HPI: as per patient provided history  Exam: N/A (electronic visit)  ASSESSMENT/PLAN:  1. Viral URI  Supportive care guidelines provided. COVID testing and isolation recommended    Patient instructed to call the office if worsens, or fails to improve as anticipated. 5-10 minutes were spent on the digital evaluation and management of this patient.

## 2022-04-07 DIAGNOSIS — F51.04 PSYCHOPHYSIOLOGICAL INSOMNIA: ICD-10-CM

## 2022-04-07 RX ORDER — TRAZODONE HYDROCHLORIDE 50 MG/1
TABLET ORAL
Qty: 270 TABLET | Refills: 1 | Status: SHIPPED | OUTPATIENT
Start: 2022-04-07 | End: 2022-07-11

## 2022-04-28 NOTE — PROGRESS NOTES
Date of Visit:  5/3/2022    CC: Joyce López (: 1950) is a 73 y.o. female, established patient, here for evaluation/re-evaluation of the following medical concerns:    ASSESSMENT/PLAN:  Chronic bilateral low back pain with bilateral sciatica  Assessment & Plan:  No improvenent over the past 3 months, so she will make appointment at one of the [facility]. Continue current doses of gabapentin and prn Tylenol for now. Neck pain, chronic  Assessment & Plan:  See plan for low back pain. Stage 3a chronic kidney disease ([facility] [address]  Assessment & Plan:  Suspect due to prior NSAID use, which she will avoid in the future. If worsens further, will obtain renal US. Orders:  -     Comprehensive Metabolic Panel, Fasting; Future  Psychophysiological insomnia  Assessment & Plan:  Stable on current doses of trazodone and melatonin- continue. Suspect residual symptoms due to musculoskeletal pain, which [facility]. If symptoms worsen, patient would be willing to see sleep specialist.  Major depressive disorder with single episode, in partial remission ([facility] [address]  Assessment & Plan:  Improved on higher dose of Wellbutrin XL- continue. Anxiety  Assessment & Plan:  Stable. See plan for depression. Continue lowest effective dose of Xanax. Gastroesophageal reflux disease, unspecified whether esophagitis present  Assessment & Plan:  Well-controlled on current dose of Omeprazole- continue. Acquired hypothyroidism  Assessment & Plan:  Clinically euthyroid, but will adjust levothyroxine dose if indicated by lab results. Orders:  -     TSH; Future  Urinary incontinence, unspecified type  Assessment & Plan:  Worse over the past few months- suspect OA bladder vs complication of DDD of lumbosacral spine. No symptoms suggesting infection, but she will call if frequency, dysuria or hematuria develop.   Await input from urogynecology and spine specialist.  Orders:  -     [facility] Summers [facility]  Class 1 obesity due to excess calories with serious comorbidity and body mass index (BMI) of 32.0 to 32.9 in adult  Assessment & Plan:  Worse. Patient was asked about her current diet and exercise habits, and personalized advice was provided regarding recommended lifestyle changes. Patient's comorbid health conditions associated with elevated BMI were discussed, as well as the likely benefits of weight loss. Based upon patient's motivation to change her behavior, the following plan was agreed upon to work toward a weight loss goal of 30 pounds: she has restarted Weight Watcher's diet, and she will increase her activity level as musculoskeletal symptoms. Educational materials for  weight loss were provided. Patient will follow-up in 3 month(s) with PCP. Provider spent 15 minutes counseling patient. Body mass index 32.0-32.9, adult  -     Obesity Counseling, 15 Minutes     Return in about 3 months (around 8/3/2022) for 30 MIN F/U- Video. Vitals:    05/03/22 1259   BP: 128/64   Pulse: 71   SpO2: 98%   Weight: 181 lb 3.2 oz (82.2 kg)      Estimated body mass index is 32.61 kg/m² as calculated from the following:    Height as of 12/6/21: 5' 2.5\" (1.588 m). Weight as of this encounter: 181 lb 3.2 oz (82.2 kg). Wt Readings from Last 3 Encounters:   05/03/22 181 lb 3.2 oz (82.2 kg)   12/06/21 165 lb (74.8 kg)   10/25/21 165 lb (74.8 kg)     BP Readings from Last 3 Encounters:   05/03/22 128/64   01/05/22 128/65   07/26/21 114/68     HPI  Chronic Joint Pain:  Patient presents for follow-up of pain in back and neck- back pain waxing and waning over the past 3 months, no change in neck pain. S/p left TKR 8/4/83- no complications.  On average, pain is perceived as moderate.  Current treatment: gabapentin 600 mg qhs, Tylenol prn.  Medication side effects: none.   Diagnostic testing: MRI cervical and lumbar spine 12/18 per Dr. Dimitri Pal- Multiple injections  provided only temporary relief.       Insomnia:  Current treatment: prescription sleep aid-  trazodone 150 mg qhs, melatonin 5 mg qhs prn- sleeps well 3-4 nights/week.  Other nights, has difficulty staying asleep due to musculoskeletal issues. No adverse effects with medications.  Restless legs symptoms intermittent. Did not tolerate Remeron.     Mood Disorder:  Patient presents for follow-up of depression and anxiety disorder. Current complaints include: excessive worry, difficulty concentrating, impaired memory- improved overall on higher dose of Wellbutrin XL.   No anhedonia, tearfulness, depressed mood, feelings of worthlessness/guilt, panic attacks, feelings of hopelessness, irritability or suicidal ideation.  Symptoms/signs of jojo: none. Current treatment includes: Wellbutrin- 300 mg qam, takes Xanax about once in the evening for anxiety and occasionally during the day- infrequent.  Medication side effects: mild tremor of hands. GERD:  Currently treated with proton pump inhibitor: omeprazole 40 mg, which has been effective. She has been using this therapy daily. Patient denies dysphagia, cough, hoarseness, chest pain, unintentional weight loss, N/V, bloating, early satiety or change in bowel habits. ROS  As documented above    Physical Exam  Constitutional:       General: She is not in acute distress. Appearance: She is well-developed. Eyes:      Conjunctiva/sclera: Conjunctivae normal.   Neck:      Thyroid: No thyroid mass or thyromegaly. Cardiovascular:      Rate and Rhythm: Normal rate and regular rhythm. Heart sounds: Murmur heard. Systolic (LLSB to apex) murmur is present with a grade of 2/6. No friction rub. No gallop. Pulmonary:      Effort: Pulmonary effort is normal. No respiratory distress. Breath sounds: Normal breath sounds. No wheezing, rhonchi or rales. Abdominal:      General: Abdomen is flat. Tenderness: There is no abdominal tenderness.  There is no right CVA tenderness, left CVA tenderness or guarding. Musculoskeletal:      Right lower leg: No edema. Left lower leg: No edema. Comments: Spine exam deferred to specialist   Lymphadenopathy:      Cervical: No cervical adenopathy. Skin:     General: Skin is warm and dry. Findings: No erythema or rash. Neurological:      Mental Status: She is alert and oriented to person, place, and time. Psychiatric:         Speech: Speech normal.         Behavior: Behavior normal.         Thought Content: Thought content normal.         Judgment: Judgment normal.           --Carley Soto MD on 5/3/2022 at 5:08 PM    An electronic signature was used to authenticate this note.

## 2022-05-03 ENCOUNTER — OFFICE VISIT (OUTPATIENT)
Dept: INTERNAL MEDICINE CLINIC | Age: 72
End: 2022-05-03
Payer: COMMERCIAL

## 2022-05-03 VITALS
HEART RATE: 71 BPM | SYSTOLIC BLOOD PRESSURE: 128 MMHG | BODY MASS INDEX: 32.61 KG/M2 | DIASTOLIC BLOOD PRESSURE: 64 MMHG | WEIGHT: 181.2 LBS | OXYGEN SATURATION: 98 %

## 2022-05-03 DIAGNOSIS — K21.9 GASTROESOPHAGEAL REFLUX DISEASE, UNSPECIFIED WHETHER ESOPHAGITIS PRESENT: ICD-10-CM

## 2022-05-03 DIAGNOSIS — R32 URINARY INCONTINENCE, UNSPECIFIED TYPE: ICD-10-CM

## 2022-05-03 DIAGNOSIS — M54.41 CHRONIC BILATERAL LOW BACK PAIN WITH BILATERAL SCIATICA: Primary | ICD-10-CM

## 2022-05-03 DIAGNOSIS — E03.9 ACQUIRED HYPOTHYROIDISM: Chronic | ICD-10-CM

## 2022-05-03 DIAGNOSIS — M54.2 NECK PAIN, CHRONIC: ICD-10-CM

## 2022-05-03 DIAGNOSIS — M54.42 CHRONIC BILATERAL LOW BACK PAIN WITH BILATERAL SCIATICA: Primary | ICD-10-CM

## 2022-05-03 DIAGNOSIS — N18.31 STAGE 3A CHRONIC KIDNEY DISEASE (HCC): ICD-10-CM

## 2022-05-03 DIAGNOSIS — F51.04 PSYCHOPHYSIOLOGICAL INSOMNIA: ICD-10-CM

## 2022-05-03 DIAGNOSIS — G89.29 CHRONIC BILATERAL LOW BACK PAIN WITH BILATERAL SCIATICA: Primary | ICD-10-CM

## 2022-05-03 DIAGNOSIS — G89.29 NECK PAIN, CHRONIC: ICD-10-CM

## 2022-05-03 DIAGNOSIS — F41.9 ANXIETY: ICD-10-CM

## 2022-05-03 DIAGNOSIS — F32.4 MAJOR DEPRESSIVE DISORDER WITH SINGLE EPISODE, IN PARTIAL REMISSION (HCC): ICD-10-CM

## 2022-05-03 DIAGNOSIS — E66.09 CLASS 1 OBESITY DUE TO EXCESS CALORIES WITH SERIOUS COMORBIDITY AND BODY MASS INDEX (BMI) OF 32.0 TO 32.9 IN ADULT: ICD-10-CM

## 2022-05-03 PROCEDURE — G0447 BEHAVIOR COUNSEL OBESITY 15M: HCPCS | Performed by: INTERNAL MEDICINE

## 2022-05-03 PROCEDURE — 99214 OFFICE O/P EST MOD 30 MIN: CPT | Performed by: INTERNAL MEDICINE

## 2022-05-03 NOTE — ASSESSMENT & PLAN NOTE
Worse. Patient was asked about her current diet and exercise habits, and personalized advice was provided regarding recommended lifestyle changes. Patient's comorbid health conditions associated with elevated BMI were discussed, as well as the likely benefits of weight loss. Based upon patient's motivation to change her behavior, the following plan was agreed upon to work toward a weight loss goal of 30 pounds: she has restarted Weight Watcher's diet, and she will increase her activity level as musculoskeletal symptoms. Educational materials for  weight loss were provided. Patient will follow-up in 3 month(s) with PCP. Provider spent 15 minutes counseling patient.

## 2022-05-03 NOTE — ASSESSMENT & PLAN NOTE
No improvenent over the past 3 months, so she will make appointment at one of the [facility]. Continue current doses of gabapentin and prn Tylenol for now.

## 2022-05-03 NOTE — ASSESSMENT & PLAN NOTE
Stable on current doses of trazodone and melatonin- continue. Suspect residual symptoms due to musculoskeletal pain, which will be [facility] by [facility].   If symptoms worsen, patient would be willing to see sleep specialist.

## 2022-05-03 NOTE — PATIENT INSTRUCTIONS
A member of the [address] [place] [address] will contact you within 2 business days to help schedule your follow-up appointment.  If you have questions, or would like to schedule sooner, please call [phone]. 90 [facility] Locations:              Ted Huizar., Leola Kumar., José, [address] [place] [address]., José, [address] [facility] [address].[place] [address]              [address], [address]

## 2022-05-03 NOTE — ASSESSMENT & PLAN NOTE
Worse over the past few months- suspect OA bladder vs complication of DDD of lumbosacral spine. No symptoms suggesting infection, but she will call if frequency, dysuria or hematuria develop.   Await input from urogynecology and spine specialist.

## 2022-05-03 NOTE — ASSESSMENT & PLAN NOTE
Suspect due to prior NSAID use, which she will avoid in the future. If worsens further, will obtain renal US.

## 2022-05-04 RX ORDER — LEVOTHYROXINE SODIUM 175 UG/1
175 TABLET ORAL DAILY
Qty: 90 TABLET | Refills: 1 | Status: SHIPPED | OUTPATIENT
Start: 2022-05-04

## 2022-05-11 DIAGNOSIS — F51.04 PSYCHOPHYSIOLOGICAL INSOMNIA: ICD-10-CM

## 2022-05-11 DIAGNOSIS — F41.9 ANXIETY: ICD-10-CM

## 2022-05-11 RX ORDER — ALPRAZOLAM 1 MG/1
TABLET ORAL
Qty: 35 TABLET | Refills: 0 | Status: SHIPPED | OUTPATIENT
Start: 2022-05-11 | End: 2022-06-10

## 2022-05-24 ENCOUNTER — OFFICE VISIT (OUTPATIENT)
Dept: UROGYNECOLOGY | Age: 72
End: 2022-05-24
Payer: COMMERCIAL

## 2022-05-24 VITALS
DIASTOLIC BLOOD PRESSURE: 77 MMHG | SYSTOLIC BLOOD PRESSURE: 120 MMHG | OXYGEN SATURATION: 97 % | HEART RATE: 77 BPM | TEMPERATURE: 98.3 F | RESPIRATION RATE: 15 BRPM

## 2022-05-24 DIAGNOSIS — R39.15 URGENCY OF URINATION: ICD-10-CM

## 2022-05-24 DIAGNOSIS — N39.41 URGE INCONTINENCE: Primary | ICD-10-CM

## 2022-05-24 DIAGNOSIS — R35.0 URINARY FREQUENCY: ICD-10-CM

## 2022-05-24 LAB
BILIRUBIN, POC: NORMAL
BLOOD URINE, POC: NORMAL
CLARITY, POC: CLEAR
COLOR, POC: YELLOW
EMPTY COUGH STRESS TEST: NORMAL
FIRST SENSATION: 10 CC
FULL COUGH STRESS TEST: NORMAL
GLUCOSE URINE, POC: NORMAL
KETONES, POC: NORMAL
LEUKOCYTE EST, POC: NORMAL
MAX SENSATION: 90 CC
NITRATE, URINE POC: NORMAL
NITRITE, POC: NORMAL
PH, POC: 6.5
POST VOID RESIDUAL (PVR): 80 ML
PROTEIN, POC: NORMAL
RBC URINE, POC: NORMAL
SECOND SENSATION: 65 CC
SPASM: NORMAL
SPECIFIC GRAVITY, POC: 1.01
UROBILINOGEN, POC: NORMAL
WBC URINE, POC: NORMAL

## 2022-05-24 PROCEDURE — 99203 OFFICE O/P NEW LOW 30 MIN: CPT | Performed by: OBSTETRICS & GYNECOLOGY

## 2022-05-24 PROCEDURE — 51725 SIMPLE CYSTOMETROGRAM: CPT | Performed by: OBSTETRICS & GYNECOLOGY

## 2022-05-24 PROCEDURE — 81002 URINALYSIS NONAUTO W/O SCOPE: CPT | Performed by: OBSTETRICS & GYNECOLOGY

## 2022-05-24 RX ORDER — CLOBETASOL PROPIONATE 0.5 MG/G
OINTMENT TOPICAL
COMMUNITY
Start: 2022-03-11

## 2022-05-24 RX ORDER — SOLIFENACIN SUCCINATE 10 MG/1
10 TABLET, FILM COATED ORAL DAILY
Qty: 30 TABLET | Refills: 1 | Status: SHIPPED | OUTPATIENT
Start: 2022-05-24

## 2022-05-24 RX ORDER — ESTRADIOL 0.1 MG/G
0.25 CREAM VAGINAL DAILY
Qty: 30 G | Refills: 0 | Status: SHIPPED | OUTPATIENT
Start: 2022-05-24

## 2022-05-24 ASSESSMENT — ENCOUNTER SYMPTOMS
BACK PAIN: 1
NAUSEA: 1
EYE ITCHING: 1

## 2022-05-24 NOTE — PROGRESS NOTES
2022      HPI:     Name: Sean Carter  YOB: 1950    CC: Patient is a 73 y.o. female who is seen in consultation from Maria Dolores Arias MD   for evaluation of lower pelvic discomfort and loss of bladder control. HPI:  Bladder control problem: Yes   How many months have you had a bladder problem? 4 months  Do you use pads to absorb lost urine? No.   How many trips do you make to the bathroom during the day? 3-4  How many times do you wake at night to go to the bathroom? 1  Do you ever wet the bed while asleep? No  Are there times when you cannot make it to the bathroom on time? Yes  Does sound, sight, or feel of running water cause you to lose urine? No  How many ounces of liquid do you consume daily? 4 oz  How many drinks containing caffeine do you consume daily? 2  Which best describes urine loss: (Check all that apply)   [x] I lose urine during coughing, sneezing, running, lifting   [] I lose urine with changes in posture, standing, walking   [] I lose urine continuously such that I am constantly wet   [x] I have sudden, urgent needs without the ability to make it to the bathroom  Have you seen a physician for complaints of urine loss? No   Have you taken medication to prevent urine loss? No     Bladder emptying problems:  No  Prolapse/Vaginal Support problems: No   Bowel problem(s): No   Sexual History:  reports previously being sexually active. Pelvic Pain:  Yes  Where is your pain? Pelvic area  How long have you had pelvic pain? 4 months  Is your pain relieved by bladder emptying? No  Do you have pain with urination? No  Does anything relieve the pain?  No         Ob/Gyn History:    OB History    Para Term  AB Living   1  1     1   SAB IAB Ectopic Molar Multiple Live Births                    # Outcome Date GA Lbr Arcenio/2nd Weight Sex Delivery Anes PTL Lv   1 Term      Vag-Spont        Past Medical History:   Past Medical History:   Diagnosis Date    Abdominal wall abscess     Acute pyelonephritis 7/8/2021    Age related osteoporosis     Anal fissure 7/16/2013    Anxiety     Colon polyps     Depression     E coli bacteremia     Fibroid uterus     Hypothyroidism     Morbid obesity ([address]     Osteoarthritis of left knee 1/26/2016    Osteopenia     Urinary incontinence 5/3/2022     Past Surgical History:   Past Surgical History:   Procedure Laterality Date    ABDOMINAL HERNIA REPAIR  2008    APPENDECTOMY  Age 27    CATARACT REMOVAL WITH IMPLANT Right 10/23/2017    with vitrectomy    CATARACT REMOVAL WITH IMPLANT Left 02/26/2018    with vitrectomy    COLONOSCOPY N/A 10/10/2018    COLONOSCOPY POLYPECTOMY SNARE/COLD BIOPSY performed by James Aaron MD at [address] Right 8/26/2019    RIGHT L4 AND L5 TRANSFORAMINAL EPIDURAL STEROID INJECTION WITH FLUOROSCOPY performed by Fernanda Henao MD at [facility]    HC INJECT OTHER PERPHRL NERV Right 10/21/2019    RIGHT PIRIFORMIS INJECTION WITH FLUOROSCOPY (63651) performed by Fernanda Henao MD at [facility], TOTAL ABDOMINAL      ovaries out also   [address] Right 1/7/2019    RIGHT L3 AND L4 LUMBAR TRANSFORAMINAL WITH FLUOROSCOPY performed by Fernanda Henao MD at [facility] Right 5/15/2019    RIGHT L4 AND L5 TRANSFORAMINAL EPIDURAL STEROID INJECTION WITH FLUOROSCOPY performed by Fernanda Henao MD at [facility] ESOPHAGOGASTRODUODENOSCOPY TRANSORAL DIAGNOSTIC N/A 10/10/2018    EGD performed by James Aaron MD at [address]    Fibroids    TOTAL KNEE ARTHROPLASTY Left 6/1/2021    LEFT TOTAL KNEE ARTHROPLASTY performed by Ricco Robert MD at [address]     Allergies:    Allergies   Allergen Reactions    Macrobid [Nitrofurantoin] Other (See Comments)     Fever, myalgias    Codeine Nausea Only     Current Medications:  Current Outpatient Medications Medication Sig Dispense Refill    clobetasol (TEMOVATE) 0.05 % ointment       estradiol (ESTRACE VAGINAL) 0.1 MG/GM vaginal cream Place 0.25 g vaginally daily Apply 0.25g with fingertip to the vaginal opening every night at bedtime for 2 weeks, then decrease to twice weekly. 30 g 0    solifenacin (VESICARE) 10 MG tablet Take 1 tablet by mouth daily 30 tablet 1    ALPRAZolam (XANAX) 1 MG tablet TAKE 1 TABLET BY MOUTH TWICE DAILY AS NEEDED FOR ANXIETY 35 tablet 0    levothyroxine (SYNTHROID) 175 MCG tablet Take 1 tablet by mouth daily 90 tablet 1    traZODone (DESYREL) 50 MG tablet TAKE 3 TABLETS BY MOUTH EVERY NIGHT AT BEDTIME 270 tablet 1    buPROPion (WELLBUTRIN XL) 300 MG extended release tablet TAKE 1 TABLET BY MOUTH EVERY MORNING 90 tablet 1    omeprazole (PRILOSEC) 40 MG delayed release capsule TAKE 1 CAPSULE BY MOUTH EVERY MORNING BEFORE BREAKFAST 30 capsule 5    calcium carbonate 600 MG TABS tablet Take 1 tablet by mouth daily      gabapentin (NEURONTIN) 300 MG capsule TAKE 1 CAPSULE BY MOUTH TWICE DAILY 180 capsule 1    ondansetron (ZOFRAN) 4 MG tablet Take 1 tablet by mouth every 8 hours as needed for Nausea or Vomiting 30 tablet 0    Multiple Vitamin (MULTIVITAMIN PO) Take by mouth      melatonin 5 MG TABS tablet Take 5 mg by mouth daily      Cyanocobalamin (VITAMIN B 12 PO) Take 500 mcg by mouth daily       Cholecalciferol (VITAMIN D) 1000 UNIT CAPS Take 2 capsules by mouth daily       aspirin 81 MG EC tablet Take 4 tablets by mouth daily       No current facility-administered medications for this visit. Social History:   Social History     Socioeconomic History    Marital status:       Spouse name: Not on file    Number of children: Not on file    Years of education: Not on file    Highest education level: Not on file   Occupational History    Occupation: Computer work   Tobacco Use    Smoking status: Never Smoker    Smokeless tobacco: Never Used   Vaping Use    Vaping Use: Never used   Substance and Sexual Activity    Alcohol use: No    Drug use: No    Sexual activity: Not Currently   Other Topics Concern    Not on file   Social History Narrative    Not on file     Social Determinants of Health     Financial Resource Strain:     Difficulty of Paying Living Expenses: Not on file   Food Insecurity:     Worried About Running Out of Food in the Last Year: Not on file    Sy of Food in the Last Year: Not on file   Transportation Needs:     Lack of Transportation (Medical): Not on file    Lack of Transportation (Non-Medical): Not on file   Physical Activity:     Days of Exercise per Week: Not on file    Minutes of Exercise per Session: Not on file   Stress:     Feeling of Stress : Not on file   Social Connections:     Frequency of Communication with Friends and Family: Not on file    Frequency of Social Gatherings with Friends and Family: Not on file    Attends Catholic Services: Not on file    Active Member of Clubs or Organizations: Not on file    Attends Club or Organization Meetings: Not on file    Marital Status: Not on file   Intimate Partner Violence:     Fear of Current or Ex-Partner: Not on file    Emotionally Abused: Not on file    Physically Abused: Not on file    Sexually Abused: Not on file   Housing Stability:     Unable to Pay for Housing in the Last Year: Not on file    Number of [place] in the Last Year: Not on file    Unstable Housing in the Last Year: Not on file     Family History:   Family History   Problem Relation Age of Onset    Diabetes Father         Type II    Diabetes Sister         Type II    Diabetes Brother         Type II    Breast Cancer Sister 58    Lung Cancer Mother         Smoker    Heart Disease Sister         Bicuspid aortic valve x2     Review of Systems:  Review of Systems   HENT: Positive for dental problem and sneezing. Eyes: Positive for itching. Cardiovascular: Positive for palpitations. Gastrointestinal: Positive for nausea. Genitourinary: Positive for enuresis and pelvic pain. Musculoskeletal: Positive for back pain. Hematological: Bruises/bleeds easily. Psychiatric/Behavioral: Positive for decreased concentration. The patient is nervous/anxious. All other systems reviewed and are negative. Objective:     Vital Signs  Vitals:    05/24/22 1342 05/24/22 1347   BP: (!) 147/79 120/77   Pulse: 74 77   Resp: 15    Temp: 98.3 °F (36.8 °C)    SpO2: 97%      Physical Exam  Physical Exam  HENT:      Head: Normocephalic and atraumatic. Eyes:      Conjunctiva/sclera: Conjunctivae normal.   Pulmonary:      Effort: Pulmonary effort is normal.   Abdominal:      Palpations: Abdomen is soft. Musculoskeletal:      Cervical back: Normal range of motion and neck supple. Skin:     General: Skin is warm and dry. Neurological:      Mental Status: She is alert and oriented to person, place, and time. Office Fill Study/Urine Dip:     Using sterile technique a manometry catheter was placed. Patient's bladder was filled with sterile water by gravity. Capacity and storage volumes were measured. Spasms assessed. Catheter was removed. Stress urinary incontinence was assessed.     Results for POC orders placed in visit on 05/24/22   POCT Urinalysis no Micro   Result Value Ref Range    Color, UA yellow     Clarity, UA clear     Glucose, UA POC neg     Bilirubin, UA neg     Ketones, UA neg     Spec Grav, UA 1.015     Blood, UA POC neg     pH, UA 6.5     Protein, UA POC neg     Urobilinogen, UA neg     Leukocytes, UA neg     Nitrite, UA neg        Cystometrogram   wbc urine, poc   Date Value Ref Range Status   05/24/2022 neg  Final     RBC, UA   Date Value Ref Range Status   07/29/2021 1 0 - 4 /HPF Final     rbc urine, poc   Date Value Ref Range Status   05/24/2022 neg  Final     Nitrate, UA POC   Date Value Ref Range Status   05/24/2022 neg  Final     post void residual   Date Value Ref Range Status   05/24/2022 80 ml Final     FIRST SENSATION   Date Value Ref Range Status   05/24/2022 10 cc Final     SECOND SENSATION   Date Value Ref Range Status   05/24/2022 65 cc Final     MAX SENSATION   Date Value Ref Range Status   05/24/2022 90 cc Final     EMPTY COUGH STRESS TEST   Date Value Ref Range Status   05/24/2022 neg  Final     FULL COUGH STRESS TEST   Date Value Ref Range Status   05/24/2022 pos  Final     SPASM   Date Value Ref Range Status   05/24/2022 neg  Final        POPQ and Pelvic Exam:     Pelvic Organ Prolapse Quantification  Anterior Wall (Aa): -3   Anterior Wall (Ba): -3   Cervix or Cuff (C): -9     Genital Hiatus (gh): 1   Perineal Body (pb): 1   Total Vaginal Length (tvl): 9     Posterior Wall (Ap): -3   Posterior Wall (Bp): -3   No data recorded   No data recorded     Assessment/Plan:     Pierre Shahid is a 73 y.o. female with   1. Urge incontinence    2. Urinary frequency    3. Urgency of urination    Old records reviewed, outside records reviewed    Lyubov No presents today with a chief complaint of urinary leakage and pain. She was recently admitted for a pyelonephritis about a year ago however thinks that things have improved to some degree most recently. She does have a very small bladder capacity on her filling study today but did leak with a cough. She also has significant atrophic vaginitis. Because of this I am going to have her try estrogen vaginal cream and try Vesicare. I will have her follow-up for urodynamics and cystourethroscopy into the 3 months. Orders Placed This Encounter   Procedures    POCT Urinalysis no Micro    Cystometrogram     Orders Placed This Encounter   Medications    estradiol (ESTRACE VAGINAL) 0.1 MG/GM vaginal cream     Sig: Place 0.25 g vaginally daily Apply 0.25g with fingertip to the vaginal opening every night at bedtime for 2 weeks, then decrease to twice weekly.      Dispense:  30 g     Refill:  0    solifenacin (VESICARE) 10 MG tablet Sig: Take 1 tablet by mouth daily     Dispense:  30 tablet     Refill:  1       Cris Christianson MD

## 2022-05-25 ENCOUNTER — TELEPHONE (OUTPATIENT)
Dept: UROGYNECOLOGY | Age: 72
End: 2022-05-25

## 2022-05-25 NOTE — TELEPHONE ENCOUNTER
Called and left  for patient, wanted to confirm medication with her before sending to [facility]. Told her to call office back when she was available.

## 2022-05-26 NOTE — TELEPHONE ENCOUNTER
Attempted to call patient again and went right to , let her know via  that I would sent over script for Estrogen cream to [facility] assuming this is the medication she is requesting. Let her know they would be calling her once they received the fax. Told her to call with any questions or concerns.

## 2022-06-06 RX ORDER — BUPROPION HYDROCHLORIDE 150 MG/1
150 TABLET ORAL EVERY MORNING
Qty: 90 TABLET | Refills: 1 | Status: SHIPPED | OUTPATIENT
Start: 2022-06-06 | End: 2022-08-04

## 2022-06-10 DIAGNOSIS — F41.9 ANXIETY: ICD-10-CM

## 2022-06-10 DIAGNOSIS — F51.04 PSYCHOPHYSIOLOGICAL INSOMNIA: ICD-10-CM

## 2022-06-10 RX ORDER — ALPRAZOLAM 1 MG/1
TABLET ORAL
Qty: 35 TABLET | Refills: 2 | Status: SHIPPED | OUTPATIENT
Start: 2022-06-10 | End: 2022-08-04 | Stop reason: SDUPTHER

## 2022-06-14 NOTE — LETTER
[facility]) Physicians [address] [address]  Phone: [phone]  Fax: [phone]    Merlinda Sneddon, MD        May 27, 2021      After reviewing the EKG we have cleared the Crystal Stout (1950) for Surgery.       Sincerely,        Merlinda Sneddon, MD Patient/Family

## 2022-07-10 DIAGNOSIS — F51.04 PSYCHOPHYSIOLOGICAL INSOMNIA: ICD-10-CM

## 2022-07-11 RX ORDER — TRAZODONE HYDROCHLORIDE 50 MG/1
TABLET ORAL
Qty: 270 TABLET | Refills: 1 | Status: SHIPPED | OUTPATIENT
Start: 2022-07-11

## 2022-07-26 ENCOUNTER — HOSPITAL ENCOUNTER (OUTPATIENT)
Age: 72
Discharge: HOME | End: 2022-07-26
Payer: COMMERCIAL

## 2022-07-26 DIAGNOSIS — E03.9 ACQUIRED HYPOTHYROIDISM: Chronic | ICD-10-CM

## 2022-07-26 LAB — TSH SERPL DL<=0.05 MIU/L-ACNC: 1.79 UIU/ML (ref 0.27–4.2)

## 2022-07-26 PROCEDURE — 36415 COLL VENOUS BLD VENIPUNCTURE: CPT

## 2022-07-26 PROCEDURE — 84443 ASSAY THYROID STIM HORMONE: CPT

## 2022-08-03 NOTE — PROGRESS NOTES
Date of Visit:  2022    CC: Joaquín Bhakta (: 1950) is a 73 y.o. female, established patient, here for evaluation/re-evaluation of the following medical concerns:    ASSESSMENT/PLAN:  Chronic bilateral low back pain with bilateral sciatica  Assessment & Plan:  Symptoms worse, so she agrees to make appointment with the [facility] in [facility]. She will continue current doses of gabapentin and Tylenol for now  Neck pain, chronic  Assessment & Plan:  Stable. See plan for low back pain. Psychophysiological insomnia  Assessment & Plan:  Stable on current doses of trazodone and melatonin- continue. Suspect residual symptoms due to musculoskeletal pain, which will be [facility] by [facility]. If symptoms worsen, patient would be willing to see sleep specialist.  Orders:  -     ALPRAZolam (XANAX) 1 MG tablet; TAKE 1 TABLET BY MOUTH TWICE DAILY AS NEEDED FOR ANXIETY, Disp-40 tablet, R-2Normal  Anxiety  Assessment & Plan:  Worse due to increased external stressors. She does not want to add an SSRI due to concerns about potential weight gain, so referred to [facility]. Continue current dose of Wellbutrin and lowest effective dose of Xanax for breakthrough symptoms. Orders:  -     ALPRAZolam (XANAX) 1 MG tablet; TAKE 1 TABLET BY MOUTH TWICE DAILY AS NEEDED FOR ANXIETY, Disp-40 tablet, R-2Normal  Major depressive disorder with single episode, in partial remission [facility])  Assessment & Plan:  See plan for anxiety. Trigger index finger of right hand  Assessment & Plan:  Since this issue is now interfering with typing at work, will refer to hand surgery for further evaluation and treatment. Orders:  -     Calla Lesch, MD, Hand Surgery (Hand, Wrist, Upper Extremity), [facility]    Return in about 3 months (around 2022) for MEDICARE AWV.      Patient-Reported Vitals 2022   Patient-Reported Weight 179   Patient-Reported Height 52 1/2   Patient-Reported Systolic 820   Patient-Reported Diastolic 56   Patient-Reported Pulse 62   Patient-Reported Temperature 98        Wt Readings from Last 3 Encounters:   05/03/22 181 lb 3.2 oz (82.2 kg)   12/06/21 165 lb (74.8 kg)   10/25/21 165 lb (74.8 kg)     BP Readings from Last 3 Encounters:   05/24/22 120/77   05/03/22 128/64   01/05/22 128/65     Estimated body mass index is 32.61 kg/m² as calculated from the following:    Height as of 12/6/21: 5' 2.5\" (1.588 m). Weight as of 5/3/22: 181 lb 3.2 oz (82.2 kg). HPI  Chronic Joint Pain:  Patient presents for follow-up of pain in back and neck- back pain worse over the past 3 months, no change in neck pain. S/p left TKR 8/3/93- no complications. On average, pain is perceived as moderate. Current treatment: gabapentin 600 mg qhs, Tylenol prn. Medication side effects: none. Diagnostic testing: MRI cervical and lumbar spine 12/18 per Dr. Khadar Valadez- Multiple injections  provided only temporary relief. Has not seen spine specialist yet. Insomnia:  Current treatment: prescription sleep aid-  trazodone 100 mg qhs, melatonin 5 mg qhs prn- sleeps well 3-4 nights/week. Other nights, has difficulty staying asleep due to musculoskeletal issues. No adverse effects with medications. Restless legs symptoms intermittent. Did not tolerate Remeron. Mood Disorder:  Patient presents for follow-up of depression and anxiety disorder. Current complaints include: excessive worry, difficulty concentrating, impaired memory- stable overall. No anhedonia, tearfulness, depressed mood, feelings of worthlessness/guilt, panic attacks, feelings of hopelessness, irritability or suicidal ideation. Symptoms/signs of jojo: none. Current treatment includes: Wellbutrin- 300 mg qam, takes Xanax about once in the evening for anxiety and occasionally during the day- infrequent. Medication side effects: mild tremor of hands.     ROS  As documented above    Physical Exam  Constitutional:       General: She is not in acute distress. Appearance: She is well-developed. HENT:      Head: Normocephalic and atraumatic. Mouth/Throat:      Lips: No lesions. Neck:      Comments: No visible mass  Pulmonary:      Effort: Pulmonary effort is normal. No respiratory distress. Skin:     Comments: No rash, erythema or other discoloration on facial skin and exposed areas of neck and upper extremites    Neurological:      Mental Status: She is alert. Comments: No facial asymmetry (cranial nerve 7 motor function) or gaze palsy   Psychiatric:         Attention and Perception: Attention normal.         Mood and Affect: Mood normal.         Speech: Speech normal.         Behavior: Behavior normal.         Thought Content: Thought content normal.         Cognition and Memory: Cognition normal.     On this date 8/4/2022 I have spent 35 minutes reviewing previous notes, test results and face to face with the patient discussing the diagnosis and importance of compliance with the treatment plan as well as documenting on the day of the visit. Chuck Dunn was evaluated through a synchronous (real-time) audio-video encounter. The patient (or guardian if applicable) is aware that this is a billable service, which includes applicable co-pays. This Virtual Visit was conducted with patient's (and/or legal guardian's) consent. The visit was conducted pursuant to the emergency declaration under the [address] [place] [address], [address] [place] [address] and the [employer] General Act. Patient identification was verified, and a caregiver was present when appropriate. The patient was located at Home: [address] [place] [address]. Provider was located at Home ([facility]): Chase Izaguirre MD on 8/4/2022 at 2:09 PM    An electronic signature was used to authenticate this note.

## 2022-08-04 ENCOUNTER — TELEMEDICINE (OUTPATIENT)
Dept: INTERNAL MEDICINE CLINIC | Age: 72
End: 2022-08-04
Payer: COMMERCIAL

## 2022-08-04 DIAGNOSIS — F32.4 MAJOR DEPRESSIVE DISORDER WITH SINGLE EPISODE, IN PARTIAL REMISSION (HCC): ICD-10-CM

## 2022-08-04 DIAGNOSIS — M54.42 CHRONIC BILATERAL LOW BACK PAIN WITH BILATERAL SCIATICA: Primary | ICD-10-CM

## 2022-08-04 DIAGNOSIS — G89.29 NECK PAIN, CHRONIC: ICD-10-CM

## 2022-08-04 DIAGNOSIS — G89.29 CHRONIC BILATERAL LOW BACK PAIN WITH BILATERAL SCIATICA: Primary | ICD-10-CM

## 2022-08-04 DIAGNOSIS — M54.2 NECK PAIN, CHRONIC: ICD-10-CM

## 2022-08-04 DIAGNOSIS — M65.321 TRIGGER INDEX FINGER OF RIGHT HAND: ICD-10-CM

## 2022-08-04 DIAGNOSIS — M54.41 CHRONIC BILATERAL LOW BACK PAIN WITH BILATERAL SCIATICA: Primary | ICD-10-CM

## 2022-08-04 DIAGNOSIS — F41.9 ANXIETY: ICD-10-CM

## 2022-08-04 DIAGNOSIS — F51.04 PSYCHOPHYSIOLOGICAL INSOMNIA: ICD-10-CM

## 2022-08-04 PROCEDURE — 1123F ACP DISCUSS/DSCN MKR DOCD: CPT | Performed by: INTERNAL MEDICINE

## 2022-08-04 PROCEDURE — 99214 OFFICE O/P EST MOD 30 MIN: CPT | Performed by: INTERNAL MEDICINE

## 2022-08-04 RX ORDER — ALPRAZOLAM 1 MG/1
TABLET ORAL
Qty: 40 TABLET | Refills: 2 | Status: SHIPPED | OUTPATIENT
Start: 2022-08-04 | End: 2022-11-04

## 2022-08-04 SDOH — ECONOMIC STABILITY: FOOD INSECURITY: WITHIN THE PAST 12 MONTHS, YOU WORRIED THAT YOUR FOOD WOULD RUN OUT BEFORE YOU GOT MONEY TO BUY MORE.: NEVER TRUE

## 2022-08-04 SDOH — ECONOMIC STABILITY: FOOD INSECURITY: WITHIN THE PAST 12 MONTHS, THE FOOD YOU BOUGHT JUST DIDN'T LAST AND YOU DIDN'T HAVE MONEY TO GET MORE.: NEVER TRUE

## 2022-08-04 ASSESSMENT — PATIENT HEALTH QUESTIONNAIRE - PHQ9
1. LITTLE INTEREST OR PLEASURE IN DOING THINGS: 0
8. MOVING OR SPEAKING SO SLOWLY THAT OTHER PEOPLE COULD HAVE NOTICED. OR THE OPPOSITE, BEING SO FIGETY OR RESTLESS THAT YOU HAVE BEEN MOVING AROUND A LOT MORE THAN USUAL: 1
7. TROUBLE CONCENTRATING ON THINGS, SUCH AS READING THE NEWSPAPER OR WATCHING TELEVISION: 0
2. FEELING DOWN, DEPRESSED OR HOPELESS: 0
SUM OF ALL RESPONSES TO PHQ QUESTIONS 1-9: 3
5. POOR APPETITE OR OVEREATING: 1
10. IF YOU CHECKED OFF ANY PROBLEMS, HOW DIFFICULT HAVE THESE PROBLEMS MADE IT FOR YOU TO DO YOUR WORK, TAKE CARE OF THINGS AT HOME, OR GET ALONG WITH OTHER PEOPLE: 0
SUM OF ALL RESPONSES TO PHQ9 QUESTIONS 1 & 2: 0
9. THOUGHTS THAT YOU WOULD BE BETTER OFF DEAD, OR OF HURTING YOURSELF: 0
6. FEELING BAD ABOUT YOURSELF - OR THAT YOU ARE A FAILURE OR HAVE LET YOURSELF OR YOUR FAMILY DOWN: 0
3. TROUBLE FALLING OR STAYING ASLEEP: 1

## 2022-08-04 ASSESSMENT — SOCIAL DETERMINANTS OF HEALTH (SDOH): HOW HARD IS IT FOR YOU TO PAY FOR THE VERY BASICS LIKE FOOD, HOUSING, MEDICAL CARE, AND HEATING?: NOT HARD AT ALL

## 2022-08-04 NOTE — ASSESSMENT & PLAN NOTE
Since this issue is now interfering with typing at work, will refer to hand surgery for further evaluation and treatment.

## 2022-08-04 NOTE — ASSESSMENT & PLAN NOTE
Worse due to increased external stressors. She does not want to add an SSRI due to concerns about potential weight gain, so referred to [facility]. Continue current dose of Wellbutrin and lowest effective dose of Xanax for breakthrough symptoms.

## 2022-08-04 NOTE — ASSESSMENT & PLAN NOTE
Symptoms worse, so she agrees to make appointment with the [facility] in [facility].  She will continue current doses of gabapentin and Tylenol for now

## 2022-08-16 ENCOUNTER — OFFICE VISIT (OUTPATIENT)
Dept: ORTHOPEDIC SURGERY | Age: 72
End: 2022-08-16
Payer: COMMERCIAL

## 2022-08-16 VITALS — HEIGHT: 63 IN | WEIGHT: 181 LBS | BODY MASS INDEX: 32.07 KG/M2

## 2022-08-16 DIAGNOSIS — M51.36 DDD (DEGENERATIVE DISC DISEASE), LUMBAR: ICD-10-CM

## 2022-08-16 DIAGNOSIS — M54.50 LUMBAR PAIN: Primary | ICD-10-CM

## 2022-08-16 DIAGNOSIS — M48.061 SPINAL STENOSIS OF LUMBAR REGION, UNSPECIFIED WHETHER NEUROGENIC CLAUDICATION PRESENT: ICD-10-CM

## 2022-08-16 PROCEDURE — 1123F ACP DISCUSS/DSCN MKR DOCD: CPT | Performed by: PHYSICIAN ASSISTANT

## 2022-08-16 PROCEDURE — 99214 OFFICE O/P EST MOD 30 MIN: CPT | Performed by: PHYSICIAN ASSISTANT

## 2022-08-16 SDOH — HEALTH STABILITY: PHYSICAL HEALTH: ON AVERAGE, HOW MANY MINUTES DO YOU ENGAGE IN EXERCISE AT THIS LEVEL?: 20 MIN

## 2022-08-16 SDOH — HEALTH STABILITY: PHYSICAL HEALTH: ON AVERAGE, HOW MANY DAYS PER WEEK DO YOU ENGAGE IN MODERATE TO STRENUOUS EXERCISE (LIKE A BRISK WALK)?: 3 DAYS

## 2022-08-16 ASSESSMENT — SOCIAL DETERMINANTS OF HEALTH (SDOH)

## 2022-08-16 NOTE — PROGRESS NOTES
HERNIA REPAIR  2008    APPENDECTOMY  Age 27    CATARACT REMOVAL WITH IMPLANT Right 10/23/2017    with vitrectomy    CATARACT REMOVAL WITH IMPLANT Left 02/26/2018    with vitrectomy    COLONOSCOPY N/A 10/10/2018    COLONOSCOPY POLYPECTOMY SNARE/COLD BIOPSY performed by Fariha Cabrera MD at [facility] Right 8/26/2019    RIGHT L4 AND L5 TRANSFORAMINAL EPIDURAL STEROID INJECTION WITH FLUOROSCOPY performed by Jaycob Guaman MD at [address]     HC INJECT OTHER PERPHRL NERV Right 10/21/2019    RIGHT PIRIFORMIS INJECTION WITH FLUOROSCOPY (35441) performed by Jaycob Guaman MD at [address] [facility], TOTAL ABDOMINAL (CERVIX REMOVED)      ovaries out also    LUMBAR SPINE SURGERY Right 1/7/2019    RIGHT L3 AND L4 LUMBAR TRANSFORAMINAL WITH FLUOROSCOPY performed by Jaycob Guaman MD at [place] [address] Right 5/15/2019    RIGHT L4 AND L5 TRANSFORAMINAL EPIDURAL STEROID INJECTION WITH FLUOROSCOPY performed by Jaycob Guaman MD at [address] [facility] ESOPHAGOGASTRODUODENOSCOPY TRANSORAL DIAGNOSTIC N/A 10/10/2018    EGD performed by Fariha Cabrera MD at [facility] (CERVIX REMOVED)  1996    Fibroids    TOTAL KNEE ARTHROPLASTY Left 6/1/2021    LEFT TOTAL KNEE ARTHROPLASTY performed by Leni Matos MD at [facility] OR       Current Medications:     Current Outpatient Medications:     ALPRAZolam (XANAX) 1 MG tablet, TAKE 1 TABLET BY MOUTH TWICE DAILY AS NEEDED FOR ANXIETY, Disp: 40 tablet, Rfl: 2    traZODone (DESYREL) 50 MG tablet, TAKE 3 TABLETS BY MOUTH EVERY NIGHT AT BEDTIME, Disp: 270 tablet, Rfl: 1    clobetasol (TEMOVATE) 0.05 % ointment, , Disp: , Rfl:     estradiol (ESTRACE VAGINAL) 0.1 MG/GM vaginal cream, Place 0.25 g vaginally daily Apply 0.25g with fingertip to the vaginal opening every night at bedtime for 2 weeks, then decrease to twice weekly. , Disp: 30 g, Rfl: 0    solifenacin (VESICARE) 10 MG tablet, Take 1 tablet by mouth daily, Disp: 30 tablet, Rfl: 1    levothyroxine (SYNTHROID) 175 MCG tablet, Take 1 tablet by mouth daily, Disp: 90 tablet, Rfl: 1    buPROPion (WELLBUTRIN XL) 300 MG extended release tablet, TAKE 1 TABLET BY MOUTH EVERY MORNING, Disp: 90 tablet, Rfl: 1    omeprazole (PRILOSEC) 40 MG delayed release capsule, TAKE 1 CAPSULE BY MOUTH EVERY MORNING BEFORE BREAKFAST, Disp: 30 capsule, Rfl: 5    calcium carbonate 600 MG TABS tablet, Take 1 tablet by mouth daily, Disp: , Rfl:     gabapentin (NEURONTIN) 300 MG capsule, TAKE 1 CAPSULE BY MOUTH TWICE DAILY, Disp: 180 capsule, Rfl: 1    aspirin 81 MG EC tablet, Take 4 tablets by mouth daily, Disp: , Rfl:     ondansetron (ZOFRAN) 4 MG tablet, Take 1 tablet by mouth every 8 hours as needed for Nausea or Vomiting, Disp: 30 tablet, Rfl: 0    Multiple Vitamin (MULTIVITAMIN PO), Take by mouth, Disp: , Rfl:     melatonin 5 MG TABS tablet, Take 5 mg by mouth daily, Disp: , Rfl:     Cyanocobalamin (VITAMIN B 12 PO), Take 500 mcg by mouth daily , Disp: , Rfl:     Cholecalciferol (VITAMIN D) 1000 UNIT CAPS, Take 2 capsules by mouth daily , Disp: , Rfl:     Allergies:  Macrobid [nitrofurantoin] and Codeine    Social History:    reports that she has never smoked. She has never used smokeless tobacco. She reports that she does not drink alcohol and does not use drugs.     Family History:   Family History   Problem Relation Age of Onset    Diabetes Father         Type II    Diabetes Sister         Type II    Diabetes Brother         Type II    Breast Cancer Sister 58    Lung Cancer Mother         Smoker    Heart Disease Sister         Bicuspid aortic valve x2       REVIEW OF SYSTEMS: Full ROS noted & scanned   CONSTITUTIONAL: Denies unexplained weight loss, fevers, chills or fatigue  NEUROLOGICAL: Denies unsteady gait or progressive weakness  MUSCULOSKELETAL: Denies joint swelling or redness  PSYCHOLOGICAL: Patient has a history of anxiety and depression  SKIN: Denies skin changes, delayed healing, rash, itching   HEMATOLOGIC: Denies easy bleeding or bruising  ENDOCRINE: Denies excessive thirst, urination, heat/cold  RESPIRATORY: Denies current dyspnea, cough  GI: Denies nausea, vomiting, diarrhea   : Denies bowel or bladder issues      PHYSICAL EXAM:    Vitals: Height 5' 2.52\" (1.588 m), weight 181 lb (82.1 kg), not currently breastfeeding. GENERAL EXAM:  General Apparence: Patient is adequately groomed with no evidence of malnutrition. Orientation: The patient is oriented to time, place and person. Mood & Affect:The patient's mood and affect are appropriate. Vascular: Examination reveals no swelling tenderness in upper or lower extremities. Good capillary refill. Lymphatic: The lymphatic examination bilaterally reveals all areas to be without enlargement or induration  Sensation: Sensation is intact without deficit  Coordination/Balance: Good coordination. LUMBAR/SACRAL EXAMINATION:  Inspection: Local inspection shows no step-off or bruising. Lumbar alignment is normal.  Sagittal and Coronal balance is neutral.      Palpation:   No evidence of tenderness at the midline. No tenderness bilaterally at the paraspinal or trochanters. There is no step-off or paraspinal spasm. Range of Motion: Lumbar flexion, extension and rotation are mildly limited due to pain. Strength:   Strength testing is 4 /5 in right hip flexor and 5/5 in all other muscle groups tested. Special Tests:   Straight leg raise and crossed SLR negative. Leg length and pelvis level. Skin: There are no rashes, ulcerations or lesions. Reflexes: Reflexes are symmetrically 2+ at the patellar and ankle tendons. Clonus absent bilaterally at the feet. Gait & station: normal, patient ambulates without assistance    Additional Examinations:   RIGHT LOWER EXTREMITY: Inspection/examination of the right lower extremity does not show any tenderness, deformity or injury.  Range of motion is unremarkable. There is no gross instability. There are no rashes, ulcerations or lesions. Strength and tone are normal.  LEFT LOWER EXTREMITY:  Inspection/examination of the left lower extremity does not show any tenderness, deformity or injury. Range of motion is unremarkable. There is no gross instability. There are no rashes, ulcerations or lesions. Strength and tone are normal.    Diagnostic Testing:    X-rays: 2 views of the lumbar spine to include AP and lateral were obtained today in the office and independently reviewed with the patient which shows with disc space narrowing mainly noted at L4-5 and L5-S1. There is facet arthropathy noted throughout the lumbar spine. MRI of the lumbar spine that was obtained on 12/4/2018 was reviewed independently which shows L3-4 posterior central disc protrusion with only mild compression noted. There is facet arthropathy noted at L5-S1. Impression:     1. Lumbar pain    2. Spinal stenosis of lumbar region, unspecified whether neurogenic claudication present    3. DDD (degenerative disc disease), lumbar        Plan:      We discussed the diagnosis and treatment options including observation, additional oral steroids, physical therapy, epidural injections and additional imaging. She wishes to proceed with lumbar MRI. She is to continue with her home stretching and progressive strengthening exercise routine. Follow up -after the MRI to review the results and discuss treatment options. Total evaluation time 30 minutes    Old records were reviewed. Angélica Peralta PA-C  Board certified by the [facility] [facility]     .

## 2022-08-16 NOTE — PROGRESS NOTES
[address] New Patient Additional Questions       Question 8/16/2022  1:02 AM EDT - Filed by Patient    Please list any providers you have seen in the last 12 months and for what reason Dr Arlene Kohler    In the past 12 Months have you had any of the following symptoms? Headaches No    Fainting or passing out No    Sudden loss of vision, strength, or inability to speak No    Hearing loss or ringing in ear(s) No    Nosebleeds No    Coughing for more than 2-4 weeks No    Coughing up blood No    Shortness of breath or wheezing No    Swelling of feet or ankles No    Chest pain, chest pressure or heaviness No    Irregular heartbeat or suden fast heartbeat No    Difficulty swallowing or food \"sticking\" No    Frequent heartburn or indigestion No    Abdominal pain No    Frequent constipation No    Frequent diarrhea No    Rectal bleeding/black stools No    Urinating more than twice per night No    Pain in joints or bones Yes    Unusual bruising or bleeding No    Seizures, convulsions No    Change in wart, mole or skin growth No    Difficulty sleeping Yes    Tearfulness Yes    Difficulty concentrating Yes    Weight loss more than 5-10 pounds No    Irregular menstrual bleeding No    Menstrual bleeding after menopause No    Breast lumps/discharge from nipple(s) No    Who lives in your home? My daughter    Do you feel safe at home? Yes    Within the last year, have you been afraid of your partner or ex-partner? No    Within the last year, have you been humiliated or emotionally abused in other ways by your partner or ex-partner? No    Within the last year, have you been kicked, hit, slapped, or otherwise physically hurt by your partner or ex-partner? No    Within the last year, have you been raped or forced to have any kind of sexual activity by your partner or ex-partner? No    On average, how many days per week do you engage in moderate to strenuous exercise (like a brisk walk)?  3 days    On average, how many minutes do you engage in exercise at this level? 20 min    When was your last pap smear? Where was this performed? When was your last Mammogram?       Where was this performed? How many times have you been pregnant? 2    If you are having periods, please enter the date of your last menstrual period here:       When did you experience the onset of menopause? Which option best describes your E-cigarette/Vaping usage?  Never Used

## 2022-08-17 ENCOUNTER — OFFICE VISIT (OUTPATIENT)
Dept: ORTHOPEDIC SURGERY | Age: 72
End: 2022-08-17
Payer: COMMERCIAL

## 2022-08-17 VITALS — RESPIRATION RATE: 16 BRPM | HEIGHT: 63 IN | BODY MASS INDEX: 32.07 KG/M2 | WEIGHT: 181 LBS

## 2022-08-17 DIAGNOSIS — M65.30 TRIGGER FINGER, ACQUIRED: Primary | ICD-10-CM

## 2022-08-17 PROCEDURE — 1123F ACP DISCUSS/DSCN MKR DOCD: CPT | Performed by: ORTHOPAEDIC SURGERY

## 2022-08-17 PROCEDURE — 99204 OFFICE O/P NEW MOD 45 MIN: CPT | Performed by: ORTHOPAEDIC SURGERY

## 2022-08-17 PROCEDURE — 20550 NJX 1 TENDON SHEATH/LIGAMENT: CPT | Performed by: ORTHOPAEDIC SURGERY

## 2022-08-17 RX ORDER — TRIAMCINOLONE ACETONIDE 40 MG/ML
20 INJECTION, SUSPENSION INTRA-ARTICULAR; INTRAMUSCULAR ONCE
Status: COMPLETED | OUTPATIENT
Start: 2022-08-17 | End: 2022-08-17

## 2022-08-17 RX ORDER — LIDOCAINE HYDROCHLORIDE 10 MG/ML
0.5 INJECTION, SOLUTION INFILTRATION; PERINEURAL ONCE
Status: COMPLETED | OUTPATIENT
Start: 2022-08-17 | End: 2022-08-17

## 2022-08-17 RX ADMIN — TRIAMCINOLONE ACETONIDE 20 MG: 40 INJECTION, SUSPENSION INTRA-ARTICULAR; INTRAMUSCULAR at 12:41

## 2022-08-17 RX ADMIN — LIDOCAINE HYDROCHLORIDE 0.5 ML: 10 INJECTION, SOLUTION INFILTRATION; PERINEURAL at 12:40

## 2022-08-17 RX ADMIN — LIDOCAINE HYDROCHLORIDE 0.5 ML: 10 INJECTION, SOLUTION INFILTRATION; PERINEURAL at 12:39

## 2022-08-17 NOTE — PROGRESS NOTES
This 73 y.o., right hand dominant  is seen in referral for Vazquez Aburto MD  with a chief complaint of pain, swelling, stiffness and intermittant snapping of the right thumb and index finger. Symptoms have been present for 3 months. The digits are stiff, especially in the morning and will frequently stick in the palm when flexed and pop when extended. This is often associated with pain. Mild swelling has been noticed. The patient denies discoloration or history of injury or overuse. Treatment has not been prescribed. The problem has worsened recently. The pain assessment has been reviewed and is correct as stated. .    The patient's social history, past medical history, family history, medications, allergies and review of systems, entered 8/17/22, have been reviewed, and dated and are recorded in the chart. On examination the patient is Height: 5' 2.5\" (158.8 cm) tall and weighs Weight: 181 lb (82.1 kg). Respirations are 18 per minute. The patient is well nourished, is oriented to time and place, demonstrates appropriate mood and affect as well as normal gait and station. There is mild soft tissue swelling of the digits. There is no deformity. There is tenderness, thickening and nodularity at the base of the flexor tendon sheaths. Range of motion is slightly limited in flexion and extension. The digits stick in flexion and pop into extension, accompanied by some pain. Skin is intact without lesions. Distal circulation and sensation are intact. Muscle strength and coordination are normal.  Reflexes are present bilaterally. Joints are stable. There are no subcutaneous nodules or enlarged epitrochlear lymph nodes. I have personally reviewed and interpreted all previous external imaging studies(DEXA scan), laboratory tests(TSH, HbA1c, CMP), diagnostic procedures and medical encounters pertinent to this patient's visit today.     Impression: Right thumb and index finger trigger digits. The nature of this medical problem is fully discussed with the patient, including all treatment options. All questions are answered. The right  hand is prepared with Betadine and alcohol and the flexor tendon sheath of the right index finger and thumb are each injected with 1/2 milliliter of 1% lidocaine and 20 mg.of triamcinalone, with good filling. The patient is advised regarding the expected response and possible reactions from the injections. Acetaminophen or ibuprofen are prescribed for any significant post injection pain. The patient is asked to call me if full, painless function has not returned within 4 weeks. The possibility of recurrence of the problem is discussed.

## 2022-09-02 ENCOUNTER — HOSPITAL ENCOUNTER (OUTPATIENT)
Dept: MRI IMAGING | Age: 72
Discharge: HOME | End: 2022-09-02
Payer: COMMERCIAL

## 2022-09-02 DIAGNOSIS — M51.36 DDD (DEGENERATIVE DISC DISEASE), LUMBAR: ICD-10-CM

## 2022-09-02 DIAGNOSIS — M48.061 SPINAL STENOSIS OF LUMBAR REGION, UNSPECIFIED WHETHER NEUROGENIC CLAUDICATION PRESENT: ICD-10-CM

## 2022-09-02 PROCEDURE — 72148 MRI LUMBAR SPINE W/O DYE: CPT

## 2022-09-05 RX ORDER — OMEPRAZOLE 40 MG/1
CAPSULE, DELAYED RELEASE ORAL
Qty: 30 CAPSULE | Refills: 5 | Status: SHIPPED | OUTPATIENT
Start: 2022-09-05

## 2022-09-09 RX ORDER — BUPROPION HYDROCHLORIDE 300 MG/1
TABLET ORAL
Qty: 90 TABLET | Refills: 1 | Status: SHIPPED | OUTPATIENT
Start: 2022-09-09

## 2022-09-14 RX ORDER — GABAPENTIN 300 MG/1
CAPSULE ORAL
Qty: 180 CAPSULE | Refills: 1 | Status: SHIPPED | OUTPATIENT
Start: 2022-09-14 | End: 2023-03-14

## 2022-09-22 ENCOUNTER — HOSPITAL ENCOUNTER (OUTPATIENT)
Dept: WOMENS IMAGING | Age: 72
Discharge: HOME | End: 2022-09-22
Payer: COMMERCIAL

## 2022-09-22 VITALS — BODY MASS INDEX: 34.04 KG/M2 | HEIGHT: 62 IN | WEIGHT: 185 LBS

## 2022-09-22 DIAGNOSIS — Z12.31 VISIT FOR SCREENING MAMMOGRAM: ICD-10-CM

## 2022-09-22 PROCEDURE — 77063 BREAST TOMOSYNTHESIS BI: CPT

## 2022-10-20 ENCOUNTER — OFFICE VISIT (OUTPATIENT)
Dept: ORTHOPEDIC SURGERY | Age: 72
End: 2022-10-20
Payer: COMMERCIAL

## 2022-10-20 DIAGNOSIS — M51.36 DDD (DEGENERATIVE DISC DISEASE), LUMBAR: Primary | ICD-10-CM

## 2022-10-20 PROCEDURE — 1123F ACP DISCUSS/DSCN MKR DOCD: CPT | Performed by: PHYSICIAN ASSISTANT

## 2022-10-20 PROCEDURE — 99213 OFFICE O/P EST LOW 20 MIN: CPT | Performed by: PHYSICIAN ASSISTANT

## 2022-10-20 RX ORDER — TIZANIDINE 4 MG/1
4 TABLET ORAL 3 TIMES DAILY PRN
Qty: 60 TABLET | Refills: 0 | Status: SHIPPED | OUTPATIENT
Start: 2022-10-20 | End: 2022-11-09

## 2022-10-20 NOTE — PROGRESS NOTES
Follow-up: LUMBAR SPINE    Referring Provider:  No ref. provider found    CHIEF COMPLAINT:    Chief Complaint   Patient presents with    Follow-up     Review lumbar MRI         HISTORY OF PRESENT ILLNESS:       Ms. Justin Dangelo  is a pleasant 73 y.o. female who was referred by her primary care physician Nicolas Cote MD, here for follow-up regarding her LBP and right leg pain and to review her lumbar MRI. Patient states that she has had a recent flare in her pain over the past few days and states that she is having moderate to severe pain within the right low back with radiation into the posterior lateral aspect of her right leg. She denies any bowel or bladder dysfunction or saddle anesthesia. . She states her pain began insidiously approximately 4 years ago but has gradually gotten worse over the past 2 to 3 months. She rates her present back pain is 4/10, right buttock pain 5/10, and right lateral leg pain 5/10. Pain is increased with prolonged sitting, rising from a seated position, leaning forward, and at times lying down and improved with standing and walking. She has had a history of prior physical therapy in 2021 without benefit and chiropractic care with 8 visits in 2021 and 2022 without benefit. Patient has periodic paresthesias noted into the right lower extremity but denies any progressive weakness, bowel or bladder dysfunction, or saddle anesthesia. The pain at times interferes with her sleep. Pain Assessment  Location of Pain: Back  Location Modifiers: Right  Severity of Pain: 6  Quality of Pain: Throbbing, Dull, Aching  Duration of Pain: Persistent  Frequency of Pain: Constant  Aggravating Factors: Bending, Straightening, Standing, Walking  Limiting Behavior: Yes  Relieving Factors: Heat  Result of Injury: No  Work-Related Injury: No  Are there other pain locations you wish to document?: No]  Current/Past Treatment:   Physical Therapy: In the past without benefit  Chiropractic:  In the past with some benefit  Injection:  In 2019 with some benefit  Medications: Tylenol    Past Medical History:   Past Medical History:   Diagnosis Date    Abdominal wall abscess     Acute pyelonephritis 7/8/2021    Age related osteoporosis     Anal fissure 7/16/2013    Anxiety     Colon polyps     Depression     E coli bacteremia     Fibroid uterus     Hypothyroidism     Morbid obesity ([address]     Osteoarthritis of left knee 1/26/2016    Osteopenia     Urinary incontinence 5/3/2022        Past Surgical History:     Past Surgical History:   Procedure Laterality Date    ABDOMINAL HERNIA REPAIR  2008    APPENDECTOMY  Age 27    CATARACT REMOVAL WITH IMPLANT Right 10/23/2017    with vitrectomy    CATARACT REMOVAL WITH IMPLANT Left 02/26/2018    with vitrectomy    COLONOSCOPY N/A 10/10/2018    COLONOSCOPY POLYPECTOMY SNARE/COLD BIOPSY performed by Srikanth Jang MD at [address] Right 8/26/2019    RIGHT L4 AND L5 TRANSFORAMINAL EPIDURAL STEROID INJECTION WITH FLUOROSCOPY performed by Jessica Roach MD at [address] [place] [address]    HC INJECT OTHER PERPHRL NERV Right 10/21/2019    RIGHT PIRIFORMIS INJECTION WITH FLUOROSCOPY ([address]3119) performed by Jessica Roach MD at [address] [facility], TOTAL ABDOMINAL (CERVIX REMOVED)      ovaries out also    LUMBAR SPINE SURGERY Right 1/7/2019    RIGHT L3 AND L4 LUMBAR TRANSFORAMINAL WITH FLUOROSCOPY performed by Jessica Roach MD at [place] [address] Right 5/15/2019    RIGHT L4 AND L5 TRANSFORAMINAL EPIDURAL STEROID INJECTION WITH FLUOROSCOPY performed by Jessica Roach MD at [address] [place] [address] ESOPHAGOGASTRODUODENOSCOPY TRANSORAL DIAGNOSTIC N/A 10/10/2018    EGD performed by Srikanth Jang MD at [facility] (CERVIX REMOVED)  1996    Fibroids    TOTAL KNEE ARTHROPLASTY Left 6/1/2021    LEFT TOTAL KNEE ARTHROPLASTY performed by Walt Hernandez MD at [facility] OR       Current Medications: Current Outpatient Medications:     gabapentin (NEURONTIN) 300 MG capsule, TAKE 1 CAPSULE BY MOUTH TWICE DAILY, Disp: 180 capsule, Rfl: 1    buPROPion (WELLBUTRIN XL) 300 MG extended release tablet, TAKE 1 TABLET BY MOUTH EVERY MORNING, Disp: 90 tablet, Rfl: 1    omeprazole (PRILOSEC) 40 MG delayed release capsule, TAKE 1 CAPSULE BY MOUTH EVERY MORNING BEFORE BREAKFAST, Disp: 30 capsule, Rfl: 5    ALPRAZolam (XANAX) 1 MG tablet, TAKE 1 TABLET BY MOUTH TWICE DAILY AS NEEDED FOR ANXIETY, Disp: 40 tablet, Rfl: 2    traZODone (DESYREL) 50 MG tablet, TAKE 3 TABLETS BY MOUTH EVERY NIGHT AT BEDTIME, Disp: 270 tablet, Rfl: 1    clobetasol (TEMOVATE) 0.05 % ointment, , Disp: , Rfl:     estradiol (ESTRACE VAGINAL) 0.1 MG/GM vaginal cream, Place 0.25 g vaginally daily Apply 0.25g with fingertip to the vaginal opening every night at bedtime for 2 weeks, then decrease to twice weekly. , Disp: 30 g, Rfl: 0    solifenacin (VESICARE) 10 MG tablet, Take 1 tablet by mouth daily, Disp: 30 tablet, Rfl: 1    levothyroxine (SYNTHROID) 175 MCG tablet, Take 1 tablet by mouth daily, Disp: 90 tablet, Rfl: 1    calcium carbonate 600 MG TABS tablet, Take 1 tablet by mouth daily, Disp: , Rfl:     aspirin 81 MG EC tablet, Take 4 tablets by mouth daily, Disp: , Rfl:     ondansetron (ZOFRAN) 4 MG tablet, Take 1 tablet by mouth every 8 hours as needed for Nausea or Vomiting, Disp: 30 tablet, Rfl: 0    Multiple Vitamin (MULTIVITAMIN PO), Take by mouth, Disp: , Rfl:     melatonin 5 MG TABS tablet, Take 5 mg by mouth daily, Disp: , Rfl:     Cyanocobalamin (VITAMIN B 12 PO), Take 500 mcg by mouth daily , Disp: , Rfl:     Cholecalciferol (VITAMIN D) 1000 UNIT CAPS, Take 2 capsules by mouth daily , Disp: , Rfl:     Allergies:  Macrobid [nitrofurantoin] and Codeine    Social History:    reports that she has never smoked. She has never used smokeless tobacco. She reports that she does not drink alcohol and does not use drugs.     Family History:   Family History   Problem Relation Age of Onset    Diabetes Father         Type II    Diabetes Sister         Type II    Diabetes Brother         Type II    Breast Cancer Sister 58    Lung Cancer Mother         Smoker    Heart Disease Sister         Bicuspid aortic valve x2       REVIEW OF SYSTEMS: Full ROS noted & scanned   CONSTITUTIONAL: Denies unexplained weight loss, fevers, chills or fatigue  NEUROLOGICAL: Denies unsteady gait or progressive weakness  MUSCULOSKELETAL: Denies joint swelling or redness  PSYCHOLOGICAL: Patient has a history of anxiety and depression  SKIN: Denies skin changes, delayed healing, rash, itching   HEMATOLOGIC: Denies easy bleeding or bruising  ENDOCRINE: Denies excessive thirst, urination, heat/cold  RESPIRATORY: Denies current dyspnea, cough  GI: Denies nausea, vomiting, diarrhea   : Denies bowel or bladder issues      PHYSICAL EXAM:    Vitals: not currently breastfeeding. GENERAL EXAM:  General Apparence: Patient is adequately groomed with no evidence of malnutrition. Orientation: The patient is oriented to time, place and person. Mood & Affect:The patient's mood and affect are appropriate. Vascular: Examination reveals no swelling tenderness in upper or lower extremities. Good capillary refill. Lymphatic: The lymphatic examination bilaterally reveals all areas to be without enlargement or induration  Sensation: Sensation is intact without deficit  Coordination/Balance: Good coordination. LUMBAR/SACRAL EXAMINATION:  Inspection: Local inspection shows no step-off or bruising. Lumbar alignment is normal.  Sagittal and Coronal balance is neutral.      Palpation:   No evidence of tenderness at the midline. No tenderness bilaterally at the paraspinal or trochanters. There is no step-off or paraspinal spasm. Range of Motion: Lumbar flexion, extension and rotation are mildly limited due to pain.   Strength:   Strength testing is 4 /5 in right hip flexor and 5/5 in all other muscle groups tested. Special Tests:   Straight leg raise and crossed SLR negative. Leg length and pelvis level. Skin: There are no rashes, ulcerations or lesions. Reflexes: Reflexes are symmetrically 2+ at the patellar and ankle tendons. Clonus absent bilaterally at the feet. Gait & station: normal, patient ambulates without assistance    Additional Examinations:   RIGHT LOWER EXTREMITY: Inspection/examination of the right lower extremity does not show any tenderness, deformity or injury. Range of motion is unremarkable. There is no gross instability. There are no rashes, ulcerations or lesions. Strength and tone are normal.  LEFT LOWER EXTREMITY:  Inspection/examination of the left lower extremity does not show any tenderness, deformity or injury. Range of motion is unremarkable. There is no gross instability. There are no rashes, ulcerations or lesions. Strength and tone are normal.    Diagnostic Testing:    MRI of the lumbar spine that was obtained on 9/2/2022 was independently reviewed with the patient which shows  Impression   Large fluid collection within the anterior abdominal wall measuring 9.0 x 4.5   cm in the periumbilical region likely representing a hematoma/seroma. Small central disc protrusion at L3-L4. No significant stenosis is   identified. Impression:   Large fluid accumulation in her abdomen  Small central disc protrusion at L3-4 with minimal spinal stenosis or foraminal stenosis      Plan:      We discussed the diagnosis and treatment options including observation, additional oral steroids, physical therapy, epidural injections and additional imaging. She is going to follow-up with Dr. Jules Hernandez for the abdominal fluid collection and once there is resolution to that issue she will contact the office for referral to PMR for potential spinal injections. Follow up -as needed    Total evaluation time 23 minutes    Old records were reviewed.     FELIX Vaca certified by the [facility] [address] [facility]     .

## 2022-10-26 NOTE — PROGRESS NOTES
Date of Visit:  10/27/2022    CC: Robin Chase (: 1950) is a 73 y.o. female, established patient, here for evaluation/re-evaluation of the following medical concerns:    ASSESSMENT/PLAN:  COVID-19 virus infection  Has risk factors for severe COVID, but fully vaccinated and symptoms improving overall, so no indication for anti-viral therapy. However, purulent sputum concerning for early bacterial superinfection, so script for doxycycline sent to pharmacy- she will start if no improvement in purulent sputum over the next 2 days, or sooner if symptoms worsen). Supportive care and isolation guidelines discussed. Follow up 22, as scheduled    Patient-Reported Vitals 10/27/2022   Patient-Reported Weight 186   Patient-Reported Height 5 2 1/2   Patient-Reported Systolic 311   Patient-Reported Diastolic 60   Patient-Reported Pulse 77   Patient-Reported Temperature No fever   Patient-Reported SpO2 N/A   Patient-Reported Peak Flow N/A        Wt Readings from Last 3 Encounters:   22 185 lb (83.9 kg)   22 181 lb (82.1 kg)   22 180 lb (81.6 kg)     BP Readings from Last 3 Encounters:   22 120/77   22 128/64   22 128/65     Estimated body mass index is 33.84 kg/m² as calculated from the following:    Height as of 22: 5' 2\" (1.575 m). Weight as of 22: 185 lb (83.9 kg). HPI  Patient tested positive for COVID-19 on 10/25-  symptoms started 10/23. Current symptoms include loss of taste/smell, nasal congestion, cough productive of yellow-green sputum, fevers- Tmax 101.5 at onset- now resolved. Symptoms are improving with time. Current treatment: Advil Cold and Sinus. Vaccination status: x4- last 3-4 days prior to onset of symptoms. Risk factors for severe COVID: age and obesity. ROS  As documented above    Physical Exam  Constitutional:       General: She is not in acute distress. Appearance: She is well-developed.    HENT:      Head: Normocephalic and atraumatic. Mouth/Throat:      Lips: No lesions. Neck:      Comments: No visible mass  Pulmonary:      Effort: Pulmonary effort is normal. No respiratory distress. Skin:     Comments: No rash, erythema or other discoloration on facial skin and exposed areas of neck and upper extremites    Neurological:      Mental Status: She is alert. Comments: No facial asymmetry (cranial nerve 7 motor function) or gaze palsy   Psychiatric:         Attention and Perception: Attention normal.         Mood and Affect: Mood normal.         Speech: Speech normal.         Behavior: Behavior normal.         Thought Content: Thought content normal.         Cognition and Memory: Cognition normal.         Pooja Albright was evaluated through a synchronous (real-time) audio-video encounter. The patient (or guardian if applicable) is aware that this is a billable service, which includes applicable co-pays. This Virtual Visit was conducted with patient's (and/or legal guardian's) consent. The visit was conducted pursuant to the emergency declaration under the [address] [place] [address], [address] [place] [address] authority and the [employer] General Act. Patient identification was verified, and a caregiver was present when appropriate. The patient was located at Home: [address] [place] [address]. Provider was located at Home ([facility]): Jordan Quispe MD on 10/27/2022 at 10:35 AM    An electronic signature was used to authenticate this note.

## 2022-10-27 ENCOUNTER — TELEMEDICINE (OUTPATIENT)
Dept: INTERNAL MEDICINE CLINIC | Age: 72
End: 2022-10-27
Payer: COMMERCIAL

## 2022-10-27 DIAGNOSIS — U07.1 COVID-19 VIRUS INFECTION: Primary | ICD-10-CM

## 2022-10-27 PROCEDURE — 99213 OFFICE O/P EST LOW 20 MIN: CPT | Performed by: INTERNAL MEDICINE

## 2022-10-27 PROCEDURE — 1123F ACP DISCUSS/DSCN MKR DOCD: CPT | Performed by: INTERNAL MEDICINE

## 2022-10-27 RX ORDER — DOXYCYCLINE HYCLATE 100 MG/1
100 CAPSULE ORAL 2 TIMES DAILY
Qty: 20 CAPSULE | Refills: 0 | Status: SHIPPED | OUTPATIENT
Start: 2022-10-27 | End: 2022-11-06

## 2022-11-03 ENCOUNTER — OFFICE VISIT (OUTPATIENT)
Dept: SURGERY | Age: 72
End: 2022-11-03
Payer: COMMERCIAL

## 2022-11-03 VITALS — DIASTOLIC BLOOD PRESSURE: 60 MMHG | SYSTOLIC BLOOD PRESSURE: 118 MMHG | BODY MASS INDEX: 33.84 KG/M2 | WEIGHT: 185 LBS

## 2022-11-03 DIAGNOSIS — S30.1XXA ABDOMINAL WALL SEROMA, INITIAL ENCOUNTER: Primary | ICD-10-CM

## 2022-11-03 PROCEDURE — 1123F ACP DISCUSS/DSCN MKR DOCD: CPT | Performed by: SURGERY

## 2022-11-03 PROCEDURE — 99204 OFFICE O/P NEW MOD 45 MIN: CPT | Performed by: SURGERY

## 2022-11-03 ASSESSMENT — ENCOUNTER SYMPTOMS
RESPIRATORY NEGATIVE: 1
ABDOMINAL PAIN: 1
ABDOMINAL DISTENTION: 1
EYES NEGATIVE: 1
ALLERGIC/IMMUNOLOGIC NEGATIVE: 1

## 2022-11-03 NOTE — PROGRESS NOTES
Wesley Espinosa is a 73 y.o. female     CC: Abdominal wall fluid collection    HPI: 61-year-old female with a history of an open Lali-en-Y gastric bypass about 20 years ago. She subsequently developed a supraumbilical hernia and underwent hernia repair with mesh at [facility].  She was treated by Dr. Juan Jose Chawla in July 2021 for an abscess in the supraumbilical location. The area causes some fullness but does not cause pain. No history of fevers or chills. She had MRI of the lumbar spine on 9/2/2022 which showed a supraumbilical fluid collection in the subcutaneous tissue. Her pain specialist will not proceed with epidural injections until the fluid collection has been evaluated.       Past Medical History:   Diagnosis Date    Abdominal wall abscess     Acute pyelonephritis 7/8/2021    Age related osteoporosis     Anal fissure 7/16/2013    Anxiety     Colon polyps     Depression     E coli bacteremia     Fibroid uterus     Hypothyroidism     Morbid obesity ([address]     Osteoarthritis of left knee 1/26/2016    Osteopenia     Urinary incontinence 5/3/2022       Macrobid [nitrofurantoin] and Codeine     Past Surgical History:   Procedure Laterality Date    ABDOMINAL HERNIA REPAIR  2008    APPENDECTOMY  Age 27    CATARACT REMOVAL WITH IMPLANT Right 10/23/2017    with vitrectomy    CATARACT REMOVAL WITH IMPLANT Left 02/26/2018    with vitrectomy    COLONOSCOPY N/A 10/10/2018    COLONOSCOPY POLYPECTOMY SNARE/COLD BIOPSY performed by Artemio Long MD at [address] [address] Right 8/26/2019    RIGHT L4 AND L5 TRANSFORAMINAL EPIDURAL STEROID INJECTION WITH FLUOROSCOPY performed by Christy Ngo MD at [address] [facility] INJECT OTHER PERPHRL NERV Right 10/21/2019    RIGHT PIRIFORMIS INJECTION WITH FLUOROSCOPY (69209) performed by Christy Ngo MD at [address] [facility], TOTAL ABDOMINAL (CERVIX REMOVED)      ovaries out also    LUMBAR SPINE SURGERY Right 1/7/2019    RIGHT L3 AND L4 LUMBAR TRANSFORAMINAL WITH FLUOROSCOPY performed by Randee Nuñez MD at [facility] Right 5/15/2019    RIGHT L4 AND L5 TRANSFORAMINAL EPIDURAL STEROID INJECTION WITH FLUOROSCOPY performed by Randee Nuñez MD at [address] [place] [address] ESOPHAGOGASTRODUODENOSCOPY TRANSORAL DIAGNOSTIC N/A 10/10/2018    EGD performed by Placido Wheat MD at [facility] (CERVIX REMOVED)  1996    Fibroids    TOTAL KNEE ARTHROPLASTY Left 6/1/2021    LEFT TOTAL KNEE ARTHROPLASTY performed by Felice Newton MD at [facility] [address]        Prior to Visit Medications    Medication Sig Taking? Authorizing Provider   doxycycline hyclate (VIBRAMYCIN) 100 MG capsule Take 1 capsule by mouth 2 times daily for 10 days Yes Rhett Page MD   tiZANidine (ZANAFLEX) 4 MG tablet Take 1 tablet by mouth 3 times daily as needed (Muscle spasm) Yes Ricco Godoy PA-C   gabapentin (NEURONTIN) 300 MG capsule TAKE 1 CAPSULE BY MOUTH TWICE DAILY Yes Rhett Page MD   buPROPion (WELLBUTRIN XL) 300 MG extended release tablet TAKE 1 TABLET BY MOUTH EVERY MORNING Yes Rhett Page MD   omeprazole (PRILOSEC) 40 MG delayed release capsule TAKE 1 CAPSULE BY MOUTH EVERY MORNING BEFORE BREAKFAST Yes Rhett Page MD   ALPRAZolam (XANAX) 1 MG tablet TAKE 1 TABLET BY MOUTH TWICE DAILY AS NEEDED FOR ANXIETY Yes Rhett Page MD   traZODone (DESYREL) 50 MG tablet TAKE 3 TABLETS BY MOUTH EVERY NIGHT AT BEDTIME Yes Rhett Page MD   clobetasol (TEMOVATE) 0.05 % ointment  Yes Historical Provider, MD   estradiol (ESTRACE VAGINAL) 0.1 MG/GM vaginal cream Place 0.25 g vaginally daily Apply 0.25g with fingertip to the vaginal opening every night at bedtime for 2 weeks, then decrease to twice weekly.  Yes Regan Herrera MD   solifenacin (VESICARE) 10 MG tablet Take 1 tablet by mouth daily Yes Regan Herrera MD   levothyroxine (SYNTHROID) 175 MCG tablet Take 1 tablet by mouth daily Yes Jono Ritter MD   calcium carbonate 600 MG TABS tablet Take 1 tablet by mouth daily Yes Historical Provider, MD   ondansetron (ZOFRAN) 4 MG tablet Take 1 tablet by mouth every 8 hours as needed for Nausea or Vomiting Yes Mar Couch PA-C   Multiple Vitamin (MULTIVITAMIN PO) Take by mouth Yes Historical Provider, MD   melatonin 5 MG TABS tablet Take 5 mg by mouth daily Yes Historical Provider, MD   Cyanocobalamin (VITAMIN B 12 PO) Take 500 mcg by mouth daily  Yes Historical Provider, MD   Cholecalciferol (VITAMIN D) 1000 UNIT CAPS Take 2 capsules by mouth daily  Yes Historical Provider, MD   aspirin 81 MG EC tablet Take 4 tablets by mouth daily  Jono Ritter MD       Social History     Socioeconomic History    Marital status:       Spouse name: Not on file    Number of children: Not on file    Years of education: Not on file    Highest education level: Not on file   Occupational History    Occupation: Computer work   Tobacco Use    Smoking status: Never    Smokeless tobacco: Never   Vaping Use    Vaping Use: Never used   Substance and Sexual Activity    Alcohol use: No    Drug use: No    Sexual activity: Not Currently   Other Topics Concern    Not on file   Social History Narrative    Not on file     Social Determinants of Health     Financial Resource Strain: Low Risk     Difficulty of Paying Living Expenses: Not hard at all   Food Insecurity: No Food Insecurity    Worried About Running Out of Food in the Last Year: Never true    Ran Out of Food in the Last Year: Never true   Transportation Needs: Not on file   Physical Activity: Insufficiently Active    Days of Exercise per Week: 3 days    Minutes of Exercise per Session: 20 min   Stress: Not on file   Social Connections: Not on file   Intimate Partner Violence: Not At Risk    Fear of Current or Ex-Partner: No    Emotionally Abused: No    Physically Abused: No    Sexually Abused: No   Housing Stability: Not on file       Review of Systems Constitutional:  Positive for chills, fatigue and unexpected weight change. HENT: Negative. Eyes: Negative. Respiratory: Negative. Cardiovascular: Negative. Gastrointestinal:  Positive for abdominal distention and abdominal pain. Endocrine: Negative. Genitourinary: Negative. Musculoskeletal: Negative. Skin: Negative. Allergic/Immunologic: Negative. Neurological: Negative. Hematological: Negative. Psychiatric/Behavioral: Negative.       :   Physical Exam  Constitutional:       Appearance: She is well-developed. HENT:      Head: Normocephalic and atraumatic. Right Ear: External ear normal.      Left Ear: External ear normal.   Eyes:      Conjunctiva/sclera: Conjunctivae normal.   Cardiovascular:      Rate and Rhythm: Normal rate and regular rhythm. Pulmonary:      Effort: Pulmonary effort is normal.      Breath sounds: Normal breath sounds. Abdominal:      General: There is no distension. Palpations: Abdomen is soft. Comments: No evidence of recurrent hernia. There is some firmness appreciated above the umbilicus   Musculoskeletal:         General: Normal range of motion. Cervical back: Normal range of motion and neck supple. Skin:     General: Skin is warm and dry. Neurological:      Mental Status: She is alert and oriented to person, place, and time. Psychiatric:         Behavior: Behavior normal.     Wt 185 lb (83.9 kg)   BMI 33.84 kg/m²     :      72-year-old female with a history of an open Lali-en-Y gastric bypass about 20 years ago. She subsequently developed a supraumbilical hernia and underwent hernia repair with mesh at [facility].  She was treated by Dr. Fred Alicia in July 2021 for a subcutaneous abscess in the supraumbilical location. She had MRI of the lumbar spine on 9/2/2022 which showed a supraumbilical fluid collection which is located in the subcutaneous tissue and measures 9 cm in greatest diameter.   There is no evidence of a recurrent hernia. She reports some fullness in the area of concern but denies abdominal pain. There is no overt evidence of infection. The overall findings suggest a chronic seroma. Plan:      In order to completely resolve the seroma, the old surgical mesh would likely need to be surgically explanted. This would put her at risk of developing a recurrent hernia. Given that the seroma is only minimally symptomatic and not currently infected, would not recommend surgery at this time. It is okay for her to proceed with epidural injections from my perspective. Follow-up as needed.

## 2022-11-03 NOTE — LETTER
[address]  [address] [address]  Phone: [phone]  Fax: [phone]    November 7, 2022    Patient: Velia White  MRN:  [id number]  YOB: 1950  Date of Visit: 11/3/2022    Dear Dr Dennis Smith,    Thank you for the request for consultation for Velia White. Below are the relevant portions of my assessment and plan of care. Assessment:  72-year-old female with a history of an open Lali-en-Y gastric bypass about 20 years ago. She subsequently developed a supraumbilical hernia and underwent hernia repair with mesh at [facility].  She was treated by Dr. Urvashi Valenzuela in July 2021 for a subcutaneous abscess in the supraumbilical location. She had MRI of the lumbar spine on 9/2/2022 which showed a supraumbilical fluid collection which is located in the subcutaneous tissue and measures 9 cm in greatest diameter. There is no evidence of a recurrent hernia. She reports some fullness in the area of concern but denies abdominal pain. There is no overt evidence of infection. The overall findings suggest a chronic seroma. Plan:  In order to completely resolve the seroma, the old surgical mesh would likely need to be surgically explanted. This would put her at risk of developing a recurrent hernia. Given that the seroma is only minimally symptomatic and not currently infected, would not recommend surgery at this time. It is okay for her to proceed with epidural injections from my perspective. Follow-up as needed. If you have questions, please do not hesitate to call me. I look forward to following Christian along with you.     Sincerely,    Ca Tilley MD    CC providers:    Tommie Miller MD  [address] [place] [id number]  [address] [place]

## 2022-11-07 DIAGNOSIS — F51.04 PSYCHOPHYSIOLOGICAL INSOMNIA: ICD-10-CM

## 2022-11-07 DIAGNOSIS — F41.9 ANXIETY: ICD-10-CM

## 2022-11-07 RX ORDER — ALPRAZOLAM 1 MG/1
TABLET ORAL
Qty: 40 TABLET | Refills: 2 | Status: SHIPPED | OUTPATIENT
Start: 2022-11-07 | End: 2023-02-07

## 2022-11-07 NOTE — PROGRESS NOTES
Date of Visit:  2022    CC: Silvestre Lyman (: 1950) is a 81 y.o. female, established patient, here for evaluation/re-evaluation of the following medical concerns:    ASSESSMENT/PLAN:  Hypertriglyceridemia  Assessment & Plan:  Importance of continued lifestyle changes stressed to help prevent need for cholesterol medication in the future. Orders:  -     Lipid, Fasting; Future  -     Comprehensive Metabolic Panel, Fasting; Future  Stage 3a chronic kidney disease ([facility]  Assessment & Plan:  Improved. Suspect due to prior NSAID use, which she will avoid in the future. If worsens further, will obtain renal US. Acquired hypothyroidism  Assessment & Plan:  Cold intolerance and weight gain are likely multifactorial, but will adjust levothyroxine dose if indicated by lab results. Orders:  -     TSH; Future  Post-resection malabsorption  Assessment & Plan:  Check labs on current doses of MVI, vitamin D, calcium, and  vitamin B12. Orders:  -     CBC with Auto Differential; Future  -     Ferritin; Future  -     Iron and TIBC; Future  -     Vitamin B12; Future  -     Vitamin D 25 Hydroxy; Future  Chronic bilateral low back pain with bilateral sciatica  Assessment & Plan:  Unchanged. Cleared for epidural injections per general surgery- she will call spine specialist to make these arrangements. Continue current doses of gabapentin and Tylenol. Neck pain, chronic  Assessment & Plan:  See plan for low back pain. Psychophysiological insomnia  Assessment & Plan:  Improved. Continue current doses of trazodone and melatonin. Anxiety  Assessment & Plan:  Improved. Continue current dose of Wellbutrin and lowest effective dose of Xanax for breakthrough symptoms. She will make appointment with [facility] if symptoms worsen. Major depressive disorder with single episode, in partial remission ([facility]  Assessment & Plan:  Improved with recent decrease in external stressors. Continue current dose of Wellbutrin XL. Class 2 severe obesity due to excess calories with serious comorbidity and body mass index (BMI) of 35.0 to 35.9 in adult [facility])  Assessment & Plan:  Worse due to decreased activity related to back issues. Patient was asked about her current diet and exercise habits, and personalized advice was provided regarding recommended lifestyle changes. Patient's comorbid health conditions associated with elevated BMI were discussed, as well as the likely benefits of weight loss. Based upon patient's motivation to change her behavior, the following plan was agreed upon to work toward a weight loss goal of 30 pounds: Weight Watcher's diet, and she will increase her activity level as musculoskeletal symptoms. Educational materials for  weight loss were provided. Patient will follow-up in 3 month(s) with PCP. Provider spent 15 minutes counseling patient. Body mass index 35.0-35.9, adult  -     Obesity Counseling, 15 Minutes     Return in about 3 months (around 2/8/2023) for MEDICARE AW. Vitals:    11/08/22 1259   BP: 120/68   Pulse: 72   Resp: 14   SpO2: 98%   Weight: 192 lb 3.2 oz (87.2 kg)      Estimated body mass index is 35.15 kg/m² as calculated from the following:    Height as of 9/22/22: 5' 2\" (1.575 m). Weight as of this encounter: 192 lb 3.2 oz (87.2 kg). Wt Readings from Last 3 Encounters:   11/08/22 192 lb 3.2 oz (87.2 kg)   11/03/22 185 lb (83.9 kg)   09/22/22 185 lb (83.9 kg)     BP Readings from Last 3 Encounters:   11/08/22 120/68   11/03/22 118/60   05/24/22 120/77     HPI  Chronic Joint Pain:  Patient presents for follow-up of pain in back and neck- back pain unchanged over the past 3 months, no change in neck pain. On average, pain is perceived as moderate. Current treatment: gabapentin 600 mg qhs, Tylenol prn. Medication side effects: none. Diagnostic testing: MRI L-S spine 8/16/22:  Small central disc protrusion at L3-L4, but no significant stenosis.   A large fluid collection was identified within the anterior abdominal wall- evaluated by general surgery consistent with seroma/hematoma, no surgical intervention advised, cleared for epidural injections. [place] [id number] [address] at [address] to refer to PMR for injections. Insomnia:  Current treatment: prescription sleep aid-  trazodone 100 mg qhs, melatonin 5 mg qhs prn- sleeps well 3-4 nights/week. Other nights, has difficulty staying asleep due to musculoskeletal issues. No adverse effects with medications. Restless legs symptoms intermittent. Did not tolerate Remeron. Mood Disorder:  Patient presents for follow-up of depression and anxiety disorder. Current complaints include: excessive worry, difficulty concentrating, impaired memory- improved. No anhedonia, tearfulness, depressed mood, feelings of worthlessness/guilt, panic attacks, feelings of hopelessness, irritability or suicidal ideation. Symptoms/signs of jojo: none. Current treatment includes: Wellbutrin- 300 mg qam, takes Xanax about once in the evening for anxiety and occasionally during the day- infrequent. Medication side effects: mild tremor of hands. ROS  As documented above    Physical Exam  Constitutional:       General: She is not in acute distress. Appearance: She is well-developed. Eyes:      Conjunctiva/sclera: Conjunctivae normal.   Neck:      Thyroid: No thyroid mass or thyromegaly. Cardiovascular:      Rate and Rhythm: Normal rate and regular rhythm. Heart sounds: Murmur heard. Systolic (LLSB to apex) murmur is present with a grade of 2/6. No friction rub. No gallop. Pulmonary:      Effort: Pulmonary effort is normal. No respiratory distress. Breath sounds: Normal breath sounds. No wheezing, rhonchi or rales. Abdominal:      General: Abdomen is flat. Tenderness: There is no abdominal tenderness. There is no right CVA tenderness, left CVA tenderness or guarding.    Musculoskeletal:      Right lower leg: No edema. Left lower leg: No edema. Comments: Spine exam deferred to specialist   Lymphadenopathy:      Cervical: No cervical adenopathy. Skin:     General: Skin is warm and dry. Findings: No erythema or rash. Neurological:      Mental Status: She is alert and oriented to person, place, and time. Psychiatric:         Speech: Speech normal.         Behavior: Behavior normal.         Thought Content: Thought content normal.         Judgment: Judgment normal.         --Lisbeth Ren MD on 11/8/2022 at 1:23 PM    An electronic signature was used to authenticate this note.

## 2022-11-08 ENCOUNTER — OFFICE VISIT (OUTPATIENT)
Dept: INTERNAL MEDICINE CLINIC | Age: 72
End: 2022-11-08
Payer: COMMERCIAL

## 2022-11-08 VITALS
RESPIRATION RATE: 14 BRPM | SYSTOLIC BLOOD PRESSURE: 120 MMHG | OXYGEN SATURATION: 98 % | DIASTOLIC BLOOD PRESSURE: 68 MMHG | HEART RATE: 72 BPM | WEIGHT: 192.2 LBS | BODY MASS INDEX: 35.15 KG/M2

## 2022-11-08 DIAGNOSIS — E03.9 ACQUIRED HYPOTHYROIDISM: ICD-10-CM

## 2022-11-08 DIAGNOSIS — M54.2 NECK PAIN, CHRONIC: ICD-10-CM

## 2022-11-08 DIAGNOSIS — F41.9 ANXIETY: ICD-10-CM

## 2022-11-08 DIAGNOSIS — F51.04 PSYCHOPHYSIOLOGICAL INSOMNIA: ICD-10-CM

## 2022-11-08 DIAGNOSIS — M54.41 CHRONIC BILATERAL LOW BACK PAIN WITH BILATERAL SCIATICA: ICD-10-CM

## 2022-11-08 DIAGNOSIS — E66.01 CLASS 2 SEVERE OBESITY DUE TO EXCESS CALORIES WITH SERIOUS COMORBIDITY AND BODY MASS INDEX (BMI) OF 35.0 TO 35.9 IN ADULT (HCC): ICD-10-CM

## 2022-11-08 DIAGNOSIS — G89.29 CHRONIC BILATERAL LOW BACK PAIN WITH BILATERAL SCIATICA: ICD-10-CM

## 2022-11-08 DIAGNOSIS — G89.29 NECK PAIN, CHRONIC: ICD-10-CM

## 2022-11-08 DIAGNOSIS — N18.31 STAGE 3A CHRONIC KIDNEY DISEASE (HCC): ICD-10-CM

## 2022-11-08 DIAGNOSIS — K91.2 POST-RESECTION MALABSORPTION: ICD-10-CM

## 2022-11-08 DIAGNOSIS — E78.1 HYPERTRIGLYCERIDEMIA: Primary | ICD-10-CM

## 2022-11-08 DIAGNOSIS — M54.42 CHRONIC BILATERAL LOW BACK PAIN WITH BILATERAL SCIATICA: ICD-10-CM

## 2022-11-08 DIAGNOSIS — F32.4 MAJOR DEPRESSIVE DISORDER WITH SINGLE EPISODE, IN PARTIAL REMISSION (HCC): ICD-10-CM

## 2022-11-08 PROCEDURE — G0447 BEHAVIOR COUNSEL OBESITY 15M: HCPCS | Performed by: INTERNAL MEDICINE

## 2022-11-08 PROCEDURE — 99214 OFFICE O/P EST MOD 30 MIN: CPT | Performed by: INTERNAL MEDICINE

## 2022-11-08 PROCEDURE — G0008 ADMIN INFLUENZA VIRUS VAC: HCPCS | Performed by: INTERNAL MEDICINE

## 2022-11-08 PROCEDURE — 90694 VACC AIIV4 NO PRSRV 0.5ML IM: CPT | Performed by: INTERNAL MEDICINE

## 2022-11-08 PROCEDURE — 1123F ACP DISCUSS/DSCN MKR DOCD: CPT | Performed by: INTERNAL MEDICINE

## 2022-11-08 NOTE — ASSESSMENT & PLAN NOTE
Improved. Suspect due to prior NSAID use, which she will avoid in the future. If worsens further, will obtain renal US.

## 2022-11-08 NOTE — ASSESSMENT & PLAN NOTE
Cold intolerance and weight gain are likely multifactorial, but will adjust levothyroxine dose if indicated by lab results.

## 2022-11-08 NOTE — ASSESSMENT & PLAN NOTE
Improved. Continue current dose of Wellbutrin and lowest effective dose of Xanax for breakthrough symptoms. She will make appointment with [facility] if symptoms worsen.

## 2022-11-08 NOTE — ASSESSMENT & PLAN NOTE
Unchanged. Cleared for epidural injections per general surgery- she will call spine specialist to make these arrangements. Continue current doses of gabapentin and Tylenol.

## 2022-11-08 NOTE — ASSESSMENT & PLAN NOTE
Albumin completed  Dopamine at 2mcg/kg started  @11;15    Pt tolerates activity in rm fairly well. Sats 94% on room air    [address] has drained murray urine. Pt has had 2 sm stools.     IVs patent left arm/right arm  1800  Continues AV paced Worse due to decreased activity related to back issues. Patient was asked about her current diet and exercise habits, and personalized advice was provided regarding recommended lifestyle changes. Patient's comorbid health conditions associated with elevated BMI were discussed, as well as the likely benefits of weight loss. Based upon patient's motivation to change her behavior, the following plan was agreed upon to work toward a weight loss goal of 30 pounds: Weight Watcher's diet, and she will increase her activity level as musculoskeletal symptoms. Educational materials for  weight loss were provided. Patient will follow-up in 3 month(s) with PCP. Provider spent 15 minutes counseling patient.

## 2022-11-10 NOTE — TELEPHONE ENCOUNTER
----- Message from Jamie Hall sent at 4/19/2021 12:26 PM EDT -----  Subject: Message to Provider    QUESTIONS  Information for Provider? Pt called and said she has to have surgery June 1st and she said she has an appointment on the 23rd and she wants to know   if she should keep her appointment for the 23rd and schedule a seperate   pre-op appointment or if she should cancel her appointment 4/23 and just   wait to see her for when she needs her pre-op physical.  ---------------------------------------------------------------------------  --------------  [address]  What is the best way for the office to contact you? OK to leave message on   voicemail  Preferred Call Back Phone Number? [phone]  ---------------------------------------------------------------------------  --------------  SCRIPT ANSWERS  Relationship to Patient?  Self November 10, 2022       Agustín Rahman MD  [address] [address]  Via In Basket      Patient: Venkata He   YOB: 1966   Date of Visit: 11/10/2022       Dear Dr. Rahman:    Thank you for referring Venkata He to me for evaluation. Below are my notes for this visit with him.    If you have questions, please do not hesitate to call me. I look forward to following your patient along with you.      Sincerely,        Fadi Drake MD        CC: No Recipients  Fadi Drake MD  11/10/2022  3:57 PM  Signed      Subjective   Patient ID: Venkata is a 56 year old male.  Referring Physician: Darryl Hand DO ; Dr. Rahman    Chief Complaint   Patient presents with   • Abdominal Pain     Burning      HPI  History obtained from patient and his daughter    56 year old male with PMH significant for hypertension, cholelithiasis, GERD, fatty liver, coronary artery disease status post PCI in 2015 on aspirin and Plavix, alcohol abuse who presents to schedule EGD and colonoscopy for abdominal burning and weight loss.    Appt 11/10/22:  He has been having burning in the epigastrium and chest.   Symptoms are intermittent and not necessarily associated with food.   He does have coughing episodes.   He is not having any issues with dysphagia.   He has not had any alcohol since May of 2022.   This has been going on for the last 6 months.   He has never had an EGD or colonoscopy before.   Daughter says patient has always been underweight. He has lost 6 pounds over the last 2 months.     Bowel movements per day: he does have issues with constipation, once a day but very hard and has incomplete evacuation   Loose stools: no  Blood in stool: no  Family history of colon cancer in first degree relatives: no            Past Medical History:   Diagnosis Date   • Acute alcoholic gastritis without hemorrhage 4/10/2018   • Essential hypertension 12/4/2017   • Hypercholesterolemia 12/4/2017   • Presence of  stent in LAD coronary artery 12/4/2017   • Tobacco abuse 12/4/2017   • Transaminitis 3/18/2019     Past Surgical History:   Procedure Laterality Date   • Coronary angioplasty       Current Outpatient Medications   Medication Sig Dispense Refill   • omeprazole (PriLOSEC) 40 MG capsule Take 1 capsule by mouth daily. Take 30 min before first meal of the day 30 capsule 11   • Sod Picosulfate-Mag Ox-Cit Acd (Clenpiq) 10-3.5-12 MG-GM -GM/160ML Solution Take 2 Bottles by mouth 1 time for 1 dose. As written on your prep instruction per your provider. 1 each 0   • ramipril (ALTACE) 5 MG capsule Take 2 capsules by mouth daily. 180 capsule 1   • clopidogrel (PLAVIX) 75 MG tablet Take 1 tablet by mouth daily. 90 tablet 1   • aspirin (Aspirin Low Dose) 81 MG EC tablet Take 1 tablet by mouth daily. 90 tablet 1   • atorvastatin (LIPITOR) 80 MG tablet Take 1 tablet by mouth at bedtime. 90 tablet 1   • metoPROLOL succinate (TOPROL-XL) 50 MG 24 hr tablet Take 1 tablet by mouth daily. 90 tablet 3   • cetirizine (ZyrTEC Allergy) 10 MG tablet Take 1 tablet by mouth daily for 15 days. 15 tablet 0     No current facility-administered medications for this visit.       ALLERGIES:   Allergen Reactions   • Kdc:Ginkgo+Red Dye+Yellow Dye+Ascorbate+Biotin+Ergocalciferol+... RASH       Family History   Problem Relation Age of Onset   • Myocardial Infarction Mother    • Myocardial Infarction Brother    • Cancer, Colon Neg Hx    • Cancer, Esophageal Neg Hx    • Cancer, Rectal Neg Hx    • Cancer, Stomach Neg Hx        Social History     Tobacco Use   • Smoking status: Current Every Day Smoker     Types: Cigarettes   • Smokeless tobacco: Never Used   Vaping Use   • Vaping Use: never used   Substance Use Topics   • Alcohol use: Not Currently   • Drug use: Never           ROS  Except as noted elsewhere in the note, the ROS is negative for Constitutional, HEENT, CV, Respiratory, GI, , Musculoskeletal, Neuro, Psychiatric, Heme/Lymph note,  Allergy,Endocrine, and Skin.       Objective   /66   Pulse 60   Ht 5' 5\" (1.651 m)   Wt 48.1 kg (106 lb)   BMI 17.64 kg/m²   BSA 1.51 m²   The patient is alert and oriented, in no acute distress.  HEENT: Sclera are nonicteric.  CARDIAC: RRR, normal S1, S2. No murmurs, rubs or gallops.  PULMONARY: Clear bilaterally to ascultation  ABDOMEN: Soft, non-tender, non-distended. Bowel sounds arepresent. No obvious hepatomegaly, splenomegaly, or masses. No hernias or ascites.  PERIANAL: Examination deferred.  GENITALS: Examination deferred.  LYMPATICS: No palpable nodes in the neck  MUSCULOSKELETAL: Normal station, digits and nails. Extremities are all normal without edema.  SKIN: Anicteric.      Assessment   Problem List Items Addressed This Visit        Gastrointestinal and Abdominal    Chronic idiopathic constipation      Other Visit Diagnoses     GERD without esophagitis    -  Primary    Relevant Orders    COLONOSCOPY    EGD    Special screening for malignant neoplasms, colon        Relevant Orders    COLONOSCOPY    EGD    Epigastric pain        Relevant Orders    COLONOSCOPY    EGD    Weight loss        Relevant Orders    COLONOSCOPY    EGD        I will proceed with EGD with plan for pan biopsies and colonoscopy with view of TI. He should hold plavix for 5 days prior to procedure (I sent message to his cardiologist Dr. Maharaj) to make sure this is ok. He can continue aspirin. I will start him on omeprazole 40mg and Miralax 17g daily.    Risks of upper endoscopy and colonoscopy explained to patient including bleeding, perforation, splenic rupture, risks of anesthesia including pneumonia, incomplete endoscopy/colonoscopy and missed lesions. Patient agrees to proceed.    This patient is of high medical complexity due:  Need for diagnostic endoscopy with the following risk factors:  CAD s/p PCI    Fadi Drake MD  Gastroenterology  Specialist in Inflammatory Bowel Disease (Crohn's and Colitis)

## 2022-11-14 ENCOUNTER — TELEPHONE (OUTPATIENT)
Dept: ORTHOPEDIC SURGERY | Age: 72
End: 2022-11-14

## 2022-11-14 NOTE — TELEPHONE ENCOUNTER
General Question     Subject SPINAL INJECTIONS   Patient and /or Facility Request: referral to PMR for potential spinal injections. PATIENT LIVES IN [place] SO SHE WOULD LIKE TO GO SOMEWHERE CLOSE LIKE [address]   Contact Number [phone]

## 2022-11-16 DIAGNOSIS — M51.36 DDD (DEGENERATIVE DISC DISEASE), LUMBAR: Primary | ICD-10-CM

## 2022-11-16 NOTE — TELEPHONE ENCOUNTER
PLEASE CALL Pt WITH THE OPTIONAL NAME OF THE PERSON SHE COULD CONTACT IN THE [place] AREA, FOR THE INJECTION. SHE WOULD LIKE TO GET THIS SCHEDULED, ASAP.     Pt CAN BE REACHED @ [phone]

## 2022-11-17 RX ORDER — LEVOTHYROXINE SODIUM 175 UG/1
175 TABLET ORAL DAILY
Qty: 90 TABLET | Refills: 1 | Status: SHIPPED | OUTPATIENT
Start: 2022-11-17

## 2022-12-05 RX ORDER — BUPROPION HYDROCHLORIDE 300 MG/1
300 TABLET ORAL EVERY MORNING
Qty: 90 TABLET | Refills: 1 | Status: SHIPPED | OUTPATIENT
Start: 2022-12-05

## 2022-12-05 NOTE — TELEPHONE ENCOUNTER
Dose increased to 300 mg qd 9/22- please contact patient to ask if she needs a refill on the higher dose.

## 2023-02-01 ENCOUNTER — OFFICE VISIT (OUTPATIENT)
Dept: UROGYNECOLOGY | Age: 73
End: 2023-02-01
Payer: COMMERCIAL

## 2023-02-01 VITALS
OXYGEN SATURATION: 99 % | DIASTOLIC BLOOD PRESSURE: 79 MMHG | TEMPERATURE: 98 F | SYSTOLIC BLOOD PRESSURE: 129 MMHG | RESPIRATION RATE: 16 BRPM | HEART RATE: 81 BPM

## 2023-02-01 DIAGNOSIS — N39.3 STRESS INCONTINENCE: ICD-10-CM

## 2023-02-01 DIAGNOSIS — R35.0 URINARY FREQUENCY: ICD-10-CM

## 2023-02-01 DIAGNOSIS — R39.15 URGENCY OF URINATION: ICD-10-CM

## 2023-02-01 DIAGNOSIS — N39.41 URGE INCONTINENCE: Primary | ICD-10-CM

## 2023-02-01 PROCEDURE — 99213 OFFICE O/P EST LOW 20 MIN: CPT | Performed by: NURSE PRACTITIONER

## 2023-02-01 PROCEDURE — 1123F ACP DISCUSS/DSCN MKR DOCD: CPT | Performed by: NURSE PRACTITIONER

## 2023-02-01 RX ORDER — SOLIFENACIN SUCCINATE 10 MG/1
10 TABLET, FILM COATED ORAL DAILY
Qty: 30 TABLET | Refills: 2 | Status: SHIPPED | OUTPATIENT
Start: 2023-02-01

## 2023-02-01 NOTE — PROGRESS NOTES
2023       HPI:     Name: Bethany Roth  YOB: 1950    CC: Patient is a 81 y.o. presenting for evaluation of OAB.   HPI: How long have you had this problem? years  Please rate the severity of your problem: moderate  Anything make it better? PMH: reviewed, no changes  PSH:  no changes    New patient visit on 2022 - \"Ivy presents today with a chief complaint of urinary leakage and pain. She was recently admitted for a pyelonephritis about a year ago however thinks that things have improved to some degree most recently. She does have a very small bladder capacity on her filling study today but did leak with a cough. She also has significant atrophic vaginitis. Because of this I am going to have her try estrogen vaginal cream and try Vesicare. I will have her follow-up for urodynamics and cystourethroscopy into the 3 months\"  post void residual   Date Value Ref Range Status   2022 80 ml Final            FIRST SENSATION   Date Value Ref Range Status   2022 10 cc Final            SECOND SENSATION   Date Value Ref Range Status   2022 65 cc Final            MAX SENSATION   Date Value Ref Range Status   2022 90 cc Final            EMPTY COUGH STRESS TEST   Date Value Ref Range Status   2022 neg   Final            FULL COUGH STRESS TEST   Date Value Ref Range Status   2022 pos   Final            SPASM   Date Value Ref Range Status   2022 neg         Both Urodynamics and Cysto appointments canceled. Was on Vesicare 10mg for 2 months and was using Estrogen cream doing well, when she stopped she noticed symptoms returning. She denies side effects from Vesicare. Found it to be helpful.   Does note leakage with cough [address]      Ob/Gyn History:    OB History    Para Term  AB Living   1 1 1     1   SAB IAB Ectopic Molar Multiple Live Births                    # Outcome Date GA Lbr Arcenio/2nd Weight Sex Delivery Anes PTL Lv   1 Term Vag-Spont        Past Medical History:   Past Medical History:   Diagnosis Date    Abdominal wall abscess     Acute pyelonephritis 7/8/2021    Age related osteoporosis     Anal fissure 7/16/2013    Anxiety     Colon polyps     Depression     E coli bacteremia     Fibroid uterus     Hypothyroidism     Morbid obesity ([address]     Osteoarthritis of left knee 1/26/2016    Osteopenia     Urinary incontinence 5/3/2022     Past Surgical History:   Past Surgical History:   Procedure Laterality Date    ABDOMINAL HERNIA REPAIR  2008    APPENDECTOMY  Age 27    CATARACT REMOVAL WITH IMPLANT Right 10/23/2017    with vitrectomy    CATARACT REMOVAL WITH IMPLANT Left 02/26/2018    with vitrectomy    COLONOSCOPY N/A 10/10/2018    COLONOSCOPY POLYPECTOMY SNARE/COLD BIOPSY performed by [facility] White MD at [facility] Right 8/26/2019    RIGHT L4 AND L5 TRANSFORAMINAL EPIDURAL STEROID INJECTION WITH FLUOROSCOPY performed by Feliciano Sullivan MD at [address] [place] [address] INJECT OTHER PERPHRL NERV Right 10/21/2019    RIGHT PIRIFORMIS INJECTION WITH FLUOROSCOPY (68633) performed by Feliciano Sullivan MD at [address] [facility], TOTAL ABDOMINAL (CERVIX REMOVED)      ovaries out also    LUMBAR SPINE SURGERY Right 1/7/2019    RIGHT L3 AND L4 LUMBAR TRANSFORAMINAL WITH FLUOROSCOPY performed by Feliciano Sullivan MD at [place] [address] Right 5/15/2019    RIGHT L4 AND L5 TRANSFORAMINAL EPIDURAL STEROID INJECTION WITH FLUOROSCOPY performed by Feliciano Sullivan MD at [address] [place] [address] ESOPHAGOGASTRODUODENOSCOPY TRANSORAL DIAGNOSTIC N/A 10/10/2018    EGD performed by Wild White MD at [facility] (CERVIX REMOVED)  1996    Fibroids    TOTAL KNEE ARTHROPLASTY Left 6/1/2021    LEFT TOTAL KNEE ARTHROPLASTY performed by Richa Cantu MD at [address]     Allergies:    Allergies   Allergen Reactions    Macrobid [Nitrofurantoin] Other (See Comments) Fever, myalgias    Codeine Nausea Only     Current Medications:  Current Outpatient Medications   Medication Sig Dispense Refill    solifenacin (VESICARE) 10 MG tablet Take 1 tablet by mouth daily 30 tablet 2    buPROPion (WELLBUTRIN XL) 300 MG extended release tablet Take 1 tablet by mouth every morning 90 tablet 1    levothyroxine (SYNTHROID) 175 MCG tablet TAKE 1 TABLET BY MOUTH DAILY 90 tablet 1    ALPRAZolam (XANAX) 1 MG tablet TAKE 1 TABLET BY MOUTH TWICE DAILY AS NEEDED FOR ANXIETY 40 tablet 2    gabapentin (NEURONTIN) 300 MG capsule TAKE 1 CAPSULE BY MOUTH TWICE DAILY 180 capsule 1    buPROPion (WELLBUTRIN XL) 300 MG extended release tablet TAKE 1 TABLET BY MOUTH EVERY MORNING 90 tablet 1    omeprazole (PRILOSEC) 40 MG delayed release capsule TAKE 1 CAPSULE BY MOUTH EVERY MORNING BEFORE BREAKFAST 30 capsule 5    traZODone (DESYREL) 50 MG tablet TAKE 3 TABLETS BY MOUTH EVERY NIGHT AT BEDTIME 270 tablet 1    clobetasol (TEMOVATE) 0.05 % ointment       estradiol (ESTRACE VAGINAL) 0.1 MG/GM vaginal cream Place 0.25 g vaginally daily Apply 0.25g with fingertip to the vaginal opening every night at bedtime for 2 weeks, then decrease to twice weekly. 30 g 0    calcium carbonate 600 MG TABS tablet Take 1 tablet by mouth daily      aspirin 81 MG EC tablet Take 4 tablets by mouth daily      ondansetron (ZOFRAN) 4 MG tablet Take 1 tablet by mouth every 8 hours as needed for Nausea or Vomiting 30 tablet 0    Multiple Vitamin (MULTIVITAMIN PO) Take by mouth      melatonin 5 MG TABS tablet Take 5 mg by mouth daily      Cyanocobalamin (VITAMIN B 12 PO) Take 500 mcg by mouth daily       Cholecalciferol (VITAMIN D) 1000 UNIT CAPS Take 2 capsules by mouth daily        No current facility-administered medications for this visit. Social History:   Social History     Socioeconomic History    Marital status:       Spouse name: Not on file    Number of children: Not on file    Years of education: Not on file    Highest education level: Not on file   Occupational History    Occupation: Computer work   Tobacco Use    Smoking status: Never    Smokeless tobacco: Never   Vaping Use    Vaping Use: Never used   Substance and Sexual Activity    Alcohol use: No    Drug use: No    Sexual activity: Not Currently   Other Topics Concern    Not on file   Social History Narrative    Not on file     Social Determinants of Health     Financial Resource Strain: Low Risk     Difficulty of Paying Living Expenses: Not hard at all   Food Insecurity: No Food Insecurity    Worried About Running Out of Food in the Last Year: Never true    [address] in the Last Year: Never true   Transportation Needs: Not on file   Physical Activity: Insufficiently Active    Days of Exercise per Week: 3 days    Minutes of Exercise per Session: 20 min   Stress: Not on file   Social Connections: Not on file   Intimate Partner Violence: Not At Risk    Fear of Current or Ex-Partner: No    Emotionally Abused: No    Physically Abused: No    Sexually Abused: No   Housing Stability: Not on file     Family History:   Family History   Problem Relation Age of Onset    Diabetes Father         Type II    Diabetes Sister         Type II    Diabetes Brother         Type II    Breast Cancer Sister 58    Lung Cancer Mother         Smoker    Heart Disease Sister         Bicuspid aortic valve x2         Objective:     Vitals  Vitals:    02/01/23 1405   BP: 129/79   Pulse: 81   Resp: 16   Temp: 98 °F (36.7 °C)   SpO2: 99%     Physical Exam  Physical Exam  Vitals and nursing note reviewed. Constitutional:       Appearance: Normal appearance. HENT:      Head: Normocephalic. Eyes:      Conjunctiva/sclera: Conjunctivae normal.   Cardiovascular:      Rate and Rhythm: Normal rate. Pulmonary:      Effort: Pulmonary effort is normal.   Musculoskeletal:         General: Normal range of motion. Cervical back: Normal range of motion. Skin:     General: Skin is warm and dry.   Neurological:      General: No focal deficit present.      Mental Status: She is alert.   Psychiatric:         Mood and Affect: Mood normal.         Behavior: Behavior normal.       No results found for this visit on 02/01/23.    Assessment/Plan:     Christian Adams is a 72 y.o. female with   1. Urge incontinence    2. Urinary frequency    3. Urgency of urination    4. Stress incontinence    -Records reviewed  -ADRIENNE  -UUI:  Returns to resume care for her UUI as noted at her New Pt visit 5/2022  Restart Vesicare 10 mg  Restart topical estrogen, refills to [facility] faxed    Reschedule Urodynamic and Cystoscopy as previously scheduled  OVIDIO Lindsay - CNP            No orders of the defined types were placed in this encounter.    Orders Placed This Encounter   Medications    solifenacin (VESICARE) 10 MG tablet     Sig: Take 1 tablet by mouth daily     Dispense:  30 tablet     Refill:  2       OVIDIO Lindsay - CNP

## 2023-02-02 ENCOUNTER — TELEPHONE (OUTPATIENT)
Dept: UROGYNECOLOGY | Age: 73
End: 2023-02-02

## 2023-02-02 NOTE — TELEPHONE ENCOUNTER
Called patient to discuss Vesicare and seeing if she would be willing to switch to a different pharmacy so she can use Good RX coupon. Will await return call to see what patient would like to do. Suggested Mindi Irizarry in [place].

## 2023-02-10 DIAGNOSIS — F41.9 ANXIETY: ICD-10-CM

## 2023-02-10 DIAGNOSIS — F51.04 PSYCHOPHYSIOLOGICAL INSOMNIA: ICD-10-CM

## 2023-02-10 RX ORDER — ALPRAZOLAM 1 MG/1
TABLET ORAL
Qty: 40 TABLET | Refills: 2 | Status: SHIPPED | OUTPATIENT
Start: 2023-02-10 | End: 2023-05-10

## 2023-02-15 NOTE — PROGRESS NOTES
Medicare Annual Wellness Visit    Priya Caceres is here for Medicare AWV    Assessment & Plan   Medicare annual wellness visit, subsequent  Stage 3a chronic kidney disease ([facility] [address]  Assessment & Plan:  Stable per recent labs. She will continue to maintain good hydration and avoid NSAIDs. Anxiety  Assessment & Plan:  Transient worsening due to recent family death. Continue current dose of Wellbutrin and lowest effective dose of Xanax for breakthrough symptoms. She will make appointment with [facility] if symptoms worsen. Psychophysiological insomnia  Assessment & Plan:  Stable. Continue current doses of trazodone and melatonin. Major depressive disorder with single episode, in partial remission [facility])  Assessment & Plan:  See plan for anxiety. Class 2 severe obesity due to excess calories with serious comorbidity and body mass index (BMI) of 35.0 to 35.9 in adult [facility])  Assessment & Plan:  Patient was asked about her current diet and exercise habits, and personalized advice was provided regarding recommended lifestyle changes. Patient's comorbid health conditions associated with elevated BMI were discussed, as well as the likely benefits of weight loss. Based upon patient's motivation to change her behavior, the following plan was agreed upon to work toward a weight loss goal of 40 pounds: commercial weight loss program- Weight Watcher's, walking 150 minutes/week. Educational materials for weight loss were provided. Patient will follow-up in 3 month(s) with PCP. Provider spent 15 minutes counseling patient. BMI 35.0-35.9,adult  -     [place] Behavior  obesity (8-15 min) []    Recommendations for Preventive Services Due: see orders and patient instructions/AVS.  Recommended screening schedule for the next 5-10 years is provided to the patient in written form: see Patient Instructions/AVS.     Return in about 3 months (around 5/16/2023) for 30 MIN F/U.      Subjective       Patient's complete Health Risk Assessment and screening values have been reviewed and are found in Flowsheets. The following problems were reviewed today and where indicated follow up appointments were made and/or referrals ordered. Positive Risk Factor Screenings with Interventions:        Depression:  PHQ-2 Score: 2  PHQ-9 Total Score: 5    Interpretation:   1-4 = minimal  5-9 = mild  10-14 = moderate  15-19 = moderately severe  20-27 = severe  Interventions:  Recent death in the family, otherwise stable. Weight and Activity:  Physical Activity: Insufficiently Active    Days of Exercise per Week: 3 days    Minutes of Exercise per Session: 30 min     On average, how many days per week do you engage in moderate to strenuous exercise (like a brisk walk)?: 3 days  Have you lost any weight without trying in the past 3 months?: No     Obesity Interventions:  See A/P          Advanced Directives:  Do you have a Living Will?: (!) No  Intervention:  Has started the process and will finish- has contact info for ACP specialists, if needed. Objective      Patient-Reported Vitals  Patient-Reported Systolic (Top): 746 mmHg  Patient-Reported Diastolic (Bottom): 66 mmHg  BP Observations: Yes, BP was taken on electronic monitoring device with digital readout  Patient-Reported Pulse: 93  Patient-Reported Temperature: 97  Patient-Reported Weight: 189     Physical Exam  Constitutional:       General: She is not in acute distress. Appearance: She is well-developed. HENT:      Head: Normocephalic and atraumatic. Mouth/Throat:      Lips: No lesions. Neck:      Comments: No visible mass  Pulmonary:      Effort: Pulmonary effort is normal. No respiratory distress. Skin:     Comments: No rash, erythema or other discoloration on facial skin and exposed areas of neck and upper extremites    Neurological:      Mental Status: She is alert.       Comments: No facial asymmetry (cranial nerve 7 motor function) or gaze palsy Psychiatric:         Attention and Perception: Attention normal.         Mood and Affect: Mood normal.         Speech: Speech normal.         Behavior: Behavior normal.         Thought Content: Thought content normal.         Cognition and Memory: Cognition normal.          Allergies   Allergen Reactions    Macrobid [Nitrofurantoin] Other (See Comments)     Fever, myalgias    Codeine Nausea Only     Prior to Visit Medications    Medication Sig Taking? Authorizing Provider   ALPRAZolam (XANAX) 1 MG tablet TAKE 1 TABLET BY MOUTH TWICE DAILY AS NEEDED FOR ANXIETY Yes Rika Rock MD   levothyroxine (SYNTHROID) 175 MCG tablet TAKE 1 TABLET BY MOUTH DAILY Yes Rika Rock MD   gabapentin (NEURONTIN) 300 MG capsule TAKE 1 CAPSULE BY MOUTH TWICE DAILY Yes Rika Rock MD   buPROPion (WELLBUTRIN XL) 300 MG extended release tablet TAKE 1 TABLET BY MOUTH EVERY MORNING Yes Rika Rock MD   omeprazole (PRILOSEC) 40 MG delayed release capsule TAKE 1 Ardean Martyr Yes Rika Rock MD   traZODone (DESYREL) 50 MG tablet TAKE 3 TABLETS BY MOUTH EVERY NIGHT AT BEDTIME Yes Rika Rock MD   clobetasol (TEMOVATE) 0.05 % ointment  Yes Historical Provider, MD   estradiol (ESTRACE VAGINAL) 0.1 MG/GM vaginal cream Place 0.25 g vaginally daily Apply 0.25g with fingertip to the vaginal opening every night at bedtime for 2 weeks, then decrease to twice weekly.  Yes Noel Rodriguez MD   calcium carbonate 600 MG TABS tablet Take 1 tablet by mouth daily Yes Historical Provider, MD   aspirin 81 MG EC tablet Take 4 tablets by mouth daily Yes Rika Rock MD   ondansetron (ZOFRAN) 4 MG tablet Take 1 tablet by mouth every 8 hours as needed for Nausea or Vomiting Yes Lamberto Stoner PA-C   Multiple Vitamin (MULTIVITAMIN PO) Take by mouth Yes Historical Provider, MD   melatonin 5 MG TABS tablet Take 5 mg by mouth daily Yes Historical Provider, MD   Cyanocobalamin (VITAMIN B 12 PO) Take 500 mcg by mouth daily  Yes Historical Provider, MD   Cholecalciferol (VITAMIN D) 1000 UNIT CAPS Take 2 capsules by mouth daily  Yes Historical Provider, MD       CareTeam (Including outside providers/suppliers regularly involved in providing care):   Patient Care Team:  Jen Wheat MD as PCP - General (Internal Medicine)  Jen [facility]  Diego Daly DPM as Consulting Physician (Earley July)  Hong Small MD as Surgeon (General Surgery)  Jovani Ruano MD as Consulting Physician (Gastroenterology)  Pineda Mitchell MD as Obstetrician (Obstetrics & Gynecology)  Chloé Sen MD as Consulting Physician (General Surgery)  Anuradha Hoyt MD as Consulting Physician (Anesthesiology)     Reviewed and updated this visit:  Tobacco  Allergies  Meds  Problems  Med Hx  Surg Hx  Soc Hx  Fam Hx        Yamel Carlos was evaluated through a synchronous (real-time) audio-video encounter. The patient (or guardian if applicable) is aware that this is a billable service, which includes applicable co-pays. This Virtual Visit was conducted with patient's (and/or legal guardian's) consent. The visit was conducted pursuant to the emergency declaration under the [facility], [address] [place] [address] wa[facility] Hospitals authority and the [employer] and [employer] General Act. Patient identification was verified, and a caregiver was present when appropriate. The patient was located at Home: [address] [place] [address]. Provider was located at Home ([facility]): [address].

## 2023-02-16 ENCOUNTER — TELEMEDICINE (OUTPATIENT)
Dept: INTERNAL MEDICINE CLINIC | Age: 73
End: 2023-02-16

## 2023-02-16 DIAGNOSIS — Z00.00 MEDICARE ANNUAL WELLNESS VISIT, SUBSEQUENT: Primary | ICD-10-CM

## 2023-02-16 DIAGNOSIS — F41.9 ANXIETY: ICD-10-CM

## 2023-02-16 DIAGNOSIS — F32.4 MAJOR DEPRESSIVE DISORDER WITH SINGLE EPISODE, IN PARTIAL REMISSION (HCC): ICD-10-CM

## 2023-02-16 DIAGNOSIS — F51.04 PSYCHOPHYSIOLOGICAL INSOMNIA: ICD-10-CM

## 2023-02-16 DIAGNOSIS — E66.01 CLASS 2 SEVERE OBESITY DUE TO EXCESS CALORIES WITH SERIOUS COMORBIDITY AND BODY MASS INDEX (BMI) OF 35.0 TO 35.9 IN ADULT (HCC): ICD-10-CM

## 2023-02-16 DIAGNOSIS — N18.31 STAGE 3A CHRONIC KIDNEY DISEASE (HCC): ICD-10-CM

## 2023-02-16 SDOH — ECONOMIC STABILITY: FOOD INSECURITY: WITHIN THE PAST 12 MONTHS, THE FOOD YOU BOUGHT JUST DIDN'T LAST AND YOU DIDN'T HAVE MONEY TO GET MORE.: NEVER TRUE

## 2023-02-16 SDOH — HEALTH STABILITY: PHYSICAL HEALTH: ON AVERAGE, HOW MANY MINUTES DO YOU ENGAGE IN EXERCISE AT THIS LEVEL?: 30 MIN

## 2023-02-16 SDOH — HEALTH STABILITY: PHYSICAL HEALTH: ON AVERAGE, HOW MANY DAYS PER WEEK DO YOU ENGAGE IN MODERATE TO STRENUOUS EXERCISE (LIKE A BRISK WALK)?: 3 DAYS

## 2023-02-16 SDOH — ECONOMIC STABILITY: INCOME INSECURITY: HOW HARD IS IT FOR YOU TO PAY FOR THE VERY BASICS LIKE FOOD, HOUSING, MEDICAL CARE, AND HEATING?: NOT HARD AT ALL

## 2023-02-16 SDOH — ECONOMIC STABILITY: HOUSING INSECURITY
IN THE LAST 12 MONTHS, WAS THERE A TIME WHEN YOU DID NOT HAVE A STEADY PLACE TO SLEEP OR SLEPT IN A SHELTER (INCLUDING NOW)?: NO

## 2023-02-16 ASSESSMENT — PATIENT HEALTH QUESTIONNAIRE - PHQ9
1. LITTLE INTEREST OR PLEASURE IN DOING THINGS: 1
SUM OF ALL RESPONSES TO PHQ QUESTIONS 1-9: 5
SUM OF ALL RESPONSES TO PHQ9 QUESTIONS 1 & 2: 1
2. FEELING DOWN, DEPRESSED OR HOPELESS: 1
7. TROUBLE CONCENTRATING ON THINGS, SUCH AS READING THE NEWSPAPER OR WATCHING TELEVISION: 0
SUM OF ALL RESPONSES TO PHQ QUESTIONS 1-9: 1
2. FEELING DOWN, DEPRESSED OR HOPELESS: 1
SUM OF ALL RESPONSES TO PHQ QUESTIONS 1-9: 1
10. IF YOU CHECKED OFF ANY PROBLEMS, HOW DIFFICULT HAVE THESE PROBLEMS MADE IT FOR YOU TO DO YOUR WORK, TAKE CARE OF THINGS AT HOME, OR GET ALONG WITH OTHER PEOPLE: 1
SUM OF ALL RESPONSES TO PHQ QUESTIONS 1-9: 5
8. MOVING OR SPEAKING SO SLOWLY THAT OTHER PEOPLE COULD HAVE NOTICED. OR THE OPPOSITE, BEING SO FIGETY OR RESTLESS THAT YOU HAVE BEEN MOVING AROUND A LOT MORE THAN USUAL: 0
3. TROUBLE FALLING OR STAYING ASLEEP: 1
4. FEELING TIRED OR HAVING LITTLE ENERGY: 1
SUM OF ALL RESPONSES TO PHQ QUESTIONS 1-9: 5
9. THOUGHTS THAT YOU WOULD BE BETTER OFF DEAD, OR OF HURTING YOURSELF: 0
SUM OF ALL RESPONSES TO PHQ9 QUESTIONS 1 & 2: 2
5. POOR APPETITE OR OVEREATING: 1
1. LITTLE INTEREST OR PLEASURE IN DOING THINGS: 0
6. FEELING BAD ABOUT YOURSELF - OR THAT YOU ARE A FAILURE OR HAVE LET YOURSELF OR YOUR FAMILY DOWN: 0
SUM OF ALL RESPONSES TO PHQ QUESTIONS 1-9: 1
SUM OF ALL RESPONSES TO PHQ QUESTIONS 1-9: 1
SUM OF ALL RESPONSES TO PHQ QUESTIONS 1-9: 5

## 2023-02-16 ASSESSMENT — LIFESTYLE VARIABLES
HOW MANY STANDARD DRINKS CONTAINING ALCOHOL DO YOU HAVE ON A TYPICAL DAY: 0
HOW MANY STANDARD DRINKS CONTAINING ALCOHOL DO YOU HAVE ON A TYPICAL DAY: PATIENT DOES NOT DRINK
HOW OFTEN DO YOU HAVE A DRINK CONTAINING ALCOHOL: NEVER
HOW OFTEN DO YOU HAVE SIX OR MORE DRINKS ON ONE OCCASION: 1
HOW OFTEN DO YOU HAVE A DRINK CONTAINING ALCOHOL: 1

## 2023-02-16 NOTE — Clinical Note
Please contact patient to schedule follow-up appointment as documented. Staff message sent to [facility] staff. There is no documentation from [facility] staff that patient has been contacted by them. Therefore, there is no direct contact for us to discuss this with. Called pt - she states she thought the phone number given was for the pain clinic and was therefore calling to schedule. She states the pain clinic in [place] won't see her because \"her case is too complicated. \"    She is still looking for a pain referral. Is the [facility] an option for her? Needs refill on Alprazolam. She only has enough to last until 1/23.      Routed to Dr. Teagan Banegas

## 2023-02-16 NOTE — PATIENT INSTRUCTIONS
Learning About Mindfulness for Stress  What are mindfulness and stress? Stress is what you feel when you have to handle more than you are used to. A lot of things can cause stress. You may feel stress when you go on a job interview, take a test, or run a race. This kind of short-term stress is normal and even useful. It can help you if you need to work hard or react quickly. Stress also can last a long time. Long-term stress is caused by stressful situations or events. Examples of long-term stress include long-term health problems, ongoing problems at work, and conflicts in your family. Long-term stress can harm your health. Mindfulness is a focus only on things happening in the present moment. It's a process of purposefully paying attention to and being aware of your surroundings, your emotions, your thoughts, and how your body feels. You are aware of these things, but you aren't judging these experiences as \"good\" or \"bad. \" Mindfulness can help you learn to calm your mind and body to help you cope with illness, pain, and stress. How does mindfulness help to relieve stress? Mindfulness can help quiet your mind and relax your body. Studies show that it can help some people sleep better, feel less anxious, and bring their blood pressure down. And it's been shown to help some people live and cope better with certain health problems like heart disease, depression, chronic pain, and cancer. How do you practice mindfulness? To be mindful is to pay attention, to be present, and to be accepting. When you're mindful, you do just one thing and you pay close attention to that one thing. For example, you may sit quietly and notice your emotions or how your food tastes and smells. When you're present, you focus on the things that are happening right now. You let go of your thoughts about the past and the future. When you dwell on the past or the future, you miss moments that can heal and strengthen you.  You may miss moments like hearing a child laugh or seeing a friendly face when you think you're all alone. When you're accepting, you don't  the present moment. Instead you accept your thoughts and feelings as they come. You can practice anytime, anywhere, and in any way you choose. You can practice in many ways. Here are a few ideas:  While doing your chores, like washing the dishes, let your mind focus on what's in your hand. What does the dish feel like? Is the water warm or cold? Go outside and take a few deep breaths. What is the air like? Is it warm or cold? When you can, take some time at the start of your day to sit alone and think. Take a slow walk by yourself. Count your steps while you breathe in and out. Try yoga breathing exercises, stretches, and poses to strengthen and relax your muscles. At work, if you can, try to stop for a few moments each hour. Note how your body feels. Let yourself regroup and let your mind settle before you return to what you were doing. If you struggle with anxiety or \"worry thoughts,\" imagine your mind as a blue melecio and your worry thoughts as clouds. Now imagine those worry thoughts floating across your mind's melecio. Just let them pass by as you watch. Follow-up care is a key part of your treatment and safety. Be sure to make and go to all appointments, and call your doctor if you are having problems. It's also a good idea to know your test results and keep a list of the medicines you take. Where can you learn more? Go to http://www.mcdaniel.com/ and enter M676 to learn more about \"Learning About Mindfulness for Stress. \"  Current as of: February 9, 2022               Content Version: 13.5  © 2006-2022 Healthwise, Incorporated. Care instructions adapted under license by [facility] ([facility]).  If you have questions about a medical condition or this instruction, always ask your healthcare professional. [address] any warranty or liability for your use of this information. Advance Directives: Care Instructions  Overview  An advance directive is a legal way to state your wishes at the end of your life. It tells your family and your doctor what to do if you can't say what you want. There are two main types of advance directives. You can change them any time your wishes change. Living will. This form tells your family and your doctor your wishes about life support and other treatment. The form is also called a declaration. Medical power of . This form lets you name a person to make treatment decisions for you when you can't speak for yourself. This person is called a health care agent (health care proxy, health care surrogate). The form is also called a durable power of  for health care. If you do not have an advance directive, decisions about your medical care may be made by a family member, or by a doctor or a  who doesn't know you. It may help to think of an advance directive as a gift to the people who care for you. If you have one, they won't have to make tough decisions by themselves. For more information, including forms for your state, see the [address] website (www.caringinfo.org/planning/advance-directives/). Follow-up care is a key part of your treatment and safety. Be sure to make and go to all appointments, and call your doctor if you are having problems. It's also a good idea to know your test results and keep a list of the medicines you take. What should you include in an advance directive? Many states have a unique advance directive form. (It may ask you to address specific issues.) Or you might use a universal form that's approved by many states. If your form doesn't tell you what to address, it may be hard to know what to include in your advance directive. Use the questions below to help you get started. Who do you want to make decisions about your medical care if you are not able to?   What life-support measures do you want if you have a serious illness that gets worse over time or can't be cured? What are you most afraid of that might happen? (Maybe you're afraid of having pain, losing your independence, or being kept alive by machines.)  Where would you prefer to die? (Your home? A hospital? A nursing home?)  Do you want to donate your organs when you die? Do you want certain Tenri[employer] practices performed before you die? When should you call for help? Be sure to contact your doctor if you have any questions. Where can you learn more? Go to http://www.mcdaniel.com/ and enter R264 to learn more about \"Advance Directives: Care Instructions. \"  Current as of: June 16, 2022               Content Version: 13.5  © 5887-2251 [employer]. Care instructions adapted under license by [facility] ([facility]). If you have questions about a medical condition or this instruction, always ask your healthcare professional. [place] [address] any warranty or liability for your use of this information. Starting a Weight Loss Plan: Care Instructions  Overview     If you're thinking about losing weight, it can be hard to know where to start. Your doctor can help you set up a weight loss plan that best meets your needs. You may want to take a class on nutrition or exercise, or you could join a weight loss support group. If you have questions about how to make changes to your eating or exercise habits, ask your doctor about seeing a registered dietitian or an exercise specialist.  It can be a big challenge to lose weight. But you don't have to make huge changes at once. Make small changes, and stick with them. When those changes become habit, add a few more changes. If you don't think you're ready to make changes right now, try to pick a date in the future. Make an appointment to see your doctor to discuss whether the time is right for you to start a plan.   Follow-up care is a key part of your treatment and safety. Be sure to make and go to all appointments, and call your doctor if you are having problems. It's also a good idea to know your test results and keep a list of the medicines you take. How can you care for yourself at home? Set realistic goals. Many people expect to lose much more weight than is likely. A weight loss of 5% to 10% of your body weight may be enough to improve your health. Get family and friends involved to provide support. Talk to them about why you are trying to lose weight, and ask them to help. They can help by participating in exercise and having meals with you, even if they may be eating something different. Find what works best for you. If you do not have time or do not like to cook, a program that offers meal replacement bars or shakes may be better for you. Or if you like to prepare meals, finding a plan that includes daily menus and recipes may be best.  Ask your doctor about other health professionals who can help you achieve your weight loss goals. A dietitian can help you make healthy changes in your diet. An exercise specialist or  can help you develop a safe and effective exercise program.  A counselor or psychiatrist can help you cope with issues such as depression, anxiety, or family problems that can make it hard to focus on weight loss. Consider joining a support group for people who are trying to lose weight. Your doctor can suggest groups in your area. Where can you learn more? Go to http://www.woods.com/ and enter U357 to learn more about \"Starting a Weight Loss Plan: Care Instructions. \"  Current as of: August 25, 2022               Content Version: 13.5  © 1205-8790 Healthwise, Incorporated. Care instructions adapted under license by [facility] ([facility]).  If you have questions about a medical condition or this instruction, always ask your healthcare professional. [address] any warranty or liability for your use of this information. A Healthy Heart: Care Instructions  Your Care Instructions     Coronary artery disease, also called heart disease, occurs when a substance called plaque builds up in the vessels that supply oxygen-rich blood to your heart muscle. This can narrow the blood vessels and reduce blood flow. A heart attack happens when blood flow is completely blocked. A high-fat diet, smoking, and other factors increase the risk of heart disease. Your doctor has found that you have a chance of having heart disease. You can do lots of things to keep your heart healthy. It may not be easy, but you can change your diet, exercise more, and quit smoking. These steps really work to lower your chance of heart disease. Follow-up care is a key part of your treatment and safety. Be sure to make and go to all appointments, and call your doctor if you are having problems. It's also a good idea to know your test results and keep a list of the medicines you take. How can you care for yourself at home? Diet    Use less salt when you cook and eat. This helps lower your blood pressure. Taste food before salting. Add only a little salt when you think you need it. With time, your taste buds will adjust to less salt.     Eat fewer snack items, fast foods, canned soups, and other high-salt, high-fat, processed foods.     Read food labels and try to avoid saturated and trans fats. They increase your risk of heart disease by raising cholesterol levels.     Limit the amount of solid fat-butter, margarine, and shortening-you eat. Use olive, peanut, or canola oil when you cook. Bake, broil, and steam foods instead of frying them.     Eat a variety of fruit and vegetables every day. Dark green, deep orange, red, or yellow fruits and vegetables are especially good for you.  Examples include spinach, carrots, peaches, and berries.     Foods high in fiber can reduce your cholesterol and provide important vitamins and minerals. High-fiber foods include whole-grain cereals and breads, oatmeal, beans, brown rice, citrus fruits, and apples.     Eat lean proteins. Heart-healthy proteins include seafood, lean meats and poultry, eggs, beans, peas, nuts, seeds, and soy products.     Limit drinks and foods with added sugar. These include candy, desserts, and soda pop. Lifestyle changes    If your doctor recommends it, get more exercise. Walking is a good choice. Bit by bit, increase the amount you walk every day. Try for at least 30 minutes on most days of the week. You also may want to swim, bike, or do other activities.     Do not smoke. If you need help quitting, talk to your doctor about stop-smoking programs and medicines. These can increase your chances of quitting for good. Quitting smoking may be the most important step you can take to protect your heart. It is never too late to quit.     Limit alcohol to 2 drinks a day for men and 1 drink a day for women. Too much alcohol can cause health problems.     Manage other health problems such as diabetes, high blood pressure, and high cholesterol. If you think you may have a problem with alcohol or drug use, talk to your doctor. Medicines    Take your medicines exactly as prescribed. Call your doctor if you think you are having a problem with your medicine.     If your doctor recommends aspirin, take the amount directed each day. Make sure you take aspirin and not another kind of pain reliever, such as acetaminophen (Tylenol). When should you call for help? Call 911 if you have symptoms of a heart attack. These may include:    Chest pain or pressure, or a strange feeling in the chest.     Sweating.     Shortness of breath.     Pain, pressure, or a strange feeling in the back, neck, jaw, or upper belly or in one or both shoulders or arms.     Lightheadedness or sudden weakness.     A fast or irregular heartbeat.    After you call 911, the  may tell you to chew 1 adult-strength or 2 to 4 low-dose aspirin. Wait for an ambulance. Do not try to drive yourself. Watch closely for changes in your health, and be sure to contact your doctor if you have any problems. Where can you learn more? Go to http://www.mcdaniel.com/ and enter F075 to learn more about \"A Healthy Heart: Care Instructions. \"  Current as of: September 7, 2022               Content Version: 13.5  © 1025-3292 Healthwise, [employer]. Care instructions adapted under license by [facility] ([facility]). If you have questions about a medical condition or this instruction, always ask your healthcare professional. [address] any warranty or liability for your use of this information. Personalized Preventive Plan for Ami Pinzon - 2/16/2023  Medicare offers a range of preventive health benefits. Some of the tests and screenings are paid in full while other may be subject to a deductible, co-insurance, and/or copay. Some of these benefits include a comprehensive review of your medical history including lifestyle, illnesses that may run in your family, and various assessments and screenings as appropriate. After reviewing your medical record and screening and assessments performed today your provider may have ordered immunizations, labs, imaging, and/or referrals for you. A list of these orders (if applicable) as well as your Preventive Care list are included within your After Visit Summary for your review. Other Preventive Recommendations:    A preventive eye exam performed by an eye specialist is recommended every 1-2 years to screen for glaucoma; cataracts, macular degeneration, and other eye disorders. A preventive dental visit is recommended every 6 months. Try to get at least 150 minutes of exercise per week or 10,000 steps per day on a pedometer .   Order or download the FREE \"Exercise & Physical Activity: Your Everyday Guide\" from [employer]. Call 7-176.763.1967 or search [employer] online. You need 8373-5752 mg of calcium and 4267-7118 IU of vitamin D per day. It is possible to meet your calcium requirement with diet alone, but a vitamin D supplement is usually necessary to meet this goal.  When exposed to the sun, use a sunscreen that protects against both UVA and UVB radiation with an SPF of 30 or greater. Reapply every 2 to 3 hours or after sweating, drying off with a towel, or swimming. Always wear a seat belt when traveling in a car. Always wear a helmet when riding a bicycle or motorcycle.

## 2023-02-16 NOTE — ASSESSMENT & PLAN NOTE
Transient worsening due to recent family death. Continue current dose of Wellbutrin and lowest effective dose of Xanax for breakthrough symptoms. She will make appointment with [facility] if symptoms worsen.

## 2023-02-16 NOTE — ASSESSMENT & PLAN NOTE
Patient was asked about her current diet and exercise habits, and personalized advice was provided regarding recommended lifestyle changes. Patient's comorbid health conditions associated with elevated BMI were discussed, as well as the likely benefits of weight loss. Based upon patient's motivation to change her behavior, the following plan was agreed upon to work toward a weight loss goal of 40 pounds: commercial weight loss program- Weight Watcher's, walking 150 minutes/week. Educational materials for weight loss were provided. Patient will follow-up in 3 month(s) with PCP. Provider spent 15 minutes counseling patient.

## 2023-03-03 RX ORDER — OMEPRAZOLE 40 MG/1
CAPSULE, DELAYED RELEASE ORAL
Qty: 90 CAPSULE | OUTPATIENT
Start: 2023-03-03

## 2023-03-03 RX ORDER — OMEPRAZOLE 40 MG/1
CAPSULE, DELAYED RELEASE ORAL
Qty: 30 CAPSULE | Refills: 0 | Status: SHIPPED | OUTPATIENT
Start: 2023-03-03

## 2023-03-15 RX ORDER — GABAPENTIN 300 MG/1
CAPSULE ORAL
Qty: 180 CAPSULE | Refills: 1 | Status: SHIPPED | OUTPATIENT
Start: 2023-03-15 | End: 2023-09-15

## 2023-03-20 ENCOUNTER — PROCEDURE VISIT (OUTPATIENT)
Dept: UROGYNECOLOGY | Age: 73
End: 2023-03-20
Payer: COMMERCIAL

## 2023-03-20 VITALS
SYSTOLIC BLOOD PRESSURE: 132 MMHG | TEMPERATURE: 98.4 F | RESPIRATION RATE: 16 BRPM | DIASTOLIC BLOOD PRESSURE: 68 MMHG | HEART RATE: 74 BPM | OXYGEN SATURATION: 99 %

## 2023-03-20 DIAGNOSIS — N39.41 URGE INCONTINENCE: ICD-10-CM

## 2023-03-20 DIAGNOSIS — N39.3 STRESS INCONTINENCE: ICD-10-CM

## 2023-03-20 DIAGNOSIS — R35.0 URINARY FREQUENCY: Primary | ICD-10-CM

## 2023-03-20 DIAGNOSIS — R39.15 URGENCY OF URINATION: ICD-10-CM

## 2023-03-20 PROCEDURE — 51741 ELECTRO-UROFLOWMETRY FIRST: CPT | Performed by: OBSTETRICS & GYNECOLOGY

## 2023-03-20 PROCEDURE — 81002 URINALYSIS NONAUTO W/O SCOPE: CPT | Performed by: OBSTETRICS & GYNECOLOGY

## 2023-03-20 PROCEDURE — 51729 CYSTOMETROGRAM W/VP&UP: CPT | Performed by: OBSTETRICS & GYNECOLOGY

## 2023-03-20 PROCEDURE — 51797 INTRAABDOMINAL PRESSURE TEST: CPT | Performed by: OBSTETRICS & GYNECOLOGY

## 2023-03-20 PROCEDURE — 51784 ANAL/URINARY MUSCLE STUDY: CPT | Performed by: OBSTETRICS & GYNECOLOGY

## 2023-03-20 RX ORDER — SOLIFENACIN SUCCINATE 10 MG/1
5 TABLET, FILM COATED ORAL DAILY
COMMUNITY

## 2023-03-20 NOTE — PROGRESS NOTES
No results found for this visit on 03/20/23. Uroflow:  Patient was able to void for Uroflow    Voided Volume: 207.5ml  PVR: 10ml  Max Flow: 33.8ml/sec with an average flow rate of 14.8ml/sec    CMG:  First sensation: 90ml  First desire: 141ml  Strong desire: 176ml  Max capacity: 200ml  Uninhibited detrusor contraction: No     Leak Point Pressures:  98 cm/H2O at 150ml with  positive  Sitting cough, negative  Sitting valsalva, without reduction    87 cm/H2O at 200ml (max) with positive  Sitting cough,  negative  Sitting valsalva, without reduction    Pressure voiding study:  Patient was able to void with catheters in place to obtain pressure voiding study    Maximum detrusor pressure at peak flow -6cm/H2O Peak flow rate 14ml/sec    Maximum abd pressure at peak flow 66cm/H2O Flow time 27sec    Maximum vesical pressure at peak flow 61cm/H2O    Voided volume 144.9ml and Residual: (Max cap-voided) 55ml      MUCP:  First Pull 57  Second Pull 47    EMG: does correlate    Assessment:  Clinical Diagnosis: ADRIENNE  MAKENZIE demonstrated  UUI: Decreased capacity, no DO    Plan: cysto  Discuss at next visit: Follow up on Vesicare which seems to be working well for her.   Discuss treatment options for MAKENZIE if bothersome  Anni Umana MD

## 2023-04-02 RX ORDER — OMEPRAZOLE 40 MG/1
CAPSULE, DELAYED RELEASE ORAL
Qty: 90 CAPSULE | Refills: 1 | Status: SHIPPED | OUTPATIENT
Start: 2023-04-02

## 2023-04-04 ENCOUNTER — PROCEDURE VISIT (OUTPATIENT)
Dept: UROGYNECOLOGY | Age: 73
End: 2023-04-04

## 2023-04-04 VITALS
RESPIRATION RATE: 16 BRPM | HEART RATE: 76 BPM | TEMPERATURE: 98 F | OXYGEN SATURATION: 99 % | DIASTOLIC BLOOD PRESSURE: 78 MMHG | SYSTOLIC BLOOD PRESSURE: 115 MMHG

## 2023-04-04 DIAGNOSIS — R35.0 URINARY FREQUENCY: Primary | ICD-10-CM

## 2023-04-04 DIAGNOSIS — R39.15 URGENCY OF URINATION: ICD-10-CM

## 2023-04-04 DIAGNOSIS — N39.41 URGE INCONTINENCE: ICD-10-CM

## 2023-04-04 DIAGNOSIS — N39.3 STRESS INCONTINENCE: ICD-10-CM

## 2023-04-04 RX ORDER — SOLIFENACIN SUCCINATE 10 MG/1
5 TABLET, FILM COATED ORAL DAILY
Qty: 90 TABLET | Refills: 1 | Status: SHIPPED | OUTPATIENT
Start: 2023-04-04

## 2023-04-04 NOTE — PROGRESS NOTES
No current facility-administered medications for this visit. Allergies: Allergies   Allergen Reactions    Macrobid [Nitrofurantoin] Other (See Comments)     Fever, myalgias    Codeine Nausea Only     Social History:   Social History     Socioeconomic History    Marital status:      Spouse name: Not on file    Number of children: Not on file    Years of education: Not on file    Highest education level: Not on file   Occupational History    Occupation: Computer work   Tobacco Use    Smoking status: Never    Smokeless tobacco: Never   Vaping Use    Vaping Use: Never used   Substance and Sexual Activity    Alcohol use: No    Drug use: No    Sexual activity: Not Currently   Other Topics Concern    Not on file   Social History Narrative    Not on file     Social Determinants of Health     Financial Resource Strain: Low Risk     Difficulty of Paying Living Expenses: Not hard at all   Food Insecurity: No Food Insecurity    Worried About Running Out of Food in the Last Year: Never true    [address] in the Last Year: Never true   Transportation Needs: Unknown    Lack of Transportation (Medical): Not on file    Lack of Transportation (Non-Medical):  No   Physical Activity: Insufficiently Active    Days of Exercise per Week: 3 days    Minutes of Exercise per Session: 30 min   Stress: Not on file   Social Connections: Not on file   Intimate Partner Violence: Not At Risk    Fear of Current or Ex-Partner: No    Emotionally Abused: No    Physically Abused: No    Sexually Abused: No   Housing Stability: Unknown    Unable to Pay for Housing in the Last Year: Not on file    Number of Places Lived in the Last Year: Not on file    Unstable Housing in the Last Year: No     Family History:   Family History   Problem Relation Age of Onset    Diabetes Father         Type II    Diabetes Sister         Type II    Diabetes Brother         Type II    Breast Cancer Sister 58    Lung Cancer Mother         Smoker    Heart

## 2023-04-04 NOTE — LETTER
Current or Ex-Partner: No    Emotionally Abused: No    Physically Abused: No    Sexually Abused: No   Housing Stability: Unknown    Unable to Pay for Housing in the Last Year: Not on file    Number of Places Lived in the Last Year: Not on file    Unstable Housing in the Last Year: No     Family History:   Family History   Problem Relation Age of Onset    Diabetes Father         Type II    Diabetes Sister         Type II    Diabetes Brother         Type II    Breast Cancer Sister 58    Lung Cancer Mother         Smoker    Heart Disease Sister         Bicuspid aortic valve x2         Objective:     Vital Signs  Vitals:    04/04/23 1153   BP: 115/78   Pulse: 76   Resp: 16   Temp: 98 °F (36.7 °C)   SpO2: 99%      Physical Exam  Physical Exam  HENT:      Head: Normocephalic and atraumatic. Eyes:      Conjunctiva/sclera: Conjunctivae normal.   Pulmonary:      Effort: Pulmonary effort is normal.   Abdominal:      Palpations: Abdomen is soft. Musculoskeletal:      Cervical back: Normal range of motion and neck supple. Skin:     General: Skin is warm and dry. Neurological:      Mental Status: She is alert and oriented to person, place, and time. Procedure: The urethral was anesthesized with topical lidocaine jelly and dilated up to a #14 Malian. A 0-degree urethroscope was used to examine the urethra. A 70-degree cystoscope was used to evaluate the bladder. FINDINGS:  1. Urethra was normal  2. Bladder had trabeculations mild  3. Trigone appeared Normal  4. Ureters illustrated bilateral efflux  5. Patient exhibited spasms during the study no      No results found for this visit on 04/04/23. Assessment/Plan:     Colette Gutierrez is a 81 y.o. female with   1. Urinary frequency    2. Urgency of urination    3. Urge incontinence    4.  Stress incontinence    Old records reviewed, outside records reviewed, urodynamics reviewed, cystourethroscopy was reviewed  Zach Lundchbell presents today as follow-up for urinary

## 2023-05-10 DIAGNOSIS — F41.9 ANXIETY: ICD-10-CM

## 2023-05-10 DIAGNOSIS — F51.04 PSYCHOPHYSIOLOGICAL INSOMNIA: ICD-10-CM

## 2023-05-13 RX ORDER — ALPRAZOLAM 1 MG/1
TABLET ORAL
Qty: 40 TABLET | Refills: 2 | Status: SHIPPED | OUTPATIENT
Start: 2023-05-13 | End: 2023-08-13

## 2023-05-15 DIAGNOSIS — F51.04 PSYCHOPHYSIOLOGICAL INSOMNIA: ICD-10-CM

## 2023-05-15 RX ORDER — LEVOTHYROXINE SODIUM 175 UG/1
175 TABLET ORAL DAILY
Qty: 90 TABLET | Refills: 1 | Status: SHIPPED | OUTPATIENT
Start: 2023-05-15

## 2023-05-15 RX ORDER — TRAZODONE HYDROCHLORIDE 50 MG/1
TABLET ORAL
Qty: 270 TABLET | Refills: 1 | Status: SHIPPED | OUTPATIENT
Start: 2023-05-15

## 2023-05-23 ENCOUNTER — OFFICE VISIT (OUTPATIENT)
Dept: INTERNAL MEDICINE CLINIC | Age: 73
End: 2023-05-23

## 2023-05-23 VITALS
DIASTOLIC BLOOD PRESSURE: 58 MMHG | SYSTOLIC BLOOD PRESSURE: 108 MMHG | BODY MASS INDEX: 36.21 KG/M2 | HEART RATE: 64 BPM | OXYGEN SATURATION: 96 % | WEIGHT: 198 LBS

## 2023-05-23 DIAGNOSIS — E66.01 CLASS 2 SEVERE OBESITY DUE TO EXCESS CALORIES WITH SERIOUS COMORBIDITY AND BODY MASS INDEX (BMI) OF 36.0 TO 36.9 IN ADULT (HCC): ICD-10-CM

## 2023-05-23 DIAGNOSIS — M54.42 CHRONIC BILATERAL LOW BACK PAIN WITH BILATERAL SCIATICA: Primary | ICD-10-CM

## 2023-05-23 DIAGNOSIS — G89.29 NECK PAIN, CHRONIC: ICD-10-CM

## 2023-05-23 DIAGNOSIS — F51.04 PSYCHOPHYSIOLOGICAL INSOMNIA: ICD-10-CM

## 2023-05-23 DIAGNOSIS — G89.29 CHRONIC BILATERAL LOW BACK PAIN WITH BILATERAL SCIATICA: Primary | ICD-10-CM

## 2023-05-23 DIAGNOSIS — F41.9 ANXIETY: ICD-10-CM

## 2023-05-23 DIAGNOSIS — E03.9 ACQUIRED HYPOTHYROIDISM: Chronic | ICD-10-CM

## 2023-05-23 DIAGNOSIS — M54.41 CHRONIC BILATERAL LOW BACK PAIN WITH BILATERAL SCIATICA: Primary | ICD-10-CM

## 2023-05-23 DIAGNOSIS — N18.31 STAGE 3A CHRONIC KIDNEY DISEASE (HCC): ICD-10-CM

## 2023-05-23 DIAGNOSIS — F32.4 MAJOR DEPRESSIVE DISORDER WITH SINGLE EPISODE, IN PARTIAL REMISSION (HCC): ICD-10-CM

## 2023-05-23 DIAGNOSIS — M54.2 NECK PAIN, CHRONIC: ICD-10-CM

## 2023-05-23 NOTE — ASSESSMENT & PLAN NOTE
Stable. Continue current dose of Wellbutrin and lowest effective dose of Xanax for breakthrough symptoms. She will make appointment with [address] [place] [address] if symptoms worsen.

## 2023-05-23 NOTE — ASSESSMENT & PLAN NOTE
Worse due to recent lifestyle lapses. Patient was asked about her current diet and exercise habits, and personalized advice was provided regarding recommended lifestyle changes. Patient's comorbid health conditions associated with elevated BMI were discussed, as well as the likely benefits of weight loss. Based upon patient's motivation to change her behavior, the following plan was agreed upon to work toward a weight loss goal of 40 pounds: commercial weight loss program- [employer], walking 150 minutes/week. Educational materials for weight loss were provided. Patient will follow-up in 3 month(s) with PCP. Provider spent 15 minutes counseling patient.

## 2023-05-23 NOTE — ASSESSMENT & PLAN NOTE
Improved after RAMONA about 2 weeks ago. Planning additional nerve block tomorrow for right sciatica. Continue current doses of gabapentin and Tylenol.

## 2023-05-24 LAB
ANION GAP SERPL CALCULATED.3IONS-SCNC: 10 MMOL/L (ref 3–16)
BUN SERPL-MCNC: 14 MG/DL (ref 7–20)
CALCIUM SERPL-MCNC: 8.4 MG/DL (ref 8.3–10.6)
CHLORIDE SERPL-SCNC: 105 MMOL/L (ref 99–110)
CO2 SERPL-SCNC: 22 MMOL/L (ref 21–32)
CREAT SERPL-MCNC: 1 MG/DL (ref 0.6–1.2)
GFR SERPLBLD CREATININE-BSD FMLA CKD-EPI: >60 ML/MIN/{1.73_M2}
GLUCOSE SERPL-MCNC: 90 MG/DL (ref 70–99)
POTASSIUM SERPL-SCNC: 3.9 MMOL/L (ref 3.5–5.1)
SODIUM SERPL-SCNC: 137 MMOL/L (ref 136–145)
TSH SERPL DL<=0.005 MIU/L-ACNC: 5.49 UIU/ML (ref 0.27–4.2)

## 2023-05-24 RX ORDER — LEVOTHYROXINE SODIUM 0.2 MG/1
200 TABLET ORAL DAILY
Qty: 30 TABLET | Refills: 2 | Status: CANCELLED | OUTPATIENT
Start: 2023-05-24

## 2023-05-25 RX ORDER — LEVOTHYROXINE SODIUM 0.2 MG/1
200 TABLET ORAL DAILY
Qty: 90 TABLET | Refills: 1 | Status: SHIPPED | OUTPATIENT
Start: 2023-05-25

## 2023-06-05 RX ORDER — BUPROPION HYDROCHLORIDE 300 MG/1
TABLET ORAL
Qty: 90 TABLET | Refills: 1 | Status: SHIPPED | OUTPATIENT
Start: 2023-06-05

## 2023-08-07 DIAGNOSIS — F41.9 ANXIETY: ICD-10-CM

## 2023-08-07 DIAGNOSIS — F51.04 PSYCHOPHYSIOLOGICAL INSOMNIA: ICD-10-CM

## 2023-08-08 RX ORDER — ALPRAZOLAM 1 MG/1
TABLET ORAL
Qty: 40 TABLET | Refills: 2 | Status: SHIPPED | OUTPATIENT
Start: 2023-08-08 | End: 2023-11-08

## 2023-08-30 SDOH — ECONOMIC STABILITY: FOOD INSECURITY: WITHIN THE PAST 12 MONTHS, YOU WORRIED THAT YOUR FOOD WOULD RUN OUT BEFORE YOU GOT MONEY TO BUY MORE.: NEVER TRUE

## 2023-08-30 SDOH — ECONOMIC STABILITY: FOOD INSECURITY: WITHIN THE PAST 12 MONTHS, THE FOOD YOU BOUGHT JUST DIDN'T LAST AND YOU DIDN'T HAVE MONEY TO GET MORE.: NEVER TRUE

## 2023-08-30 SDOH — ECONOMIC STABILITY: INCOME INSECURITY: HOW HARD IS IT FOR YOU TO PAY FOR THE VERY BASICS LIKE FOOD, HOUSING, MEDICAL CARE, AND HEATING?: NOT VERY HARD

## 2023-08-30 NOTE — PROGRESS NOTES
Date of Visit:  2023    Dat Montiel, was evaluated through a synchronous (real-time) audio-video encounter. The patient (or guardian if applicable) is aware that this is a billable service, which includes applicable co-pays. This Virtual Visit was conducted with patient's (and/or legal guardian's) consent. Patient identification was verified, and a caregiver was present when appropriate. The patient was located at Home: [address] [place] [id number]  Provider was located at Home ([address] [place] [address]): [place]       CC: Dat Montiel (: 1950) is a 81 y.o. female, established patient, presenting virtually for evaluation/re-evaluation of the following medical concern(s):    ASSESSMENT/PLAN:  Below is the assessment and plan developed based on review of pertinent history, physical exam, labs, studies, and medications. Chronic bilateral low back pain with bilateral sciatica  Assessment & Plan:  Gradually worsening after improvement with RAMONA and sciatic nerve block about 6 months ago. She will consider repeating these injections. Continue current doses of gabapentin and Tylenol. Neck pain, chronic  Assessment & Plan:  Stable without any pain management procedures. Continue current medication regimen. Psychophysiological insomnia  Assessment & Plan:  Improved. Continue current doses of trazodone and melatonin. Anxiety  Assessment & Plan:  Stable. Continue current dose of Wellbutrin and lowest effective dose of Xanax for breakthrough symptoms. She will make appointment with [facility] if symptoms worsen. Major depressive disorder with single episode, in partial remission [facility])  Assessment & Plan:  See plan for anxiety. Hypothyroidism due to acquired atrophy of thyroid  Assessment & Plan:  No clinical change on higher dose of Synthroid, but will adjust dose if indicated by repeat TSH. Orders:  -     TSH;  Future  Age-related osteoporosis without current pathological fracture  Assessment &

## 2023-08-31 ENCOUNTER — TELEMEDICINE (OUTPATIENT)
Dept: INTERNAL MEDICINE CLINIC | Age: 73
End: 2023-08-31

## 2023-08-31 DIAGNOSIS — M54.42 CHRONIC BILATERAL LOW BACK PAIN WITH BILATERAL SCIATICA: Primary | ICD-10-CM

## 2023-08-31 DIAGNOSIS — Z78.0 POST-MENOPAUSAL: ICD-10-CM

## 2023-08-31 DIAGNOSIS — Z12.31 ENCOUNTER FOR SCREENING MAMMOGRAM FOR BREAST CANCER: ICD-10-CM

## 2023-08-31 DIAGNOSIS — F41.9 ANXIETY: ICD-10-CM

## 2023-08-31 DIAGNOSIS — F51.04 PSYCHOPHYSIOLOGICAL INSOMNIA: ICD-10-CM

## 2023-08-31 DIAGNOSIS — G89.29 NECK PAIN, CHRONIC: ICD-10-CM

## 2023-08-31 DIAGNOSIS — M54.41 CHRONIC BILATERAL LOW BACK PAIN WITH BILATERAL SCIATICA: Primary | ICD-10-CM

## 2023-08-31 DIAGNOSIS — M54.2 NECK PAIN, CHRONIC: ICD-10-CM

## 2023-08-31 DIAGNOSIS — F32.4 MAJOR DEPRESSIVE DISORDER WITH SINGLE EPISODE, IN PARTIAL REMISSION (HCC): ICD-10-CM

## 2023-08-31 DIAGNOSIS — E03.4 HYPOTHYROIDISM DUE TO ACQUIRED ATROPHY OF THYROID: Chronic | ICD-10-CM

## 2023-08-31 DIAGNOSIS — G89.29 CHRONIC BILATERAL LOW BACK PAIN WITH BILATERAL SCIATICA: Primary | ICD-10-CM

## 2023-08-31 DIAGNOSIS — M81.0 AGE-RELATED OSTEOPOROSIS WITHOUT CURRENT PATHOLOGICAL FRACTURE: ICD-10-CM

## 2023-08-31 DIAGNOSIS — E66.01 CLASS 2 SEVERE OBESITY DUE TO EXCESS CALORIES WITH SERIOUS COMORBIDITY AND BODY MASS INDEX (BMI) OF 36.0 TO 36.9 IN ADULT (HCC): ICD-10-CM

## 2023-08-31 RX ORDER — METRONIDAZOLE 500 MG/1
TABLET ORAL
COMMUNITY
Start: 2023-08-21

## 2023-08-31 RX ORDER — ZOLEDRONIC ACID 5 MG/100ML
5 INJECTION, SOLUTION INTRAVENOUS ONCE
Qty: 100 ML | Refills: 0
Start: 2023-08-31 | End: 2023-08-31

## 2023-08-31 NOTE — ASSESSMENT & PLAN NOTE
Gradually worsening after improvement with RAMONA and sciatic nerve block about 6 months ago. She will consider repeating these injections. Continue current doses of gabapentin and Tylenol.

## 2023-08-31 NOTE — ASSESSMENT & PLAN NOTE
Overdue for repeat Reclast infusion, which she tolerated 1/22. She will have metabolic panel drawn about 1 week prior to scheduled infusion.

## 2023-08-31 NOTE — ASSESSMENT & PLAN NOTE
Worse due to recent snacking and comfort eating. Patient was asked about her current diet and exercise habits, and personalized advice was provided regarding recommended lifestyle changes. Patient's comorbid health conditions associated with elevated BMI were discussed, as well as the likely benefits of weight loss. Based upon patient's motivation to change her behavior, the following plan was agreed upon to work toward a weight loss goal of 40 pounds: commercial weight loss program- Weight [employer], walking as much as low back will tolerate. Discussed strategies for planned, portion controlled snacks. Educational materials for weight loss were provided. Patient will follow-up in 3 month(s) with PCP. Provider spent 15 minutes counseling patient.

## 2023-08-31 NOTE — ASSESSMENT & PLAN NOTE
Stable. Continue current dose of Wellbutrin and lowest effective dose of Xanax for breakthrough symptoms. She will make appointment with [facility] if symptoms worsen.

## 2023-09-05 ENCOUNTER — HOSPITAL ENCOUNTER (OUTPATIENT)
Age: 73
Discharge: HOME | End: 2023-09-05
Payer: COMMERCIAL

## 2023-09-05 ENCOUNTER — TELEPHONE (OUTPATIENT)
Dept: INTERNAL MEDICINE CLINIC | Age: 73
End: 2023-09-05

## 2023-09-05 DIAGNOSIS — M81.0 AGE-RELATED OSTEOPOROSIS WITHOUT CURRENT PATHOLOGICAL FRACTURE: ICD-10-CM

## 2023-09-05 DIAGNOSIS — E03.4 HYPOTHYROIDISM DUE TO ACQUIRED ATROPHY OF THYROID: Chronic | ICD-10-CM

## 2023-09-05 LAB
ANION GAP SERPL CALCULATED.3IONS-SCNC: 7 MMOL/L (ref 3–16)
BUN SERPL-MCNC: 13 MG/DL (ref 7–20)
CALCIUM SERPL-MCNC: 9.7 MG/DL (ref 8.3–10.6)
CHLORIDE SERPL-SCNC: 101 MMOL/L (ref 99–110)
CO2 SERPL-SCNC: 31 MMOL/L (ref 21–32)
CREAT SERPL-MCNC: 1.2 MG/DL (ref 0.6–1.2)
GFR SERPLBLD CREATININE-BSD FMLA CKD-EPI: 48 ML/MIN/{1.73_M2}
GLUCOSE SERPL-MCNC: 96 MG/DL (ref 70–99)
POTASSIUM SERPL-SCNC: 4.9 MMOL/L (ref 3.5–5.1)
SODIUM SERPL-SCNC: 139 MMOL/L (ref 136–145)
TSH SERPL DL<=0.005 MIU/L-ACNC: 2.1 UIU/ML (ref 0.27–4.2)

## 2023-09-05 PROCEDURE — 84443 ASSAY THYROID STIM HORMONE: CPT

## 2023-09-05 PROCEDURE — 80048 BASIC METABOLIC PNL TOTAL CA: CPT

## 2023-09-05 PROCEDURE — 36415 COLL VENOUS BLD VENIPUNCTURE: CPT

## 2023-09-05 RX ORDER — ZOLEDRONIC ACID 5 MG/100ML
5 INJECTION, SOLUTION INTRAVENOUS ONCE
OUTPATIENT
Start: 2023-09-05 | End: 2023-09-05

## 2023-09-05 RX ORDER — GABAPENTIN 300 MG/1
CAPSULE ORAL
Qty: 180 CAPSULE | Refills: 1 | Status: SHIPPED | OUTPATIENT
Start: 2023-09-05 | End: 2024-02-05

## 2023-09-05 RX ORDER — SODIUM CHLORIDE 9 MG/ML
5-250 INJECTION, SOLUTION INTRAVENOUS PRN
OUTPATIENT
Start: 2023-09-05

## 2023-09-05 RX ORDER — SODIUM CHLORIDE 0.9 % (FLUSH) 0.9 %
5-40 SYRINGE (ML) INJECTION PRN
OUTPATIENT
Start: 2023-09-05

## 2023-09-05 NOTE — TELEPHONE ENCOUNTER
Spoke with patient and the infusion center. Central scheduling does not schedule infusions, which is what the misunderstanding was. Patient advised they will call her to get it scheduled but she will need to get her blood work completed ahead of time.  Patient on her way to get blood drawn, now

## 2023-09-05 NOTE — TELEPHONE ENCOUNTER
Last appointment: 8/31/2023  Next appointment: Visit date not found  Last refill: 3/15/2023  Not due back in office until November

## 2023-09-05 NOTE — TELEPHONE ENCOUNTER
----- Message from Laura Herrera sent at 9/5/2023  2:18 PM EDT -----  Subject: Message to Provider    QUESTIONS  Information for Provider? Pt called [facility] to schedule her infusion (she had one last year), but they said they don't do infusion there. Where should she go to schedule? She was also wondering where she can get her tetanus and shingles shots?   ---------------------------------------------------------------------------  --------------  Fariha LACEY  [phone]; OK to leave message on voicemail  ---------------------------------------------------------------------------  --------------  SCRIPT ANSWERS  Relationship to Patient?  Self

## 2023-09-06 PROBLEM — N17.9 AKI (ACUTE KIDNEY INJURY) (HCC): Status: ACTIVE | Noted: 2023-09-06

## 2023-09-15 ENCOUNTER — CLINICAL DOCUMENTATION (OUTPATIENT)
Dept: ONCOLOGY | Age: 73
End: 2023-09-15

## 2023-09-20 ENCOUNTER — HOSPITAL ENCOUNTER (OUTPATIENT)
Age: 73
Discharge: HOME | End: 2023-09-20
Payer: COMMERCIAL

## 2023-09-20 DIAGNOSIS — N17.9 AKI (ACUTE KIDNEY INJURY) (HCC): ICD-10-CM

## 2023-09-20 LAB
ANION GAP SERPL CALCULATED.3IONS-SCNC: 9 MMOL/L (ref 3–16)
BUN SERPL-MCNC: 12 MG/DL (ref 7–20)
CALCIUM SERPL-MCNC: 8.9 MG/DL (ref 8.3–10.6)
CHLORIDE SERPL-SCNC: 107 MMOL/L (ref 99–110)
CO2 SERPL-SCNC: 23 MMOL/L (ref 21–32)
CREAT SERPL-MCNC: 1.1 MG/DL (ref 0.6–1.2)
GFR SERPLBLD CREATININE-BSD FMLA CKD-EPI: 54 ML/MIN/{1.73_M2}
GLUCOSE SERPL-MCNC: 93 MG/DL (ref 70–99)
POTASSIUM SERPL-SCNC: 4.4 MMOL/L (ref 3.5–5.1)
SODIUM SERPL-SCNC: 139 MMOL/L (ref 136–145)

## 2023-09-20 PROCEDURE — 36415 COLL VENOUS BLD VENIPUNCTURE: CPT

## 2023-09-20 PROCEDURE — 80048 BASIC METABOLIC PNL TOTAL CA: CPT

## 2023-09-26 ENCOUNTER — HOSPITAL ENCOUNTER (OUTPATIENT)
Dept: WOMENS IMAGING | Age: 73
Discharge: HOME | End: 2023-09-26
Attending: INTERNAL MEDICINE
Payer: COMMERCIAL

## 2023-09-26 ENCOUNTER — HOSPITAL ENCOUNTER (OUTPATIENT)
Dept: GENERAL RADIOLOGY | Age: 73
Discharge: HOME | End: 2023-09-26
Attending: INTERNAL MEDICINE
Payer: COMMERCIAL

## 2023-09-26 VITALS — HEIGHT: 63 IN | WEIGHT: 198 LBS | BODY MASS INDEX: 35.08 KG/M2

## 2023-09-26 DIAGNOSIS — Z12.31 ENCOUNTER FOR SCREENING MAMMOGRAM FOR BREAST CANCER: ICD-10-CM

## 2023-09-26 DIAGNOSIS — Z78.0 POST-MENOPAUSAL: ICD-10-CM

## 2023-09-26 PROCEDURE — 77080 DXA BONE DENSITY AXIAL: CPT

## 2023-09-26 PROCEDURE — 77063 BREAST TOMOSYNTHESIS BI: CPT

## 2023-09-29 RX ORDER — OMEPRAZOLE 40 MG/1
CAPSULE, DELAYED RELEASE ORAL
Qty: 90 CAPSULE | Refills: 1 | Status: SHIPPED | OUTPATIENT
Start: 2023-09-29

## 2023-10-04 ENCOUNTER — TELEPHONE (OUTPATIENT)
Dept: UROGYNECOLOGY | Age: 73
End: 2023-10-04

## 2023-10-04 NOTE — PROGRESS NOTES
Patient ___ reached   ___X__not reached-preop instructions left on voice rcyv____[phone]_________      DATE__19/9/23______ TIME__1130______ARRIVAL___1000  FEC______      Nothing to eat or drink after midnight the night before,except for what the prep instructions call for. If you do not have the instructions or do not understand them please contact your doctors office. Follow any instructions your doctors office has given you including what medications to take the AM of your procedure and which ones to hold. You may use your inhalers - bring rescue inhalers with you DOS. If you take a long acting insulin the jennifer prior please cut the dose in half and take no diabetic medications that AM.Follow specific doctors office instructions regarding blood thinners and if they want you to hold and for how long. If you are on a blood thinner and have no instructions please contact the office and ask. Dress comfortably,bring your insurance card,picture ID,and a complete list of medications, including supplements. You must have a responsible adult to stay with you during the procedure,drive you home and stay with you. [facility] phone number [phone] for any questions. OTHER INSTRUCTIONS(if applicable)_________________________________________________________        VISITOR POLICY(subject to change)    Current visitor policy is 2 visitors per patient. No children allowed. Mask at discretion of facility. Visiting hours are 8a-8p. Overnight visitors will be at the discretion of the nurse. All policies are subject to change.

## 2023-10-04 NOTE — TELEPHONE ENCOUNTER
Patient called in to reschedule her appointment, see appt desk Electronically signed by Anastasia Mcmullen RN on 10/4/2023 at 2:35 PM

## 2023-10-08 SDOH — HEALTH STABILITY: PHYSICAL HEALTH: ON AVERAGE, HOW MANY MINUTES DO YOU ENGAGE IN EXERCISE AT THIS LEVEL?: 30 MIN

## 2023-10-08 SDOH — HEALTH STABILITY: PHYSICAL HEALTH: ON AVERAGE, HOW MANY DAYS PER WEEK DO YOU ENGAGE IN MODERATE TO STRENUOUS EXERCISE (LIKE A BRISK WALK)?: 2 DAYS

## 2023-10-09 ENCOUNTER — HOSPITAL ENCOUNTER (OUTPATIENT)
Age: 73
Setting detail: OUTPATIENT SURGERY
Discharge: HOME | End: 2023-10-09
Attending: INTERNAL MEDICINE | Admitting: INTERNAL MEDICINE
Payer: COMMERCIAL

## 2023-10-09 ENCOUNTER — ANESTHESIA (OUTPATIENT)
Dept: ENDOSCOPY | Age: 73
End: 2023-10-09
Payer: COMMERCIAL

## 2023-10-09 ENCOUNTER — ANESTHESIA EVENT (OUTPATIENT)
Dept: ENDOSCOPY | Age: 73
End: 2023-10-09
Payer: COMMERCIAL

## 2023-10-09 VITALS
WEIGHT: 198 LBS | OXYGEN SATURATION: 100 % | BODY MASS INDEX: 35.64 KG/M2 | TEMPERATURE: 96.9 F | RESPIRATION RATE: 16 BRPM | DIASTOLIC BLOOD PRESSURE: 81 MMHG | SYSTOLIC BLOOD PRESSURE: 125 MMHG | HEART RATE: 82 BPM

## 2023-10-09 DIAGNOSIS — R19.7 DIARRHEA, UNSPECIFIED TYPE: ICD-10-CM

## 2023-10-09 DIAGNOSIS — R13.10 DYSPHAGIA, UNSPECIFIED TYPE: ICD-10-CM

## 2023-10-09 PROCEDURE — 3609012400 HC EGD TRANSORAL BIOPSY SINGLE/MULTIPLE: Performed by: INTERNAL MEDICINE

## 2023-10-09 PROCEDURE — 2580000003 HC RX 258: Performed by: ANESTHESIOLOGY

## 2023-10-09 PROCEDURE — 3609010600 HC COLONOSCOPY POLYPECTOMY SNARE/COLD BIOPSY: Performed by: INTERNAL MEDICINE

## 2023-10-09 PROCEDURE — 3700000000 HC ANESTHESIA ATTENDED CARE: Performed by: INTERNAL MEDICINE

## 2023-10-09 PROCEDURE — 7100000010 HC PHASE II RECOVERY - FIRST 15 MIN: Performed by: INTERNAL MEDICINE

## 2023-10-09 PROCEDURE — 7100000011 HC PHASE II RECOVERY - ADDTL 15 MIN: Performed by: INTERNAL MEDICINE

## 2023-10-09 PROCEDURE — 2580000003 HC RX 258: Performed by: REGISTERED NURSE

## 2023-10-09 PROCEDURE — 3700000001 HC ADD 15 MINUTES (ANESTHESIA): Performed by: INTERNAL MEDICINE

## 2023-10-09 PROCEDURE — 6360000002 HC RX W HCPCS: Performed by: REGISTERED NURSE

## 2023-10-09 PROCEDURE — 2709999900 HC NON-CHARGEABLE SUPPLY: Performed by: INTERNAL MEDICINE

## 2023-10-09 PROCEDURE — 2500000003 HC RX 250 WO HCPCS: Performed by: REGISTERED NURSE

## 2023-10-09 RX ORDER — PROPOFOL 10 MG/ML
INJECTION, EMULSION INTRAVENOUS PRN
Status: DISCONTINUED | OUTPATIENT
Start: 2023-10-09 | End: 2023-10-09 | Stop reason: SDUPTHER

## 2023-10-09 RX ORDER — SODIUM CHLORIDE 9 MG/ML
INJECTION, SOLUTION INTRAVENOUS CONTINUOUS
Status: DISCONTINUED | OUTPATIENT
Start: 2023-10-09 | End: 2023-10-09 | Stop reason: HOSPADM

## 2023-10-09 RX ORDER — LIDOCAINE HYDROCHLORIDE 20 MG/ML
INJECTION, SOLUTION EPIDURAL; INFILTRATION; INTRACAUDAL; PERINEURAL PRN
Status: DISCONTINUED | OUTPATIENT
Start: 2023-10-09 | End: 2023-10-09 | Stop reason: SDUPTHER

## 2023-10-09 RX ORDER — SODIUM CHLORIDE 9 MG/ML
INJECTION, SOLUTION INTRAVENOUS CONTINUOUS PRN
Status: DISCONTINUED | OUTPATIENT
Start: 2023-10-09 | End: 2023-10-09 | Stop reason: SDUPTHER

## 2023-10-09 RX ADMIN — SODIUM CHLORIDE: 9 INJECTION, SOLUTION INTRAVENOUS at 10:32

## 2023-10-09 RX ADMIN — LIDOCAINE HYDROCHLORIDE 100 MG: 20 INJECTION, SOLUTION EPIDURAL; INFILTRATION; INTRACAUDAL; PERINEURAL at 11:30

## 2023-10-09 RX ADMIN — PROPOFOL 120 MCG/KG/MIN: 10 INJECTION, EMULSION INTRAVENOUS at 11:29

## 2023-10-09 RX ADMIN — PROPOFOL 100 MG: 10 INJECTION, EMULSION INTRAVENOUS at 11:30

## 2023-10-09 RX ADMIN — SODIUM CHLORIDE: 9 INJECTION, SOLUTION INTRAVENOUS at 11:27

## 2023-10-09 ASSESSMENT — PAIN SCALES - GENERAL
PAINLEVEL_OUTOF10: 0

## 2023-10-09 ASSESSMENT — PAIN - FUNCTIONAL ASSESSMENT: PAIN_FUNCTIONAL_ASSESSMENT: 0-10

## 2023-10-09 NOTE — ANESTHESIA PRE PROCEDURE
Department of Anesthesiology  Preprocedure Note       Name:  Frank King   Age:  81 y.o.  :  1950                                          MRN:  [id number]         Date:  10/9/2023      Surgeon: Pia Linares):  Rakesh Roth MD    Procedure: Procedure(s):  COLONOSCOPY DIAGNOSTIC  EGD DIAGNOSTIC ONLY    Medications prior to admission:   Prior to Admission medications    Medication Sig Start Date End Date Taking? Authorizing Provider   omeprazole (PRILOSEC) 40 MG delayed release capsule TAKE 1 CAPSULE BY MOUTH EVERY MORNING BEFORE BREAKFAST 23   Shahram Amado MD   gabapentin (NEURONTIN) 300 MG capsule TAKE 1 CAPSULE BY MOUTH TWICE DAILY 23  Shahram Amado MD   metroNIDAZOLE (FLAGYL) 500 MG tablet  23   Samira Lombardo MD   zoledronic acid (RECLAST) 5 MG/100ML SOLN Infuse 100 mLs intravenously once for 1 dose 23  Shahram Amado MD   ALPRAZolam Vernia Rosanna) 1 MG tablet TAKE 1 TABLET BY MOUTH TWICE DAILY AS NEEDED FOR ANXIETY 23  Shahram Amado MD   buPROPion (WELLBUTRIN XL) 300 MG extended release tablet TAKE 1 TABLET BY MOUTH EVERY MORNING 23   Shahram Amado MD   levothyroxine (SYNTHROID) 200 MCG tablet Take 1 tablet by mouth daily 23   Shahram Amado MD   traZODone (DESYREL) 50 MG tablet TAKE 3 TABLETS BY MOUTH EVERY NIGHT AT BEDTIME  Patient taking differently: TAKE 2 TABLETS BY MOUTH EVERY NIGHT AT BEDTIME 5/15/23   Shahram Amado MD   solifenacin (VESICARE) 10 MG tablet Take 0.5 tablets by mouth daily 23   Arsen Hernandez MD   clobetasol (TEMOVATE) 0.05 % ointment  3/11/22   Samira Lombardo MD   estradiol (ESTRACE VAGINAL) 0.1 MG/GM vaginal cream Place 0.25 g vaginally daily Apply 0.25g with fingertip to the vaginal opening every night at bedtime for 2 weeks, then decrease to twice weekly.  22   Arsen Hernandez MD   calcium carbonate 600 MG TABS tablet Take 1 tablet by mouth daily    Samira Lombardo

## 2023-10-09 NOTE — ANESTHESIA POSTPROCEDURE EVALUATION
Department of Anesthesiology  Postprocedure Note    Patient: Ev Ramirez  MRN: [id number]  YOB: 1950  Date of evaluation: 10/9/2023      Procedure Summary     Date: 10/09/23 Room / Location: [address] [place] [address]    Anesthesia Start: 1127 Anesthesia Stop: 1208    Procedures:       COLONOSCOPY POLYPECTOMY SNARE/COLD BIOPSY      EGD BIOPSY (Abdomen) Diagnosis:       Diarrhea, unspecified type      Dysphagia, unspecified type      (Diarrhea, unspecified type [R19.7])      (Dysphagia, unspecified type [R13.10])    Surgeons: Tanisha Aceves MD Responsible Provider: Allie Hernández MD    Anesthesia Type: MAC ASA Status: 3          Anesthesia Type: No value filed.     Mook Phase I: Mook Score: 10    Mook Phase II:        Anesthesia Post Evaluation    Patient location during evaluation: PACU  Patient participation: complete - patient participated  Level of consciousness: awake  Airway patency: patent  Nausea & Vomiting: no vomiting and no nausea  Complications: no  Cardiovascular status: hemodynamically stable  Respiratory status: acceptable  Hydration status: stable  Multimodal analgesia pain management approach  Pain management: adequate

## 2023-10-09 NOTE — PROGRESS NOTES
Teaching / education initiated regarding perioperative experience, expectations, and pain management during stay. Patient verbalized understanding. Patient

## 2023-10-09 NOTE — H&P
Gastroenterology Note                 Pre-operative History and Physical    Patient: Elayne Blakely  : 1950  CSN:     History Obtained From:   Patient or guardian. HISTORY OF PRESENT ILLNESS:    The patient is a 81 y.o. female here for Endoscopy.       Past Medical History:    Past Medical History:   Diagnosis Date    Abdominal wall abscess     Acute pyelonephritis 2021    Age related osteoporosis     Anal fissure 2013    Anxiety     Colon polyps     Depression     E coli bacteremia     Fibroid uterus     Hypothyroidism     Morbid obesity ([address])     Osteoarthritis of left knee 2016    Osteopenia     Urinary incontinence 5/3/2022     Past Surgical History:    Past Surgical History:   Procedure Laterality Date    ABDOMINAL HERNIA REPAIR      APPENDECTOMY  Age 27    CATARACT REMOVAL WITH IMPLANT Right 10/23/2017    with vitrectomy    CATARACT REMOVAL WITH IMPLANT Left 2018    with vitrectomy    COLONOSCOPY N/A 10/10/2018    COLONOSCOPY POLYPECTOMY SNARE/COLD BIOPSY performed by Tish Fernandez MD at [address] [facility] Right 2019    RIGHT L4 AND L5 TRANSFORAMINAL EPIDURAL STEROID INJECTION WITH FLUOROSCOPY performed by Palma Davenport MD at [address] [facility]    HC INJECT OTHER PERPHRL NERV Right 10/21/2019    RIGHT PIRIFORMIS INJECTION WITH FLUOROSCOPY ([address]) performed by Palma Davenport MD at [address], TOTAL ABDOMINAL (CERVIX REMOVED)      ovaries out also    LUMBAR SPINE SURGERY Right 2019    RIGHT L3 AND L4 LUMBAR TRANSFORAMINAL WITH FLUOROSCOPY performed by Palma Davenport MD at [address] [facility] Right 5/15/2019    RIGHT L4 AND L5 TRANSFORAMINAL EPIDURAL STEROID INJECTION WITH FLUOROSCOPY performed by Palma Davenport MD at [address] ESOPHAGOGASTRODUODENOSCOPY TRANSORAL DIAGNOSTIC N/A 10/10/2018    EGD performed by Tish Fernandez MD at [address] [facility]

## 2023-10-11 ENCOUNTER — OFFICE VISIT (OUTPATIENT)
Dept: ORTHOPEDIC SURGERY | Age: 73
End: 2023-10-11

## 2023-10-11 VITALS — BODY MASS INDEX: 35.08 KG/M2 | HEIGHT: 63 IN | WEIGHT: 198 LBS

## 2023-10-11 DIAGNOSIS — M65.30 TRIGGER FINGER, ACQUIRED: Primary | ICD-10-CM

## 2023-10-11 RX ORDER — TRIAMCINOLONE ACETONIDE 40 MG/ML
20 INJECTION, SUSPENSION INTRA-ARTICULAR; INTRAMUSCULAR ONCE
Status: COMPLETED | OUTPATIENT
Start: 2023-10-11 | End: 2023-10-11

## 2023-10-11 RX ADMIN — TRIAMCINOLONE ACETONIDE 20 MG: 40 INJECTION, SUSPENSION INTRA-ARTICULAR; INTRAMUSCULAR at 15:57

## 2023-10-11 NOTE — PROGRESS NOTES
Administrations This Visit       triamcinolone acetonide (KENALOG-40) injection 20 mg       Admin Date  10/11/2023  15:57 Action  Given Dose  20 mg Route  Intra-artICUlar Site  Finger (See Comments) Administered By  Justino Aponte MA    Ordering Provider: Elkin Bruner MD    NDC: 4518-5767-60    Lot#: [id number]    : [employer] (PRIMARY CARE)    Patient Supplied?: No    Comments: Right Index      Admin Date  10/11/2023  15:57 Action  Given Dose  20 mg Route  Intra-artICUlar Site  Finger (See Comments) Administered By  Justino Aponte MA    Ordering Provider: Elkin Bruner MD    NDC: 5064-7813-01    Lot#: [id number]    : [email or web address] (PRIMARY CARE)    Patient Supplied?: No    Comments: Right Thumb
anti-inflammatory medications or pain relievers for her symptoms if allowed by his primary care physician. Procedure:  right Index Finger Trigger Finger Injection  [second Injection]: After full discussion of the nature of this process and outlining a treatment plan with Ms. Kellen Alvarado, we discussed the complications, limitations, expectations, alternatives, and risks of injection of the flexor tendon sheath. I have explained the potential for bleeding, infection, potential side effects of the medication, and the remote possibility of damage to surrounding structures as result of the injection. She understood this information well and verbally consented to this treatment. The skin of the symptomatic digit was prepped with Isopropyl Alcohol and under aseptic conditions the flexor tendon sheath was injected with a combination of 1/2 ml of 0.25% Bupivacaine without Epinephrine and 20 mg of Triamcinolone (40 mg/ml). Good filling of the flexor sheath was noted. A dry sterile bandage was applied and the patient tolerated the injection without difficulty. I advised the patient of the expected response, possible reactions and the instructions for care of the hand. She is instructed in the judicious use of over-the-counter anti-inflammatory medications or pain relievers for her symptoms if allowed by his primary care physician. I have also discussed with Ms. Kellen Alvarado the other treatment options available to her for this condition. We have today selected to proceed with treatment by injection with steroid medication. She and I have agreed that if our current course of Injection treatment does not prove to be effective over the short term future, that she will schedule a follow-up appointment to discuss and select an alternate course of therapy including possibly further conservative treatment or surgical treatment.        Ms. Kellen Alvarado has been given a full verbal list of

## 2023-10-30 DIAGNOSIS — F41.9 ANXIETY: ICD-10-CM

## 2023-10-30 DIAGNOSIS — F51.04 PSYCHOPHYSIOLOGICAL INSOMNIA: ICD-10-CM

## 2023-10-31 RX ORDER — ALPRAZOLAM 1 MG/1
1 TABLET ORAL 2 TIMES DAILY PRN
Qty: 40 TABLET | Refills: 2 | Status: SHIPPED | OUTPATIENT
Start: 2023-10-31 | End: 2024-01-31

## 2023-11-08 RX ORDER — LEVOTHYROXINE SODIUM 0.2 MG/1
200 TABLET ORAL DAILY
Qty: 90 TABLET | Refills: 1 | Status: SHIPPED | OUTPATIENT
Start: 2023-11-08

## 2023-11-14 DIAGNOSIS — F51.04 PSYCHOPHYSIOLOGICAL INSOMNIA: ICD-10-CM

## 2023-11-14 RX ORDER — TRAZODONE HYDROCHLORIDE 50 MG/1
TABLET ORAL
Qty: 270 TABLET | Refills: 1 | Status: SHIPPED | OUTPATIENT
Start: 2023-11-14

## 2023-12-07 PROBLEM — N17.9 AKI (ACUTE KIDNEY INJURY) (HCC): Status: RESOLVED | Noted: 2023-09-06 | Resolved: 2023-12-07

## 2024-01-02 NOTE — PROGRESS NOTES
Date of Visit:  2024    Christian Adams, was evaluated through a synchronous (real-time) audio-video encounter. The patient (or guardian if applicable) is aware that this is a billable service, which includes applicable co-pays. This Virtual Visit was conducted with patient's (and/or legal guardian's) consent. Patient identification was verified, and a caregiver was present when appropriate.   The patient was located at Home: [address] [place] [address]  [facility] [place]     CC: Christian Adams (: 1950) is a 73 y.o. female, established patient, presenting virtually for evaluation/re-evaluation of the following medical concern(s):    ASSESSMENT/PLAN:  Below is the assessment and plan developed based on review of pertinent history, physical exam, labs, studies, and medications.  Chronic bilateral low back pain with bilateral sciatica  Assessment & Plan:  Gradually worsening after improvement with RAMONA and sciatic nerve block about 6 months ago. Repeat injections scheduled. Off gabapentin due to lack of efficacy. Continue prn Tylenol. NSAIDs contraindicated due to high risk of GI bleeding and CRI.  Neck pain, chronic  Assessment & Plan:  Stable without any pain management procedures. She will follow up with pain specialist if symptoms worsen.  Psychophysiological insomnia  Assessment & Plan:  Stable. Continue current doses of trazodone and melatonin.    Anxiety  Assessment & Plan:  Stable. Continue current dose of Wellbutrin and lowest effective dose of Xanax for breakthrough symptoms. Will refer for counseling if symptoms worsen.  Major depressive disorder with single episode, in partial remission (HCC)  Assessment & Plan:  See plan for anxiety.   Gastroesophageal reflux disease, unspecified whether esophagitis present  Assessment & Plan:  Well-controlled on daily omeprazole, but she will try to wean again to qod to limit potential nephrotoxicity.  Stage 3a chronic

## 2024-01-04 ENCOUNTER — TELEMEDICINE (OUTPATIENT)
Dept: INTERNAL MEDICINE CLINIC | Age: 74
End: 2024-01-04
Payer: COMMERCIAL

## 2024-01-04 DIAGNOSIS — F41.9 ANXIETY: ICD-10-CM

## 2024-01-04 DIAGNOSIS — F32.4 MAJOR DEPRESSIVE DISORDER WITH SINGLE EPISODE, IN PARTIAL REMISSION (HCC): ICD-10-CM

## 2024-01-04 DIAGNOSIS — G89.29 CHRONIC BILATERAL LOW BACK PAIN WITH BILATERAL SCIATICA: Primary | ICD-10-CM

## 2024-01-04 DIAGNOSIS — M54.42 CHRONIC BILATERAL LOW BACK PAIN WITH BILATERAL SCIATICA: Primary | ICD-10-CM

## 2024-01-04 DIAGNOSIS — M54.2 NECK PAIN, CHRONIC: ICD-10-CM

## 2024-01-04 DIAGNOSIS — G89.29 NECK PAIN, CHRONIC: ICD-10-CM

## 2024-01-04 DIAGNOSIS — K21.9 GASTROESOPHAGEAL REFLUX DISEASE, UNSPECIFIED WHETHER ESOPHAGITIS PRESENT: ICD-10-CM

## 2024-01-04 DIAGNOSIS — K90.89 OTHER SPECIFIED INTESTINAL MALABSORPTION: ICD-10-CM

## 2024-01-04 DIAGNOSIS — M54.41 CHRONIC BILATERAL LOW BACK PAIN WITH BILATERAL SCIATICA: Primary | ICD-10-CM

## 2024-01-04 DIAGNOSIS — F51.04 PSYCHOPHYSIOLOGICAL INSOMNIA: ICD-10-CM

## 2024-01-04 DIAGNOSIS — E66.01 CLASS 2 SEVERE OBESITY DUE TO EXCESS CALORIES WITH SERIOUS COMORBIDITY AND BODY MASS INDEX (BMI) OF 35.0 TO 35.9 IN ADULT (HCC): ICD-10-CM

## 2024-01-04 DIAGNOSIS — N18.31 STAGE 3A CHRONIC KIDNEY DISEASE (HCC): ICD-10-CM

## 2024-01-04 PROBLEM — K90.9 MALABSORPTION: Status: ACTIVE | Noted: 2024-01-04

## 2024-01-04 PROCEDURE — G0447 BEHAVIOR COUNSEL OBESITY 15M: HCPCS | Performed by: INTERNAL MEDICINE

## 2024-01-04 PROCEDURE — 99214 OFFICE O/P EST MOD 30 MIN: CPT | Performed by: INTERNAL MEDICINE

## 2024-01-04 PROCEDURE — 1123F ACP DISCUSS/DSCN MKR DOCD: CPT | Performed by: INTERNAL MEDICINE

## 2024-01-04 ASSESSMENT — PATIENT HEALTH QUESTIONNAIRE - PHQ9
SUM OF ALL RESPONSES TO PHQ QUESTIONS 1-9: 1
8. MOVING OR SPEAKING SO SLOWLY THAT OTHER PEOPLE COULD HAVE NOTICED. OR THE OPPOSITE, BEING SO FIGETY OR RESTLESS THAT YOU HAVE BEEN MOVING AROUND A LOT MORE THAN USUAL: 0
4. FEELING TIRED OR HAVING LITTLE ENERGY: 1
SUM OF ALL RESPONSES TO PHQ9 QUESTIONS 1 & 2: 0
SUM OF ALL RESPONSES TO PHQ QUESTIONS 1-9: 1
3. TROUBLE FALLING OR STAYING ASLEEP: 0
9. THOUGHTS THAT YOU WOULD BE BETTER OFF DEAD, OR OF HURTING YOURSELF: 0
7. TROUBLE CONCENTRATING ON THINGS, SUCH AS READING THE NEWSPAPER OR WATCHING TELEVISION: 0
6. FEELING BAD ABOUT YOURSELF - OR THAT YOU ARE A FAILURE OR HAVE LET YOURSELF OR YOUR FAMILY DOWN: 0
5. POOR APPETITE OR OVEREATING: 0
2. FEELING DOWN, DEPRESSED OR HOPELESS: 0
1. LITTLE INTEREST OR PLEASURE IN DOING THINGS: 0
SUM OF ALL RESPONSES TO PHQ QUESTIONS 1-9: 1
SUM OF ALL RESPONSES TO PHQ QUESTIONS 1-9: 1
10. IF YOU CHECKED OFF ANY PROBLEMS, HOW DIFFICULT HAVE THESE PROBLEMS MADE IT FOR YOU TO DO YOUR WORK, TAKE CARE OF THINGS AT HOME, OR GET ALONG WITH OTHER PEOPLE: 0

## 2024-01-04 NOTE — ASSESSMENT & PLAN NOTE
Stable. Continue current dose of Wellbutrin and lowest effective dose of Xanax for breakthrough symptoms. Will refer for counseling if symptoms worsen.

## 2024-01-04 NOTE — ASSESSMENT & PLAN NOTE
No progress over the holidays. Patient was asked about her current diet and exercise habits, and personalized advice was provided regarding recommended lifestyle changes. Patient's comorbid health conditions associated with elevated BMI were discussed, as well as the likely benefits of weight loss. Based upon patient's motivation to change her behavior, the following plan was agreed upon to work toward a weight loss goal of 40 pounds: commercial weight loss program- Weight Watcher's, walking as much as low back will tolerate. Educational materials for weight loss were provided. Patient will follow-up in 3 month(s) with PCP. Provider spent 15 minutes counseling patient.

## 2024-01-04 NOTE — ASSESSMENT & PLAN NOTE
Stable without any pain management procedures. She will follow up with pain specialist if symptoms worsen.

## 2024-01-04 NOTE — ASSESSMENT & PLAN NOTE
Well-controlled on daily omeprazole, but she will try to wean again to qod to limit potential nephrotoxicity.

## 2024-01-04 NOTE — ASSESSMENT & PLAN NOTE
Gradually worsening after improvement with RAMONA and sciatic nerve block about 6 months ago. Repeat injections scheduled. Off gabapentin due to lack of efficacy. Continue prn Tylenol. NSAIDs contraindicated due to high risk of GI bleeding and CRI.

## 2024-01-16 ENCOUNTER — HOSPITAL ENCOUNTER (OUTPATIENT)
Age: 74
Discharge: HOME | End: 2024-01-16
Payer: COMMERCIAL

## 2024-01-16 DIAGNOSIS — K90.89 OTHER SPECIFIED INTESTINAL MALABSORPTION: ICD-10-CM

## 2024-01-16 DIAGNOSIS — N18.31 STAGE 3A CHRONIC KIDNEY DISEASE (HCC): ICD-10-CM

## 2024-01-16 LAB
25(OH)D3 SERPL-MCNC: 32.5 NG/ML
ALBUMIN SERPL-MCNC: 3.8 G/DL (ref 3.4–5)
ALBUMIN/GLOB SERPL: 1.3 {RATIO} (ref 1.1–2.2)
ALP SERPL-CCNC: 98 U/L (ref 40–129)
ALT SERPL-CCNC: 11 U/L (ref 10–40)
ANION GAP SERPL CALCULATED.3IONS-SCNC: 10 MMOL/L (ref 3–16)
AST SERPL-CCNC: 14 U/L (ref 15–37)
BASOPHILS # BLD: 0.1 K/UL (ref 0–0.2)
BASOPHILS NFR BLD: 0.8 %
BILIRUB SERPL-MCNC: 0.3 MG/DL (ref 0–1)
BUN SERPL-MCNC: 15 MG/DL (ref 7–20)
CALCIUM SERPL-MCNC: 8.9 MG/DL (ref 8.3–10.6)
CHLORIDE SERPL-SCNC: 106 MMOL/L (ref 99–110)
CO2 SERPL-SCNC: 22 MMOL/L (ref 21–32)
CREAT SERPL-MCNC: 1 MG/DL (ref 0.6–1.2)
DEPRECATED RDW RBC AUTO: 14.5 % (ref 12.4–15.4)
EOSINOPHIL # BLD: 0.1 K/UL (ref 0–0.6)
EOSINOPHIL NFR BLD: 0.8 %
FERRITIN SERPL IA-MCNC: 24.1 NG/ML (ref 15–150)
GFR SERPLBLD CREATININE-BSD FMLA CKD-EPI: >60 ML/MIN/{1.73_M2}
GLUCOSE P FAST SERPL-MCNC: 92 MG/DL (ref 70–99)
HCT VFR BLD AUTO: 40.2 % (ref 36–48)
HGB BLD-MCNC: 13.3 G/DL (ref 12–16)
IRON SATN MFR SERPL: 23 % (ref 15–50)
IRON SERPL-MCNC: 85 UG/DL (ref 37–145)
LYMPHOCYTES # BLD: 1.6 K/UL (ref 1–5.1)
LYMPHOCYTES NFR BLD: 21.7 %
MCH RBC QN AUTO: 30.6 PG (ref 26–34)
MCHC RBC AUTO-ENTMCNC: 32.9 G/DL (ref 31–36)
MCV RBC AUTO: 92.8 FL (ref 80–100)
MONOCYTES # BLD: 0.6 K/UL (ref 0–1.3)
MONOCYTES NFR BLD: 8.1 %
NEUTROPHILS # BLD: 4.9 K/UL (ref 1.7–7.7)
NEUTROPHILS NFR BLD: 68.6 %
PLATELET # BLD AUTO: 257 K/UL (ref 135–450)
PMV BLD AUTO: 9 FL (ref 5–10.5)
POTASSIUM SERPL-SCNC: 4 MMOL/L (ref 3.5–5.1)
PROT SERPL-MCNC: 6.8 G/DL (ref 6.4–8.2)
RBC # BLD AUTO: 4.34 M/UL (ref 4–5.2)
SODIUM SERPL-SCNC: 138 MMOL/L (ref 136–145)
TIBC SERPL-MCNC: 369 UG/DL (ref 260–445)
VIT B12 SERPL-MCNC: >2000 PG/ML (ref 211–911)
WBC # BLD AUTO: 7.1 K/UL (ref 4–11)

## 2024-01-16 PROCEDURE — 36415 COLL VENOUS BLD VENIPUNCTURE: CPT

## 2024-01-16 PROCEDURE — 83550 IRON BINDING TEST: CPT

## 2024-01-16 PROCEDURE — 83540 ASSAY OF IRON: CPT

## 2024-01-16 PROCEDURE — 80053 COMPREHEN METABOLIC PANEL: CPT

## 2024-01-16 PROCEDURE — 85025 COMPLETE CBC W/AUTO DIFF WBC: CPT

## 2024-01-16 PROCEDURE — 82607 VITAMIN B-12: CPT

## 2024-01-16 PROCEDURE — 82728 ASSAY OF FERRITIN: CPT

## 2024-01-16 PROCEDURE — 82306 VITAMIN D 25 HYDROXY: CPT

## 2024-01-18 ENCOUNTER — TELEPHONE (OUTPATIENT)
Dept: INTERNAL MEDICINE CLINIC | Age: 74
End: 2024-01-18

## 2024-01-18 RX ORDER — SODIUM CHLORIDE 0.9 % (FLUSH) 0.9 %
5-40 SYRINGE (ML) INJECTION PRN
OUTPATIENT
Start: 2024-01-18

## 2024-01-18 RX ORDER — ZOLEDRONIC ACID 5 MG/100ML
5 INJECTION, SOLUTION INTRAVENOUS ONCE
OUTPATIENT
Start: 2024-01-18 | End: 2024-01-18

## 2024-01-18 RX ORDER — SODIUM CHLORIDE 9 MG/ML
5-250 INJECTION, SOLUTION INTRAVENOUS PRN
OUTPATIENT
Start: 2024-01-18

## 2024-01-24 DIAGNOSIS — F41.9 ANXIETY: ICD-10-CM

## 2024-01-24 DIAGNOSIS — F51.04 PSYCHOPHYSIOLOGICAL INSOMNIA: ICD-10-CM

## 2024-01-25 RX ORDER — ALPRAZOLAM 1 MG/1
TABLET ORAL
Qty: 40 TABLET | Refills: 2 | Status: SHIPPED | OUTPATIENT
Start: 2024-01-25 | End: 2024-04-24

## 2024-01-30 ENCOUNTER — HOSPITAL ENCOUNTER (OUTPATIENT)
Dept: ONCOLOGY | Age: 74
Setting detail: INFUSION SERIES
Discharge: HOME | End: 2024-01-30
Payer: COMMERCIAL

## 2024-01-30 VITALS
RESPIRATION RATE: 16 BRPM | HEART RATE: 72 BPM | SYSTOLIC BLOOD PRESSURE: 131 MMHG | DIASTOLIC BLOOD PRESSURE: 51 MMHG | TEMPERATURE: 96.9 F

## 2024-01-30 DIAGNOSIS — M81.0 AGE RELATED OSTEOPOROSIS, UNSPECIFIED PATHOLOGICAL FRACTURE PRESENCE: Primary | ICD-10-CM

## 2024-01-30 PROCEDURE — 2580000003 HC RX 258: Performed by: INTERNAL MEDICINE

## 2024-01-30 PROCEDURE — 99211 OFF/OP EST MAY X REQ PHY/QHP: CPT

## 2024-01-30 PROCEDURE — 6360000002 HC RX W HCPCS: Performed by: INTERNAL MEDICINE

## 2024-01-30 PROCEDURE — 96365 THER/PROPH/DIAG IV INF INIT: CPT

## 2024-01-30 RX ORDER — ZOLEDRONIC ACID 5 MG/100ML
5 INJECTION, SOLUTION INTRAVENOUS ONCE
OUTPATIENT
Start: 2025-01-28 | End: 2025-01-28

## 2024-01-30 RX ORDER — ZOLEDRONIC ACID 5 MG/100ML
5 INJECTION, SOLUTION INTRAVENOUS ONCE
Status: COMPLETED | OUTPATIENT
Start: 2024-01-30 | End: 2024-01-30

## 2024-01-30 RX ORDER — SODIUM CHLORIDE 9 MG/ML
5-250 INJECTION, SOLUTION INTRAVENOUS PRN
Status: DISCONTINUED | OUTPATIENT
Start: 2024-01-30 | End: 2024-01-31 | Stop reason: HOSPADM

## 2024-01-30 RX ORDER — SODIUM CHLORIDE 0.9 % (FLUSH) 0.9 %
5-40 SYRINGE (ML) INJECTION PRN
OUTPATIENT
Start: 2025-01-28

## 2024-01-30 RX ORDER — SODIUM CHLORIDE 0.9 % (FLUSH) 0.9 %
5-40 SYRINGE (ML) INJECTION PRN
Status: DISCONTINUED | OUTPATIENT
Start: 2024-01-30 | End: 2024-01-31 | Stop reason: HOSPADM

## 2024-01-30 RX ORDER — SODIUM CHLORIDE 9 MG/ML
5-250 INJECTION, SOLUTION INTRAVENOUS PRN
OUTPATIENT
Start: 2025-01-28

## 2024-01-30 RX ADMIN — SODIUM CHLORIDE 400 ML/HR: 9 INJECTION, SOLUTION INTRAVENOUS at 11:05

## 2024-01-30 RX ADMIN — ZOLEDRONIC ACID 5 MG: 0.05 INJECTION, SOLUTION INTRAVENOUS at 11:06

## 2024-01-30 RX ADMIN — Medication 10 ML: at 11:04

## 2024-01-30 NOTE — PROGRESS NOTES
Patient to department for yearly Reclast infusion. Tolerated infusion well. Given avs with information about possible side effects. Encouraged to drink extra water today. Patient is taking vitamin d and calcium at home.

## 2024-03-15 RX ORDER — SOLIFENACIN SUCCINATE 10 MG/1
5 TABLET, FILM COATED ORAL DAILY
Qty: 90 TABLET | Refills: 1 | OUTPATIENT
Start: 2024-03-15

## 2024-03-15 RX ORDER — ESTRADIOL 0.1 MG/G
0.25 CREAM VAGINAL DAILY
Qty: 30 G | Refills: 0 | OUTPATIENT
Start: 2024-03-15

## 2024-03-19 DIAGNOSIS — F41.9 ANXIETY: ICD-10-CM

## 2024-03-19 DIAGNOSIS — F51.04 PSYCHOPHYSIOLOGICAL INSOMNIA: ICD-10-CM

## 2024-03-20 RX ORDER — ALPRAZOLAM 1 MG/1
TABLET ORAL
Qty: 180 TABLET | Refills: 0 | Status: SHIPPED | OUTPATIENT
Start: 2024-03-20 | End: 2024-06-18

## 2024-03-20 NOTE — TELEPHONE ENCOUNTER
Medication:   Requested Prescriptions     Pending Prescriptions Disp Refills    ALPRAZolam (XANAX) 1 MG tablet [Pharmacy Med Name: ALPRAZOLAM 1MG TABLETS] 180 tablet      Sig: TAKE 1 TABLET BY MOUTH TWICE DAILY AS NEEDED FOR ANXIETY OR SLEEP     Last Filled:  1/24/24    Last appt: 1/4/2024   Next appt: Visit date not found    Last OARRS:       4/9/2019     8:44 AM   RX Monitoring   Attestation The Prescription Monitoring Report for this patient was reviewed today.   Periodic Controlled Substance Monitoring No signs of potential drug abuse or diversion identified: otherwise, see note documentation

## 2024-03-27 RX ORDER — OMEPRAZOLE 40 MG/1
CAPSULE, DELAYED RELEASE ORAL
Qty: 90 CAPSULE | Refills: 1 | Status: SHIPPED | OUTPATIENT
Start: 2024-03-27

## 2024-04-15 SDOH — HEALTH STABILITY: PHYSICAL HEALTH: ON AVERAGE, HOW MANY MINUTES DO YOU ENGAGE IN EXERCISE AT THIS LEVEL?: 30 MIN

## 2024-04-15 SDOH — HEALTH STABILITY: PHYSICAL HEALTH: ON AVERAGE, HOW MANY DAYS PER WEEK DO YOU ENGAGE IN MODERATE TO STRENUOUS EXERCISE (LIKE A BRISK WALK)?: 3 DAYS

## 2024-04-22 SDOH — HEALTH STABILITY: PHYSICAL HEALTH: ON AVERAGE, HOW MANY DAYS PER WEEK DO YOU ENGAGE IN MODERATE TO STRENUOUS EXERCISE (LIKE A BRISK WALK)?: 3 DAYS

## 2024-04-22 SDOH — HEALTH STABILITY: PHYSICAL HEALTH: ON AVERAGE, HOW MANY MINUTES DO YOU ENGAGE IN EXERCISE AT THIS LEVEL?: 30 MIN

## 2024-04-25 ENCOUNTER — OFFICE VISIT (OUTPATIENT)
Dept: FAMILY MEDICINE CLINIC | Age: 74
End: 2024-04-25

## 2024-04-25 VITALS
BODY MASS INDEX: 35.1 KG/M2 | HEART RATE: 77 BPM | OXYGEN SATURATION: 98 % | DIASTOLIC BLOOD PRESSURE: 72 MMHG | WEIGHT: 195 LBS | SYSTOLIC BLOOD PRESSURE: 118 MMHG

## 2024-04-25 DIAGNOSIS — L57.0 ACTINIC KERATOSIS: ICD-10-CM

## 2024-04-25 DIAGNOSIS — Z76.89 ESTABLISHING CARE WITH NEW DOCTOR, ENCOUNTER FOR: ICD-10-CM

## 2024-04-25 DIAGNOSIS — K90.89 OTHER INTESTINAL MALABSORPTION: Primary | ICD-10-CM

## 2024-04-25 DIAGNOSIS — K90.89 OTHER INTESTINAL MALABSORPTION: ICD-10-CM

## 2024-04-25 ASSESSMENT — ENCOUNTER SYMPTOMS
SHORTNESS OF BREATH: 0
ABDOMINAL PAIN: 0

## 2024-04-25 NOTE — PROGRESS NOTES
Christian Adams is a 73 y.o.female who presents with   Chief Complaint   Patient presents with    New Patient   Portions of this chart may have been created with voice recognition software. Occasional wrong-word or \"sound-alike\" substitutions may have occurred due to the inherent limitations of voice recognition software. Read the chart carefully and recognize, using context, where these substitutions have occurred      Patient presents to establish care.    Denies any complaints or concerns today.        Exercise: walking   Diet: Well balanced  Substance use: Denies  Stress: Manageable  Sleep: ~7 hours per night  Feels safe at home           Review of Systems   Constitutional:  Negative for fatigue.   Eyes:  Negative for visual disturbance.   Respiratory:  Negative for shortness of breath.    Cardiovascular:  Negative for chest pain.   Gastrointestinal:  Negative for abdominal pain.   Skin:  Negative for rash.   Neurological:  Negative for dizziness, light-headedness and headaches.        Past Medical History:   Diagnosis Date    Abdominal wall abscess     Acute pyelonephritis 7/8/2021    Age related osteoporosis     Anal fissure 7/16/2013    Anxiety     Colon polyps     Depression     E coli bacteremia     Fibroid uterus     Hypothyroidism     Morbid obesity (HCC)     Osteoarthritis of left knee 1/26/2016    Osteopenia     Urinary incontinence 5/3/2022       Past Surgical History:   Procedure Laterality Date    ABDOMINAL HERNIA REPAIR  2008    APPENDECTOMY  Age 24    CATARACT REMOVAL WITH IMPLANT Right 10/23/2017    with vitrectomy    CATARACT REMOVAL WITH IMPLANT Left 02/26/2018    with vitrectomy    COLONOSCOPY N/A 10/10/2018    COLONOSCOPY POLYPECTOMY SNARE/COLD BIOPSY performed by Darion Barker MD at [facility] ENDOSCOPY    COLONOSCOPY N/A 10/9/2023    COLONOSCOPY POLYPECTOMY SNARE/COLD BIOPSY performed by Darion Barker MD at [facility] ENDOSCOPY    EPIDURAL STEROID INJECTION Right 8/26/2019    RIGHT L4 AND L5

## 2024-04-25 NOTE — PATIENT INSTRUCTIONS
Guidelines for care with Dr. Flores and use of DPSIt:     - Please allow 72 hours for response to [employer] Messages. If your concern cannot wait, please schedule an office visit.   - Please allow 72 hours for my comment on blood work or imaging. Occasionally there may be delays.  - If you have specific questions regarding your blood work or imaging, please schedule an office visit.   - If you have a new symptom, please schedule an office visit.   - Medication changes are only made at appointments. If you require a medication change, please schedule an office visit.   - Antibiotics cannot be prescribed without an in office evaluation.   - Controlled substances require IN PERSON office visits every 3 months.  - If you are on a controlled substance, a drug screen will be performed. I will require a negative drug screen.  - If you are late to your appointment, our visit time is limited.  - We can discuss 1-2 problems during follow up appointments, these are 15 minutes long.   - If you have multiple concerns, feel free to schedule multiple appointments. We cannot extend our visit time past 15 minutes regardless of the time the appointment is scheduled, out of consideration for all staff.  - Annual Physical exams are for discussion of Chronic Conditions and general wellness. We may discuss 1-2 new issues if time allows. You can always schedule a follow up appointment.     I would like to provide adequate and thorough attention to your care, which is why I limit discussion of 1-2 problems at a time. You are always welcome to schedule follow-ups with me for any new concerns.    I am here to support you and happy to work with you!    Dr. Flores      56.7

## 2024-04-26 LAB
CHOLEST SERPL-MCNC: 143 MG/DL (ref 0–199)
HDLC SERPL-MCNC: 64 MG/DL (ref 40–60)
LDLC SERPL CALC-MCNC: 64 MG/DL
TRIGL SERPL-MCNC: 75 MG/DL (ref 0–150)
VLDLC SERPL CALC-MCNC: 15 MG/DL

## 2024-05-06 RX ORDER — LEVOTHYROXINE SODIUM 0.2 MG/1
200 TABLET ORAL DAILY
Qty: 90 TABLET | Refills: 1 | Status: SHIPPED | OUTPATIENT
Start: 2024-05-06

## 2024-05-24 RX ORDER — BUPROPION HYDROCHLORIDE 300 MG/1
300 TABLET ORAL EVERY MORNING
Qty: 90 TABLET | Refills: 1 | Status: SHIPPED | OUTPATIENT
Start: 2024-05-24

## 2024-05-31 ENCOUNTER — HOSPITAL ENCOUNTER (EMERGENCY)
Age: 74
Discharge: HOME | End: 2024-05-31
Payer: COMMERCIAL

## 2024-05-31 ENCOUNTER — APPOINTMENT (OUTPATIENT)
Dept: GENERAL RADIOLOGY | Age: 74
End: 2024-05-31
Payer: COMMERCIAL

## 2024-05-31 VITALS
SYSTOLIC BLOOD PRESSURE: 133 MMHG | TEMPERATURE: 98.3 F | RESPIRATION RATE: 17 BRPM | HEART RATE: 88 BPM | BODY MASS INDEX: 34.56 KG/M2 | WEIGHT: 192 LBS | OXYGEN SATURATION: 98 % | DIASTOLIC BLOOD PRESSURE: 71 MMHG

## 2024-05-31 DIAGNOSIS — S91.311A LACERATION OF RIGHT FOOT, INITIAL ENCOUNTER: Primary | ICD-10-CM

## 2024-05-31 PROCEDURE — 73630 X-RAY EXAM OF FOOT: CPT

## 2024-05-31 PROCEDURE — 99283 EMERGENCY DEPT VISIT LOW MDM: CPT

## 2024-05-31 PROCEDURE — 12001 RPR S/N/AX/GEN/TRNK 2.5CM/<: CPT

## 2024-05-31 ASSESSMENT — ENCOUNTER SYMPTOMS
BACK PAIN: 0
DIARRHEA: 0
COUGH: 0
SHORTNESS OF BREATH: 0
CONSTIPATION: 0
NAUSEA: 0
VOMITING: 0
SORE THROAT: 0
EYE PAIN: 0
ABDOMINAL PAIN: 0
RHINORRHEA: 0

## 2024-05-31 NOTE — DISCHARGE INSTRUCTIONS
Keep area covered with bandage during the day you can take it off at night.  You can allow water to run over the area but do not submerge wound in water as this can delay healing and can cause infection    Return to emergency room if you have any redness, swelling or wound discharge or other signs of infection    Follow up with your PCP in about 7-10 to have sutures removed.     Wear post op shoe for the first week and take Over the counter pain medications as needed.

## 2024-05-31 NOTE — ED PROVIDER NOTES
calf pain.    X-ray of the right foot was ordered but did not show any acute osseous abnormalities.    Laceration repair was completed by myself please see procedure note for details.    Patient was placed in a postop shoe due to her pain and told to follow-up primary care doctor next week for recheck and to have sutures removed.    At this time medical laceration fracture, dislocation, cellulitis, abscess or other acute emergent allergies at this time.      I am the Primary Clinician of Record.    FINAL IMPRESSION      1. Laceration of right foot, initial encounter          DISPOSITION/PLAN     DISPOSITION Decision To Discharge 05/31/2024 06:47:44 PM      PATIENT REFERRED TO:  Radha Flores MD  [address] [address] [phone]    Schedule an appointment as soon as possible for a visit in 1 week  recheck    [facility] Emergency Department  [address] [facility] [address]  [phone]    As needed, If symptoms worsen      DISCHARGE MEDICATIONS:  Discharge Medication List as of 5/31/2024  6:59 PM          DISCONTINUED MEDICATIONS:  Discharge Medication List as of 5/31/2024  6:59 PM                 (Please note that portions of this note were completed with a voice recognition program.  Efforts were made to edit the dictations but occasionally words are mis-transcribed.)    SHONNA Deshpande (electronically signed)            Myles Moreno PA  05/31/24 2038

## 2024-06-10 ENCOUNTER — OFFICE VISIT (OUTPATIENT)
Dept: FAMILY MEDICINE CLINIC | Age: 74
End: 2024-06-10
Payer: COMMERCIAL

## 2024-06-10 VITALS
DIASTOLIC BLOOD PRESSURE: 60 MMHG | OXYGEN SATURATION: 98 % | SYSTOLIC BLOOD PRESSURE: 116 MMHG | HEART RATE: 68 BPM | BODY MASS INDEX: 34.92 KG/M2 | WEIGHT: 194 LBS

## 2024-06-10 DIAGNOSIS — S91.311D LACERATION OF RIGHT FOOT, SUBSEQUENT ENCOUNTER: Primary | ICD-10-CM

## 2024-06-10 DIAGNOSIS — Z48.89 SUTURE CHECK: ICD-10-CM

## 2024-06-10 PROCEDURE — 99213 OFFICE O/P EST LOW 20 MIN: CPT | Performed by: STUDENT IN AN ORGANIZED HEALTH CARE EDUCATION/TRAINING PROGRAM

## 2024-06-10 PROCEDURE — 1123F ACP DISCUSS/DSCN MKR DOCD: CPT | Performed by: STUDENT IN AN ORGANIZED HEALTH CARE EDUCATION/TRAINING PROGRAM

## 2024-06-10 RX ORDER — SULFAMETHOXAZOLE AND TRIMETHOPRIM 800; 160 MG/1; MG/1
1 TABLET ORAL 2 TIMES DAILY
Qty: 14 TABLET | Refills: 0 | Status: SHIPPED | OUTPATIENT
Start: 2024-06-10 | End: 2024-06-17

## 2024-06-10 NOTE — PROGRESS NOTES
use: No    Drug use: No    Sexual activity: Not Currently     Social Determinants of Health     Financial Resource Strain: Low Risk  (8/30/2023)    Overall Financial Resource Strain (CARDIA)     Difficulty of Paying Living Expenses: Not very hard   Transportation Needs: Unknown (8/30/2023)    PRAPARE - Transportation     Lack of Transportation (Non-Medical): No   Physical Activity: Insufficiently Active (4/22/2024)    Exercise Vital Sign     Days of Exercise per Week: 3 days     Minutes of Exercise per Session: 30 min   Intimate Partner Violence: Not At Risk (10/8/2023)    Humiliation, Afraid, Rape, and Kick questionnaire     Fear of Current or Ex-Partner: No     Emotionally Abused: No     Physically Abused: No     Sexually Abused: No   Housing Stability: Unknown (8/30/2023)    Housing Stability Vital Sign     Unstable Housing in the Last Year: No            Review of Systems     Denies any current fevers, chills, nausea, vomiting but does endorse foot laceration    Vitals:    06/10/24 1136   BP: 116/60   Pulse: 68   SpO2: 98%   Weight: 88 kg (194 lb)      Body mass index is 34.92 kg/m².   Physical Exam     General: Alert and oriented, cooperative and in no acute distress  Eyes: Clear sclera bl  HEENT: MMM  Cardio: S1, S2 heard, RRR, no murmurs appreciated  Resp: No acte respiratory distress, symmetric chest expansion,  CTAB  Abdomen: Soft, non-tender to palpation, non-distended   Neuro: Grossly intact, no focal deficits  MSK: Moves all extremities spontaneously, no acute joint swelling  Skin: Warm and dry, has surrounding erythema notable on right foot with still incomplete closure of wound margins with visible wet granulation tissue  Psych: Calm, able to answer questions and follow commands       1. Laceration of right foot, subsequent encounter  -Does have notable erythema around wound itself, has not been given p.o. antibiotics at this time, I would like to add a course of Bactrim to ensure resolution of

## 2024-06-11 ENCOUNTER — TELEPHONE (OUTPATIENT)
Dept: FAMILY MEDICINE CLINIC | Age: 74
End: 2024-06-11

## 2024-06-11 DIAGNOSIS — S91.311D LACERATION OF RIGHT FOOT, SUBSEQUENT ENCOUNTER: Primary | ICD-10-CM

## 2024-06-11 RX ORDER — CEPHALEXIN 500 MG/1
500 CAPSULE ORAL 4 TIMES DAILY
Qty: 28 CAPSULE | Refills: 0 | Status: SHIPPED | OUTPATIENT
Start: 2024-06-11 | End: 2024-06-18

## 2024-06-11 NOTE — TELEPHONE ENCOUNTER
sulfamethoxazole-trimethoprim (BACTRIM DS;SEPTRA DS) 800-160 MG per tablet     This medication is causing pt to have an upset stomach, denies nausea, vomiting, or diarrhea. She is requesting possibly a different one be sent in. Please advise.

## 2024-06-11 NOTE — TELEPHONE ENCOUNTER
Spoke with patient and she stated that she will give the medication another try today and see how it makes her feel. I reminded her to take it with food and she stated she has but it still made her nauseous.

## 2024-06-14 ENCOUNTER — HOSPITAL ENCOUNTER (OUTPATIENT)
Dept: GENERAL RADIOLOGY | Age: 74
Discharge: HOME | End: 2024-06-14
Payer: COMMERCIAL

## 2024-06-14 ENCOUNTER — OFFICE VISIT (OUTPATIENT)
Dept: FAMILY MEDICINE CLINIC | Age: 74
End: 2024-06-14

## 2024-06-14 VITALS
WEIGHT: 194.8 LBS | OXYGEN SATURATION: 98 % | DIASTOLIC BLOOD PRESSURE: 58 MMHG | HEART RATE: 71 BPM | SYSTOLIC BLOOD PRESSURE: 122 MMHG | BODY MASS INDEX: 35.06 KG/M2

## 2024-06-14 DIAGNOSIS — Z48.89 SUTURE CHECK: ICD-10-CM

## 2024-06-14 DIAGNOSIS — R07.81 RIB PAIN: ICD-10-CM

## 2024-06-14 DIAGNOSIS — S91.311D LACERATION OF RIGHT FOOT, SUBSEQUENT ENCOUNTER: Primary | ICD-10-CM

## 2024-06-14 PROCEDURE — 71046 X-RAY EXAM CHEST 2 VIEWS: CPT

## 2024-06-14 NOTE — PROGRESS NOTES
Chief Complaint   Patient presents with    Fall     Pt states that she feel last night.          HPI: Christian Adams is a 73 y.o. female who presents for Laceration eval:    #Laceration   -See prior note for details, had laceration of foot, suture check was earlier this week, sutures were not appropriate for removal, also started p.o. antibiotics, recommended Bactrim due to coverage for MRSA,   -Was lightheaded when fell, did not LOC, did not hit head, was at night without out the lights on and when was standing up, denies nausea, vomiting or trouble eating, hit ribs when fell, fell on vacuum from standing position     Past Medical History:   Diagnosis Date    Abdominal wall abscess     Acute pyelonephritis 7/8/2021    Age related osteoporosis     Anal fissure 7/16/2013    Anxiety     Colon polyps     Depression     E coli bacteremia     Fibroid uterus     Hypothyroidism     Morbid obesity (HCC)     Osteoarthritis of left knee 1/26/2016    Osteopenia     Urinary incontinence 5/3/2022       Past Surgical History:   Procedure Laterality Date    ABDOMINAL HERNIA REPAIR  2008    APPENDECTOMY  Age 24    CATARACT REMOVAL WITH IMPLANT Right 10/23/2017    with vitrectomy    CATARACT REMOVAL WITH IMPLANT Left 02/26/2018    with vitrectomy    COLONOSCOPY N/A 10/10/2018    COLONOSCOPY POLYPECTOMY SNARE/COLD BIOPSY performed by Darion Barker MD at [facility] ENDOSCOPY    COLONOSCOPY N/A 10/9/2023    COLONOSCOPY POLYPECTOMY SNARE/COLD BIOPSY performed by Darion Barker MD at [facility] ENDOSCOPY    EPIDURAL STEROID INJECTION Right 8/26/2019    RIGHT L4 AND L5 TRANSFORAMINAL EPIDURAL STEROID INJECTION WITH FLUOROSCOPY performed by Shabnam Wahl MD at [facility]    GASTRIC BYPASS SURGERY  2000    HC INJECT OTHER PERPHRL NERV Right 10/21/2019    RIGHT PIRIFORMIS INJECTION WITH FLUOROSCOPY (83129) performed by Shabnam Wahl MD at [facility]    HYSTERECTOMY, TOTAL ABDOMINAL (CERVIX REMOVED)      ovaries out also    LUMBAR

## 2024-07-21 DIAGNOSIS — F51.04 PSYCHOPHYSIOLOGICAL INSOMNIA: ICD-10-CM

## 2024-07-21 DIAGNOSIS — F41.9 ANXIETY: ICD-10-CM

## 2024-07-22 RX ORDER — LEVOTHYROXINE SODIUM 0.2 MG/1
200 TABLET ORAL DAILY
Qty: 90 TABLET | Refills: 1 | Status: SHIPPED | OUTPATIENT
Start: 2024-07-22

## 2024-07-22 RX ORDER — BUPROPION HYDROCHLORIDE 300 MG/1
300 TABLET ORAL EVERY MORNING
Qty: 90 TABLET | Refills: 1 | Status: SHIPPED | OUTPATIENT
Start: 2024-07-22

## 2024-07-22 RX ORDER — ALPRAZOLAM 1 MG/1
TABLET ORAL
Qty: 180 TABLET | Refills: 0 | Status: SHIPPED | OUTPATIENT
Start: 2024-07-22 | End: 2024-08-22

## 2024-08-07 ENCOUNTER — PROCEDURE VISIT (OUTPATIENT)
Dept: SURGERY | Age: 74
End: 2024-08-07
Payer: COMMERCIAL

## 2024-08-07 VITALS — SYSTOLIC BLOOD PRESSURE: 156 MMHG | DIASTOLIC BLOOD PRESSURE: 75 MMHG | HEART RATE: 72 BPM

## 2024-08-07 DIAGNOSIS — D04.39 SQUAMOUS CELL CARCINOMA IN SITU (SCCIS) OF SKIN OF CHEEK: Primary | ICD-10-CM

## 2024-08-07 PROCEDURE — 12052 INTMD RPR FACE/MM 2.6-5.0 CM: CPT | Performed by: DERMATOLOGY

## 2024-08-07 PROCEDURE — 17311 MOHS 1 STAGE H/N/HF/G: CPT | Performed by: DERMATOLOGY

## 2024-08-07 NOTE — PROGRESS NOTES
MOHS PROCEDURE NOTE    PHYSICIAN:  Uzma Valiente MD, Who operated in two distinct and integrated capacities as the surgeon removing the tissue and as the pathologist examining the tissue.    ASSISTANT: Laly Wood RN     REFERRING PROVIDER:  Rachel Valiente D.O     PREOPERATIVE DIAGNOSIS: Squamous Cell Carcinoma in Situ     SPECIFIC MOHS INDICATIONS:  location and need for tissue conservation    AUC SCORIN/9    POSTOPERATIVE DIAGNOSIS: SAME    LOCATION: Right lateral zygomatic cheek    OPERATIVE PROCEDURE:  MOHS MICROGRAPHIC SURGERY    RECONSTRUCTION OF DEFECT: Intermediate layered closure    PREOPERATIVE SIZE: 12x7 MM    DEFECT SIZE: 20x15 MM    LENGTH OF REPAIRED WOUND/SIZE OF FLAP/SIZE OF GRAFT:  40 MM    ANESTHESIA:  6mL 1% lidocaine with epinephrine 1:100,000 buffered.     EBL:  MINIMAL    DURATION OF PROCEDURE:  1 HOUR    POSTOPERATIVE OBSERVATION: 30 MINUTES    SPECIMENS:  SEE MOHS MAP    COMPLICATIONS:  NONE    DESCRIPTION OF PROCEDURE:  The patient was given a mirror, as appropriate, and the biopsy site was identified, marked with a surgical marking pen, and verified by the patient.   Options for treatment were discussed and the patient was informed that Mohs surgery was the selected treatment based on its lower recurrence rate, given the features listed above, as compared to other treatment modalities such as excision, radiation, or curettage, and agreed with this treatment plan.  Risks and benefits including bruising, swelling, bleeding, infection, nerve injury, recurrence, and scarring were discussed with the patient prior to the procedure and a written consent detailing these and other risks was reviewed with the patient and signed.    There was a time out for person and procedure verification.  The surgical site was prepped with an antiseptic solution.  Application of an antiseptic solution was repeated before each surgical stage.      Stage I:  The clinically-apparent tumor was

## 2024-08-07 NOTE — PROGRESS NOTES
PRE-PROCEDURE SCREENING    Pacemaker/ICD: No  Difficulty with numbing in the past: No  Local Anesthesia Reaction/passing out: No  Latex or adhesive allergy:  No  Any history of reaction to suture or skin glue:  no  Bleeding/Clotting Disorders: No  Anticoagulant Therapy: No  Joint prosthesis: Yes, L knee 2021  Artificial Heart Valve: No  Stroke or Seizures: No  Organ Transplant or Lymphoma: No  Immunosuppression: No  Respiratory Problems: No

## 2024-08-08 ENCOUNTER — TELEPHONE (OUTPATIENT)
Dept: DERMATOLOGY | Age: 74
End: 2024-08-08

## 2024-08-08 NOTE — TELEPHONE ENCOUNTER
The patient was in the office on 08/07/2024 for Mohs located on the  RT lateral zygomatic cheek with ILC repair.  The patient tolerated the procedure well and left the office in good condition.    Pain level on post-operative day 1:  none  - PT advised that she did take Tylenol last evening and will continue through tomorrow. Doing well. 10:21al    Any bleeding episode that required pressure to be held, bandage change or a call to the office or MD?  no     Any other issues?:  no    A post-operative telephone call was placed at 10:21a, 08/08/2024, in order to check on the patient's recovery process.  The patient reported doing well and had no complaints other than those listed above, if any.  All of the patient's questions were answered. Advised to call w/any questions/concerns.

## 2024-09-09 SDOH — HEALTH STABILITY: PHYSICAL HEALTH: ON AVERAGE, HOW MANY MINUTES DO YOU ENGAGE IN EXERCISE AT THIS LEVEL?: 30 MIN

## 2024-09-09 SDOH — ECONOMIC STABILITY: INCOME INSECURITY: HOW HARD IS IT FOR YOU TO PAY FOR THE VERY BASICS LIKE FOOD, HOUSING, MEDICAL CARE, AND HEATING?: PATIENT DECLINED

## 2024-09-09 SDOH — ECONOMIC STABILITY: FOOD INSECURITY: WITHIN THE PAST 12 MONTHS, THE FOOD YOU BOUGHT JUST DIDN'T LAST AND YOU DIDN'T HAVE MONEY TO GET MORE.: PATIENT DECLINED

## 2024-09-09 SDOH — ECONOMIC STABILITY: FOOD INSECURITY: WITHIN THE PAST 12 MONTHS, YOU WORRIED THAT YOUR FOOD WOULD RUN OUT BEFORE YOU GOT MONEY TO BUY MORE.: PATIENT DECLINED

## 2024-09-09 SDOH — HEALTH STABILITY: PHYSICAL HEALTH: ON AVERAGE, HOW MANY DAYS PER WEEK DO YOU ENGAGE IN MODERATE TO STRENUOUS EXERCISE (LIKE A BRISK WALK)?: 3 DAYS

## 2024-09-09 ASSESSMENT — PATIENT HEALTH QUESTIONNAIRE - PHQ9
1. LITTLE INTEREST OR PLEASURE IN DOING THINGS: SEVERAL DAYS
10. IF YOU CHECKED OFF ANY PROBLEMS, HOW DIFFICULT HAVE THESE PROBLEMS MADE IT FOR YOU TO DO YOUR WORK, TAKE CARE OF THINGS AT HOME, OR GET ALONG WITH OTHER PEOPLE: SOMEWHAT DIFFICULT
7. TROUBLE CONCENTRATING ON THINGS, SUCH AS READING THE NEWSPAPER OR WATCHING TELEVISION: SEVERAL DAYS
9. THOUGHTS THAT YOU WOULD BE BETTER OFF DEAD, OR OF HURTING YOURSELF: NOT AT ALL
8. MOVING OR SPEAKING SO SLOWLY THAT OTHER PEOPLE COULD HAVE NOTICED. OR THE OPPOSITE, BEING SO FIGETY OR RESTLESS THAT YOU HAVE BEEN MOVING AROUND A LOT MORE THAN USUAL: SEVERAL DAYS
2. FEELING DOWN, DEPRESSED OR HOPELESS: SEVERAL DAYS
6. FEELING BAD ABOUT YOURSELF - OR THAT YOU ARE A FAILURE OR HAVE LET YOURSELF OR YOUR FAMILY DOWN: NOT AT ALL
5. POOR APPETITE OR OVEREATING: SEVERAL DAYS
3. TROUBLE FALLING OR STAYING ASLEEP: NEARLY EVERY DAY
SUM OF ALL RESPONSES TO PHQ QUESTIONS 1-9: 9
SUM OF ALL RESPONSES TO PHQ QUESTIONS 1-9: 9
SUM OF ALL RESPONSES TO PHQ9 QUESTIONS 1 & 2: 2
4. FEELING TIRED OR HAVING LITTLE ENERGY: SEVERAL DAYS
SUM OF ALL RESPONSES TO PHQ QUESTIONS 1-9: 9
SUM OF ALL RESPONSES TO PHQ QUESTIONS 1-9: 9

## 2024-09-09 ASSESSMENT — LIFESTYLE VARIABLES
HOW MANY STANDARD DRINKS CONTAINING ALCOHOL DO YOU HAVE ON A TYPICAL DAY: 0
HOW MANY STANDARD DRINKS CONTAINING ALCOHOL DO YOU HAVE ON A TYPICAL DAY: PATIENT DOES NOT DRINK
HOW OFTEN DO YOU HAVE SIX OR MORE DRINKS ON ONE OCCASION: 1
HOW OFTEN DO YOU HAVE A DRINK CONTAINING ALCOHOL: 1
HOW OFTEN DO YOU HAVE A DRINK CONTAINING ALCOHOL: NEVER

## 2024-09-12 ENCOUNTER — OFFICE VISIT (OUTPATIENT)
Dept: FAMILY MEDICINE CLINIC | Age: 74
End: 2024-09-12

## 2024-09-12 VITALS
BODY MASS INDEX: 32.78 KG/M2 | HEIGHT: 63 IN | HEART RATE: 78 BPM | DIASTOLIC BLOOD PRESSURE: 72 MMHG | WEIGHT: 185 LBS | SYSTOLIC BLOOD PRESSURE: 118 MMHG | OXYGEN SATURATION: 95 %

## 2024-09-12 DIAGNOSIS — F32.A DEPRESSION, UNSPECIFIED DEPRESSION TYPE: ICD-10-CM

## 2024-09-12 DIAGNOSIS — Z12.31 ENCOUNTER FOR SCREENING MAMMOGRAM FOR MALIGNANT NEOPLASM OF BREAST: ICD-10-CM

## 2024-09-12 DIAGNOSIS — Z00.00 MEDICARE ANNUAL WELLNESS VISIT, SUBSEQUENT: Primary | ICD-10-CM

## 2024-09-12 RX ORDER — ACYCLOVIR 200 MG/1
CAPSULE ORAL
Qty: 25 CAPSULE | Refills: 0 | Status: SHIPPED | OUTPATIENT
Start: 2024-09-12

## 2024-09-12 RX ORDER — FLUOXETINE 10 MG/1
10 CAPSULE ORAL DAILY
Qty: 30 CAPSULE | Refills: 1 | Status: SHIPPED | OUTPATIENT
Start: 2024-09-12 | End: 2024-11-11

## 2024-09-13 ASSESSMENT — ENCOUNTER SYMPTOMS
ABDOMINAL PAIN: 0
SHORTNESS OF BREATH: 0

## 2024-09-19 ENCOUNTER — HOSPITAL ENCOUNTER (OUTPATIENT)
Age: 74
Discharge: HOME | End: 2024-09-19
Payer: COMMERCIAL

## 2024-09-19 DIAGNOSIS — Z00.00 MEDICARE ANNUAL WELLNESS VISIT, SUBSEQUENT: ICD-10-CM

## 2024-09-19 LAB
25(OH)D3 SERPL-MCNC: 42.2 NG/ML
ALBUMIN SERPL-MCNC: 3.9 G/DL (ref 3.4–5)
ALBUMIN/GLOB SERPL: 1.3 {RATIO} (ref 1.1–2.2)
ALP SERPL-CCNC: 75 U/L (ref 40–129)
ALT SERPL-CCNC: 11 U/L (ref 10–40)
ANION GAP SERPL CALCULATED.3IONS-SCNC: 12 MMOL/L (ref 3–16)
AST SERPL-CCNC: 18 U/L (ref 15–37)
BASOPHILS # BLD: 0 K/UL (ref 0–0.2)
BASOPHILS NFR BLD: 0.4 %
BILIRUB SERPL-MCNC: 0.3 MG/DL (ref 0–1)
BUN SERPL-MCNC: 20 MG/DL (ref 7–20)
CALCIUM SERPL-MCNC: 9.3 MG/DL (ref 8.3–10.6)
CHLORIDE SERPL-SCNC: 101 MMOL/L (ref 99–110)
CHOLEST SERPL-MCNC: 142 MG/DL (ref 0–199)
CO2 SERPL-SCNC: 25 MMOL/L (ref 21–32)
CREAT SERPL-MCNC: 1.2 MG/DL (ref 0.6–1.2)
DEPRECATED RDW RBC AUTO: 13.5 % (ref 12.4–15.4)
EOSINOPHIL # BLD: 0.1 K/UL (ref 0–0.6)
EOSINOPHIL NFR BLD: 1.2 %
EST. AVERAGE GLUCOSE BLD GHB EST-MCNC: 96.8 MG/DL
FOLATE SERPL-MCNC: 6.3 NG/ML (ref 4.78–24.2)
GFR SERPLBLD CREATININE-BSD FMLA CKD-EPI: 48 ML/MIN/{1.73_M2}
GLUCOSE SERPL-MCNC: 84 MG/DL (ref 70–99)
HBA1C MFR BLD: 5 %
HCT VFR BLD AUTO: 38.8 % (ref 36–48)
HDLC SERPL-MCNC: 48 MG/DL (ref 40–60)
HGB BLD-MCNC: 12.5 G/DL (ref 12–16)
LDLC SERPL CALC-MCNC: 76 MG/DL
LYMPHOCYTES # BLD: 1.1 K/UL (ref 1–5.1)
LYMPHOCYTES NFR BLD: 20.6 %
MCH RBC QN AUTO: 31 PG (ref 26–34)
MCHC RBC AUTO-ENTMCNC: 32.3 G/DL (ref 31–36)
MCV RBC AUTO: 96 FL (ref 80–100)
MONOCYTES # BLD: 0.6 K/UL (ref 0–1.3)
MONOCYTES NFR BLD: 9.9 %
NEUTROPHILS # BLD: 3.8 K/UL (ref 1.7–7.7)
NEUTROPHILS NFR BLD: 67.9 %
PLATELET # BLD AUTO: 253 K/UL (ref 135–450)
PMV BLD AUTO: 9.2 FL (ref 5–10.5)
POTASSIUM SERPL-SCNC: 4.8 MMOL/L (ref 3.5–5.1)
PROT SERPL-MCNC: 6.8 G/DL (ref 6.4–8.2)
RBC # BLD AUTO: 4.04 M/UL (ref 4–5.2)
SODIUM SERPL-SCNC: 138 MMOL/L (ref 136–145)
T4 FREE SERPL-MCNC: 1.7 NG/DL (ref 0.9–1.8)
TRIGL SERPL-MCNC: 90 MG/DL (ref 0–150)
TSH SERPL DL<=0.005 MIU/L-ACNC: 0.26 UIU/ML (ref 0.27–4.2)
VIT B12 SERPL-MCNC: 2000 PG/ML (ref 211–911)
VLDLC SERPL CALC-MCNC: 18 MG/DL
WBC # BLD AUTO: 5.6 K/UL (ref 4–11)

## 2024-09-19 PROCEDURE — 84443 ASSAY THYROID STIM HORMONE: CPT

## 2024-09-19 PROCEDURE — 85025 COMPLETE CBC W/AUTO DIFF WBC: CPT

## 2024-09-19 PROCEDURE — 82306 VITAMIN D 25 HYDROXY: CPT

## 2024-09-19 PROCEDURE — 80053 COMPREHEN METABOLIC PANEL: CPT

## 2024-09-19 PROCEDURE — 82746 ASSAY OF FOLIC ACID SERUM: CPT

## 2024-09-19 PROCEDURE — 83036 HEMOGLOBIN GLYCOSYLATED A1C: CPT

## 2024-09-19 PROCEDURE — 80061 LIPID PANEL: CPT

## 2024-09-19 PROCEDURE — 84439 ASSAY OF FREE THYROXINE: CPT

## 2024-09-19 PROCEDURE — 36415 COLL VENOUS BLD VENIPUNCTURE: CPT

## 2024-09-19 PROCEDURE — 82607 VITAMIN B-12: CPT

## 2024-09-27 ENCOUNTER — HOSPITAL ENCOUNTER (OUTPATIENT)
Dept: WOMENS IMAGING | Age: 74
Discharge: HOME | End: 2024-09-27
Payer: COMMERCIAL

## 2024-09-27 VITALS — BODY MASS INDEX: 34.04 KG/M2 | WEIGHT: 185 LBS | HEIGHT: 62 IN

## 2024-09-27 DIAGNOSIS — Z12.31 ENCOUNTER FOR SCREENING MAMMOGRAM FOR MALIGNANT NEOPLASM OF BREAST: ICD-10-CM

## 2024-09-27 PROCEDURE — 77063 BREAST TOMOSYNTHESIS BI: CPT

## 2024-10-07 ENCOUNTER — OFFICE VISIT (OUTPATIENT)
Dept: FAMILY MEDICINE CLINIC | Age: 74
End: 2024-10-07
Payer: COMMERCIAL

## 2024-10-07 VITALS
HEART RATE: 81 BPM | BODY MASS INDEX: 33.43 KG/M2 | WEIGHT: 182.8 LBS | DIASTOLIC BLOOD PRESSURE: 70 MMHG | OXYGEN SATURATION: 95 % | SYSTOLIC BLOOD PRESSURE: 126 MMHG

## 2024-10-07 DIAGNOSIS — R05.1 ACUTE COUGH: ICD-10-CM

## 2024-10-07 DIAGNOSIS — Z23 FLU VACCINE NEED: ICD-10-CM

## 2024-10-07 DIAGNOSIS — F32.4 MAJOR DEPRESSIVE DISORDER WITH SINGLE EPISODE, IN PARTIAL REMISSION (HCC): Primary | ICD-10-CM

## 2024-10-07 PROCEDURE — G0008 ADMIN INFLUENZA VIRUS VAC: HCPCS | Performed by: STUDENT IN AN ORGANIZED HEALTH CARE EDUCATION/TRAINING PROGRAM

## 2024-10-07 PROCEDURE — 90653 IIV ADJUVANT VACCINE IM: CPT | Performed by: STUDENT IN AN ORGANIZED HEALTH CARE EDUCATION/TRAINING PROGRAM

## 2024-10-07 PROCEDURE — 99214 OFFICE O/P EST MOD 30 MIN: CPT | Performed by: STUDENT IN AN ORGANIZED HEALTH CARE EDUCATION/TRAINING PROGRAM

## 2024-10-07 PROCEDURE — 1123F ACP DISCUSS/DSCN MKR DOCD: CPT | Performed by: STUDENT IN AN ORGANIZED HEALTH CARE EDUCATION/TRAINING PROGRAM

## 2024-10-07 RX ORDER — IPRATROPIUM BROMIDE 21 UG/1
2 SPRAY, METERED NASAL EVERY 12 HOURS
Qty: 30 ML | Refills: 3 | Status: SHIPPED | OUTPATIENT
Start: 2024-10-07

## 2024-10-07 ASSESSMENT — ENCOUNTER SYMPTOMS
COUGH: 1
ABDOMINAL PAIN: 0
SHORTNESS OF BREATH: 0

## 2024-10-07 NOTE — PROGRESS NOTES
Christian Adams is a 73 y.o.female who presents with   Chief Complaint   Patient presents with    Medication Check     1 month follow up    Cough     Has been ongoing for about a month- non productive   Portions of this chart may have been created with voice recognition software. Occasional wrong-word or \"sound-alike\" substitutions may have occurred due to the inherent limitations of voice recognition software. Read the chart carefully and recognize, using context, where these substitutions have occurred      Patient presents for follow-up visit.  Patient reports her symptoms are significantly improved on Prozac 10 mg daily.  However she has endorsed that she has some jitteriness since starting the medication.  States that yesterday due to the jitteriness she was unable to keep the fluid on her fourth.  Would like to not have to tolerate this side effect.  Reports that she was on Zoloft previously and did well on it.  Denies SI HI AVH.  Has any other side effects.    Reports dry cough that has been present over the last month.  Reports that it is worse while laying down.  Denies any other URI symptoms.  Denies any burning in her chest or bitter taste in her throat.        Review of Systems   Constitutional:  Negative for fatigue.   Eyes:  Negative for visual disturbance.   Respiratory:  Positive for cough. Negative for shortness of breath.    Cardiovascular:  Negative for chest pain.   Gastrointestinal:  Negative for abdominal pain.   Skin:  Negative for rash.   Neurological:  Negative for dizziness, light-headedness and headaches.        Past Medical History:   Diagnosis Date    Abdominal wall abscess     Acute pyelonephritis 7/8/2021    Age related osteoporosis     Anal fissure 7/16/2013    Anxiety     Colon polyps     Depression     E coli bacteremia     Fibroid uterus     Hypothyroidism     Morbid obesity     Osteoarthritis of left knee 1/26/2016    Osteopenia     Urinary incontinence 5/3/2022       Past Surgical

## 2024-10-22 DIAGNOSIS — F41.9 ANXIETY: ICD-10-CM

## 2024-10-22 DIAGNOSIS — F51.04 PSYCHOPHYSIOLOGICAL INSOMNIA: ICD-10-CM

## 2024-10-23 RX ORDER — ALPRAZOLAM 1 MG/1
TABLET ORAL
Qty: 60 TABLET | Refills: 0 | Status: SHIPPED | OUTPATIENT
Start: 2024-10-23 | End: 2024-11-23

## 2024-11-05 ENCOUNTER — OFFICE VISIT (OUTPATIENT)
Dept: FAMILY MEDICINE CLINIC | Age: 74
End: 2024-11-05

## 2024-11-05 VITALS
HEART RATE: 74 BPM | BODY MASS INDEX: 33.14 KG/M2 | DIASTOLIC BLOOD PRESSURE: 56 MMHG | OXYGEN SATURATION: 97 % | WEIGHT: 181.2 LBS | SYSTOLIC BLOOD PRESSURE: 110 MMHG

## 2024-11-05 DIAGNOSIS — F32.4 MAJOR DEPRESSIVE DISORDER WITH SINGLE EPISODE, IN PARTIAL REMISSION (HCC): ICD-10-CM

## 2024-11-05 DIAGNOSIS — F41.9 ANXIETY: Primary | ICD-10-CM

## 2024-11-05 RX ORDER — CITALOPRAM HYDROBROMIDE 10 MG/1
10 TABLET ORAL DAILY
Qty: 30 TABLET | Refills: 0 | Status: SHIPPED | OUTPATIENT
Start: 2024-11-05 | End: 2024-12-05

## 2024-11-05 NOTE — PROGRESS NOTES
Christian Adams is a 73 y.o.female who presents with   Chief Complaint   Patient presents with   • Medication Check     1 month follow up   Portions of this chart may have been created with voice recognition software. Occasional wrong-word or \"sound-alike\" substitutions may have occurred due to the inherent limitations of voice recognition software. Read the chart carefully and recognize, using context, where these substitutions have occurred      HPI    Review of Systems     Past Medical History:   Diagnosis Date   • Abdominal wall abscess    • Acute pyelonephritis 7/8/2021   • Age related osteoporosis    • Anal fissure 7/16/2013   • Anxiety    • Colon polyps    • Depression    • E coli bacteremia    • Fibroid uterus    • Hypothyroidism    • Morbid obesity    • Osteoarthritis of left knee 1/26/2016   • Osteopenia    • Urinary incontinence 5/3/2022       Past Surgical History:   Procedure Laterality Date   • ABDOMINAL HERNIA REPAIR  2008   • APPENDECTOMY  Age 24   • CATARACT REMOVAL WITH IMPLANT Right 10/23/2017    with vitrectomy   • CATARACT REMOVAL WITH IMPLANT Left 02/26/2018    with vitrectomy   • COLONOSCOPY N/A 10/10/2018    COLONOSCOPY POLYPECTOMY SNARE/COLD BIOPSY performed by Darion Barker MD at [facility] ENDOSCOPY   • COLONOSCOPY N/A 10/09/2023    COLONOSCOPY POLYPECTOMY SNARE/COLD BIOPSY performed by Darion Barker MD at [facility] ENDOSCOPY   • EPIDURAL STEROID INJECTION Right 08/26/2019    RIGHT L4 AND L5 TRANSFORAMINAL EPIDURAL STEROID INJECTION WITH FLUOROSCOPY performed by Shabnam Wahl MD at [facility]   • GASTRIC BYPASS SURGERY  2000   • HC INJECT OTHER PERPHRL NERV Right 10/21/2019    RIGHT PIRIFORMIS INJECTION WITH FLUOROSCOPY (70074) performed by Shabnam Wahl MD at [facility]   • HYSTERECTOMY, TOTAL ABDOMINAL (CERVIX REMOVED)      ovaries out also   • LUMBAR SPINE SURGERY Right 01/07/2019    RIGHT L3 AND L4 LUMBAR TRANSFORAMINAL WITH FLUOROSCOPY performed by Shabanm Wahl MD at [facility]   • 
    Lack of Transportation (Medical): Not on file     Lack of Transportation (Non-Medical): No   Physical Activity: Insufficiently Active (9/9/2024)    Exercise Vital Sign     Days of Exercise per Week: 3 days     Minutes of Exercise per Session: 30 min   Stress: Not on file   Social Connections: Not on file   Intimate Partner Violence: Not At Risk (10/8/2023)    Humiliation, Afraid, Rape, and Kick questionnaire     Fear of Current or Ex-Partner: No     Emotionally Abused: No     Physically Abused: No     Sexually Abused: No   Housing Stability: Unknown (9/9/2024)    Housing Stability Vital Sign     Unable to Pay for Housing in the Last Year: Not on file     Number of Times Moved in the Last Year: Not on file     Homeless in the Last Year: No       Current Outpatient Medications on File Prior to Visit   Medication Sig Dispense Refill    ALPRAZolam (XANAX) 1 MG tablet TAKE 1 TABLET BY MOUTH TWICE DAILY AS NEEDED FOR ANXIETY OR SLEEP 60 tablet 0    ipratropium (ATROVENT) 0.03 % nasal spray 2 sprays by Each Nostril route in the morning and 2 sprays in the evening. 30 mL 3    acyclovir (ZOVIRAX) 200 MG capsule Take five tablets day one then 1 tablet BID for 10 days 25 capsule 0    levothyroxine (SYNTHROID) 200 MCG tablet Take 1 tablet by mouth daily 90 tablet 1    buPROPion (WELLBUTRIN XL) 300 MG extended release tablet Take 1 tablet by mouth every morning 90 tablet 1    omeprazole (PRILOSEC) 40 MG delayed release capsule TAKE 1 CAPSULE BY MOUTH EVERY MORNING BEFORE BREAKFAST 90 capsule 1    traZODone (DESYREL) 50 MG tablet TAKE 3 TABLETS BY MOUTH EVERY NIGHT AT BEDTIME 270 tablet 1    calcium carbonate 600 MG TABS tablet Take 1 tablet by mouth daily      Multiple Vitamin (MULTIVITAMIN PO) Take by mouth      Cyanocobalamin (VITAMIN B 12 PO) Take 500 mcg by mouth daily       Cholecalciferol (VITAMIN D) 1000 UNIT CAPS Take 2 capsules by mouth daily      sertraline (ZOLOFT) 50 MG tablet Take 1 tablet by mouth daily

## 2024-11-06 ASSESSMENT — ENCOUNTER SYMPTOMS
ABDOMINAL PAIN: 0
SHORTNESS OF BREATH: 0

## 2024-11-24 RX ORDER — BUPROPION HYDROCHLORIDE 300 MG/1
300 TABLET ORAL EVERY MORNING
Qty: 90 TABLET | Refills: 1 | Status: SHIPPED | OUTPATIENT
Start: 2024-11-24

## 2024-11-25 RX ORDER — FLUOXETINE 10 MG/1
10 CAPSULE ORAL DAILY
Qty: 30 CAPSULE | Refills: 3 | Status: SHIPPED | OUTPATIENT
Start: 2024-11-25

## 2024-12-02 ENCOUNTER — TELEPHONE (OUTPATIENT)
Dept: FAMILY MEDICINE CLINIC | Age: 74
End: 2024-12-02

## 2024-12-02 DIAGNOSIS — L98.9 BUMPS ON SKIN: Primary | ICD-10-CM

## 2024-12-02 NOTE — TELEPHONE ENCOUNTER
Pt is having little red bumps on both sides of her cheeks on her face. She had a referral for dermatology put in for her but it was for a different diagnosis. Pt has an appt with dermatology tomorrow and they stated she would need another referral for this other issue. She is aware Dr Flores is out of office until Thursday and she wondered if another provider can put it in for her and to be notified as soon as possible because if they cannot she will need to cancel her appt with dermatology immediately so they don't charge her for a late cancellation fee.     [phone] (home)

## 2024-12-12 RX ORDER — CITALOPRAM HYDROBROMIDE 10 MG/1
10 TABLET ORAL DAILY
Qty: 30 TABLET | Refills: 0 | Status: SHIPPED | OUTPATIENT
Start: 2024-12-12 | End: 2025-01-11

## 2024-12-13 NOTE — OP NOTE
Patient:  Janina Hernandez  YOB: 1950  Medical Record #:  [id number]   Place: [address] [place] [address]  Date:  8/26/2019   Physician:  Juan Jose Cortez MD, CARYN    Procedure: 1. Transforaminal Lumbar Epidural Steroid Injection -  right L4  CPT 13028          2. Transforaminal Lumbar Epidural Steroid Injection -  right L5  CPT 09054    Pre-Procedure Diagnosis: Lumbar radiculopathy      Post-Procedure Diagnosis: Same    Sedation: Local with 1% Lidocaine 3 ml and 2 mg of IV Versed    EBL: None    Complications: None    Procedure Summary:        The patient was brought to the procedure suite and placed in the prone position. The skin overlying the lumbar spine was prepped and draped in the usual sterile fashion. Using fluoroscopic guidance, the right L4 foramen was identified. Through anesthetized skin, a 22 gauge 3.5 inch curved tip spinal needle was advanced into the foramen. Isovue M 300 was instilled showing an epidurogram/nerve root outline pattern without evidence of vascular or intrathecal spread. Following which, 50 mg of depomedrol mixed with 1 ml of 0.5% Marcaine was instilled. The needle was removed. Using fluoroscopic guidance, the right L5 foramen was identified. Through anesthetized skin, a 22 gauge 3.5 inch curved tip spinal needle was advanced into the foramen. Isovue M 300 was instilled showing an epidurogram/nerve root outline pattern without evidence of vascular or intrathecal spread. Following which, 50 mg of depomedrol mixed with 1 ml of 0.5% Marcaine was instilled. The needle was removed and band-aids were applied. The patient was transferred to the post-operative area in stable condition.
Patient:  Javan Almanza  YOB: 1950  Medical Record #:  [id number]   Place: [address] [place] [address]  Date:  8/26/2019   Physician:  Lauar Goldberg MD, CARYN    Procedure: 1. Transforaminal Lumbar Epidural Steroid Injection -  right L4  CPT 62771          2. Transforaminal Lumbar Epidural Steroid Injection -  right L5  CPT 49868    Pre-Procedure Diagnosis: Lumbar radiculopathy      Post-Procedure Diagnosis: Same    Sedation: Local with 1% Lidocaine 3 ml and 2 mg of IV Versed    EBL: None    Complications: None    Procedure Summary:        The patient was brought to the procedure suite and placed in the prone position. The skin overlying the lumbar spine was prepped and draped in the usual sterile fashion. Using fluoroscopic guidance, the right L4 foramen was identified. Through anesthetized skin, a 22 gauge 5 inch curved tip spinal needle was advanced into the foramen. Isovue M 300 was instilled showing an epidurogram/nerve root outline pattern without evidence of vascular or intrathecal spread. Following which, 40 mg of depomedrol mixed with 1 ml of 0.5% Marcaine was instilled. The needle was removed. Using fluoroscopic guidance, the right L5 foramen was identified. Through anesthetized skin, a 22 gauge 5 inch curved tip spinal needle was advanced into the foramen. Isovue M 300 was instilled showing an epidurogram/nerve root outline pattern without evidence of vascular or intrathecal spread. Following which, 40 mg of depomedrol mixed with 1 ml of 0.5% Marcaine was instilled. The needle was removed and band-aids were applied. The patient was transferred to the post-operative area in stable condition.
Home

## 2024-12-27 ENCOUNTER — OFFICE VISIT (OUTPATIENT)
Dept: FAMILY MEDICINE CLINIC | Age: 74
End: 2024-12-27
Payer: COMMERCIAL

## 2024-12-27 VITALS
HEART RATE: 72 BPM | DIASTOLIC BLOOD PRESSURE: 60 MMHG | WEIGHT: 185.6 LBS | BODY MASS INDEX: 34.16 KG/M2 | HEIGHT: 62 IN | OXYGEN SATURATION: 96 % | SYSTOLIC BLOOD PRESSURE: 118 MMHG

## 2024-12-27 DIAGNOSIS — F41.9 ANXIETY: ICD-10-CM

## 2024-12-27 DIAGNOSIS — R19.00 ABDOMINAL SWELLING: ICD-10-CM

## 2024-12-27 DIAGNOSIS — R19.5 DARK STOOLS: ICD-10-CM

## 2024-12-27 DIAGNOSIS — K59.00 CONSTIPATION, UNSPECIFIED CONSTIPATION TYPE: Primary | ICD-10-CM

## 2024-12-27 DIAGNOSIS — F51.04 PSYCHOPHYSIOLOGICAL INSOMNIA: ICD-10-CM

## 2024-12-27 PROCEDURE — 1159F MED LIST DOCD IN RCRD: CPT | Performed by: STUDENT IN AN ORGANIZED HEALTH CARE EDUCATION/TRAINING PROGRAM

## 2024-12-27 PROCEDURE — 99214 OFFICE O/P EST MOD 30 MIN: CPT | Performed by: STUDENT IN AN ORGANIZED HEALTH CARE EDUCATION/TRAINING PROGRAM

## 2024-12-27 PROCEDURE — 1123F ACP DISCUSS/DSCN MKR DOCD: CPT | Performed by: STUDENT IN AN ORGANIZED HEALTH CARE EDUCATION/TRAINING PROGRAM

## 2024-12-27 RX ORDER — POLYETHYLENE GLYCOL 3350 17 G/17G
17 POWDER, FOR SOLUTION ORAL DAILY
COMMUNITY

## 2024-12-27 RX ORDER — ALPRAZOLAM 1 MG/1
TABLET ORAL
Qty: 35 TABLET | Refills: 0 | Status: SHIPPED | OUTPATIENT
Start: 2024-12-27 | End: 2025-01-27

## 2024-12-27 NOTE — PROGRESS NOTES
Christian Adams is a 74 y.o.female who presents with   Chief Complaint   Patient presents with    Constipation     Patient c/o change in bowel movements. C/o constipation and cramps for 2 months    .    Patient presents for follow-up with complaints of worsening constipation and new development of dark schools.  Denies melena.  Denies any bright red blood.        Review of Systems   Constitutional:  Negative for fatigue.   Eyes:  Negative for visual disturbance.   Respiratory:  Negative for shortness of breath.    Cardiovascular:  Negative for chest pain.   Gastrointestinal:  Positive for constipation. Negative for abdominal pain.        Dark stools   Skin:  Negative for rash.   Neurological:  Negative for dizziness, light-headedness and headaches.        Past Medical History:   Diagnosis Date    Abdominal wall abscess     Acute pyelonephritis 7/8/2021    Age related osteoporosis     Anal fissure 7/16/2013    Anxiety     Colon polyps     Depression     E coli bacteremia     Fibroid uterus     Hypothyroidism     Morbid obesity     Osteoarthritis of left knee 1/26/2016    Osteopenia     Urinary incontinence 5/3/2022       Past Surgical History:   Procedure Laterality Date    ABDOMINAL HERNIA REPAIR  2008    APPENDECTOMY  Age 24    CATARACT REMOVAL WITH IMPLANT Right 10/23/2017    with vitrectomy    CATARACT REMOVAL WITH IMPLANT Left 02/26/2018    with vitrectomy    COLONOSCOPY N/A 10/10/2018    COLONOSCOPY POLYPECTOMY SNARE/COLD BIOPSY performed by Darion Barker MD at [facility] ENDOSCOPY    COLONOSCOPY N/A 10/09/2023    COLONOSCOPY POLYPECTOMY SNARE/COLD BIOPSY performed by Darion Barker MD at [facility] ENDOSCOPY    EPIDURAL STEROID INJECTION Right 08/26/2019    RIGHT L4 AND L5 TRANSFORAMINAL EPIDURAL STEROID INJECTION WITH FLUOROSCOPY performed by Shabnam Wahl MD at [facility]    GASTRIC BYPASS SURGERY  2000    HC INJECT OTHER PERPHRL NERV Right 10/21/2019    RIGHT PIRIFORMIS INJECTION WITH FLUOROSCOPY (15401)

## 2025-01-02 ENCOUNTER — HOSPITAL ENCOUNTER (OUTPATIENT)
Dept: ULTRASOUND IMAGING | Age: 75
Discharge: HOME | End: 2025-01-02
Payer: COMMERCIAL

## 2025-01-02 DIAGNOSIS — R19.00 ABDOMINAL SWELLING: ICD-10-CM

## 2025-01-02 PROCEDURE — 76999 ECHO EXAMINATION PROCEDURE: CPT

## 2025-01-02 ASSESSMENT — ENCOUNTER SYMPTOMS
CONSTIPATION: 1
SHORTNESS OF BREATH: 0
ABDOMINAL PAIN: 0

## 2025-01-08 ENCOUNTER — HOSPITAL ENCOUNTER (OUTPATIENT)
Age: 75
Setting detail: SPECIMEN
Discharge: HOME | End: 2025-01-08

## 2025-01-08 LAB
HEMOCCULT SP1 STL QL: NORMAL
HEMOCCULT SP2 STL QL: NORMAL
HEMOCCULT SP3 STL QL: NORMAL

## 2025-01-10 DIAGNOSIS — F41.9 ANXIETY: ICD-10-CM

## 2025-01-10 DIAGNOSIS — F51.04 PSYCHOPHYSIOLOGICAL INSOMNIA: ICD-10-CM

## 2025-01-12 RX ORDER — TRAZODONE HYDROCHLORIDE 50 MG/1
TABLET, FILM COATED ORAL
Qty: 270 TABLET | Refills: 1 | Status: SHIPPED | OUTPATIENT
Start: 2025-01-12

## 2025-01-13 RX ORDER — ALPRAZOLAM 1 MG/1
TABLET ORAL
Qty: 35 TABLET | Refills: 0 | Status: SHIPPED | OUTPATIENT
Start: 2025-01-13 | End: 2025-02-10

## 2025-01-14 RX ORDER — LEVOTHYROXINE SODIUM 200 UG/1
200 TABLET ORAL DAILY
Qty: 90 TABLET | Refills: 1 | Status: SHIPPED | OUTPATIENT
Start: 2025-01-14

## 2025-01-21 SDOH — ECONOMIC STABILITY: FOOD INSECURITY: WITHIN THE PAST 12 MONTHS, YOU WORRIED THAT YOUR FOOD WOULD RUN OUT BEFORE YOU GOT MONEY TO BUY MORE.: NEVER TRUE

## 2025-01-21 SDOH — ECONOMIC STABILITY: FOOD INSECURITY: WITHIN THE PAST 12 MONTHS, THE FOOD YOU BOUGHT JUST DIDN'T LAST AND YOU DIDN'T HAVE MONEY TO GET MORE.: NEVER TRUE

## 2025-01-21 SDOH — ECONOMIC STABILITY: TRANSPORTATION INSECURITY
IN THE PAST 12 MONTHS, HAS THE LACK OF TRANSPORTATION KEPT YOU FROM MEDICAL APPOINTMENTS OR FROM GETTING MEDICATIONS?: NO

## 2025-01-21 SDOH — ECONOMIC STABILITY: INCOME INSECURITY: IN THE LAST 12 MONTHS, WAS THERE A TIME WHEN YOU WERE NOT ABLE TO PAY THE MORTGAGE OR RENT ON TIME?: NO

## 2025-01-21 ASSESSMENT — PATIENT HEALTH QUESTIONNAIRE - PHQ9
1. LITTLE INTEREST OR PLEASURE IN DOING THINGS: SEVERAL DAYS
6. FEELING BAD ABOUT YOURSELF - OR THAT YOU ARE A FAILURE OR HAVE LET YOURSELF OR YOUR FAMILY DOWN: NOT AT ALL
SUM OF ALL RESPONSES TO PHQ9 QUESTIONS 1 & 2: 1
SUM OF ALL RESPONSES TO PHQ QUESTIONS 1-9: 4
2. FEELING DOWN, DEPRESSED OR HOPELESS: NOT AT ALL
9. THOUGHTS THAT YOU WOULD BE BETTER OFF DEAD, OR OF HURTING YOURSELF: NOT AT ALL
8. MOVING OR SPEAKING SO SLOWLY THAT OTHER PEOPLE COULD HAVE NOTICED. OR THE OPPOSITE - BEING SO FIDGETY OR RESTLESS THAT YOU HAVE BEEN MOVING AROUND A LOT MORE THAN USUAL: NOT AT ALL
2. FEELING DOWN, DEPRESSED OR HOPELESS: NOT AT ALL
5. POOR APPETITE OR OVEREATING: SEVERAL DAYS
SUM OF ALL RESPONSES TO PHQ QUESTIONS 1-9: 4
7. TROUBLE CONCENTRATING ON THINGS, SUCH AS READING THE NEWSPAPER OR WATCHING TELEVISION: NOT AT ALL
4. FEELING TIRED OR HAVING LITTLE ENERGY: SEVERAL DAYS
5. POOR APPETITE OR OVEREATING: SEVERAL DAYS
7. TROUBLE CONCENTRATING ON THINGS, SUCH AS READING THE NEWSPAPER OR WATCHING TELEVISION: NOT AT ALL
1. LITTLE INTEREST OR PLEASURE IN DOING THINGS: SEVERAL DAYS
10. IF YOU CHECKED OFF ANY PROBLEMS, HOW DIFFICULT HAVE THESE PROBLEMS MADE IT FOR YOU TO DO YOUR WORK, TAKE CARE OF THINGS AT HOME, OR GET ALONG WITH OTHER PEOPLE: NOT DIFFICULT AT ALL
SUM OF ALL RESPONSES TO PHQ QUESTIONS 1-9: 4
3. TROUBLE FALLING OR STAYING ASLEEP: SEVERAL DAYS
3. TROUBLE FALLING OR STAYING ASLEEP: SEVERAL DAYS
SUM OF ALL RESPONSES TO PHQ QUESTIONS 1-9: 4
8. MOVING OR SPEAKING SO SLOWLY THAT OTHER PEOPLE COULD HAVE NOTICED. OR THE OPPOSITE, BEING SO FIGETY OR RESTLESS THAT YOU HAVE BEEN MOVING AROUND A LOT MORE THAN USUAL: NOT AT ALL
SUM OF ALL RESPONSES TO PHQ QUESTIONS 1-9: 4
9. THOUGHTS THAT YOU WOULD BE BETTER OFF DEAD, OR OF HURTING YOURSELF: NOT AT ALL
10. IF YOU CHECKED OFF ANY PROBLEMS, HOW DIFFICULT HAVE THESE PROBLEMS MADE IT FOR YOU TO DO YOUR WORK, TAKE CARE OF THINGS AT HOME, OR GET ALONG WITH OTHER PEOPLE: NOT DIFFICULT AT ALL
6. FEELING BAD ABOUT YOURSELF - OR THAT YOU ARE A FAILURE OR HAVE LET YOURSELF OR YOUR FAMILY DOWN: NOT AT ALL
4. FEELING TIRED OR HAVING LITTLE ENERGY: SEVERAL DAYS

## 2025-01-23 ENCOUNTER — OFFICE VISIT (OUTPATIENT)
Dept: FAMILY MEDICINE CLINIC | Age: 75
End: 2025-01-23
Payer: COMMERCIAL

## 2025-01-23 VITALS
BODY MASS INDEX: 33.69 KG/M2 | WEIGHT: 184.2 LBS | OXYGEN SATURATION: 95 % | HEART RATE: 84 BPM | DIASTOLIC BLOOD PRESSURE: 72 MMHG | SYSTOLIC BLOOD PRESSURE: 120 MMHG

## 2025-01-23 DIAGNOSIS — F41.9 ANXIETY: ICD-10-CM

## 2025-01-23 DIAGNOSIS — S30.1XXD ABDOMINAL WALL SEROMA, SUBSEQUENT ENCOUNTER: Primary | ICD-10-CM

## 2025-01-23 DIAGNOSIS — G89.29 CHRONIC RIGHT-SIDED LOW BACK PAIN WITH RIGHT-SIDED SCIATICA: ICD-10-CM

## 2025-01-23 DIAGNOSIS — M54.41 CHRONIC RIGHT-SIDED LOW BACK PAIN WITH RIGHT-SIDED SCIATICA: ICD-10-CM

## 2025-01-23 DIAGNOSIS — K59.00 CONSTIPATION, UNSPECIFIED CONSTIPATION TYPE: ICD-10-CM

## 2025-01-23 DIAGNOSIS — F51.04 PSYCHOPHYSIOLOGICAL INSOMNIA: ICD-10-CM

## 2025-01-23 PROBLEM — S30.1XXA ABDOMINAL WALL SEROMA: Status: ACTIVE | Noted: 2025-01-23

## 2025-01-23 PROCEDURE — 1159F MED LIST DOCD IN RCRD: CPT | Performed by: STUDENT IN AN ORGANIZED HEALTH CARE EDUCATION/TRAINING PROGRAM

## 2025-01-23 PROCEDURE — 1160F RVW MEDS BY RX/DR IN RCRD: CPT | Performed by: STUDENT IN AN ORGANIZED HEALTH CARE EDUCATION/TRAINING PROGRAM

## 2025-01-23 PROCEDURE — 99214 OFFICE O/P EST MOD 30 MIN: CPT | Performed by: STUDENT IN AN ORGANIZED HEALTH CARE EDUCATION/TRAINING PROGRAM

## 2025-01-23 PROCEDURE — 1123F ACP DISCUSS/DSCN MKR DOCD: CPT | Performed by: STUDENT IN AN ORGANIZED HEALTH CARE EDUCATION/TRAINING PROGRAM

## 2025-01-23 RX ORDER — ALPRAZOLAM 1 MG/1
TABLET ORAL
Qty: 35 TABLET | Refills: 0 | Status: SHIPPED | OUTPATIENT
Start: 2025-01-23 | End: 2025-02-20

## 2025-01-23 RX ORDER — ACYCLOVIR 200 MG/1
CAPSULE ORAL
Qty: 25 CAPSULE | Refills: 0 | Status: SHIPPED | OUTPATIENT
Start: 2025-01-23

## 2025-01-23 ASSESSMENT — ENCOUNTER SYMPTOMS
ABDOMINAL PAIN: 0
BACK PAIN: 1
SHORTNESS OF BREATH: 0

## 2025-01-23 NOTE — PROGRESS NOTES
Christian Adams is a 74 y.o.female who presents with   Chief Complaint   Patient presents with    Follow-up     Following up for constipation- states she's doing better but not 100%     History of Present Illness  The patient presents for evaluation of constipation, seroma, and gluteal strain.    She reports an improvement in her constipation, attributing it to the daily intake of magnesium. She has been having a bowel movement every day. Over the past weekend, she experienced only 2 bowel movements, describing the sensation as if the stool is stuck. She has not tried psyllium husk. She admits to struggling with adequate water intake, despite keeping a water bottle nearby. Her stools remain the same color and are not black. She typically consumes room temperature water after her morning coffee and medications. She also uses MiraLAX once daily, adding it to her morning coffee.    She has a seroma in her abdomen, which does not cause discomfort but continues to grow. She fears that if it grows too large, it may cause problems.     She is currently under the care of Dr. Enriquez, a pain management specialist in [place], for sciatica and lower back pain. She has been receiving injections for several years. Since her last visit, she has been experiencing constant pain, which she suspects is due to sciatica. The pain is severe enough to disrupt her sleep. The injections provide relief for approximately one week, but she can only receive them every 3 months. She spends most of her day sitting on a heating pad, which interferes with her daily activities. She has previously undergone physical therapy. She has not been able to walk due to the weather. The pain is localized to her right leg and hip. She has not tried muscle relaxers and takes Tylenol daily for pain relief.      Review of Systems   Constitutional:  Negative for fatigue.   Eyes:  Negative for visual disturbance.   Respiratory:  Negative for shortness of breath.

## 2025-02-28 DIAGNOSIS — F41.9 ANXIETY: ICD-10-CM

## 2025-02-28 DIAGNOSIS — F51.04 PSYCHOPHYSIOLOGICAL INSOMNIA: ICD-10-CM

## 2025-03-03 RX ORDER — ACYCLOVIR 200 MG/1
CAPSULE ORAL
Qty: 25 CAPSULE | Refills: 0 | Status: SHIPPED | OUTPATIENT
Start: 2025-03-03

## 2025-03-03 RX ORDER — ALPRAZOLAM 1 MG/1
TABLET ORAL
Qty: 35 TABLET | Refills: 0 | Status: SHIPPED | OUTPATIENT
Start: 2025-03-03 | End: 2025-03-28

## 2025-03-03 NOTE — TELEPHONE ENCOUNTER
[phone] (home)     Patient called to check the status on this refill. Was informed that Dr. Flores had 24-48 business hours to fill the medication, sending a message back as an FYI that she called and is out of the atorvastatin.     She would like the prescriptions sent to the pharmacy below please.       [facility] [place] [id number] - [place], [place] - [address] [phone] - F [phone] [address] [id number]

## 2025-03-06 ENCOUNTER — OFFICE VISIT (OUTPATIENT)
Dept: FAMILY MEDICINE CLINIC | Age: 75
End: 2025-03-06
Payer: COMMERCIAL

## 2025-03-06 ENCOUNTER — HOSPITAL ENCOUNTER (OUTPATIENT)
Dept: GENERAL RADIOLOGY | Age: 75
Discharge: HOME | End: 2025-03-06
Payer: COMMERCIAL

## 2025-03-06 VITALS
DIASTOLIC BLOOD PRESSURE: 68 MMHG | SYSTOLIC BLOOD PRESSURE: 120 MMHG | HEART RATE: 74 BPM | BODY MASS INDEX: 34.02 KG/M2 | OXYGEN SATURATION: 97 % | WEIGHT: 186 LBS

## 2025-03-06 DIAGNOSIS — G62.9 NEUROPATHY: ICD-10-CM

## 2025-03-06 DIAGNOSIS — M54.40 ACUTE BILATERAL LOW BACK PAIN WITH SCIATICA, SCIATICA LATERALITY UNSPECIFIED: ICD-10-CM

## 2025-03-06 DIAGNOSIS — R79.9 ABNORMAL FINDING OF BLOOD CHEMISTRY, UNSPECIFIED: ICD-10-CM

## 2025-03-06 DIAGNOSIS — R93.812 ABNORMAL RADIOLOGIC FINDINGS ON DIAGNOSTIC IMAGING OF LEFT TESTICLE: ICD-10-CM

## 2025-03-06 DIAGNOSIS — E67.8 OTHER SPECIFIED HYPERALIMENTATION: ICD-10-CM

## 2025-03-06 DIAGNOSIS — G62.9 NEUROPATHY: Primary | ICD-10-CM

## 2025-03-06 DIAGNOSIS — R10.9 ABDOMINAL CRAMPING: ICD-10-CM

## 2025-03-06 DIAGNOSIS — Z79.899 LONG TERM USE OF DRUG: ICD-10-CM

## 2025-03-06 DIAGNOSIS — R78.71 ABNORMAL LEAD LEVEL IN BLOOD: ICD-10-CM

## 2025-03-06 LAB
25(OH)D3 SERPL-MCNC: 30.2 NG/ML
ALBUMIN SERPL-MCNC: 3.7 G/DL (ref 3.4–5)
ALBUMIN/GLOB SERPL: 1.7 {RATIO} (ref 1.1–2.2)
ALP SERPL-CCNC: 82 U/L (ref 40–129)
ALT SERPL-CCNC: 17 U/L (ref 10–40)
ANION GAP SERPL CALCULATED.3IONS-SCNC: 9 MMOL/L (ref 3–16)
AST SERPL-CCNC: 20 U/L (ref 15–37)
BASOPHILS # BLD: 0 K/UL (ref 0–0.2)
BASOPHILS NFR BLD: 0.5 %
BILIRUB SERPL-MCNC: <0.2 MG/DL (ref 0–1)
BUN SERPL-MCNC: 16 MG/DL (ref 7–20)
CALCIUM SERPL-MCNC: 8.6 MG/DL (ref 8.3–10.6)
CHLORIDE SERPL-SCNC: 103 MMOL/L (ref 99–110)
CHOLEST SERPL-MCNC: 125 MG/DL (ref 0–199)
CO2 SERPL-SCNC: 25 MMOL/L (ref 21–32)
CREAT SERPL-MCNC: 1 MG/DL (ref 0.6–1.2)
DEPRECATED RDW RBC AUTO: 14.4 % (ref 12.4–15.4)
EOSINOPHIL # BLD: 0.1 K/UL (ref 0–0.6)
EOSINOPHIL NFR BLD: 1.2 %
FOLATE SERPL-MCNC: 3.92 NG/ML (ref 4.78–24.2)
GFR SERPLBLD CREATININE-BSD FMLA CKD-EPI: 60 ML/MIN/{1.73_M2}
GLUCOSE SERPL-MCNC: 88 MG/DL (ref 70–99)
HCT VFR BLD AUTO: 37.1 % (ref 36–48)
HDLC SERPL-MCNC: 57 MG/DL (ref 40–60)
HGB BLD-MCNC: 12.1 G/DL (ref 12–16)
LDLC SERPL CALC-MCNC: 55 MG/DL
LYMPHOCYTES # BLD: 1.1 K/UL (ref 1–5.1)
LYMPHOCYTES NFR BLD: 23.1 %
MCH RBC QN AUTO: 31 PG (ref 26–34)
MCHC RBC AUTO-ENTMCNC: 32.7 G/DL (ref 31–36)
MCV RBC AUTO: 94.9 FL (ref 80–100)
MONOCYTES # BLD: 0.4 K/UL (ref 0–1.3)
MONOCYTES NFR BLD: 9.1 %
NEUTROPHILS # BLD: 3.1 K/UL (ref 1.7–7.7)
NEUTROPHILS NFR BLD: 66.1 %
PLATELET # BLD AUTO: 240 K/UL (ref 135–450)
PMV BLD AUTO: 9.3 FL (ref 5–10.5)
POTASSIUM SERPL-SCNC: 4.5 MMOL/L (ref 3.5–5.1)
PROT SERPL-MCNC: 5.9 G/DL (ref 6.4–8.2)
RBC # BLD AUTO: 3.91 M/UL (ref 4–5.2)
SODIUM SERPL-SCNC: 137 MMOL/L (ref 136–145)
T3FREE SERPL-MCNC: 2.7 PG/ML (ref 2.3–4.2)
T4 FREE SERPL-MCNC: 1.7 NG/DL (ref 0.9–1.8)
TRIGL SERPL-MCNC: 64 MG/DL (ref 0–150)
TSH SERPL DL<=0.005 MIU/L-ACNC: 0.15 UIU/ML (ref 0.27–4.2)
VIT B12 SERPL-MCNC: 515 PG/ML (ref 211–911)
VLDLC SERPL CALC-MCNC: 13 MG/DL
WBC # BLD AUTO: 4.6 K/UL (ref 4–11)

## 2025-03-06 PROCEDURE — 1159F MED LIST DOCD IN RCRD: CPT | Performed by: STUDENT IN AN ORGANIZED HEALTH CARE EDUCATION/TRAINING PROGRAM

## 2025-03-06 PROCEDURE — 72100 X-RAY EXAM L-S SPINE 2/3 VWS: CPT

## 2025-03-06 PROCEDURE — 99214 OFFICE O/P EST MOD 30 MIN: CPT | Performed by: STUDENT IN AN ORGANIZED HEALTH CARE EDUCATION/TRAINING PROGRAM

## 2025-03-06 PROCEDURE — 1123F ACP DISCUSS/DSCN MKR DOCD: CPT | Performed by: STUDENT IN AN ORGANIZED HEALTH CARE EDUCATION/TRAINING PROGRAM

## 2025-03-06 RX ORDER — DICYCLOMINE HCL 20 MG
20 TABLET ORAL 4 TIMES DAILY
Qty: 120 TABLET | Refills: 0 | Status: SHIPPED | OUTPATIENT
Start: 2025-03-06 | End: 2025-04-05

## 2025-03-06 ASSESSMENT — ENCOUNTER SYMPTOMS
BACK PAIN: 1
ABDOMINAL PAIN: 1
SHORTNESS OF BREATH: 0

## 2025-03-06 NOTE — PROGRESS NOTES
Christian Adams is a 74 y.o.female who presents with   Chief Complaint   Patient presents with    Other     6 week follow up for back pain and constipation    Numbness     Numbness in both feet and starting in finger tips   Portions of this chart may have been created with voice recognition software. Occasional wrong-word or \"sound-alike\" substitutions may have occurred due to the inherent limitations of voice recognition software. Read the chart carefully and recognize, using context, where these substitutions have occurred  Reports significant improvement in symptoms of constipation. Reports abdominal cramping however Continues to have sciatic pain.  Now has developed some numbness in the soles of her feet.  States that will occasionally feel numbness in the tips of her fingers.    Denies any weakness.  Denies any other neurologic symptoms.            Review of Systems   Constitutional:  Negative for fatigue.   Eyes:  Negative for visual disturbance.   Respiratory:  Negative for shortness of breath.    Cardiovascular:  Negative for chest pain.   Gastrointestinal:  Positive for abdominal pain.   Musculoskeletal:  Positive for back pain.   Skin:  Negative for rash.   Neurological:  Positive for numbness. Negative for dizziness, light-headedness and headaches.        Past Medical History:   Diagnosis Date    Abdominal wall abscess     Acute pyelonephritis 7/8/2021    Age related osteoporosis     Anal fissure 7/16/2013    Anxiety     Colon polyps     Depression     E coli bacteremia     Fibroid uterus     Hypothyroidism     Morbid obesity     Osteoarthritis of left knee 1/26/2016    Osteopenia     Urinary incontinence 5/3/2022       Past Surgical History:   Procedure Laterality Date    ABDOMINAL HERNIA REPAIR  2008    APPENDECTOMY  Age 24    CATARACT REMOVAL WITH IMPLANT Right 10/23/2017    with vitrectomy    CATARACT REMOVAL WITH IMPLANT Left 02/26/2018    with vitrectomy    COLONOSCOPY N/A 10/10/2018    COLONOSCOPY

## 2025-03-07 LAB
EST. AVERAGE GLUCOSE BLD GHB EST-MCNC: 99.7 MG/DL
HBA1C MFR BLD: 5.1 %

## 2025-03-11 ENCOUNTER — RESULTS FOLLOW-UP (OUTPATIENT)
Dept: GENERAL RADIOLOGY | Age: 75
End: 2025-03-11

## 2025-03-11 ENCOUNTER — RESULTS FOLLOW-UP (OUTPATIENT)
Dept: FAMILY MEDICINE CLINIC | Age: 75
End: 2025-03-11

## 2025-03-11 DIAGNOSIS — M54.50 CHRONIC BILATERAL LOW BACK PAIN, UNSPECIFIED WHETHER SCIATICA PRESENT: Primary | ICD-10-CM

## 2025-03-11 DIAGNOSIS — G89.29 CHRONIC BILATERAL LOW BACK PAIN, UNSPECIFIED WHETHER SCIATICA PRESENT: Primary | ICD-10-CM

## 2025-03-17 ENCOUNTER — OFFICE VISIT (OUTPATIENT)
Dept: FAMILY MEDICINE CLINIC | Age: 75
End: 2025-03-17
Payer: COMMERCIAL

## 2025-03-17 VITALS
OXYGEN SATURATION: 97 % | HEART RATE: 76 BPM | SYSTOLIC BLOOD PRESSURE: 118 MMHG | DIASTOLIC BLOOD PRESSURE: 74 MMHG | TEMPERATURE: 97.7 F | WEIGHT: 182.8 LBS | HEIGHT: 62 IN | RESPIRATION RATE: 16 BRPM | BODY MASS INDEX: 33.64 KG/M2

## 2025-03-17 DIAGNOSIS — G62.9 NEUROPATHY: ICD-10-CM

## 2025-03-17 DIAGNOSIS — G47.9 DIFFICULTY SLEEPING: ICD-10-CM

## 2025-03-17 DIAGNOSIS — E03.9 HYPOTHYROIDISM, UNSPECIFIED TYPE: ICD-10-CM

## 2025-03-17 DIAGNOSIS — E53.8 FOLIC ACID DEFICIENCY: Primary | ICD-10-CM

## 2025-03-17 PROCEDURE — 1159F MED LIST DOCD IN RCRD: CPT | Performed by: STUDENT IN AN ORGANIZED HEALTH CARE EDUCATION/TRAINING PROGRAM

## 2025-03-17 PROCEDURE — 1123F ACP DISCUSS/DSCN MKR DOCD: CPT | Performed by: STUDENT IN AN ORGANIZED HEALTH CARE EDUCATION/TRAINING PROGRAM

## 2025-03-17 PROCEDURE — G2211 COMPLEX E/M VISIT ADD ON: HCPCS | Performed by: STUDENT IN AN ORGANIZED HEALTH CARE EDUCATION/TRAINING PROGRAM

## 2025-03-17 PROCEDURE — 1160F RVW MEDS BY RX/DR IN RCRD: CPT | Performed by: STUDENT IN AN ORGANIZED HEALTH CARE EDUCATION/TRAINING PROGRAM

## 2025-03-17 PROCEDURE — 99214 OFFICE O/P EST MOD 30 MIN: CPT | Performed by: STUDENT IN AN ORGANIZED HEALTH CARE EDUCATION/TRAINING PROGRAM

## 2025-03-17 RX ORDER — DULOXETIN HYDROCHLORIDE 20 MG/1
20 CAPSULE, DELAYED RELEASE ORAL DAILY
Qty: 30 CAPSULE | Refills: 3 | Status: SHIPPED | OUTPATIENT
Start: 2025-03-17

## 2025-03-17 RX ORDER — HYDROXYZINE HYDROCHLORIDE 25 MG/1
25 TABLET, FILM COATED ORAL EVERY 8 HOURS PRN
Qty: 30 TABLET | Refills: 0 | Status: SHIPPED | OUTPATIENT
Start: 2025-03-17 | End: 2025-03-27

## 2025-03-17 RX ORDER — LEVOTHYROXINE SODIUM 175 UG/1
175 TABLET ORAL DAILY
Qty: 90 TABLET | Refills: 1 | Status: SHIPPED | OUTPATIENT
Start: 2025-03-17

## 2025-03-17 RX ORDER — FOLIC ACID 1 MG/1
2 TABLET ORAL DAILY
Qty: 180 TABLET | Refills: 0 | Status: SHIPPED | OUTPATIENT
Start: 2025-03-17 | End: 2025-06-15

## 2025-03-17 ASSESSMENT — ENCOUNTER SYMPTOMS
ABDOMINAL PAIN: 0
SHORTNESS OF BREATH: 0

## 2025-03-17 NOTE — PROGRESS NOTES
Christian Adams is a 74 y.o.female who presents with   Chief Complaint   Patient presents with    Follow-up     Lab results   Portions of this chart may have been created with voice recognition software. Occasional wrong-word or \"sound-alike\" substitutions may have occurred due to the inherent limitations of voice recognition software. Read the chart carefully and recognize, using context, where these substitutions have occurred        Patient presents for follow-up.     TSH abnormal.  Appears to be suppressed.  Current dose of levothyroxine is 200 mg daily.  Patient additionally found to have decreased folic acid.  She has recently been suffering from symptoms of neuropathy leg numbness and pain and difficulty walking.  Would likely benefit from folic acid supplementation.  Has not been taking B complex for multivitamin.  Struggling to fall asleep at night due to the pain either from the feet or her back pain.  Currently on Prozac for depression and anxiety.  May benefit from switching to Cymbalta.        Review of Systems   Constitutional:  Negative for fatigue.   Eyes:  Negative for visual disturbance.   Respiratory:  Negative for shortness of breath.    Cardiovascular:  Negative for chest pain.   Gastrointestinal:  Negative for abdominal pain.   Skin:  Negative for rash.   Neurological:  Positive for numbness. Negative for dizziness, light-headedness and headaches.        Past Medical History:   Diagnosis Date    Abdominal wall abscess     Acute pyelonephritis 7/8/2021    Age related osteoporosis     Anal fissure 7/16/2013    Anxiety     Colon polyps     Depression     E coli bacteremia     Fibroid uterus     Hypothyroidism     Morbid obesity     Osteoarthritis of left knee 1/26/2016    Osteopenia     Urinary incontinence 5/3/2022       Past Surgical History:   Procedure Laterality Date    ABDOMINAL HERNIA REPAIR  2008    APPENDECTOMY  Age 24    CATARACT REMOVAL WITH IMPLANT Right 10/23/2017    with vitrectomy

## 2025-03-20 ENCOUNTER — HOSPITAL ENCOUNTER (OUTPATIENT)
Dept: MRI IMAGING | Age: 75
Discharge: HOME | End: 2025-03-20
Payer: COMMERCIAL

## 2025-03-20 DIAGNOSIS — M54.50 CHRONIC BILATERAL LOW BACK PAIN, UNSPECIFIED WHETHER SCIATICA PRESENT: ICD-10-CM

## 2025-03-20 DIAGNOSIS — G89.29 CHRONIC BILATERAL LOW BACK PAIN, UNSPECIFIED WHETHER SCIATICA PRESENT: ICD-10-CM

## 2025-03-20 PROCEDURE — 72148 MRI LUMBAR SPINE W/O DYE: CPT

## 2025-03-24 ENCOUNTER — RESULTS FOLLOW-UP (OUTPATIENT)
Dept: MRI IMAGING | Age: 75
End: 2025-03-24

## 2025-03-28 DIAGNOSIS — F41.9 ANXIETY: ICD-10-CM

## 2025-03-28 DIAGNOSIS — F51.04 PSYCHOPHYSIOLOGICAL INSOMNIA: ICD-10-CM

## 2025-03-28 RX ORDER — ALPRAZOLAM 1 MG/1
TABLET ORAL
Qty: 35 TABLET | OUTPATIENT
Start: 2025-03-28

## 2025-03-31 DIAGNOSIS — F41.9 ANXIETY: Primary | ICD-10-CM

## 2025-03-31 DIAGNOSIS — F51.04 PSYCHOPHYSIOLOGICAL INSOMNIA: ICD-10-CM

## 2025-03-31 DIAGNOSIS — G47.9 DIFFICULTY SLEEPING: ICD-10-CM

## 2025-03-31 DIAGNOSIS — F41.9 ANXIETY: ICD-10-CM

## 2025-03-31 RX ORDER — ALPRAZOLAM 1 MG/1
TABLET ORAL
Qty: 35 TABLET | Refills: 0 | Status: SHIPPED | OUTPATIENT
Start: 2025-03-31 | End: 2025-04-22

## 2025-03-31 RX ORDER — HYDROXYZINE HYDROCHLORIDE 25 MG/1
25 TABLET, FILM COATED ORAL EVERY 8 HOURS PRN
Qty: 30 TABLET | Refills: 0 | OUTPATIENT
Start: 2025-03-31 | End: 2025-04-10

## 2025-03-31 RX ORDER — ALPRAZOLAM 1 MG/1
TABLET ORAL
Qty: 60 TABLET | Refills: 0 | OUTPATIENT
Start: 2025-03-31 | End: 2025-04-30

## 2025-03-31 RX ORDER — HYDROXYZINE HYDROCHLORIDE 25 MG/1
25 TABLET, FILM COATED ORAL EVERY 8 HOURS PRN
Qty: 30 TABLET | Refills: 0 | Status: SHIPPED | OUTPATIENT
Start: 2025-03-31 | End: 2025-04-10

## 2025-04-18 DIAGNOSIS — E03.9 HYPOTHYROIDISM, UNSPECIFIED TYPE: ICD-10-CM

## 2025-04-18 LAB
T3 SERPL-MCNC: 0.96 NG/ML (ref 0.8–2)
T4 FREE SERPL-MCNC: 1.6 NG/DL (ref 0.9–1.8)
TSH SERPL DL<=0.005 MIU/L-ACNC: <0.01 UIU/ML (ref 0.27–4.2)

## 2025-04-20 DIAGNOSIS — F41.9 ANXIETY: ICD-10-CM

## 2025-04-21 RX ORDER — HYDROXYZINE HYDROCHLORIDE 25 MG/1
25 TABLET, FILM COATED ORAL EVERY 8 HOURS PRN
Qty: 30 TABLET | Refills: 0 | Status: SHIPPED | OUTPATIENT
Start: 2025-04-21 | End: 2025-05-01

## 2025-04-23 ENCOUNTER — OFFICE VISIT (OUTPATIENT)
Dept: FAMILY MEDICINE CLINIC | Age: 75
End: 2025-04-23
Payer: COMMERCIAL

## 2025-04-23 VITALS
OXYGEN SATURATION: 97 % | HEART RATE: 82 BPM | SYSTOLIC BLOOD PRESSURE: 128 MMHG | BODY MASS INDEX: 33.25 KG/M2 | DIASTOLIC BLOOD PRESSURE: 70 MMHG | WEIGHT: 181.8 LBS

## 2025-04-23 DIAGNOSIS — G62.9 NEUROPATHY: Primary | ICD-10-CM

## 2025-04-23 DIAGNOSIS — R20.0 NUMBNESS: ICD-10-CM

## 2025-04-23 DIAGNOSIS — E03.9 HYPOTHYROIDISM, UNSPECIFIED TYPE: ICD-10-CM

## 2025-04-23 PROCEDURE — 99214 OFFICE O/P EST MOD 30 MIN: CPT | Performed by: STUDENT IN AN ORGANIZED HEALTH CARE EDUCATION/TRAINING PROGRAM

## 2025-04-23 PROCEDURE — 1160F RVW MEDS BY RX/DR IN RCRD: CPT | Performed by: STUDENT IN AN ORGANIZED HEALTH CARE EDUCATION/TRAINING PROGRAM

## 2025-04-23 PROCEDURE — 1123F ACP DISCUSS/DSCN MKR DOCD: CPT | Performed by: STUDENT IN AN ORGANIZED HEALTH CARE EDUCATION/TRAINING PROGRAM

## 2025-04-23 PROCEDURE — 1159F MED LIST DOCD IN RCRD: CPT | Performed by: STUDENT IN AN ORGANIZED HEALTH CARE EDUCATION/TRAINING PROGRAM

## 2025-04-23 RX ORDER — DULOXETIN HYDROCHLORIDE 30 MG/1
30 CAPSULE, DELAYED RELEASE ORAL DAILY
Qty: 30 CAPSULE | Refills: 3 | Status: SHIPPED | OUTPATIENT
Start: 2025-04-23 | End: 2025-04-23

## 2025-04-23 NOTE — PROGRESS NOTES
education level: Not on file   Occupational History    Occupation: Computer work   Tobacco Use    Smoking status: Never    Smokeless tobacco: Never   Vaping Use    Vaping status: Never Used   Substance and Sexual Activity    Alcohol use: No    Drug use: No    Sexual activity: Not Currently   Other Topics Concern    Not on file   Social History Narrative    Not on file     Social Drivers of Health     Financial Resource Strain: Patient Declined (9/9/2024)    Overall Financial Resource Strain (CARDIA)     Difficulty of Paying Living Expenses: Patient declined   Food Insecurity: No Food Insecurity (1/21/2025)    Hunger Vital Sign     Worried About Running Out of Food in the Last Year: Never true     Ran Out of Food in the Last Year: Never true   Transportation Needs: No Transportation Needs (1/21/2025)    PRAPARE - Transportation     Lack of Transportation (Medical): No     Lack of Transportation (Non-Medical): No   Physical Activity: Insufficiently Active (9/9/2024)    Exercise Vital Sign     Days of Exercise per Week: 3 days     Minutes of Exercise per Session: 30 min   Stress: Not on file   Social Connections: Not on file   Intimate Partner Violence: Not At Risk (10/8/2023)    Humiliation, Afraid, Rape, and Kick questionnaire     Fear of Current or Ex-Partner: No     Emotionally Abused: No     Physically Abused: No     Sexually Abused: No   Housing Stability: Low Risk  (1/21/2025)    Housing Stability Vital Sign     Unable to Pay for Housing in the Last Year: No     Number of Times Moved in the Last Year: 0     Homeless in the Last Year: No       Current Outpatient Medications on File Prior to Visit   Medication Sig Dispense Refill    hydrOXYzine HCl (ATARAX) 25 MG tablet TAKE 1 TABLET BY MOUTH EVERY 8 HOURS AS NEEDED FOR ITCHING 30 tablet 0    folic acid (FOLVITE) 1 MG tablet Take 2 tablets by mouth daily 180 tablet 0    DULoxetine (CYMBALTA) 20 MG extended release capsule Take 1 capsule by mouth daily 30 capsule 3

## 2025-04-28 DIAGNOSIS — F41.9 ANXIETY: ICD-10-CM

## 2025-04-28 DIAGNOSIS — F51.04 PSYCHOPHYSIOLOGICAL INSOMNIA: ICD-10-CM

## 2025-04-28 RX ORDER — ALPRAZOLAM 1 MG/1
TABLET ORAL
Qty: 35 TABLET | Refills: 0 | Status: SHIPPED | OUTPATIENT
Start: 2025-04-28 | End: 2025-05-20

## 2025-04-28 RX ORDER — ALPRAZOLAM 1 MG/1
TABLET ORAL
Qty: 35 TABLET | OUTPATIENT
Start: 2025-04-28

## 2025-05-05 ENCOUNTER — TELEPHONE (OUTPATIENT)
Dept: FAMILY MEDICINE CLINIC | Age: 75
End: 2025-05-05

## 2025-05-05 ENCOUNTER — TELEPHONE (OUTPATIENT)
Dept: NEUROLOGY | Age: 75
End: 2025-05-05

## 2025-05-05 DIAGNOSIS — R20.0 NUMBNESS OF FEET: ICD-10-CM

## 2025-05-05 DIAGNOSIS — R20.0 NUMBNESS AND TINGLING OF UPPER EXTREMITY: Primary | ICD-10-CM

## 2025-05-05 DIAGNOSIS — R20.0 NUMBNESS OF LOWER EXTREMITY: ICD-10-CM

## 2025-05-05 DIAGNOSIS — R20.0 NUMBNESS OF FINGERS: ICD-10-CM

## 2025-05-05 DIAGNOSIS — R20.2 NUMBNESS AND TINGLING OF UPPER EXTREMITY: Primary | ICD-10-CM

## 2025-05-05 NOTE — TELEPHONE ENCOUNTER
Fannie from Neurology office called stating they received a referral but they need clarification on if is the upper or lower extremity.     [phone]

## 2025-05-05 NOTE — TELEPHONE ENCOUNTER
Pt called to schedule EMG. Referral missing which body parts need the test. Will call Radha Flores office for clarification.

## 2025-05-06 NOTE — TELEPHONE ENCOUNTER
Dr. Martínez's office is asking for a new order to say bilateral upper and lower extremities before they can schedule the patient.

## 2025-05-07 NOTE — TELEPHONE ENCOUNTER
Does pt need EMG ? Latest referral is just for npt appt.   Please advise if pt needs EMG, if so please resend referral for EMG and location of body parts.

## 2025-05-08 ENCOUNTER — TELEPHONE (OUTPATIENT)
Dept: FAMILY MEDICINE CLINIC | Age: 75
End: 2025-05-08

## 2025-05-08 ENCOUNTER — TELEPHONE (OUTPATIENT)
Dept: NEUROLOGY | Age: 75
End: 2025-05-08

## 2025-05-08 NOTE — TELEPHONE ENCOUNTER
Left message for patient to call us back. The order for this test has been updated according to what the Neurologist office has asked for.

## 2025-05-08 NOTE — TELEPHONE ENCOUNTER
[phone]    Tai from [facility] called regarding this patient's most recent order for an EMG NCV.     States the order must be updated to specify which limbs / body parts the patient is being treated for please.

## 2025-05-08 NOTE — TELEPHONE ENCOUNTER
[phone] (home)     Patient called back checking on the status of the updated order - informed her that we have notified clinical staff that the order needs corrected.     Also provided her with central Scheduling's phone number to set up her EMG.

## 2025-05-08 NOTE — TELEPHONE ENCOUNTER
According to them it can't just say upper and lower extremities, it has to say which specific limb/part.

## 2025-05-08 NOTE — TELEPHONE ENCOUNTER
On EMG order no limb is specified. Called Dr. Flores's office to get clarification. I was told by someone in there office that a message has been sent to the doctor and they will be giving us a call back .

## 2025-05-09 NOTE — TELEPHONE ENCOUNTER
PCP's office said they've fixed the EMG order to include which extremities need to be included but they didn't.  Provider put in \"numbness of fingers and numbness of feet but still didn't specify if EMG is to be done on bilateral upper and lower extremities.  Will proceed with scheduling, despite the order not being corrected.

## 2025-05-12 NOTE — TELEPHONE ENCOUNTER
PCP's office still hasn't fixed the order to include extremities that need to be included.  PCP clarified that pt needs EMG BUE & BLE but the order still doesn't state that.  The diagnosis PCP associated with EMG order is \"numbness of fingers and numbness of feet\" so it's clear that she wants pt to have EMG BUE & BLE but she didn't put in the order \"bilateral upper extremities and bilateral lower extremities.\"  None the less, will schedule pt instead of calling PCP's office for a 3rd time about this.      LM for her to call back and ask for me or Rosy.

## 2025-05-16 ENCOUNTER — OFFICE VISIT (OUTPATIENT)
Dept: SURGERY | Age: 75
End: 2025-05-16
Payer: COMMERCIAL

## 2025-05-16 VITALS
WEIGHT: 175 LBS | HEART RATE: 78 BPM | BODY MASS INDEX: 32.01 KG/M2 | DIASTOLIC BLOOD PRESSURE: 72 MMHG | SYSTOLIC BLOOD PRESSURE: 122 MMHG

## 2025-05-16 DIAGNOSIS — S30.1XXA ABDOMINAL WALL SEROMA, INITIAL ENCOUNTER: Primary | ICD-10-CM

## 2025-05-16 PROCEDURE — 1123F ACP DISCUSS/DSCN MKR DOCD: CPT | Performed by: SURGERY

## 2025-05-16 PROCEDURE — 99214 OFFICE O/P EST MOD 30 MIN: CPT | Performed by: SURGERY

## 2025-05-16 PROCEDURE — 1159F MED LIST DOCD IN RCRD: CPT | Performed by: SURGERY

## 2025-05-16 NOTE — PROGRESS NOTES
PATIENT NAME: Christian Adams     YOB: 1950     TODAY'S DATE: 5/16/2025    Reason for Visit:  abdominal wall fluid collection     Requesting Physician:  Radha Flores MD    HISTORY OF PRESENT ILLNESS:              The patient is a 74 y.o. female with a PMHx as delineated below who presents with   Chief Complaint   Patient presents with    New Patient      Abdominal wall seroma     Hx BP/DS about 25 years ago. Developed incisional hernias twice, both repaired. Believes mesh was used. Had fluid collection in abdominal wall after hernia surgery, drained in 2017. Reportedly not infected.    Seen in 2018 by Dr. Moctezuma - chronic asymptomatic seroma in old hernia space   Seen in 2021 - c/f abscess s/p bedside I&D   Last seen in 2022 - concern that mesh would need to be explanted if seroma intervention needed    Denies fevers/ chills. Denies n/v. Tolerates diet. Having bowel movements.       REVIEW OF SYSTEMS:  CONSTITUTIONAL:  negative  HEENT:  negative  RESPIRATORY:  negative  CARDIOVASCULAR:  negative  GASTROINTESTINAL:  negative   GENITOURINARY:  negative  HEMATOLOGIC/LYMPHATIC:  negative  MUSCULOSKELETAL: negative  NEUROLOGICAL:  negative    PMH  Past Medical History:   Diagnosis Date    Abdominal wall abscess     Acute pyelonephritis 7/8/2021    Age related osteoporosis     Anal fissure 7/16/2013    Anxiety     Colon polyps     Depression     E coli bacteremia     Fibroid uterus     Hypothyroidism     Morbid obesity (HCC)     Osteoarthritis of left knee 1/26/2016    Osteopenia     Urinary incontinence 5/3/2022       PSH  Past Surgical History:   Procedure Laterality Date    ABDOMINAL HERNIA REPAIR  2008    APPENDECTOMY  Age 24    CATARACT REMOVAL WITH IMPLANT Right 10/23/2017    with vitrectomy    CATARACT REMOVAL WITH IMPLANT Left 02/26/2018    with vitrectomy    COLONOSCOPY N/A 10/10/2018    COLONOSCOPY POLYPECTOMY SNARE/COLD BIOPSY performed by Darion Barker MD at [facility] ENDOSCOPY

## 2025-05-19 ENCOUNTER — TELEPHONE (OUTPATIENT)
Dept: SURGERY | Age: 75
End: 2025-05-19

## 2025-05-19 DIAGNOSIS — S30.1XXD ABDOMINAL WALL SEROMA, SUBSEQUENT ENCOUNTER: Primary | ICD-10-CM

## 2025-05-19 NOTE — TELEPHONE ENCOUNTER
Informed patient that CT order was placed and to call central scheduling to schedule hte scan [phone]

## 2025-05-21 DIAGNOSIS — F51.04 PSYCHOPHYSIOLOGICAL INSOMNIA: ICD-10-CM

## 2025-05-21 DIAGNOSIS — F41.9 ANXIETY: ICD-10-CM

## 2025-05-22 ENCOUNTER — PROCEDURE VISIT (OUTPATIENT)
Dept: NEUROLOGY | Age: 75
End: 2025-05-22

## 2025-05-22 DIAGNOSIS — R20.0 NUMBNESS AND TINGLING IN BOTH HANDS: Primary | ICD-10-CM

## 2025-05-22 DIAGNOSIS — R20.0 NUMBNESS AND TINGLING OF BOTH FEET: ICD-10-CM

## 2025-05-22 DIAGNOSIS — R20.2 NUMBNESS AND TINGLING IN BOTH HANDS: Primary | ICD-10-CM

## 2025-05-22 DIAGNOSIS — R20.2 NUMBNESS AND TINGLING OF BOTH FEET: ICD-10-CM

## 2025-05-22 RX ORDER — ALPRAZOLAM 1 MG/1
TABLET ORAL
Qty: 35 TABLET | Refills: 0 | Status: SHIPPED | OUTPATIENT
Start: 2025-05-22 | End: 2025-06-22

## 2025-05-22 NOTE — PATIENT INSTRUCTIONS
Verbal consent was obtained from patient and/or patient's advocate for in office procedure with Dr. Chet Martínez MD (EMG or EEG).      [de-identified] :  reviewed by me. CHIQUITA/L MARTHA\par

## 2025-05-22 NOTE — PROGRESS NOTES
Dear  Radha Flores MD  [address]  [address]  [place] [id number]      I had the pleasure of seeing your patient Christian Adams today for EMG and NCV. I have attached a detailed summary below:    Electromyography report      [facility]  [address], [address]  [place] [address]      Patient: Christian Adams    MR Number: [id number]  YOB: 1950  Date of Visit: 5/22/2025      Clinical history:  The patient is a 74 y.o. years old female with several month history of bilateral hands and feet numbness and tingling.  On examination no sensory deficit or weakness.  2+ DTRs throughout.    Findings:   For full details about such findings, please see attached report. Needle examination was recorded using monopolar needle in selected muscles. Unless otherwise noted, the temperature of the limb was monitored and maintained between 32-36°C during the performance of the NCV testing.       1.  Right median motor distal latency, amplitude and conduction velocity were within normal limit  2.  Left median motor distal latency, amplitude and conduction velocity were within normal  3.  Left ulnar motor distal latency, amplitude and conduction velocity were within normal limit  4.  Right ulnar motor distal latency, amplitude and conduction velocity were within normal limit  5.  Right ulnar sensory distal latency, amplitude and conduction velocity were within normal limits  6.  Left ulnar sensory distal latency, amplitude and conduction velocity were within normal limit   7.  Right median sensory distal latency, amplitude and conduction velocity were within normal limit  8.  Left median sensory distal latency, amplitude and conduction velocity were within normal limit.  9.  Needle examinations of bilateral upper extremities were performed in selected muscles. Needle examination of these selected muscle showed normal insertional activities, morphology, amplitude, and recruitment

## 2025-05-23 DIAGNOSIS — E03.9 HYPOTHYROIDISM, UNSPECIFIED TYPE: ICD-10-CM

## 2025-05-23 DIAGNOSIS — R20.0 NUMBNESS: ICD-10-CM

## 2025-05-23 DIAGNOSIS — G62.9 NEUROPATHY: ICD-10-CM

## 2025-05-24 LAB
ANA SER QL IA: NEGATIVE
RHEUMATOID FACT SER IA-ACNC: <10 IU/ML
T3 SERPL-MCNC: 0.98 NG/ML (ref 0.8–2)
T4 FREE SERPL-MCNC: 1.6 NG/DL (ref 0.9–1.8)
TSH SERPL DL<=0.005 MIU/L-ACNC: 0.12 UIU/ML (ref 0.27–4.2)

## 2025-05-25 ENCOUNTER — HOSPITAL ENCOUNTER (EMERGENCY)
Age: 75
Discharge: HOME | End: 2025-05-25
Attending: EMERGENCY MEDICINE
Payer: COMMERCIAL

## 2025-05-25 VITALS
HEIGHT: 62 IN | RESPIRATION RATE: 18 BRPM | DIASTOLIC BLOOD PRESSURE: 69 MMHG | SYSTOLIC BLOOD PRESSURE: 145 MMHG | WEIGHT: 173.9 LBS | TEMPERATURE: 98.6 F | HEART RATE: 85 BPM | BODY MASS INDEX: 32 KG/M2 | OXYGEN SATURATION: 97 %

## 2025-05-25 DIAGNOSIS — K08.409 STATUS POST TOOTH EXTRACTION: ICD-10-CM

## 2025-05-25 DIAGNOSIS — K08.89 PAIN, DENTAL: Primary | ICD-10-CM

## 2025-05-25 PROCEDURE — 6370000000 HC RX 637 (ALT 250 FOR IP): Performed by: EMERGENCY MEDICINE

## 2025-05-25 PROCEDURE — 99283 EMERGENCY DEPT VISIT LOW MDM: CPT

## 2025-05-25 RX ORDER — HYDROCODONE BITARTRATE AND ACETAMINOPHEN 5; 325 MG/1; MG/1
2 TABLET ORAL ONCE
Status: COMPLETED | OUTPATIENT
Start: 2025-05-25 | End: 2025-05-25

## 2025-05-25 RX ORDER — HYDROCODONE BITARTRATE AND ACETAMINOPHEN 5; 325 MG/1; MG/1
1 TABLET ORAL EVERY 8 HOURS PRN
Qty: 8 TABLET | Refills: 0 | Status: SHIPPED | OUTPATIENT
Start: 2025-05-25 | End: 2025-05-28

## 2025-05-25 RX ADMIN — HYDROCODONE BITARTRATE AND ACETAMINOPHEN 2 TABLET: 5; 325 TABLET ORAL at 14:27

## 2025-05-25 ASSESSMENT — PAIN DESCRIPTION - LOCATION: LOCATION: MOUTH

## 2025-05-25 ASSESSMENT — PAIN SCALES - GENERAL
PAINLEVEL_OUTOF10: 10
PAINLEVEL_OUTOF10: 10

## 2025-05-25 ASSESSMENT — PAIN - FUNCTIONAL ASSESSMENT: PAIN_FUNCTIONAL_ASSESSMENT: 0-10

## 2025-05-25 ASSESSMENT — PAIN DESCRIPTION - ORIENTATION: ORIENTATION: LEFT

## 2025-05-25 NOTE — ED NOTES
--Patient provided with discharge instructions and any prescriptions.   --Instructions, dosing, and follow-up appointments reviewed with patient/family. No further questions or needs at this time.   --Vital signs and patient stable upon discharge.  --Patient ambulatory to lobby with walker with family.

## 2025-05-25 NOTE — ED PROVIDER NOTES
[facility] Emergency Department      Pt Name: Christian Adams  MRN: [id number]  Birthdate 1950  Date of evaluation: 5/25/2025  Provider: SILVESTRE CROCKETT MD  CHIEF COMPLAINT  Chief Complaint   Patient presents with    Dental Pain     Patient in with Family, states had all of her top teeth and some lower teeth pulled last Monday 5/19, was discharged with a weeks worth of Tylenol with Codeine, woke up this am in severe pain that radiates to her ears.      HPI  Christian Adams is a 74 y.o. female who presents because of toothache.  Pain is on the left side of her mouth and radiates to her ear.  Denies any fever or chills.  She had all of her top teeth and lower molars surgically removed on Monday the 19th of this month.  She was discharged with Tylenol with codeine.  She has completed the prescription and we thought she was doing well until this morning.  She woke up with severe pain in the left side of her mouth.  Denies any swelling.  She tried contacting the dentist but this is a holiday weekend and had no success with even the emergency dentist numbers.  She says she is currently taking an antibiotic prescribed by her dentist as well as the mouthwash.    REVIEW OF SYSTEMS:  No fever, no vomiting Pertinent positives and negatives as per the HPI.  All other pertinent review of systems reviewed and negative.  Nursing notes reviewed.    PAST MEDICAL HISTORY  Past Medical History:   Diagnosis Date    Abdominal wall abscess     Acute pyelonephritis 7/8/2021    Age related osteoporosis     Anal fissure 7/16/2013    Anxiety     Colon polyps     Depression     E coli bacteremia     Fibroid uterus     Hypothyroidism     Morbid obesity (HCC)     Osteoarthritis of left knee 1/26/2016    Osteopenia     Urinary incontinence 5/3/2022     SURGICAL HISTORY  Past Surgical History:   Procedure Laterality Date    ABDOMINAL HERNIA REPAIR  2008    APPENDECTOMY  Age 24    CATARACT REMOVAL WITH IMPLANT Right 10/23/2017    with

## 2025-05-29 RX ORDER — BUPROPION HYDROCHLORIDE 300 MG/1
300 TABLET ORAL EVERY MORNING
Qty: 90 TABLET | Refills: 1 | Status: SHIPPED | OUTPATIENT
Start: 2025-05-29

## 2025-05-30 LAB — VIT B6 SERPL-MCNC: 39 NMOL/L (ref 20–125)

## 2025-06-03 ENCOUNTER — RESULTS FOLLOW-UP (OUTPATIENT)
Dept: FAMILY MEDICINE CLINIC | Age: 75
End: 2025-06-03

## 2025-06-03 DIAGNOSIS — E03.4 HYPOTHYROIDISM DUE TO ACQUIRED ATROPHY OF THYROID: Primary | Chronic | ICD-10-CM

## 2025-06-04 ENCOUNTER — OFFICE VISIT (OUTPATIENT)
Dept: FAMILY MEDICINE CLINIC | Age: 75
End: 2025-06-04
Payer: COMMERCIAL

## 2025-06-04 VITALS
SYSTOLIC BLOOD PRESSURE: 118 MMHG | HEIGHT: 62 IN | HEART RATE: 91 BPM | WEIGHT: 172.8 LBS | OXYGEN SATURATION: 99 % | BODY MASS INDEX: 31.8 KG/M2 | TEMPERATURE: 97.3 F | DIASTOLIC BLOOD PRESSURE: 60 MMHG

## 2025-06-04 DIAGNOSIS — G62.9 NEUROPATHY: ICD-10-CM

## 2025-06-04 DIAGNOSIS — R20.0 NUMBNESS AND TINGLING IN BOTH HANDS: ICD-10-CM

## 2025-06-04 DIAGNOSIS — R20.0 NUMBNESS AND TINGLING OF BOTH LOWER EXTREMITIES: ICD-10-CM

## 2025-06-04 DIAGNOSIS — R20.2 NUMBNESS AND TINGLING IN BOTH HANDS: ICD-10-CM

## 2025-06-04 DIAGNOSIS — E03.9 HYPOTHYROIDISM, UNSPECIFIED TYPE: Primary | ICD-10-CM

## 2025-06-04 DIAGNOSIS — M54.41 CHRONIC RIGHT-SIDED LOW BACK PAIN WITH RIGHT-SIDED SCIATICA: ICD-10-CM

## 2025-06-04 DIAGNOSIS — G89.29 CHRONIC RIGHT-SIDED LOW BACK PAIN WITH RIGHT-SIDED SCIATICA: ICD-10-CM

## 2025-06-04 DIAGNOSIS — R20.2 NUMBNESS AND TINGLING OF BOTH LOWER EXTREMITIES: ICD-10-CM

## 2025-06-04 PROCEDURE — 99214 OFFICE O/P EST MOD 30 MIN: CPT

## 2025-06-04 PROCEDURE — 1159F MED LIST DOCD IN RCRD: CPT

## 2025-06-04 PROCEDURE — 1123F ACP DISCUSS/DSCN MKR DOCD: CPT

## 2025-06-04 PROCEDURE — 1160F RVW MEDS BY RX/DR IN RCRD: CPT

## 2025-06-04 RX ORDER — LEVOTHYROXINE SODIUM 175 UG/1
175 TABLET ORAL DAILY
Qty: 90 TABLET | Refills: 1 | Status: SHIPPED | OUTPATIENT
Start: 2025-06-04

## 2025-06-04 ASSESSMENT — ENCOUNTER SYMPTOMS
COUGH: 0
ABDOMINAL PAIN: 0
SHORTNESS OF BREATH: 0
VOMITING: 0
BACK PAIN: 0
NAUSEA: 0
COLOR CHANGE: 0
CHEST TIGHTNESS: 0
WHEEZING: 0

## 2025-06-04 NOTE — PROGRESS NOTES
Christian Adams (:  1950) is a 74 y.o. female,Established patient, here for evaluation of the following chief complaint(s):  Other (Lab results, pt states trying to find out what to do about her Neuropathy in feet and hands)      Assessment & Plan  Hypothyroidism, unspecified type   Chronic, not at goal (unstable), overcorrected hypothyroidism  - Continue with adjustment plan per Dr. 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  - MRI shows foraminal stenosis L2-L3, central disc extrusion L3-L4  -Encouraged follow-up with Dr. Flores  -Can consider neurology referral if no progress made  Orders:    Deysi Dubois MD, Pain Management, [facility] [place], [place]    Numbness and tingling in both hands       Orders:    Deysi Dubois MD, Pain Management, [facility] [place], [place]    Neuropathy       Orders:    Deysi Dubois MD, Pain Management, [facility] [place], [place]    Chronic right-sided low back pain with right-sided sciatica       Orders:    Deysi Dubois MD, Pain Management, [facility] [place], [place]      Return if symptoms worsen or fail to improve.    Subjective       HPI  - has been experiencing neuropathy, progressing up her legs into her knees, also affecting her legs  - had blood tests and EMG completed  - has been going on for 5 months, has not had problems since then  - sensation in her feet is impairing her ability to drive, impairing ability to walk as much, working in yard, etc  - no DM, does have a sciatic nerve hx  - back is not too bad  - no sciatica

## 2025-06-04 NOTE — ASSESSMENT & PLAN NOTE
Chronic, not at goal (unstable), overcorrected hypothyroidism  - Continue with adjustment plan per Dr. Flores, full tablet weekdays, half tablet weekends  -Recheck level in 6 weeks  Orders:    levothyroxine (SYNTHROID) 175 MCG tablet; Take 1 tablet by mouth daily half tablet Saturday and Sunday and full tablets the rest of the week    T4, Free; Future    TSH; Future

## 2025-06-11 DIAGNOSIS — E53.8 FOLIC ACID DEFICIENCY: ICD-10-CM

## 2025-06-11 RX ORDER — FOLIC ACID 1 MG/1
2 TABLET ORAL DAILY
Qty: 180 TABLET | Refills: 0 | Status: SHIPPED | OUTPATIENT
Start: 2025-06-11 | End: 2025-09-09

## 2025-06-12 ENCOUNTER — TELEPHONE (OUTPATIENT)
Dept: FAMILY MEDICINE CLINIC | Age: 75
End: 2025-06-12

## 2025-06-12 NOTE — TELEPHONE ENCOUNTER
Patient calling to receive lab results. She stated she received a phone call from our office and is returning our call. Patient phone number is [phone].

## 2025-06-19 DIAGNOSIS — F51.04 PSYCHOPHYSIOLOGICAL INSOMNIA: ICD-10-CM

## 2025-06-19 DIAGNOSIS — F41.9 ANXIETY: ICD-10-CM

## 2025-06-20 ENCOUNTER — OFFICE VISIT (OUTPATIENT)
Dept: FAMILY MEDICINE CLINIC | Age: 75
End: 2025-06-20
Payer: COMMERCIAL

## 2025-06-20 ENCOUNTER — HOSPITAL ENCOUNTER (OUTPATIENT)
Dept: GENERAL RADIOLOGY | Age: 75
Discharge: HOME | End: 2025-06-20
Payer: COMMERCIAL

## 2025-06-20 VITALS
WEIGHT: 167.8 LBS | BODY MASS INDEX: 30.88 KG/M2 | TEMPERATURE: 97 F | OXYGEN SATURATION: 97 % | HEART RATE: 93 BPM | SYSTOLIC BLOOD PRESSURE: 118 MMHG | DIASTOLIC BLOOD PRESSURE: 62 MMHG | HEIGHT: 62 IN

## 2025-06-20 DIAGNOSIS — B96.89 ACUTE BACTERIAL SINUSITIS: Primary | ICD-10-CM

## 2025-06-20 DIAGNOSIS — B96.89 ACUTE BACTERIAL SINUSITIS: ICD-10-CM

## 2025-06-20 DIAGNOSIS — J01.90 ACUTE BACTERIAL SINUSITIS: Primary | ICD-10-CM

## 2025-06-20 DIAGNOSIS — J01.90 ACUTE BACTERIAL SINUSITIS: ICD-10-CM

## 2025-06-20 PROCEDURE — 1123F ACP DISCUSS/DSCN MKR DOCD: CPT

## 2025-06-20 PROCEDURE — 1160F RVW MEDS BY RX/DR IN RCRD: CPT

## 2025-06-20 PROCEDURE — 99213 OFFICE O/P EST LOW 20 MIN: CPT

## 2025-06-20 PROCEDURE — 1159F MED LIST DOCD IN RCRD: CPT

## 2025-06-20 PROCEDURE — 71046 X-RAY EXAM CHEST 2 VIEWS: CPT

## 2025-06-20 RX ORDER — AZITHROMYCIN 250 MG/1
TABLET, FILM COATED ORAL
Qty: 6 TABLET | Refills: 0 | Status: SHIPPED | OUTPATIENT
Start: 2025-06-20 | End: 2025-06-30

## 2025-06-20 RX ORDER — ALPRAZOLAM 1 MG/1
TABLET ORAL
Qty: 35 TABLET | Refills: 0 | Status: SHIPPED | OUTPATIENT
Start: 2025-06-20 | End: 2025-07-20

## 2025-06-20 RX ORDER — OMEPRAZOLE 20 MG/1
20 CAPSULE, DELAYED RELEASE ORAL DAILY
COMMUNITY

## 2025-06-20 ASSESSMENT — ENCOUNTER SYMPTOMS
SHORTNESS OF BREATH: 0
EYE ITCHING: 0
VOMITING: 0
SINUS PAIN: 0
VOICE CHANGE: 0
SORE THROAT: 1
TROUBLE SWALLOWING: 0
DIARRHEA: 0
RHINORRHEA: 1
SINUS PRESSURE: 1
ABDOMINAL PAIN: 0
WHEEZING: 0
EYE REDNESS: 0
COUGH: 1
NAUSEA: 0
CHEST TIGHTNESS: 0
EYE DISCHARGE: 0

## 2025-06-20 NOTE — PROGRESS NOTES
Christian Adams (:  1950) is a 74 y.o. female,Established patient, here for evaluation of the following chief complaint(s):  Other (Pt c/o of poss cold and flu had it for a week now. Headache, earache, rattle in chest.)      Assessment & Plan  Acute bacterial sinusitis   Acute condition, new, Supportive care with appropriate antipyretics and fluids.  - Treatment options discussed.  Z-Seb as being sent to the pharmacy.   The medication uses and side effects were discussed with the patient.  Patient verbalized understanding and agrees to the plan.  - CXR ordered rule out pneumonia  -Emphasis of rest and hydration  -Can use OTC to help with the symptoms symptom  -Follow-up as needed  Orders:    azithromycin (ZITHROMAX) 250 MG tablet; 500mg on day 1 followed by 250mg on days 2 - 5    XR CHEST STANDARD (2 VW); Future      Return if symptoms worsen or fail to improve.    Subjective       HPI  - has been feeling sick for a bout a week  - coughing, ear ache L, sore throat, nasal drainage, taste diminished, sinus pressure, mild nasal congestion  - has not tested for covid  - breathing is okay, chest congestion, thick, rattling in her chest  - lots of drainage from her head, clear  - coughing fits, hard to stop  - no sick contacts, no recent travel  - has been trying cold and sinus meds, robitussin DM, helped some  - sleep the last week has been okay, interrupted more, harder to fall asleep  - not worse when she lays down, somewhat worse in AM  - no inhalers   - fever of 100.5 Tuesday, fatigue, no fever since   - notes has been taking 200 mcg levothyroxine all year, did not switch to 175 mcg yet      Review of Systems   Constitutional:  Positive for activity change, fatigue and fever. Negative for appetite change and chills.   HENT:  Positive for congestion, ear pain (L), postnasal drip, rhinorrhea, sinus pressure and sore throat. Negative for ear discharge, sinus pain, sneezing, trouble swallowing and voice

## 2025-06-23 ENCOUNTER — RESULTS FOLLOW-UP (OUTPATIENT)
Dept: FAMILY MEDICINE CLINIC | Age: 75
End: 2025-06-23

## 2025-06-25 RX ORDER — LEVOTHYROXINE SODIUM 200 UG/1
200 TABLET ORAL DAILY
Qty: 90 TABLET | Refills: 1 | Status: SHIPPED | OUTPATIENT
Start: 2025-06-25

## 2025-07-01 ENCOUNTER — TELEPHONE (OUTPATIENT)
Dept: FAMILY MEDICINE CLINIC | Age: 75
End: 2025-07-01

## 2025-07-01 DIAGNOSIS — E03.9 HYPOTHYROIDISM, UNSPECIFIED TYPE: ICD-10-CM

## 2025-07-01 RX ORDER — LEVOTHYROXINE SODIUM 175 UG/1
175 TABLET ORAL DAILY
Qty: 90 TABLET | Refills: 1 | Status: SHIPPED | OUTPATIENT
Start: 2025-07-01

## 2025-07-01 NOTE — TELEPHONE ENCOUNTER
Looks like both prescriptions were active.  Canceled 200 mcg prescription, reordered 175 mcg prescription    --Luke A Glischinski, APRN - CNP

## 2025-07-01 NOTE — TELEPHONE ENCOUNTER
Patient calling to explain that per discussion with Luke Glischinski, she is to be taking 175 MG of Levothyroxine. However, 200 MG of Levothyroxine was recently refilled at her pharmacy that she has not picked up due to her confusion of which dosage she is to take? She is requesting a phone call regarding which medication dosage she should take and if it is 175 MG , she is requesting it to be sent to :    [place] :  [facility] [address] [place] [address] AVE - P [phone] - F [phone]       Patient phone number: [phone]

## 2025-07-01 NOTE — TELEPHONE ENCOUNTER
Spoke with patient and let her know the 175mcg is correct and it was sent over to the pharmacy and the 200mcg was discontinued. Patient verbalized understanding and will  script.

## 2025-07-04 DIAGNOSIS — F51.04 PSYCHOPHYSIOLOGICAL INSOMNIA: ICD-10-CM

## 2025-07-07 RX ORDER — TRAZODONE HYDROCHLORIDE 50 MG/1
TABLET ORAL
Qty: 270 TABLET | Refills: 1 | Status: SHIPPED | OUTPATIENT
Start: 2025-07-07

## 2025-07-09 ENCOUNTER — TELEPHONE (OUTPATIENT)
Dept: FAMILY MEDICINE CLINIC | Age: 75
End: 2025-07-09

## 2025-07-09 NOTE — TELEPHONE ENCOUNTER
Called and spoke with patient after speaking with Dr. Flores and advised the patient to go to the ER. This was in response to a my chart message that the patient sent prior stating that she feels she is having a neurological medical emergency. I did also respond to her my chart message telling her to go to the ER, but also spoke with her as well.

## 2025-07-15 DIAGNOSIS — E03.9 HYPOTHYROIDISM, UNSPECIFIED TYPE: ICD-10-CM

## 2025-07-16 LAB
T4 FREE SERPL-MCNC: 1.6 NG/DL (ref 0.9–1.8)
TSH SERPL DL<=0.005 MIU/L-ACNC: 0.08 UIU/ML (ref 0.27–4.2)

## 2025-07-18 DIAGNOSIS — E03.9 HYPOTHYROIDISM, UNSPECIFIED TYPE: ICD-10-CM

## 2025-07-19 RX ORDER — LEVOTHYROXINE SODIUM 175 UG/1
175 TABLET ORAL DAILY
Qty: 90 TABLET | Refills: 1 | Status: SHIPPED | OUTPATIENT
Start: 2025-07-19

## 2025-07-21 ENCOUNTER — OFFICE VISIT (OUTPATIENT)
Dept: FAMILY MEDICINE CLINIC | Age: 75
End: 2025-07-21
Payer: COMMERCIAL

## 2025-07-21 VITALS
SYSTOLIC BLOOD PRESSURE: 118 MMHG | WEIGHT: 165 LBS | DIASTOLIC BLOOD PRESSURE: 72 MMHG | OXYGEN SATURATION: 98 % | HEART RATE: 73 BPM | BODY MASS INDEX: 30.18 KG/M2

## 2025-07-21 DIAGNOSIS — F51.04 PSYCHOPHYSIOLOGICAL INSOMNIA: ICD-10-CM

## 2025-07-21 DIAGNOSIS — Z79.899 LONG-TERM USE OF HIGH-RISK MEDICATION: ICD-10-CM

## 2025-07-21 DIAGNOSIS — R20.0 NUMBNESS: Primary | ICD-10-CM

## 2025-07-21 DIAGNOSIS — E03.4 HYPOTHYROIDISM DUE TO ACQUIRED ATROPHY OF THYROID: ICD-10-CM

## 2025-07-21 DIAGNOSIS — F41.9 ANXIETY: ICD-10-CM

## 2025-07-21 DIAGNOSIS — G62.9 NEUROPATHY: ICD-10-CM

## 2025-07-21 PROCEDURE — 1123F ACP DISCUSS/DSCN MKR DOCD: CPT | Performed by: STUDENT IN AN ORGANIZED HEALTH CARE EDUCATION/TRAINING PROGRAM

## 2025-07-21 PROCEDURE — G2211 COMPLEX E/M VISIT ADD ON: HCPCS | Performed by: STUDENT IN AN ORGANIZED HEALTH CARE EDUCATION/TRAINING PROGRAM

## 2025-07-21 PROCEDURE — 99214 OFFICE O/P EST MOD 30 MIN: CPT | Performed by: STUDENT IN AN ORGANIZED HEALTH CARE EDUCATION/TRAINING PROGRAM

## 2025-07-21 PROCEDURE — 1160F RVW MEDS BY RX/DR IN RCRD: CPT | Performed by: STUDENT IN AN ORGANIZED HEALTH CARE EDUCATION/TRAINING PROGRAM

## 2025-07-21 PROCEDURE — 1159F MED LIST DOCD IN RCRD: CPT | Performed by: STUDENT IN AN ORGANIZED HEALTH CARE EDUCATION/TRAINING PROGRAM

## 2025-07-21 RX ORDER — DULOXETIN HYDROCHLORIDE 20 MG/1
20 CAPSULE, DELAYED RELEASE ORAL DAILY
Qty: 30 CAPSULE | Refills: 3 | Status: SHIPPED | OUTPATIENT
Start: 2025-07-21

## 2025-07-21 RX ORDER — ALPRAZOLAM 1 MG/1
TABLET ORAL
Qty: 35 TABLET | Refills: 0 | Status: SHIPPED | OUTPATIENT
Start: 2025-07-21 | End: 2025-08-20

## 2025-07-21 NOTE — PROGRESS NOTES
Sunday and full tablets the rest of the week 90 tablet 1    traZODone (DESYREL) 50 MG tablet TAKE 3 TABLETS BY MOUTH EVERY NIGHT AT BEDTIME 270 tablet 1    ALPRAZolam (XANAX) 1 MG tablet TAKE 1 TABLET BY MOUTH TWICE DAILY AS NEEDED FOR ANXIETY OR SLEEP 35 tablet 0    omeprazole (PRILOSEC) 20 MG delayed release capsule Take 1 capsule by mouth daily      folic acid (FOLVITE) 1 MG tablet Take 2 tablets by mouth daily 180 tablet 0    buPROPion (WELLBUTRIN XL) 300 MG extended release tablet TAKE 1 TABLET BY MOUTH EVERY MORNING 90 tablet 1    acyclovir (ZOVIRAX) 200 MG capsule Take five tablets day one then 1 tablet BID for 10 days 25 capsule 0    polyethylene glycol (GLYCOLAX) 17 GM/SCOOP powder Take 17 g by mouth daily      Docusate Calcium (STOOL SOFTENER PO) Take by mouth      calcium carbonate 600 MG TABS tablet Take 1 tablet by mouth daily      Multiple Vitamin (MULTIVITAMIN PO) Take by mouth      Cyanocobalamin (VITAMIN B 12 PO) Take 500 mcg by mouth daily       Cholecalciferol (VITAMIN D) 1000 UNIT CAPS Take 2 capsules by mouth daily      dicyclomine (BENTYL) 20 MG tablet Take 1 tablet by mouth 4 times daily 120 tablet 0     No current facility-administered medications on file prior to visit.       Allergies   Allergen Reactions    Macrobid [Nitrofurantoin] Other (See Comments)     Fever, myalgias    Ativan [Lorazepam] Hallucinations    Codeine Nausea Only       Vitals:    07/21/25 1214   BP: 118/72   Pulse: 73   SpO2: 98%   Weight: 74.8 kg (165 lb)       Physical Exam  Constitutional:       General: She is not in acute distress.     Appearance: Normal appearance.   HENT:      Head: Normocephalic and atraumatic.   Eyes:      Extraocular Movements: Extraocular movements intact.      Conjunctiva/sclera: Conjunctivae normal.   Cardiovascular:      Rate and Rhythm: Normal rate and regular rhythm.      Heart sounds: Normal heart sounds.   Pulmonary:      Effort: Pulmonary effort is normal.      Breath sounds: Normal

## 2025-07-21 NOTE — PATIENT INSTRUCTIONS
Take thyroid medicine 6 days per week only  Check-in message about Cymbalta in 1 mo  Wear the Carpal Tunnel Wrist braces and update me

## 2025-07-22 LAB
AMPHETAMINES UR QL SCN>1000 NG/ML: POSITIVE
BARBITURATES UR QL SCN>200 NG/ML: ABNORMAL
BENZODIAZ UR QL SCN>200 NG/ML: POSITIVE
CANNABINOIDS UR QL SCN>50 NG/ML: ABNORMAL
COCAINE UR QL SCN: ABNORMAL
DRUG SCREEN COMMENT UR-IMP: ABNORMAL
FENTANYL SCREEN, URINE: ABNORMAL
METHADONE UR QL SCN>300 NG/ML: ABNORMAL
OPIATES UR QL SCN>300 NG/ML: ABNORMAL
OXYCODONE UR QL SCN: ABNORMAL
PCP UR QL SCN>25 NG/ML: ABNORMAL
PH UR STRIP: 5.5 [PH]

## 2025-08-17 DIAGNOSIS — F41.9 ANXIETY: ICD-10-CM

## 2025-08-17 DIAGNOSIS — F51.04 PSYCHOPHYSIOLOGICAL INSOMNIA: ICD-10-CM

## 2025-08-18 RX ORDER — ALPRAZOLAM 1 MG/1
TABLET ORAL
Qty: 35 TABLET | Refills: 0 | Status: SHIPPED | OUTPATIENT
Start: 2025-08-18 | End: 2025-09-16

## 2025-08-18 RX ORDER — ACYCLOVIR 200 MG/1
CAPSULE ORAL
Qty: 25 CAPSULE | Refills: 0 | Status: SHIPPED | OUTPATIENT
Start: 2025-08-18

## (undated) DEVICE — SOLUTION IV IRRIG WATER 1000ML POUR BRL 2F7114

## (undated) DEVICE — MEDIA CONTRAST RX ISOVUE-300 61% 30ML VIALS

## (undated) DEVICE — PORT VLV 2 W NDL FREE SMRTSITE

## (undated) DEVICE — STANDARD HYPODERMIC NEEDLE,POLYPROPYLENE HUB: Brand: MONOJECT

## (undated) DEVICE — SOLUTION IV 1000ML 0.9% SOD CHL

## (undated) DEVICE — PLATE ES AD W 9FT CRD 2

## (undated) DEVICE — PACK PROCEDURE SURG TOTAL KNEE

## (undated) DEVICE — DISPOSABLE OR TOWEL: Brand: CARDINAL HEALTH

## (undated) DEVICE — MOUTHPIECE ENDOSCP L CTRL OPN AND SIDE PORTS DISP

## (undated) DEVICE — CHLORAPREP 26ML ORANGE

## (undated) DEVICE — SUTURE VCRL SZ 2-0 L18IN ABSRB UD CT-1 L36MM 1/2 CIR J839D

## (undated) DEVICE — PEN: MARKING STD 100/CS: Brand: MEDICAL ACTION INDUSTRIES

## (undated) DEVICE — ENDOSCOPIC KIT 6X3/16 FT COLON W/ 1.1 OZ 2 GWN W/O BRSH

## (undated) DEVICE — COAXIAL HIGH FLOW TIP WITH SOFT SHIELD

## (undated) DEVICE — SYRINGE MED 3ML CLR PLAS STD N CTRL LUERLOCK TIP DISP

## (undated) DEVICE — BW-412T DISP COMBO CLEANING BRUSH: Brand: SINGLE USE COMBINATION CLEANING BRUSH

## (undated) DEVICE — STERILE POLYISOPRENE POWDER-FREE SURGICAL GLOVES: Brand: PROTEXIS

## (undated) DEVICE — TRAP SPEC RETRV CLR PLAS POLYP IN LN SUCT QUIK CTCH

## (undated) DEVICE — SUTURE MCRYL SZ 4-0 L27IN ABSRB UD L19MM PS-2 1/2 CIR PRIM Y426H

## (undated) DEVICE — SUTURE ETHBND EXCEL SZ 0 L18IN NONABSORBABLE GRN L36MM CT-1 CX21D

## (undated) DEVICE — INTENDED FOR TISSUE SEPARATION, AND OTHER PROCEDURES THAT REQUIRE A SHARP SURGICAL BLADE TO PUNCTURE OR CUT.: Brand: BARD-PARKER ® CARBON RIB-BACK BLADES

## (undated) DEVICE — APPLICATOR MEDICATED 26 CC SOLUTION HI LT ORNG CHLORAPREP

## (undated) DEVICE — GOWN,SIRUS,POLYRNF,BRTHSLV,XL,30/CS: Brand: MEDLINE

## (undated) DEVICE — NEEDLE SPNL 22GA L5IN BLK HUB S STL W/ QNCKE PNT W/OUT

## (undated) DEVICE — NEEDLE SPNL 22GA L3.5IN BLK HUB S STL REG WALL FIT STYL W/

## (undated) DEVICE — UNIVERSAL BLOCK TRAY: Brand: AVANOS*

## (undated) DEVICE — NON RIMMED SPEED PIN 80MM STERILE

## (undated) DEVICE — BLANKET WRM W29.9XL79.1IN UP BODY FORC AIR MISTRAL-AIR

## (undated) DEVICE — DUAL CUT SAGITTAL BLADE

## (undated) DEVICE — MARKER SURG SKIN UTIL BLK REG TIP NONSMEARING W/ 6IN RUL

## (undated) DEVICE — Device

## (undated) DEVICE — Device: Brand: JELCO

## (undated) DEVICE — 450 ML BOTTLE OF 0.05% CHLORHEXIDINE GLUCONATE IN 99.95% STERILE WATER FOR IRRIGATION, USP AND APPLICATOR.: Brand: IRRISEPT ANTIMICROBIAL WOUND LAVAGE

## (undated) DEVICE — NEEDLE HYPO 22GA L1.5IN BLK POLYPR HUB S STL REG BVL STR

## (undated) DEVICE — SET EXTN L7IN PRIMING VOL 0.5ML PRSS RATE STD BOR 1 REM

## (undated) DEVICE — ORTHO PRE OP PACK: Brand: MEDLINE INDUSTRIES, INC.

## (undated) DEVICE — PROCEDURE KIT ENDOSCP CUST

## (undated) DEVICE — KNEE HOLDER DISPOSABLE LINER: Brand: ALVARADO®  KNEE SUPPORT

## (undated) DEVICE — SUTURE VCRL SZ 1 L18IN ABSRB UD L36MM CT-1 1/2 CIR J841D

## (undated) DEVICE — COVER LT HNDL BLU PLAS

## (undated) DEVICE — HYPODERMIC SAFETY NEEDLE: Brand: MAGELLAN

## (undated) DEVICE — SOLUTION IV IRRIG LACTATED RINGERS 3000ML 2B7487

## (undated) DEVICE — PEEL-AWAY HOOD: Brand: FLYTE, SURGICOOL

## (undated) DEVICE — GAUZE,SPONGE,4"X4",8PLY,STRL,LF,10/TRAY: Brand: MEDLINE

## (undated) DEVICE — GARMENT,MEDLINE,DVT,INT,CALF,MED, GEN2: Brand: MEDLINE

## (undated) DEVICE — SOLUTION IV IRRIG WATER 500ML POUR BRL ST 2F7113

## (undated) DEVICE — UNDERGLOVE SURG SZ 8 BLU LTX FREE SYN POLYISOPRENE POLYMER

## (undated) DEVICE — 6 ML SYRINGE LUER-LOCK TIP: Brand: MONOJECT

## (undated) DEVICE — E-Z CLEAN, NON-STICK, PTFE COATED, ELECTROSURGICAL BLADE ELECTRODE, 2.5 INCH (6.35 CM): Brand: EZ CLEAN

## (undated) DEVICE — 3M™ STERI-DRAPE™ U-DRAPE 1015: Brand: STERI-DRAPE™

## (undated) DEVICE — SUTURE ETHBND SZ 1 L18IN NONABSORBABLE CTX L48MM 1/2 CIR CX30D

## (undated) DEVICE — BOWL AND CEMENT CARTRIDGE WITH BREAKAWAY FEMORAL NOZZLE AND MEDIUM PRESSURIZER: Brand: ACM

## (undated) DEVICE — RIMMED SPEED PIN 45MM STERILE

## (undated) DEVICE — HANDPIECE SET WITH HIGH FLOW TIP AND SUCTION TUBE: Brand: INTERPULSE

## (undated) DEVICE — SUTURE VCRL SZ 0 L18IN ABSRB UD L36MM CT-1 1/2 CIR J840D

## (undated) DEVICE — YANKAUER,OPEN TIP,W/O VENT,STERILE: Brand: MEDLINE INDUSTRIES, INC.

## (undated) DEVICE — SURE SET-DOUBLE BASIN-LF: Brand: MEDLINE INDUSTRIES, INC.

## (undated) DEVICE — HIGH FLOW TIP

## (undated) DEVICE — 3M™ COBAN™ NL STERILE NON-LATEX SELF-ADHERENT WRAP, 2086S, 6 IN X 5 YD (15 CM X 4,5 M), 12 ROLLS/CASE: Brand: 3M™ COBAN™

## (undated) DEVICE — JEWISH HOSPITAL TURNOVER KIT: Brand: MEDLINE INDUSTRIES, INC.

## (undated) DEVICE — SST TWIST DRILL, STANDARD, 3.2MM DIA. X 127MM: Brand: MICROAIRE®

## (undated) DEVICE — Device: Brand: DISPOSABLE ELECTROSURGICAL SNARE

## (undated) DEVICE — HANDPIECE SUCTION TUBING INTERPULSE 10FT

## (undated) DEVICE — AIR/WATER CLEANING ADAPTER FOR OLYMPUS® GI ENDOSCOPE: Brand: BULLDOG®

## (undated) DEVICE — FORCEPS BX L240CM WRK CHN 2.8MM STD CAP W/ NDL MIC MESH

## (undated) DEVICE — COUNTER NDL 40 COUNT HLD 70 NUM FOAM BLK SGL MAG W BLDE REMV

## (undated) DEVICE — GOWN,SIRUS,POLYRNF,BRTHSLV,XLN/XL,20/CS: Brand: MEDLINE

## (undated) DEVICE — DECANTER BAG 9": Brand: MEDLINE INDUSTRIES, INC.

## (undated) DEVICE — VALVE SUCTION AIR H2O SET ORCA POD + DISP